# Patient Record
Sex: FEMALE | Race: WHITE | HISPANIC OR LATINO | Employment: UNEMPLOYED | URBAN - METROPOLITAN AREA
[De-identification: names, ages, dates, MRNs, and addresses within clinical notes are randomized per-mention and may not be internally consistent; named-entity substitution may affect disease eponyms.]

---

## 2017-01-10 ENCOUNTER — OFFICE VISIT (OUTPATIENT)
Dept: PSYCHIATRY | Facility: CLINIC | Age: 47
End: 2017-01-10
Payer: COMMERCIAL

## 2017-01-10 VITALS
HEIGHT: 62 IN | DIASTOLIC BLOOD PRESSURE: 91 MMHG | HEART RATE: 67 BPM | WEIGHT: 174 LBS | SYSTOLIC BLOOD PRESSURE: 143 MMHG | BODY MASS INDEX: 32.02 KG/M2

## 2017-01-10 DIAGNOSIS — F40.298 SPECIFIC PHOBIA: ICD-10-CM

## 2017-01-10 DIAGNOSIS — F42.9 OBSESSIVE-COMPULSIVE DISORDER, UNSPECIFIED TYPE: ICD-10-CM

## 2017-01-10 DIAGNOSIS — F33.2 MDD (MAJOR DEPRESSIVE DISORDER), RECURRENT SEVERE, WITHOUT PSYCHOSIS: Primary | ICD-10-CM

## 2017-01-10 DIAGNOSIS — F41.1 GENERALIZED ANXIETY DISORDER: ICD-10-CM

## 2017-01-10 DIAGNOSIS — F41.1 GAD (GENERALIZED ANXIETY DISORDER): ICD-10-CM

## 2017-01-10 DIAGNOSIS — G47.33 OSA (OBSTRUCTIVE SLEEP APNEA): ICD-10-CM

## 2017-01-10 DIAGNOSIS — F34.1 DYSTHYMIC DISORDER: ICD-10-CM

## 2017-01-10 PROCEDURE — 3080F DIAST BP >= 90 MM HG: CPT | Mod: S$GLB,,, | Performed by: PSYCHIATRY & NEUROLOGY

## 2017-01-10 PROCEDURE — 3077F SYST BP >= 140 MM HG: CPT | Mod: S$GLB,,, | Performed by: PSYCHIATRY & NEUROLOGY

## 2017-01-10 PROCEDURE — 99213 OFFICE O/P EST LOW 20 MIN: CPT | Mod: S$GLB,,, | Performed by: PSYCHIATRY & NEUROLOGY

## 2017-01-10 PROCEDURE — 90833 PSYTX W PT W E/M 30 MIN: CPT | Mod: S$GLB,,, | Performed by: PSYCHIATRY & NEUROLOGY

## 2017-01-10 PROCEDURE — 1159F MED LIST DOCD IN RCRD: CPT | Mod: S$GLB,,, | Performed by: PSYCHIATRY & NEUROLOGY

## 2017-01-10 PROCEDURE — 99999 PR PBB SHADOW E&M-EST. PATIENT-LVL III: CPT | Mod: PBBFAC,,, | Performed by: PSYCHIATRY & NEUROLOGY

## 2017-01-10 RX ORDER — CLONAZEPAM 0.5 MG/1
0.5 TABLET, ORALLY DISINTEGRATING ORAL 4 TIMES DAILY
Qty: 120 TABLET | Refills: 5 | Status: SHIPPED | OUTPATIENT
Start: 2017-01-10 | End: 2017-04-10 | Stop reason: SDUPTHER

## 2017-01-10 RX ORDER — VENLAFAXINE HYDROCHLORIDE 37.5 MG/1
37.5 CAPSULE, EXTENDED RELEASE ORAL DAILY
Qty: 30 CAPSULE | Refills: 0 | Status: SHIPPED | OUTPATIENT
Start: 2017-01-10 | End: 2017-04-10 | Stop reason: DRUGHIGH

## 2017-01-10 RX ORDER — VENLAFAXINE HYDROCHLORIDE 150 MG/1
150 CAPSULE, EXTENDED RELEASE ORAL DAILY
Qty: 30 CAPSULE | Refills: 3 | Status: SHIPPED | OUTPATIENT
Start: 2017-01-10 | End: 2017-07-06 | Stop reason: SDUPTHER
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
## 2017-01-10 NOTE — PATIENT INSTRUCTIONS
Increase Effexor XR to 112.5 mg daily by taking one 75 mg capsule and one 37.5 mg capsule in the AM.  Take this dose for 2 weeks, then increase dose to 150 mg once a day in AM.     Continue all other current medications as you have been taking.

## 2017-01-24 ENCOUNTER — DOCUMENTATION ONLY (OUTPATIENT)
Dept: NEUROLOGY | Facility: CLINIC | Age: 47
End: 2017-01-24

## 2017-01-24 NOTE — PROGRESS NOTES
Fax received from [employer]. Avonex pen PA is approved through 1/16/22 with reference number [id number].

## 2017-02-27 ENCOUNTER — HOSPITAL ENCOUNTER (EMERGENCY)
Facility: HOSPITAL | Age: 47
Discharge: HOME | End: 2017-02-27
Attending: EMERGENCY MEDICINE
Payer: COMMERCIAL

## 2017-02-27 VITALS
TEMPERATURE: 98 F | DIASTOLIC BLOOD PRESSURE: 78 MMHG | RESPIRATION RATE: 14 BRPM | WEIGHT: 173 LBS | SYSTOLIC BLOOD PRESSURE: 120 MMHG | HEART RATE: 67 BPM | HEIGHT: 62 IN | OXYGEN SATURATION: 98 % | BODY MASS INDEX: 31.83 KG/M2

## 2017-02-27 DIAGNOSIS — G35 MULTIPLE SCLEROSIS EXACERBATION: Primary | ICD-10-CM

## 2017-02-27 DIAGNOSIS — R53.83 FATIGUE, UNSPECIFIED TYPE: ICD-10-CM

## 2017-02-27 DIAGNOSIS — R07.9 CHEST PAIN, UNSPECIFIED TYPE: ICD-10-CM

## 2017-02-27 LAB
ALBUMIN SERPL BCP-MCNC: 4.2 G/DL
ALP SERPL-CCNC: 80 U/L
ALT SERPL W/O P-5'-P-CCNC: 55 U/L
ANION GAP SERPL CALC-SCNC: 13 MMOL/L
AST SERPL-CCNC: 28 U/L
B-HCG UR QL: NEGATIVE
BASOPHILS # BLD AUTO: 0.02 K/UL
BASOPHILS NFR BLD: 0.3 %
BILIRUB SERPL-MCNC: 0.3 MG/DL
BILIRUB UR QL STRIP: NEGATIVE
BUN SERPL-MCNC: 11 MG/DL
CALCIUM SERPL-MCNC: 10.1 MG/DL
CHLORIDE SERPL-SCNC: 104 MMOL/L
CLARITY UR: CLEAR
CO2 SERPL-SCNC: 23 MMOL/L
COLOR UR: YELLOW
CREAT SERPL-MCNC: 0.7 MG/DL
CTP QC/QA: YES
D DIMER PPP IA.FEU-MCNC: 0.31 MG/L FEU
DIFFERENTIAL METHOD: ABNORMAL
EOSINOPHIL # BLD AUTO: 0.1 K/UL
EOSINOPHIL NFR BLD: 1.6 %
ERYTHROCYTE [DISTWIDTH] IN BLOOD BY AUTOMATED COUNT: 11.9 %
EST. GFR  (AFRICAN AMERICAN): >60 ML/MIN/1.73 M^2
EST. GFR  (NON AFRICAN AMERICAN): >60 ML/MIN/1.73 M^2
FLUAV AG SPEC QL IA: NEGATIVE
FLUBV AG SPEC QL IA: NEGATIVE
GLUCOSE SERPL-MCNC: 88 MG/DL
GLUCOSE UR QL STRIP: NEGATIVE
HCT VFR BLD AUTO: 41.9 %
HGB BLD-MCNC: 13.9 G/DL
HGB UR QL STRIP: NEGATIVE
KETONES UR QL STRIP: NEGATIVE
LEUKOCYTE ESTERASE UR QL STRIP: ABNORMAL
LYMPHOCYTES # BLD AUTO: 4.6 K/UL
LYMPHOCYTES NFR BLD: 63.3 %
MCH RBC QN AUTO: 32 PG
MCHC RBC AUTO-ENTMCNC: 33.2 %
MCV RBC AUTO: 96 FL
MICROSCOPIC COMMENT: NORMAL
MONOCYTES # BLD AUTO: 0.3 K/UL
MONOCYTES NFR BLD: 4.6 %
NEUTROPHILS # BLD AUTO: 2.2 K/UL
NEUTROPHILS NFR BLD: 30.2 %
NITRITE UR QL STRIP: NEGATIVE
PH UR STRIP: 7 [PH] (ref 5–8)
PLATELET # BLD AUTO: 251 K/UL
PMV BLD AUTO: 9.9 FL
POTASSIUM SERPL-SCNC: 3.9 MMOL/L
PROT SERPL-MCNC: 8 G/DL
PROT UR QL STRIP: NEGATIVE
RBC # BLD AUTO: 4.35 M/UL
SODIUM SERPL-SCNC: 140 MMOL/L
SP GR UR STRIP: 1.01 (ref 1–1.03)
SPECIMEN SOURCE: NORMAL
TROPONIN I SERPL DL<=0.01 NG/ML-MCNC: 0.01 NG/ML
URN SPEC COLLECT METH UR: ABNORMAL
UROBILINOGEN UR STRIP-ACNC: NEGATIVE EU/DL
WBC # BLD AUTO: 7.33 K/UL
WBC #/AREA URNS HPF: 3 /HPF (ref 0–5)

## 2017-02-27 PROCEDURE — 93005 ELECTROCARDIOGRAM TRACING: CPT

## 2017-02-27 PROCEDURE — 81000 URINALYSIS NONAUTO W/SCOPE: CPT

## 2017-02-27 PROCEDURE — 85025 COMPLETE CBC W/AUTO DIFF WBC: CPT

## 2017-02-27 PROCEDURE — 99284 EMERGENCY DEPT VISIT MOD MDM: CPT | Mod: 25

## 2017-02-27 PROCEDURE — 63600175 PHARM REV CODE 636 W HCPCS: Performed by: EMERGENCY MEDICINE

## 2017-02-27 PROCEDURE — 84484 ASSAY OF TROPONIN QUANT: CPT

## 2017-02-27 PROCEDURE — 80053 COMPREHEN METABOLIC PANEL: CPT

## 2017-02-27 PROCEDURE — 85379 FIBRIN DEGRADATION QUANT: CPT

## 2017-02-27 PROCEDURE — 81025 URINE PREGNANCY TEST: CPT

## 2017-02-27 PROCEDURE — 87400 INFLUENZA A/B EACH AG IA: CPT

## 2017-02-27 RX ORDER — PREDNISONE 20 MG/1
60 TABLET ORAL
Status: COMPLETED | OUTPATIENT
Start: 2017-02-27 | End: 2017-02-27

## 2017-02-27 RX ORDER — PREDNISONE 20 MG/1
40 TABLET ORAL DAILY
Qty: 8 TABLET | Refills: 0 | Status: SHIPPED | OUTPATIENT
Start: 2017-02-27 | End: 2017-03-04

## 2017-02-27 RX ADMIN — PREDNISONE 60 MG: 20 TABLET ORAL at 10:02

## 2017-02-27 NOTE — ED AVS SNAPSHOT
[facility] [address]  [place] [address]               Jillian Osullivan   2017  8:16 PM   ED    Description:  Female : 1970   Department:  [facility]           Your Care was Coordinated By:     Provider Role From To    Nancy Bradshaw MD Attending Provider 17 --      Reason for Visit     Generalized Body Aches           Diagnoses this Visit        Comments    Multiple sclerosis exacerbation    -  Primary     Chest pain, unspecified type         Fatigue, unspecified type           ED Disposition     None           To Do List           Follow-up Information     Follow up with Erik Berrios MD.    Specialty:  Family Medicine    Why:  As needed    Contact information:    [address]  [address]  [place] [address] [phone]         These Medications        Disp Refills Start End    predniSONE (DELTASONE) 20 MG tablet 8 tablet 0 2017 3/4/2017    Take 2 tablets (40 mg total) by mouth once daily. - Oral    Pharmacy: [facility] [address] [place] [address] [place] [facility] AT [facility] Ph #: [phone]         [facility]     [facility] Care Line -  Assistance  Registered nurses in the [facility] provide clinical advisement, health education, appointment booking, and other advisory services.  Call for this free service at [phone].             Medications           Message regarding Medications     Verify the changes and/or additions to your medication regime listed below are the same as discussed with your clinician today.  If any of these changes or additions are incorrect, please notify your healthcare provider.        START taking these NEW medications        Refills    predniSONE (DELTASONE) 20 MG tablet 0    Sig: Take 2 tablets (40 mg total) by mouth once daily.    Class: Print    Route: Oral      These medications were administered today        Dose  Freq    predniSONE tablet 60 mg 60 mg ED 1 Time    Sig: Take 3 tablets (60 mg total) by mouth ED 1 Time.    Class: Normal    Route: Oral           Verify that the below list of medications is an accurate representation of the medications you are currently taking.  If none reported, the list may be blank. If incorrect, please contact your healthcare provider. Carry this list with you in case of emergency.           Current Medications     busPIRone (BUSPAR) 15 MG tablet Take 1 tablet (15 mg total) by mouth 2 (two) times daily.    CALCIUM CARBONATE/VITAMIN D3 (VITAMIN D-3 ORAL) Take 5,000 Units/day by mouth once daily.     CALCIUM-MAGNESIUM-ZINC ORAL Take by mouth once daily. Calcium-1,000 mg  Magnesium-500 mg  Zinc-25mg    clindamycin (CLEOCIN) 300 MG capsule Take 1 capsule by mouth 3 (three) times daily.    clindamycin phosphate 1% (CLINDAGEL) 1 % gel     clobetasol (CLOBEX) 0.05 % shampoo     clonazepam (KLONOPIN) 0.5 MG disintegrating tablet Take 1 tablet (0.5 mg total) by mouth 4 (four) times daily.    coenzyme Q10 (CO Q-10) 300 mg Cap Take 1 capsule by mouth once daily. 400mg    cyanocobalamin 1,000 mcg/mL injection Inject 1,000 mLs into the muscle every 30 days.     cyproheptadine (,PERIACTIN,) 2 mg/5 mL syrup Take by mouth 2 (two) times daily.    diazepam (VALIUM) 10 MG Tab Take 1 tablet (10 mg total) by mouth daily as needed (sleep).    E AC/LINOLEIC/GAMOL AC/MARISELA (BORAGE OIL-GLA-LINOLEIC ACID ORAL) Take 1 tablet by mouth once daily.    estradiol 0.05 mg/24 hr td ptsw (VIVELLE-DOT) 0.05 mg/24 hr 1 patch. Apply twice weekly    FLAXSEED OIL ORAL Take by mouth once daily. Omega 3 2800MG    interferon beta-1a (AVONEX) 30 mcg/0.5 mL PnKt Inject 30 mcg as directed once a week.    L. RHAMNOSUS GG/INULIN (CULTURELLE PROBIOTICS ORAL) Take by mouth once daily. Ultimate Jo-Ann 15 Billion Probiotic    levothyroxine (SYNTHROID) 100 MCG tablet Take 1 tablet by mouth once daily.    linaclotide 290 mcg Cap Take 290 mcg by  mouth once daily.    melatonin 5 mg Cap Take by mouth once daily.    metformin (GLUCOPHAGE-XR) 500 MG 24 hr tablet Take 1 tablet by mouth once daily.    MINERALS ORAL Take 1 tablet by mouth once daily. New Vision Essential Minerals    omega 3-dha-epa-fish oil 1,000 (120-180) mg Cap Take 1 capsule by mouth once daily.    ONETOUCH DELICA LANCETS 33 gauge Misc     ONETOUCH ULTRA TEST Strp     ONETOUCH ULTRA2 kit     predniSONE (DELTASONE) 20 MG tablet Take 2 tablets (40 mg total) by mouth once daily.    predniSONE tablet 60 mg Take 3 tablets (60 mg total) by mouth ED 1 Time.    progesterone (PROMETRIUM) 100 MG capsule Take 2 capsules by mouth once daily.    psyllium (METAMUCIL) powder Take 1 packet by mouth once daily.    ramelteon (ROZEREM) 8 mg tablet Take 1 tablet (8 mg total) by mouth nightly.    trazodone (DESYREL) 100 MG tablet Take 1 - 2 tablets oral at bedtime for sleep    triamcinolone acetonide 0.1% (KENALOG) 0.1 % cream Apply 1 application topically 2 (two) times daily.    TURMERIC (CURCUMIN MISC) Take by mouth once daily. Super Bio-Curcumin 400 mg    UNABLE TO FIND Take by mouth 2 (two) times daily. Multigenics without Iron    UNABLE TO FIND Take 100 g by mouth once daily. medication name: D-Ribose    venlafaxine (EFFEXOR-XR) 150 MG Cp24 Take 1 capsule (150 mg total) by mouth once daily.    venlafaxine (EFFEXOR-XR) 37.5 MG 24 hr capsule Take 1 capsule (37.5 mg total) by mouth once daily.    venlafaxine (EFFEXOR-XR) 75 MG 24 hr capsule Take 1 capsule (75 mg total) by mouth once daily.           Clinical Reference Information           Your Vitals Were     BP                   129/97           Allergies as of 2/27/2017        Reactions    Copaxone [Glatiramer (Copolymer 1)] Dermatitis    Vicodin [Hydrocodone-acetaminophen] Rash    Aspirin     Other reaction(s): Rash    Penicillins     Other reaction(s): Rash  Other reaction(s): Rash    Sulfamethoxazole-trimethoprim     Other reaction(s): Rash    Vancomycin  Nausea And Vomiting    Percocet [Oxycodone-acetaminophen] Rash      Immunizations Administered on Date of Encounter - 2/27/2017     None      ED Micro, Lab, POCT     Start Ordered       Status Ordering Provider    02/27/17 2111 02/27/17 2110  CBC auto differential  STAT      Final result     02/27/17 2111 02/27/17 2110  Comprehensive metabolic panel  STAT      Final result     02/27/17 2111 02/27/17 2110  Troponin I  STAT      Final result     02/27/17 2111 02/27/17 2110  D dimer, quantitative  STAT      Final result     02/27/17 2033 02/27/17 2032  Influenza antigen Nasopharyngeal Swab  STAT      Final result     02/27/17 1847 02/27/17 1846  Urinalysis  STAT      Final result     02/27/17 1846 02/27/17 1846  Urinalysis Microscopic  Once      Final result     02/27/17 0000 02/27/17 1945  POCT urine pregnancy     Comments:  This order was created through External Result Entry    Completed       ED Imaging Orders     Start Ordered       Status Ordering Provider    02/27/17 2033 02/27/17 2032  X-Ray Chest PA And Lateral  1 time imaging      Final result         Discharge Instructions       Take steroids as directed.  Rest.  Drink plenty of fluids.  See your doctor if you are not improving in 2-3 days.        Your Scheduled Appointments     Mar 10, 2017 10:40 AM CST   New Patient with Manny Navarro MD   [facility]    [address]  [facility] [address]   [phone]            Jun 26, 2017  9:40 AM CDT   Established Patient Visit with MD Reynlod Gunderson- Multiple Sclerosis ([address] )    [address]  [facility] [address]   [phone]               [facility] complies with applicable Federal civil rights laws and does not discriminate on the basis of race, color, national origin, age, disability, or sex.        Language Assistance Services     ATTENTION: Language assistance services are available, free of charge. Please call  [phone].      ATENCIÓN: Si habla español, tiene a matthews disposición servicios gratuitos de asistencia lingüística. Llame al [phone].     CHÚ Ý: N?u b?n nói Ti?ng Vi?t, có các d?ch v? h? tr? ngôn ng? mi?n phí dành cho b?n. G?i s? [phone].

## 2017-02-28 NOTE — ED NOTES
Patient identifiers for Rosarito T [place] checked and correct.  LOC: The patient is awake, alert and aware of environment with an appropriate affect, the patient is oriented x 3 and speaking appropriately.  APPEARANCE: Patient uncomfortable and in no acute distress, patient is clean and well groomed, patient's clothing are properly fastened.  SKIN: The skin is warm and dry, patient has normal skin turgor and moist mucus membrane..  MUSKULOSKELETAL: Patient moving all extremities well, no obvious swelling or deformities noted.  RESPIRATORY: Airway is open and patent, respirations are spontaneous, patient has a normal effort and rate.  CARDIAC: Patient has a normal rate and rhythm.

## 2017-02-28 NOTE — DISCHARGE INSTRUCTIONS
Take steroids as directed.  Rest.  Drink plenty of fluids.  See your doctor if you are not improving in 2-3 days.

## 2017-02-28 NOTE — ED PROVIDER NOTES
Encounter Date: 2/27/2017       History     Chief Complaint   Patient presents with    Generalized Body Aches     pt reports generalized body aches x 2 week with intermittent SOB.      Review of patient's allergies indicates:   Allergen Reactions    Copaxone [glatiramer (copolymer 1)] Dermatitis    Vicodin [hydrocodone-acetaminophen] Rash    Aspirin      Other reaction(s): Rash    Penicillins      Other reaction(s): Rash  Other reaction(s): Rash    Sulfamethoxazole-trimethoprim      Other reaction(s): Rash    Vancomycin Nausea And Vomiting    Percocet [oxycodone-acetaminophen] Rash     HPI Comments: 46F with MS presents with fatigue x 2 weeks and chest pain that started today.  She reports no energy x 2 weeks, much worse today.  While she was riding the exercise bike today she felt like she was going to fall off.  Later, around 1400, she developed a pressure type pain in the center of her chest.  Pain has been constant since onset with no associated symptoms and no modifying factors. Pain is currently 7/10.    The history is provided by the patient.     Past Medical History:   Diagnosis Date    Endometriosis, site unspecified     H/O total hysterectomy     Hidradenitis     History of laparoscopy     History of psychiatric care     Multiple sclerosis     Panic anxiety syndrome     Therapy      Past Surgical History:   Procedure Laterality Date    APPENDECTOMY      breast reduction      HYSTERECTOMY      WA EXPLORATORY OF ABDOMEN      2002    sweat gland removal  10/2013    rt groin     Family History   Problem Relation Age of Onset    Anxiety disorder Mother     Cancer Mother      cervix    Breast cancer Neg Hx     Colon cancer Neg Hx     Ovarian cancer Neg Hx      Social History   Substance Use Topics    Smoking status: Never Smoker    Smokeless tobacco: Never Used    Alcohol use No     Review of Systems   Constitutional: Positive for fatigue. Negative for diaphoresis.   Respiratory:  Positive for shortness of breath.    Cardiovascular: Positive for chest pain. Negative for palpitations.   Gastrointestinal: Negative for abdominal pain, nausea and vomiting.   Neurological: Negative for syncope.   All other systems reviewed and are negative.      Physical Exam   Initial Vitals   BP Pulse Resp Temp SpO2   02/27/17 1842 02/27/17 1842 02/27/17 1842 02/27/17 1842 02/27/17 1842   178/111 75 18 97.9 °F (36.6 °C) 99 %     Physical Exam    Nursing note and vitals reviewed.  Constitutional: She appears well-developed and well-nourished. No distress.   HENT:   Head: Normocephalic and atraumatic.   Mouth/Throat: Oropharynx is clear and moist.   Eyes: Conjunctivae are normal.   Neck: Normal range of motion.   Cardiovascular: Normal rate, regular rhythm, normal heart sounds and intact distal pulses.   No murmur heard.  Pulmonary/Chest: Breath sounds normal. She has no wheezes. She has no rhonchi. She has no rales.   Abdominal: Soft. Bowel sounds are normal. She exhibits no distension. There is no tenderness.   Musculoskeletal: Normal range of motion. She exhibits no edema or tenderness.   Neurological: She is alert and oriented to person, place, and time. She has normal strength.   Skin: Skin is warm and dry. No pallor.   Psychiatric: She has a normal mood and affect. Her behavior is normal.         ED Course   Procedures  Labs Reviewed   URINALYSIS - Abnormal; Notable for the following:        Result Value    Leukocytes, UA Trace (*)     All other components within normal limits   CBC W/ AUTO DIFFERENTIAL - Abnormal; Notable for the following:     MCH 32.0 (*)     Gran% 30.2 (*)     Lymph% 63.3 (*)     All other components within normal limits   COMPREHENSIVE METABOLIC PANEL - Abnormal; Notable for the following:     ALT 55 (*)     All other components within normal limits   URINALYSIS MICROSCOPIC   INFLUENZA A AND B ANTIGEN   TROPONIN I   D DIMER, QUANTITATIVE     EKG Readings: (Independently Interpreted)    Initial Reading: No STEMI. Rhythm: Normal Sinus Rhythm. Heart Rate: 76. Ectopy: No Ectopy. ST Segments: Normal ST Segments. T Waves: Normal. Clinical Impression: Normal Sinus Rhythm       X-Rays:   Independently Interpreted Readings:   Other Readings:  CXR - no acute abnormalities    Medical Decision Making:   Independently Interpreted Test(s):   I have ordered and independently interpreted X-rays - see prior notes.  I have ordered and independently interpreted EKG Reading(s) - see prior notes  Clinical Tests:   Lab Tests: Ordered and Reviewed  Radiological Study: Ordered and Reviewed  Medical Tests: Reviewed and Ordered  ED Management:  Fatigue, gen weakness, now with CP x 5 hours.  NO STEMI or other acute ischemic changes.  Normal troponin and CXR.  No anemia or dehydration.  No signs of infection.  I feel this is likely MS flare.  I will treat with short steroid course and have pt follow up with PCP.                   ED Course     Clinical Impression:   The primary encounter diagnosis was Multiple sclerosis exacerbation. Diagnoses of Chest pain, unspecified type and Fatigue, unspecified type were also pertinent to this visit.          Nancy Bradshaw MD  03/05/17 8946

## 2017-03-10 ENCOUNTER — OFFICE VISIT (OUTPATIENT)
Dept: SLEEP MEDICINE | Facility: CLINIC | Age: 47
End: 2017-03-10
Attending: PSYCHIATRY & NEUROLOGY
Payer: COMMERCIAL

## 2017-03-10 VITALS
SYSTOLIC BLOOD PRESSURE: 124 MMHG | BODY MASS INDEX: 32.46 KG/M2 | DIASTOLIC BLOOD PRESSURE: 87 MMHG | HEIGHT: 62 IN | WEIGHT: 176.38 LBS | HEART RATE: 71 BPM

## 2017-03-10 DIAGNOSIS — G25.81 RLS (RESTLESS LEGS SYNDROME): Primary | ICD-10-CM

## 2017-03-10 DIAGNOSIS — G47.9 SLEEP DISTURBANCES: ICD-10-CM

## 2017-03-10 DIAGNOSIS — F41.1 GENERALIZED ANXIETY DISORDER: ICD-10-CM

## 2017-03-10 DIAGNOSIS — G47.33 OSA (OBSTRUCTIVE SLEEP APNEA): ICD-10-CM

## 2017-03-10 PROCEDURE — 1160F RVW MEDS BY RX/DR IN RCRD: CPT | Mod: S$GLB,,, | Performed by: PSYCHIATRY & NEUROLOGY

## 2017-03-10 PROCEDURE — 3079F DIAST BP 80-89 MM HG: CPT | Mod: S$GLB,,, | Performed by: PSYCHIATRY & NEUROLOGY

## 2017-03-10 PROCEDURE — 99999 PR PBB SHADOW E&M-EST. PATIENT-LVL III: CPT | Mod: PBBFAC,,, | Performed by: PSYCHIATRY & NEUROLOGY

## 2017-03-10 PROCEDURE — 3074F SYST BP LT 130 MM HG: CPT | Mod: S$GLB,,, | Performed by: PSYCHIATRY & NEUROLOGY

## 2017-03-10 PROCEDURE — 99204 OFFICE O/P NEW MOD 45 MIN: CPT | Mod: S$GLB,,, | Performed by: PSYCHIATRY & NEUROLOGY

## 2017-03-10 RX ORDER — GABAPENTIN 300 MG/1
300 CAPSULE ORAL NIGHTLY
Qty: 30 CAPSULE | Refills: 0 | Status: SHIPPED | OUTPATIENT
Start: 2017-03-10 | End: 2017-04-10 | Stop reason: SDUPTHER

## 2017-03-10 RX ORDER — LIOTHYRONINE SODIUM 5 UG/1
TABLET ORAL
Refills: 5 | COMMUNITY
Start: 2017-02-02 | End: 2017-04-10

## 2017-03-10 RX ORDER — ESTRADIOL 0.04 MG/D
1 FILM, EXTENDED RELEASE TRANSDERMAL
Refills: 5 | COMMUNITY
Start: 2017-03-02 | End: 2022-04-06

## 2017-03-10 NOTE — LETTER
March 10, 2017      Mer Yates MD  [address]  [facility] [address]           [facility]  [address]  [facility] [address]  Phone: [phone]          Patient: Jillian sOullivan   MR Number: [id number]   YOB: 1970   Date of Visit: 3/10/2017       Dear Dr. Mer Yates:    Thank you for referring Jillian Osullivan to me for evaluation. Attached you will find relevant portions of my assessment and plan of care.    If you have questions, please do not hesitate to call me. I look forward to following Jillian Osullivan along with you.    Sincerely,    Manny Navarro MD    Enclosure  CC:  No Recipients    If you would like to receive this communication electronically, please contact [email or web address] or [phone] to request more information on [email or web address] Link access.    For providers and/or their staff who would like to refer a patient to [facility], please contact us through our one-stop-shop provider referral line, [facility], at [phone].    If you feel you have received this communication in error or would no longer like to receive these types of communications, please e-mail [email or web address]

## 2017-03-10 NOTE — PROGRESS NOTES
"This 46 y.o. female patient presents for the management of obstructive sleep apnea. Per RYLEY Crow "Using CPAP but unable to wear mask--using nasal canual. Not waking up feeling rested."    BASELINE PSG 7/18/16: +CHRIS, AHI 16.4, low sat 91%, wt 168 lbs  TITRATION 9/20/16: Titrated to effective pressure 7 cm, wt. 168 lbs    ESS = 13    She is using CPAP nightly, but not feeling that her sleep is any better.  Feeling exhausted.    She continues to have difficulties with anxiety and insomnia.  Takes Buspar, clonazepam (4 times a day), Effexor - managed by Dr. Montiel    MS symptoms: initially eyesight; ongoing issues with joints/legs    Often feels need to move her legs at night; Urge; Moving constantly; Using adjustable bed;  Mostly in the evenings and around bed time.    For sleep, she has tried Rozeram, Trazodone 100 mg (it worked and then stopped working)    She is currently taking melatonin 5 mg    SLEEP ROUTINE:   Time to bed: 9-9:30 pm   Sleep onset latency: a while   Disruptions or awakenings: 3-4   Wakeup time: 6 am   Perceived sleep quality: poor   Daytime naps: every few days      Past Medical History:   Diagnosis Date    Endometriosis, site unspecified     H/O total hysterectomy     Hidradenitis     History of laparoscopy     History of psychiatric care     Multiple sclerosis     Panic anxiety syndrome     Therapy        Past Surgical History:   Procedure Laterality Date    APPENDECTOMY      breast reduction      HYSTERECTOMY      WA EXPLORATORY OF ABDOMEN      2002    sweat gland removal  10/2013    rt groin       Family History   Problem Relation Age of Onset    Anxiety disorder Mother     Cancer Mother      cervix    Breast cancer Neg Hx     Colon cancer Neg Hx     Ovarian cancer Neg Hx        Social History   Substance Use Topics    Smoking status: Never Smoker    Smokeless tobacco: Never Used    Alcohol use No       ALLERGIES: Reviewed in EPIC    CURRENT MEDICATIONS: Reviewed in " "EPIC    REVIEW OF SYSTEMS:  Sleep related symptoms as per HPI;    Denies weight gain;    Denies sinus problems;    Denies dyspnea;    Positive palpitations;    Denies acid reflux;    Denies polyuria;    Denies headaches;    Positive mood disturbance;    Denies anemia;    Otherwise, a balance of systems reviewed is negative.    PHYSICAL EXAM:  /87 (BP Location: Right arm, Patient Position: Sitting, BP Method: Automatic)  Pulse 71  Ht 5' 2" (1.575 m)  Wt 80 kg (176 lb 5.9 oz)  BMI 32.26 kg/m2  GENERAL: Well groomed; Normally developed;  NEURO: Oriented to time, place and person.    Normal attention span and concentration.  Station normal. Gait normal.  PSYCH: Affect is full. Mood is normal.    I spent greater than 25 minutes during this 45 minute visit in counseling the patient regarding sleep study results, CHRIS etiology, ramifications, and treatment options. Sources of fatigue, anxiety and insomnia relationships.      ASSESSMENT:    1. Obstructive Sleep Apnea, moderate severity. CHRIS appears to be very well controlled on CPAP. She is using an effective setting, determined by titration sleep study. She is demonstrating good adherence, 5.7 hours/nightly over 30-day average. Some oral leaks, but probably not enough to limit effectiveness. She will trial a chin strap to see if oral drying improves.    2. Sleep disturbances/Insomnia - underlying  appears to be anxiety/depression. Recently to ER for elevated blood pressure/chest pain; She feels like anxiety is getting worse, despite medical management. Cause for anxiety unclear - also multi-factorial, being managed by Dr. Montiel. She has implicated MS or Avonex as possible root cause, since she did not have any anxiety at all prior to her MS diagnosis and treatment. She does lament than trying to change MS related medications in the past has been challenging.    3. Restless legs feelings at night / akithesia - in some cases PLMS can be potentiated by " Effexor; No evidence of PLMS on sleep study; RLS can occur just as a feeling (without acutal limb kicks) that can contribute to night time anxiety and insomnia.    4. Fatigue - multi-factorial; certainly MS, medication, and depression can be playing a role; CHRIS is treated and an effective pressure has been determined.         PLAN:    1. With great reluctanance (due to concerns of polypharmacy), I am recommending gabapentin 300 mg at bedtime to help settle RLS/akithesia and anxiety feelings. If no perceived benefit, she should stop right away. She understands. Safe use and potential side effects discussed.    2. Continue CPAP at 7 cm; trial with chin strap. The potential benefits were discussed. She received copies of her sleep studies.    Precautions: The patient was advised to abstain from driving should they feel sleepy or drowsy.    Follow up: one month. MD/NP

## 2017-03-10 NOTE — MR AVS SNAPSHOT
[facility]  [address]  [facility] [address]  Phone: [phone]                  Jillian Osullivan   3/10/2017 10:40 AM   Office Visit    Description:  Female : 1970   Provider:  Manny Navarro MD   Department:  [facility]           Reason for Visit     Sleep Apnea           Diagnoses this Visit        Comments    RLS (restless legs syndrome)    -  Primary     CHRIS (obstructive sleep apnea)         Sleep disturbances         Generalized anxiety disorder                To Do List           Future Appointments        Provider Department Dept Phone    2017 9:00 AM Manny Navarro MD [facility] [phone]    2017 9:40 AM Mer Yates MD [facility] Multiple Sclerosis [phone]      Goals (5 Years of Data)     None      Follow-Up and Disposition     Return in about 1 month (around 4/10/2017).    Follow-up and Disposition History       These Medications        Disp Refills Start End    gabapentin (NEURONTIN) 300 MG capsule 30 capsule 0 3/10/2017 3/10/2018    Take 1 capsule (300 mg total) by mouth every evening. - Oral    [facility] [address] [place] [address] AT [facility] [address] Ph #: [phone]         [facility] On Call     [facility] On Call Nurse Care Line -  Assistance  Registered nurses in the [facility] On Call Center provide clinical advisement, health education, appointment booking, and other advisory services.  Call for this free service at [phone].             Medications           Message regarding Medications     Verify the changes and/or additions to your medication regime listed below are the same as discussed with your clinician today.  If any of these changes or additions are incorrect, please notify your healthcare provider.        START taking these NEW medications        Refills    gabapentin (NEURONTIN) 300 MG capsule 0    Sig: Take 1 capsule (300 mg total) by  mouth every evening.    Class: Normal    Route: Oral      STOP taking these medications     clindamycin (CLEOCIN) 300 MG capsule Take 1 capsule by mouth 3 (three) times daily.    clindamycin phosphate 1% (CLINDAGEL) 1 % gel            Verify that the below list of medications is an accurate representation of the medications you are currently taking.  If none reported, the list may be blank. If incorrect, please contact your healthcare provider. Carry this list with you in case of emergency.           Current Medications     busPIRone (BUSPAR) 15 MG tablet Take 1 tablet (15 mg total) by mouth 2 (two) times daily.    CALCIUM CARBONATE/VITAMIN D3 (VITAMIN D-3 ORAL) Take 5,000 Units/day by mouth once daily.     CALCIUM-MAGNESIUM-ZINC ORAL Take by mouth once daily. Calcium-1,000 mg  Magnesium-500 mg  Zinc-25mg    clonazepam (KLONOPIN) 0.5 MG disintegrating tablet Take 1 tablet (0.5 mg total) by mouth 4 (four) times daily.    coenzyme Q10 (CO Q-10) 300 mg Cap Take 1 capsule by mouth once daily. 400mg    cyanocobalamin 1,000 mcg/mL injection Inject 1,000 mLs into the muscle every 30 days.     cyproheptadine (,PERIACTIN,) 2 mg/5 mL syrup Take by mouth 2 (two) times daily.    diazepam (VALIUM) 10 MG Tab Take 1 tablet (10 mg total) by mouth daily as needed (sleep).    estradiol (VIVELLE-DOT) 0.0375 mg/24 hr 1 patch twice a week.    estradiol 0.05 mg/24 hr td ptsw (VIVELLE-DOT) 0.05 mg/24 hr 1 patch. Apply twice weekly    FLAXSEED OIL ORAL Take by mouth once daily. Omega 3 2800MG    interferon beta-1a (AVONEX) 30 mcg/0.5 mL PnKt Inject 30 mcg as directed once a week.    L. RHAMNOSUS GG/INULIN (CULTURELLE PROBIOTICS ORAL) Take by mouth once daily. Ultimate Jo-Ann 15 Billion Probiotic    levothyroxine (SYNTHROID) 100 MCG tablet Take 1 tablet by mouth once daily.    liothyronine (CYTOMEL) 5 MCG Tab TK 1 T PO  QD    melatonin 5 mg Cap Take by mouth once daily.    metformin (GLUCOPHAGE-XR) 500 MG 24 hr tablet Take 1 tablet by mouth  "once daily.    MINERALS ORAL Take 1 tablet by mouth once daily. New Vision Essential Minerals    omega 3-dha-epa-fish oil 1,000 (120-180) mg Cap Take 1 capsule by mouth once daily.    psyllium (METAMUCIL) powder Take 1 packet by mouth once daily.    trazodone (DESYREL) 100 MG tablet Take 1 - 2 tablets oral at bedtime for sleep    TURMERIC (CURCUMIN MISC) Take by mouth once daily. Super Bio-Curcumin 400 mg    UNABLE TO FIND Take by mouth 2 (two) times daily. Multigenics without Iron    UNABLE TO FIND Take 100 g by mouth once daily. medication name: D-Ribose    venlafaxine (EFFEXOR-XR) 150 MG Cp24 Take 1 capsule (150 mg total) by mouth once daily.    clobetasol (CLOBEX) 0.05 % shampoo     E AC/LINOLEIC/GAMOL AC/MARISELA (BORAGE OIL-GLA-LINOLEIC ACID ORAL) Take 1 tablet by mouth once daily.    gabapentin (NEURONTIN) 300 MG capsule Take 1 capsule (300 mg total) by mouth every evening.    linaclotide 290 mcg Cap Take 290 mcg by mouth once daily.    ONETOUCH DELICA LANCETS 33 gauge Misc     ONETOUCH ULTRA TEST Strp     ONETOUCH ULTRA2 kit     progesterone (PROMETRIUM) 100 MG capsule Take 2 capsules by mouth once daily.    ramelteon (ROZEREM) 8 mg tablet Take 1 tablet (8 mg total) by mouth nightly.    triamcinolone acetonide 0.1% (KENALOG) 0.1 % cream Apply 1 application topically 2 (two) times daily.    venlafaxine (EFFEXOR-XR) 37.5 MG 24 hr capsule Take 1 capsule (37.5 mg total) by mouth once daily.    venlafaxine (EFFEXOR-XR) 75 MG 24 hr capsule Take 1 capsule (75 mg total) by mouth once daily.           Clinical Reference Information           Your Vitals Were     BP Pulse Height Weight BMI    124/87 (BP Location: Right arm, Patient Position: Sitting, BP Method: Automatic) 71 5' 2" (1.575 m) 80 kg (176 lb 5.9 oz) 32.26 kg/m2      Blood Pressure          Most Recent Value    BP  124/87      Allergies as of 3/10/2017     Copaxone [Glatiramer (Copolymer 1)]    Vicodin [Hydrocodone-acetaminophen]    Aspirin    Penicillins    " Sulfamethoxazole-trimethoprim    Vancomycin    Percocet [Oxycodone-acetaminophen]      Immunizations Administered on Date of Encounter - 3/10/2017     None      Language Assistance Services     ATTENTION: Language assistance services are available, free of charge. Please call [phone].      ATENCIÓN: Si habla tammie, tiene a matthews disposición servicios gratuitos de asistencia lingüística. Llame al [phone].     CHÚ Ý: N?u b?n nói Ti?ng Vi?t, có các d?ch v? h? tr? ngôn ng? mi?n phí dành cho b?n. G?i s? [phone].         [facility] complies with applicable Federal civil rights laws and does not discriminate on the basis of race, color, national origin, age, disability, or sex.

## 2017-04-10 ENCOUNTER — OFFICE VISIT (OUTPATIENT)
Dept: PSYCHIATRY | Facility: CLINIC | Age: 47
End: 2017-04-10
Payer: COMMERCIAL

## 2017-04-10 VITALS
BODY MASS INDEX: 32.14 KG/M2 | HEART RATE: 67 BPM | WEIGHT: 174.63 LBS | SYSTOLIC BLOOD PRESSURE: 129 MMHG | HEIGHT: 62 IN | DIASTOLIC BLOOD PRESSURE: 83 MMHG

## 2017-04-10 DIAGNOSIS — F40.298 SPECIFIC PHOBIA: ICD-10-CM

## 2017-04-10 DIAGNOSIS — F41.1 GAD (GENERALIZED ANXIETY DISORDER): ICD-10-CM

## 2017-04-10 DIAGNOSIS — F33.2 SEVERE EPISODE OF RECURRENT MAJOR DEPRESSIVE DISORDER, WITHOUT PSYCHOTIC FEATURES: ICD-10-CM

## 2017-04-10 DIAGNOSIS — F42.9 OBSESSIVE-COMPULSIVE DISORDER, UNSPECIFIED TYPE: ICD-10-CM

## 2017-04-10 DIAGNOSIS — F32.1 MDD (MAJOR DEPRESSIVE DISORDER), SINGLE EPISODE, MODERATE: Primary | ICD-10-CM

## 2017-04-10 DIAGNOSIS — F34.1 DYSTHYMIC DISORDER: ICD-10-CM

## 2017-04-10 DIAGNOSIS — G25.81 RLS (RESTLESS LEGS SYNDROME): ICD-10-CM

## 2017-04-10 DIAGNOSIS — F41.1 GENERALIZED ANXIETY DISORDER: ICD-10-CM

## 2017-04-10 PROCEDURE — 99999 PR PBB SHADOW E&M-EST. PATIENT-LVL III: CPT | Mod: PBBFAC,,, | Performed by: PSYCHIATRY & NEUROLOGY

## 2017-04-10 PROCEDURE — 99214 OFFICE O/P EST MOD 30 MIN: CPT | Mod: S$GLB,,, | Performed by: PSYCHIATRY & NEUROLOGY

## 2017-04-10 PROCEDURE — 1160F RVW MEDS BY RX/DR IN RCRD: CPT | Mod: S$GLB,,, | Performed by: PSYCHIATRY & NEUROLOGY

## 2017-04-10 PROCEDURE — 3074F SYST BP LT 130 MM HG: CPT | Mod: S$GLB,,, | Performed by: PSYCHIATRY & NEUROLOGY

## 2017-04-10 PROCEDURE — 3079F DIAST BP 80-89 MM HG: CPT | Mod: S$GLB,,, | Performed by: PSYCHIATRY & NEUROLOGY

## 2017-04-10 RX ORDER — METFORMIN HYDROCHLORIDE 750 MG/1
TABLET, EXTENDED RELEASE ORAL
Refills: 5 | COMMUNITY
Start: 2017-03-19 | End: 2019-01-06 | Stop reason: SDUPTHER

## 2017-04-10 RX ORDER — BUSPIRONE HYDROCHLORIDE 15 MG/1
15 TABLET ORAL 2 TIMES DAILY
Qty: 60 TABLET | Refills: 6 | Status: SHIPPED | OUTPATIENT
Start: 2017-04-10 | End: 2017-07-06 | Stop reason: SDUPTHER

## 2017-04-10 RX ORDER — VENLAFAXINE HYDROCHLORIDE 75 MG/1
225 CAPSULE, EXTENDED RELEASE ORAL DAILY
Qty: 90 CAPSULE | Refills: 3 | Status: SHIPPED | OUTPATIENT
Start: 2017-04-10 | End: 2017-07-06 | Stop reason: SDUPTHER

## 2017-04-10 RX ORDER — GABAPENTIN 300 MG/1
300 CAPSULE ORAL NIGHTLY
Qty: 30 CAPSULE | Refills: 0 | Status: SHIPPED | OUTPATIENT
Start: 2017-04-10 | End: 2017-05-12 | Stop reason: SDUPTHER

## 2017-04-10 RX ORDER — CLONAZEPAM 0.5 MG/1
0.5 TABLET, ORALLY DISINTEGRATING ORAL 4 TIMES DAILY
Qty: 120 TABLET | Refills: 5 | Status: SHIPPED | OUTPATIENT
Start: 2017-04-10 | End: 2017-07-06 | Stop reason: SDUPTHER

## 2017-04-10 RX ORDER — DIAZEPAM 10 MG/1
10 TABLET ORAL DAILY PRN
Qty: 30 TABLET | Refills: 2 | Status: SHIPPED | OUTPATIENT
Start: 2017-04-10 | End: 2017-07-06 | Stop reason: SDUPTHER

## 2017-04-10 NOTE — PATIENT INSTRUCTIONS
Increase Effexor XR to 225 mg daily (take 3 of the 75 mg capsules everyday).    Continue all other current medications with refills as noted.    Take the gabapentin 300 mg one tablet every night until you see Dr. Navarro next month.
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
## 2017-04-10 NOTE — MR AVS SNAPSHOT
[facility]  [address]  [facility] [address]  Phone: [phone]  Fax: [phone]                  Jillian Osullivan   4/10/2017 10:30 AM   Office Visit    Description:  Female : 1970   Provider:  Bennett Montiel MD   Department:  [facility]           Diagnoses this Visit        Comments    Severe episode of recurrent major depressive disorder, without psychotic features    -  Primary     RLS (restless legs syndrome)         MIRANDA (generalized anxiety disorder)                To Do List           Future Appointments        Provider Department Dept Phone    2017 9:00 AM Manny Navarro MD [facility] [phone]    2017 9:40 AM Mer Yates MD [facility] Multiple Sclerosis [phone]      Goals (5 Years of Data)     None      Follow-Up and Disposition     Return in 3 months (on 2017).       These Medications        Disp Refills Start End    venlafaxine (EFFEXOR-XR) 75 MG 24 hr capsule 90 capsule 3 4/10/2017     Take 3 capsules (225 mg total) by mouth once daily. - Oral    Pharmacy: [facility] [address] [place] [address] ANNA ROMAN [facility] Ph #: [phone]       gabapentin (NEURONTIN) 300 MG capsule 30 capsule 0 4/10/2017 4/10/2018    Take 1 capsule (300 mg total) by mouth every evening. - Oral    Pharmacy: [facility] [address] [place] [address] ANNA ROMAN AT [facility] Ph #: [phone]       busPIRone (BUSPAR) 15 MG tablet 60 tablet 6 4/10/2017     Take 1 tablet (15 mg total) by mouth 2 (two) times daily. - Oral    Pharmacy: [facility] ANNA HAMMONDS AT [facility] Ph #: [phone]       clonazepam (KLONOPIN) 0.5 MG disintegrating tablet 120 tablet 5 4/10/2017     Take 1 tablet (0.5 mg total) by mouth 4 (four) times daily. - Oral    Pharmacy: [facility] [address] [place] [address] ANNA ROMAN  [address] AT [facility] Ph #: [phone]       diazePAM (VALIUM) 10 MG Tab 30 tablet 2 4/10/2017     Take 1 tablet (10 mg total) by mouth daily as needed (sleep). - Oral    [facility] [id number]  ANNA ROMAN [address] AT [facility] Ph #: [phone]         [facility]     [facility] Nurse Care Line - 24/7 Assistance  Unless otherwise directed by your provider, please contact [facility], our nurse care line that is available for 24/7 assistance.     Registered nurses in the [facility] provide: appointment scheduling, clinical advisement, health education, and other advisory services.  Call: [phone] (toll free)               Medications           Message regarding Medications     Verify the changes and/or additions to your medication regime listed below are the same as discussed with your clinician today.  If any of these changes or additions are incorrect, please notify your healthcare provider.        START taking these NEW medications        Refills    venlafaxine (EFFEXOR-XR) 75 MG 24 hr capsule 3    Sig: Take 3 capsules (225 mg total) by mouth once daily.    Class: Normal    Route: Oral      CHANGE how you are taking these medications     Start Taking Instead of    diazePAM (VALIUM) 10 MG Tab diazepam (VALIUM) 10 MG Tab    Dosage:  Take 1 tablet (10 mg total) by mouth daily as needed (sleep). Dosage:  Take 1 tablet (10 mg total) by mouth daily as needed (sleep).    Reason for Change:  Reorder       STOP taking these medications     estradiol 0.05 mg/24 hr td ptsw (VIVELLE-DOT) 0.05 mg/24 hr 1 patch. Apply twice weekly    liothyronine (CYTOMEL) 5 MCG Tab TK 1 T PO  QD    ramelteon (ROZEREM) 8 mg tablet Take 1 tablet (8 mg total) by mouth nightly.           Verify that the below list of medications is an accurate representation of the medications you are currently taking.  If none reported, the list may be blank. If incorrect, please contact  your healthcare provider. Carry this list with you in case of emergency.           Current Medications     busPIRone (BUSPAR) 15 MG tablet Take 1 tablet (15 mg total) by mouth 2 (two) times daily.    CALCIUM CARBONATE/VITAMIN D3 (VITAMIN D-3 ORAL) Take 5,000 Units/day by mouth once daily.     CALCIUM-MAGNESIUM-ZINC ORAL Take by mouth once daily. Calcium-1,000 mg  Magnesium-500 mg  Zinc-25mg    clobetasol (CLOBEX) 0.05 % shampoo     clonazepam (KLONOPIN) 0.5 MG disintegrating tablet Take 1 tablet (0.5 mg total) by mouth 4 (four) times daily.    coenzyme Q10 (CO Q-10) 300 mg Cap Take 1 capsule by mouth once daily. 400mg    cyanocobalamin 1,000 mcg/mL injection Inject 1,000 mLs into the muscle every 30 days.     cyproheptadine (,PERIACTIN,) 2 mg/5 mL syrup Take by mouth 2 (two) times daily.    diazePAM (VALIUM) 10 MG Tab Take 1 tablet (10 mg total) by mouth daily as needed (sleep).    E AC/LINOLEIC/GAMOL AC/MARISELA (BORAGE OIL-GLA-LINOLEIC ACID ORAL) Take 1 tablet by mouth once daily.    estradiol (VIVELLE-DOT) 0.0375 mg/24 hr 1 patch twice a week.    FLAXSEED OIL ORAL Take by mouth once daily. Omega 3 2800MG    gabapentin (NEURONTIN) 300 MG capsule Take 1 capsule (300 mg total) by mouth every evening.    interferon beta-1a (AVONEX) 30 mcg/0.5 mL PnKt Inject 30 mcg as directed once a week.    L. RHAMNOSUS GG/INULIN (CULTURELLE PROBIOTICS ORAL) Take by mouth once daily. Ultimate Jo-Ann 15 Billion Probiotic    levothyroxine (SYNTHROID) 100 MCG tablet Take 1 tablet by mouth once daily.    linaclotide 290 mcg Cap Take 290 mcg by mouth once daily.    melatonin 5 mg Cap Take by mouth once daily.    metformin (GLUCOPHAGE-XR) 750 MG 24 hr tablet TK ONE T PO BID    MINERALS ORAL Take 1 tablet by mouth once daily. New Vision Essential Minerals    omega 3-dha-epa-fish oil 1,000 (120-180) mg Cap Take 1 capsule by mouth once daily.    ONETOUCH DELICA LANCETS 33 gauge Misc     ONETOUCH ULTRA TEST Strp     ONETOUCH ULTRA2 kit      "progesterone (PROMETRIUM) 100 MG capsule Take 2 capsules by mouth once daily.    psyllium (METAMUCIL) powder Take 1 packet by mouth once daily.    trazodone (DESYREL) 100 MG tablet Take 1 - 2 tablets oral at bedtime for sleep    triamcinolone acetonide 0.1% (KENALOG) 0.1 % cream Apply 1 application topically 2 (two) times daily.    TURMERIC (CURCUMIN MISC) Take by mouth once daily. Super Bio-Curcumin 400 mg    UNABLE TO FIND Take by mouth 2 (two) times daily. Multigenics without Iron    UNABLE TO FIND Take 100 g by mouth once daily. medication name: D-Ribose    venlafaxine (EFFEXOR-XR) 150 MG Cp24 Take 1 capsule (150 mg total) by mouth once daily.    venlafaxine (EFFEXOR-XR) 75 MG 24 hr capsule Take 3 capsules (225 mg total) by mouth once daily.           Clinical Reference Information           Your Vitals Were     BP Pulse Height Weight BMI    129/83 67 5' 2" (1.575 m) 79.2 kg (174 lb 9.6 oz) 31.93 kg/m2      Blood Pressure          Most Recent Value    BP  129/83      Allergies as of 4/10/2017     Copaxone [Glatiramer (Copolymer 1)]    Vicodin [Hydrocodone-acetaminophen]    Aspirin    Penicillins    Sulfamethoxazole-trimethoprim    Vancomycin    Percocet [Oxycodone-acetaminophen]      Immunizations Administered on Date of Encounter - 4/10/2017     None      Instructions    Increase Effexor XR to 225 mg daily (take 3 of the 75 mg capsules everyday).    Continue all other current medications with refills as noted.    Take the gabapentin 300 mg one tablet every night until you see Dr. Navarro next month.           Language Assistance Services     ATTENTION: Language assistance services are available, free of charge. Please call [phone].      ATENCIÓN: Si habla español, tiene a matthews disposición servicios gratuitos de asistencia lingüística. Llame al [phone].     RADHA Ý: N?u b?n nói Ti?ng Vi?t, có các d?ch v? h? tr? ngôn ng? mi?n phí dành cho b?n. G?i s? [phone].         [facility] complies " with applicable Federal civil rights laws and does not discriminate on the basis of race, color, national origin, age, disability, or sex.

## 2017-04-30 DIAGNOSIS — F41.1 GAD (GENERALIZED ANXIETY DISORDER): ICD-10-CM

## 2017-05-03 RX ORDER — CLONAZEPAM 0.5 MG/1
TABLET, ORALLY DISINTEGRATING ORAL
Qty: 120 TABLET | Refills: 0 | OUTPATIENT
Start: 2017-05-03

## 2017-05-12 ENCOUNTER — OFFICE VISIT (OUTPATIENT)
Dept: SLEEP MEDICINE | Facility: CLINIC | Age: 47
End: 2017-05-12
Payer: COMMERCIAL

## 2017-05-12 VITALS
SYSTOLIC BLOOD PRESSURE: 126 MMHG | HEART RATE: 68 BPM | DIASTOLIC BLOOD PRESSURE: 86 MMHG | BODY MASS INDEX: 31.65 KG/M2 | WEIGHT: 173.06 LBS

## 2017-05-12 DIAGNOSIS — G47.33 OSA (OBSTRUCTIVE SLEEP APNEA): Primary | ICD-10-CM

## 2017-05-12 DIAGNOSIS — G25.81 RLS (RESTLESS LEGS SYNDROME): ICD-10-CM

## 2017-05-12 PROCEDURE — 3074F SYST BP LT 130 MM HG: CPT | Mod: S$GLB,,, | Performed by: PSYCHIATRY & NEUROLOGY

## 2017-05-12 PROCEDURE — 99999 PR PBB SHADOW E&M-EST. PATIENT-LVL II: CPT | Mod: PBBFAC,,, | Performed by: PSYCHIATRY & NEUROLOGY

## 2017-05-12 PROCEDURE — 3079F DIAST BP 80-89 MM HG: CPT | Mod: S$GLB,,, | Performed by: PSYCHIATRY & NEUROLOGY

## 2017-05-12 PROCEDURE — 99214 OFFICE O/P EST MOD 30 MIN: CPT | Mod: S$GLB,,, | Performed by: PSYCHIATRY & NEUROLOGY

## 2017-05-12 PROCEDURE — 1160F RVW MEDS BY RX/DR IN RCRD: CPT | Mod: S$GLB,,, | Performed by: PSYCHIATRY & NEUROLOGY

## 2017-05-12 RX ORDER — GABAPENTIN 300 MG/1
300 CAPSULE ORAL NIGHTLY
Qty: 30 CAPSULE | Refills: 3 | Status: SHIPPED | OUTPATIENT
Start: 2017-05-12 | End: 2017-06-27 | Stop reason: SDUPTHER

## 2017-05-12 NOTE — PROGRESS NOTES
"This 46 y.o. female patient presents for the management of obstructive sleep apnea. Per RYLEY Crow "Using CPAP but unable to wear mask--using nasal canual. Not waking up feeling rested."    1. CHRIS   BASELINE PSG 7/18/16: +CHRIS, AHI 16.4, low sat 91%, wt 168 lbs  TITRATION 9/20/16: Titrated to effective pressure 7 cm, wt. 168 lbs    ESS = 13    She is using CPAP nightly, but not feeling that her sleep is any better.  Feeling exhausted.    CPAP interrogation Dream Station not auto capable: 1174 hours used; 6.3 hours/n ave; 28/30 days >4 hours    Using Dreamwear mask, complaining of slippage. She wants a more stable mask.    2. She continues to have difficulties with anxiety and insomnia.  Takes Buspar, clonazepam (4 times a day), Effexor - managed by Dr. Montiel    MS symptoms: initially eyesight; ongoing issues with joints/legs    Often feels need to move her legs at night; Urge; Moving constantly; Using adjustable bed;  Mostly in the evenings and around bed time.    For sleep, she has tried Rozeram, Trazodone 100 mg (it worked and then stopped working)    She is currently taking melatonin 5 mg    SLEEP ROUTINE:   Time to bed: 9-9:30 pm   Sleep onset latency: a while   Disruptions or awakenings: 3-4   Wakeup time: 6 am   Perceived sleep quality: poor   Daytime naps: every few days      Past Medical History:   Diagnosis Date    Endometriosis, site unspecified     H/O total hysterectomy     Hidradenitis     History of laparoscopy     History of psychiatric care     Multiple sclerosis     Panic anxiety syndrome     Therapy        Past Surgical History:   Procedure Laterality Date    APPENDECTOMY      breast reduction      HYSTERECTOMY      MS EXPLORATORY OF ABDOMEN      2002    sweat gland removal  10/2013    rt groin       Family History   Problem Relation Age of Onset    Anxiety disorder Mother     Cancer Mother      cervix    Breast cancer Neg Hx     Colon cancer Neg Hx     Ovarian cancer Neg Hx  "       Social History   Substance Use Topics    Smoking status: Never Smoker    Smokeless tobacco: Never Used    Alcohol use No       ALLERGIES: Reviewed in EPIC    CURRENT MEDICATIONS: Reviewed in EPIC    REVIEW OF SYSTEMS:  Sleep related symptoms as per HPI;    Denies weight gain;    Denies sinus problems;    Denies dyspnea;    Positive palpitations;    Positive mood disturbance;    Denies anemia;    Otherwise, a balance of systems reviewed is negative.    PHYSICAL EXAM:  /86 (BP Location: Left arm, Patient Position: Sitting, BP Method: Automatic)  Pulse 68  Wt 78.5 kg (173 lb 1 oz)  BMI 31.65 kg/m2  GENERAL: Well groomed; Normally developed;  NEURO: Oriented to time, place and person.    Normal attention span and concentration.  Station normal. Gait normal.  PSYCH: Affect is full. Mood is normal.    I spent greater than 25 minutes during this 45 minute visit in counseling the patient regarding sleep study results, CHRIS etiology, ramifications, and treatment options. Sources of fatigue, anxiety and insomnia relationships.      ASSESSMENT:    1. Obstructive Sleep Apnea, moderate severity. CHRIS appears to be very well controlled on CPAP. She is using an effective setting, determined by titration sleep study. She is demonstrating good adherence, 5.7 hours/nightly over 30-day average. Some oral leaks, but probably not enough to limit effectiveness. She will trial a chin strap to see if oral drying improves.    2. Sleep disturbances/Insomnia - underlying  appears to be anxiety/depression. Recently to ER for elevated blood pressure/chest pain; She feels like anxiety is getting worse, despite medical management. Cause for anxiety unclear - also multi-factorial, being managed by Dr. Montiel. She has implicated MS or Avonex as possible root cause, since she did not have any anxiety at all prior to her MS diagnosis and treatment. She does lament than trying to change MS related medications in the past has been  challenging.    3. Restless legs feelings at night / akithesia - in some cases PLMS can be potentiated by Effexor; No evidence of PLMS on sleep study; RLS can occur just as a feeling (without acutal limb kicks) that can contribute to night time anxiety and insomnia.    4. Fatigue - multi-factorial; certainly MS, medication, and depression can be playing a role; CHRIS is treated and an effective pressure has been determined.         PLAN:    1. Continue gabapentin 300 mg at bedtime to help settle RLS/akithesia and anxiety feelings. She has had some initial effect. Trial earlier dosing to help prevent morning grogginess and help ealier onset in evening for sleep.  2. Continue CPAP, emperic bump of pressure to 8 cm; trial with chin strap. The potential benefits were discussed. She received copies of her sleep studies.    Precautions: The patient was advised to abstain from driving should they feel sleepy or drowsy.    Follow up: one month. MD/NP

## 2017-06-27 ENCOUNTER — OFFICE VISIT (OUTPATIENT)
Dept: SLEEP MEDICINE | Facility: CLINIC | Age: 47
End: 2017-06-27
Payer: COMMERCIAL

## 2017-06-27 VITALS
SYSTOLIC BLOOD PRESSURE: 118 MMHG | HEART RATE: 78 BPM | DIASTOLIC BLOOD PRESSURE: 80 MMHG | HEIGHT: 62 IN | BODY MASS INDEX: 31.64 KG/M2 | WEIGHT: 171.94 LBS

## 2017-06-27 DIAGNOSIS — G47.00 INSOMNIA, UNSPECIFIED TYPE: ICD-10-CM

## 2017-06-27 DIAGNOSIS — G47.33 OSA (OBSTRUCTIVE SLEEP APNEA): ICD-10-CM

## 2017-06-27 DIAGNOSIS — G25.81 RLS (RESTLESS LEGS SYNDROME): Primary | ICD-10-CM

## 2017-06-27 PROCEDURE — 99214 OFFICE O/P EST MOD 30 MIN: CPT | Mod: S$GLB,,, | Performed by: PSYCHIATRY & NEUROLOGY

## 2017-06-27 PROCEDURE — 99999 PR PBB SHADOW E&M-EST. PATIENT-LVL II: CPT | Mod: PBBFAC,,, | Performed by: PSYCHIATRY & NEUROLOGY

## 2017-06-27 RX ORDER — GABAPENTIN 300 MG/1
300 CAPSULE ORAL NIGHTLY PRN
Qty: 60 CAPSULE | Refills: 3 | Status: SHIPPED | OUTPATIENT
Start: 2017-06-27 | End: 2017-09-27 | Stop reason: SDUPTHER

## 2017-06-27 NOTE — PROGRESS NOTES
"This 46 y.o. female patient presents for the management of obstructive sleep apnea. Per RYLEY Crow "Using CPAP but unable to wear mask--using nasal canual. Not waking up feeling rested."    ESS = 17    1. CHRIS   BASELINE PSG 7/18/16: +CHRIS, AHI 16.4, low sat 91%, wt 168 lbs  TITRATION 9/20/16: Titrated to effective pressure 7 cm, wt. 168 lbs    She is using CPAP nightly, but not feeling that her sleep is any better.  Feeling exhausted.    CPAP interrogation Dream Station not auto capable: 1428 hours used; 5.2 hours/n ave; 21/30 days >4 hours    2. She continues to have difficulties with anxiety and insomnia.  She states that stress, anxiety, and feeling nervous at bedtime keep her from sleeping.    Takes Buspar, clonazepam (4 times a day), Effexor - managed by Dr. Montiel  For sleep, she has tried Rozeram, Trazodone 100 mg (it worked and then stopped working)  She is currently taking melatonin 5 mg    MS symptoms: initially eyesight; ongoing issues with joints/legs    3. RLS  - Gabapentin 300 mg was initially effective in controlling RLS and helping her sleep better. It "worked" for 3 nights, but then lost its effectiveness. She has been using it intermittently, because she is concerned about habituation. She is also concerned about inability to lose weight.    Often feels need to move her legs at night; Urge; Moving constantly; Using adjustable bed;  Mostly in the evenings and around bed time.    BT: 9-9:30 pm  LO: 11 pm  OOB: 6-6:30 am    SLEEP ROUTINE:   Time to bed: 9-9:30 pm   Sleep onset latency: a while   Disruptions or awakenings: 3-4   Wakeup time: 6 am   Perceived sleep quality: poor   Daytime naps: every few days      Past Medical History:   Diagnosis Date    Endometriosis, site unspecified     H/O total hysterectomy     Hidradenitis     History of laparoscopy     History of psychiatric care     Multiple sclerosis     Panic anxiety syndrome     Therapy        Past Surgical History:   Procedure " "Laterality Date    APPENDECTOMY      breast reduction      HYSTERECTOMY      UT EXPLORATORY OF ABDOMEN      2002    sweat gland removal  10/2013    rt groin       Family History   Problem Relation Age of Onset    Anxiety disorder Mother     Cancer Mother      cervix    Breast cancer Neg Hx     Colon cancer Neg Hx     Ovarian cancer Neg Hx        Social History   Substance Use Topics    Smoking status: Never Smoker    Smokeless tobacco: Never Used    Alcohol use No       ALLERGIES: Reviewed in EPIC    CURRENT MEDICATIONS: Reviewed in EPIC    REVIEW OF SYSTEMS:  Sleep related symptoms as per HPI;    Denies weight gain, but difficulty losing;    Positive palpitations;    Positive mood disturbance;    Denies anemia;    Otherwise, a balance of systems reviewed is negative.    PHYSICAL EXAM:  /80 (BP Location: Right arm, Patient Position: Sitting)   Pulse 78   Ht 5' 2" (1.575 m)   Wt 78 kg (171 lb 15.3 oz)   BMI 31.45 kg/m²   GENERAL: Well groomed; Normally developed;  NEURO: Oriented to time, place and person.    Normal attention span and concentration.  Station normal. Gait normal.  PSYCH: Affect is full. Mood is normal.    I spent greater than 25 minutes during this 45 minute visit in counseling the patient regarding insomnia and sources of fatigue, anxiety and insomnia relationships.      ASSESSMENT:    1. Obstructive Sleep Apnea, moderate severity. CHRIS appears to be very well controlled on CPAP. She is using an effective setting, determined by titration sleep study. She is demonstrating adequate adherence, 5.2 hours/nightly over 30-day average.      2. Sleep disturbances/Insomnia - underlying  appears to be anxiety/depression. Recently to ER for elevated blood pressure/chest pain; She feels like anxiety is getting worse, despite medical management. Cause for anxiety unclear - also multi-factorial, being managed by Dr. Montiel. She has implicated MS or Avonex as possible root cause, since " she did not have any anxiety at all prior to her MS diagnosis and treatment. She does lament than trying to change MS related medications in the past has been challenging.    3. Restless legs feelings at night / akithesia - in some cases PLMS can be potentiated by Effexor; No evidence of PLMS on sleep study; RLS can occur just as a feeling (without acutal limb kicks) that can contribute to night time anxiety and insomnia. RLS appears to have responded to initial doses of gabapentin    4. Fatigue - multi-factorial; certainly MS, medication, and depression can be playing a role; CHRIS is treated and an effective pressure has been determined.         PLAN:    1. Trial increasing gabapentin to 600-900 mg at bedtime to help settle RLS/akithesia and anxiety feelings. She has had some initial effect.  2. Meet with Dr. Montiel regarding anxiety control, which is a contributing factor to difficulty sleeping.  3. Sleep hygiene addressed. Recommend stimulus control as part of CBT-I when frustrated from not sleeping in the bedroom. Patient is reluctant, because a lot of trouble to leave the bedroom.  4. Continue CPAP, at pressure to 8 cm; trial with chin strap. The potential benefits were discussed.      Precautions: The patient was advised to abstain from driving should they feel sleepy or drowsy.    Follow up: one month. MD/NP

## 2017-06-29 DIAGNOSIS — G35 MULTIPLE SCLEROSIS: ICD-10-CM

## 2017-07-06 ENCOUNTER — OFFICE VISIT (OUTPATIENT)
Dept: PSYCHIATRY | Facility: CLINIC | Age: 47
End: 2017-07-06
Payer: COMMERCIAL

## 2017-07-06 VITALS
DIASTOLIC BLOOD PRESSURE: 77 MMHG | HEIGHT: 62 IN | HEART RATE: 72 BPM | BODY MASS INDEX: 32.09 KG/M2 | SYSTOLIC BLOOD PRESSURE: 119 MMHG | WEIGHT: 174.38 LBS

## 2017-07-06 DIAGNOSIS — F32.1 MDD (MAJOR DEPRESSIVE DISORDER), SINGLE EPISODE, MODERATE: Primary | ICD-10-CM

## 2017-07-06 DIAGNOSIS — F40.298 SPECIFIC PHOBIA: ICD-10-CM

## 2017-07-06 DIAGNOSIS — F42.9 OBSESSIVE-COMPULSIVE DISORDER, UNSPECIFIED TYPE: ICD-10-CM

## 2017-07-06 DIAGNOSIS — F41.1 GENERALIZED ANXIETY DISORDER: ICD-10-CM

## 2017-07-06 DIAGNOSIS — F41.1 GAD (GENERALIZED ANXIETY DISORDER): ICD-10-CM

## 2017-07-06 DIAGNOSIS — F34.1 DYSTHYMIC DISORDER: ICD-10-CM

## 2017-07-06 DIAGNOSIS — F33.2 SEVERE EPISODE OF RECURRENT MAJOR DEPRESSIVE DISORDER, WITHOUT PSYCHOTIC FEATURES: ICD-10-CM

## 2017-07-06 PROCEDURE — 90833 PSYTX W PT W E/M 30 MIN: CPT | Mod: S$GLB,,, | Performed by: PSYCHIATRY & NEUROLOGY

## 2017-07-06 PROCEDURE — 99999 PR PBB SHADOW E&M-EST. PATIENT-LVL III: CPT | Mod: PBBFAC,,, | Performed by: PSYCHIATRY & NEUROLOGY

## 2017-07-06 PROCEDURE — 99213 OFFICE O/P EST LOW 20 MIN: CPT | Mod: S$GLB,,, | Performed by: PSYCHIATRY & NEUROLOGY

## 2017-07-06 RX ORDER — TRAZODONE HYDROCHLORIDE 100 MG/1
TABLET ORAL
Qty: 60 TABLET | Refills: 6 | Status: SHIPPED | OUTPATIENT
Start: 2017-07-06 | End: 2017-09-27

## 2017-07-06 RX ORDER — BUSPIRONE HYDROCHLORIDE 15 MG/1
15 TABLET ORAL 2 TIMES DAILY
Qty: 60 TABLET | Refills: 6 | Status: SHIPPED | OUTPATIENT
Start: 2017-07-06 | End: 2017-09-27 | Stop reason: SDUPTHER

## 2017-07-06 RX ORDER — VENLAFAXINE HYDROCHLORIDE 75 MG/1
225 CAPSULE, EXTENDED RELEASE ORAL DAILY
Qty: 90 CAPSULE | Refills: 3 | Status: SHIPPED | OUTPATIENT
Start: 2017-07-06 | End: 2017-09-27 | Stop reason: SDUPTHER

## 2017-07-06 RX ORDER — DIAZEPAM 10 MG/1
10 TABLET ORAL DAILY PRN
Qty: 30 TABLET | Refills: 5 | Status: SHIPPED | OUTPATIENT
Start: 2017-07-06 | End: 2017-09-27 | Stop reason: SDUPTHER

## 2017-07-06 RX ORDER — CLONAZEPAM 0.5 MG/1
0.5 TABLET, ORALLY DISINTEGRATING ORAL 4 TIMES DAILY
Qty: 120 TABLET | Refills: 5 | Status: SHIPPED | OUTPATIENT
Start: 2017-07-06 | End: 2017-09-27 | Stop reason: SDUPTHER

## 2017-07-06 RX ORDER — VENLAFAXINE HYDROCHLORIDE 150 MG/1
150 CAPSULE, EXTENDED RELEASE ORAL DAILY
Qty: 30 CAPSULE | Refills: 3 | Status: SHIPPED | OUTPATIENT
Start: 2017-07-06 | End: 2017-09-27 | Stop reason: SDUPTHER

## 2017-07-06 NOTE — PATIENT INSTRUCTIONS
Continue all current medications with refills as noted.   Return to clinic in 2 - 3 months for follow up appt.  Consider attending our BMU program -- info was given to you.
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
## 2017-07-10 ENCOUNTER — OFFICE VISIT (OUTPATIENT)
Dept: NEUROLOGY | Facility: CLINIC | Age: 47
End: 2017-07-10
Payer: COMMERCIAL

## 2017-07-10 ENCOUNTER — LAB VISIT (OUTPATIENT)
Dept: LAB | Facility: HOSPITAL | Age: 47
End: 2017-07-10
Attending: PSYCHIATRY & NEUROLOGY
Payer: COMMERCIAL

## 2017-07-10 VITALS
SYSTOLIC BLOOD PRESSURE: 133 MMHG | BODY MASS INDEX: 31.68 KG/M2 | HEIGHT: 62 IN | HEART RATE: 70 BPM | WEIGHT: 172.19 LBS | DIASTOLIC BLOOD PRESSURE: 95 MMHG

## 2017-07-10 DIAGNOSIS — G35 MS (MULTIPLE SCLEROSIS): Primary | ICD-10-CM

## 2017-07-10 DIAGNOSIS — Z71.89 COUNSELING REGARDING ADVANCED CARE PLANNING AND GOALS OF CARE: ICD-10-CM

## 2017-07-10 DIAGNOSIS — Z51.12 ENCOUNTER FOR ANTINEOPLASTIC CHEMOTHERAPY AND IMMUNOTHERAPY: ICD-10-CM

## 2017-07-10 DIAGNOSIS — Z51.11 ENCOUNTER FOR ANTINEOPLASTIC CHEMOTHERAPY AND IMMUNOTHERAPY: ICD-10-CM

## 2017-07-10 DIAGNOSIS — G35 MS (MULTIPLE SCLEROSIS): ICD-10-CM

## 2017-07-10 LAB
ALBUMIN SERPL BCP-MCNC: 3.9 G/DL
ALP SERPL-CCNC: 98 U/L
ALT SERPL W/O P-5'-P-CCNC: 79 U/L
AST SERPL-CCNC: 33 U/L
BASOPHILS # BLD AUTO: 0.02 K/UL
BASOPHILS NFR BLD: 0.3 %
BILIRUB DIRECT SERPL-MCNC: 0.2 MG/DL
BILIRUB SERPL-MCNC: 0.4 MG/DL
DIFFERENTIAL METHOD: ABNORMAL
EOSINOPHIL # BLD AUTO: 0.1 K/UL
EOSINOPHIL NFR BLD: 0.9 %
ERYTHROCYTE [DISTWIDTH] IN BLOOD BY AUTOMATED COUNT: 12.2 %
HCT VFR BLD AUTO: 39.7 %
HGB BLD-MCNC: 13 G/DL
LYMPHOCYTES # BLD AUTO: 3.9 K/UL
LYMPHOCYTES NFR BLD: 56.6 %
MCH RBC QN AUTO: 31.4 PG
MCHC RBC AUTO-ENTMCNC: 32.7 %
MCV RBC AUTO: 96 FL
MONOCYTES # BLD AUTO: 0.3 K/UL
MONOCYTES NFR BLD: 4.9 %
NEUTROPHILS # BLD AUTO: 2.6 K/UL
NEUTROPHILS NFR BLD: 37.2 %
PLATELET # BLD AUTO: 275 K/UL
PMV BLD AUTO: 9.9 FL
PROT SERPL-MCNC: 7.7 G/DL
RBC # BLD AUTO: 4.14 M/UL
WBC # BLD AUTO: 6.92 K/UL

## 2017-07-10 PROCEDURE — 99215 OFFICE O/P EST HI 40 MIN: CPT | Mod: S$GLB,,, | Performed by: PSYCHIATRY & NEUROLOGY

## 2017-07-10 PROCEDURE — 80076 HEPATIC FUNCTION PANEL: CPT

## 2017-07-10 PROCEDURE — 99999 PR PBB SHADOW E&M-EST. PATIENT-LVL III: CPT | Mod: PBBFAC,,, | Performed by: PSYCHIATRY & NEUROLOGY

## 2017-07-10 PROCEDURE — 85025 COMPLETE CBC W/AUTO DIFF WBC: CPT

## 2017-07-10 PROCEDURE — 36415 COLL VENOUS BLD VENIPUNCTURE: CPT

## 2017-07-10 NOTE — Clinical Note
TIFFANY, starting Aubagio;  Getting baseline labs today; kindly submit via [facility]; advise 1 week washout;  thanks :)

## 2017-07-10 NOTE — PROGRESS NOTES
Subjective:       Patient ID: Jillian Osullivan is a 46 y.o. female who presents today for a routine clinic visit for MS.    MS HPI:  · DMT: Avonex  · Side effects from DMT? YES- flu like symptoms, IS pain, fatigue, worsening depression.  · Taking vitamin D3 as recommended? No Dose:   · Overall she is feeling well  · Reports on Sat she felt like her legs had weights on them; this sx only lasted for 1 hour  · Pt has had MS since 2007;    · To be remembered is that she had a severe reaction to Copaxone (only took one injection); was hospitalized; her legs swelled up and she got blisters on them.   · She has history of hidradenitis supportiva;  Takes Clinda prn;      SOCIAL HISTORY  Social History   Substance Use Topics    Smoking status: Never Smoker    Smokeless tobacco: Never Used    Alcohol use No     Living arrangements - the patient lives with her .  Employment N/A    MS ROS:  · Fatigue: Yes - confounded by depression;  Takes CoQ10 300mg/day; never been on stimulant  · Sleep Disturbance: Yes - on CPAP  · Bladder Dysfunction: Yes -   · Bowel Dysfunction: No  · Spasticity: No  · Visual Symptoms: No  · Cognitive: Yes - some issues with short-term  · Mood Disorder: Yes - has anxiety and depression; sees Dr. Montiel;  · Gait Disturbance: No  · Falls: No  · Hand Dysfunction: Yes - numbness in bilateral hands;   · Pain: No  · Sexual Dysfunction: Not Assessed  · Skin Breakdown: No  · Tremors: No  · Dysphagia:  No  · Dysarthria:  No  · Heat sensitivity:  Yes -   · Any un-met adaptive needs? No  · Copay Assist?  Yes -         Objective:        25 foot timed walk: 4.9 seconds without assist;   Timed 25 Foot Walk: 12/28/2016   Did patient wear an AFO? No   Was assistive device used? No   Time for 25 Foot Walk (seconds) 4.8     Neurologic Exam      MENTAL STATUS:  Grossly intact     CRANIAL NERVE EXAM:  There is no internuclear ophthalmoplegia.  Extraocular   muscles are intact.  Pupils are equal, round, and  reactive to light.  No facial   asymmetry.       MOTOR EXAM:  Strength is 5/5 in all groups   in the lower extremities and upper extremities.     REFLEXES:  Symmetric and 1+ throughout in all 4 extremities.    SENSORY EXAM:  Grossly intact to vibration sense     COORDINATION:  Normal finger-to-nose exam.     GAIT:  slightly wide based        Labs:     Lab Results   Component Value Date    [id number] 64 06/27/2016    [id number] 67 11/30/2015    [id number] 52 06/09/2015       Diagnosis/Assessment/Plan:    1. Multiple Sclerosis  · Assessment: Pt is clinically stable but she is not tolerating Avonex  · Imaging: planned in early 2018   · Disease Modifying Therapies: switch to Aubagio; d/c Avonex--may be driving her depression; The patient was counseled about the risks associated with immune suppressive therapy, including a higher risk of serious infections and malignancy, as well as the importance of avoiding all live virus vaccines while on immune suppressive medication. Risks, rationale, and expectations of Aubagio discussed with patient.  Will check pre-screening labs;       2. MS Symptom Assessment / Management  · Fatigue: consider fatigue medication next visit;   · Mood Disorder: pt open to counseling; have asked Dr. Montiel for input      F/u with me in 6 months;     Over 50% of this 40 minute visit was spent in direct face to face counseling of the patient about her MS and rationale for switching to Aubagio.        MS (multiple sclerosis)  -     CBC auto differential; Future; Expected date: 07/10/2017  -     Hepatic function panel; Future

## 2017-07-11 ENCOUNTER — DOCUMENTATION ONLY (OUTPATIENT)
Dept: NEUROLOGY | Facility: CLINIC | Age: 47
End: 2017-07-11

## 2017-07-17 ENCOUNTER — DOCUMENTATION ONLY (OUTPATIENT)
Dept: NEUROLOGY | Facility: CLINIC | Age: 47
End: 2017-07-17

## 2017-07-17 ENCOUNTER — TELEPHONE (OUTPATIENT)
Dept: NEUROLOGY | Facility: CLINIC | Age: 47
End: 2017-07-17

## 2017-07-17 DIAGNOSIS — Z79.899 HIGH RISK MEDICATION USE: ICD-10-CM

## 2017-07-17 DIAGNOSIS — G35 MULTIPLE SCLEROSIS: Primary | ICD-10-CM

## 2017-07-17 NOTE — PROGRESS NOTES
Fax received from [employer]. Louann HEDRICK approved through 7/14/22 with case number [id number].

## 2017-07-18 NOTE — TELEPHONE ENCOUNTER
Call placed to pt to check in re: Aubagio. Pt states that she hasn't heard from anyone at [facility] but she received a shipment of drug yesterday. Instructed pt not to begin yet as she needs a 1 week washout from Avonex (last taken 7/12/17) and a TB gold test. Pt is scheduled for TB gold tomorrow morning at [place]. Pt aware.

## 2017-07-19 ENCOUNTER — LAB VISIT (OUTPATIENT)
Dept: LAB | Facility: HOSPITAL | Age: 47
End: 2017-07-19
Attending: PSYCHIATRY & NEUROLOGY
Payer: COMMERCIAL

## 2017-07-19 DIAGNOSIS — G35 MULTIPLE SCLEROSIS: ICD-10-CM

## 2017-07-19 DIAGNOSIS — Z79.899 HIGH RISK MEDICATION USE: ICD-10-CM

## 2017-07-19 PROCEDURE — 36415 COLL VENOUS BLD VENIPUNCTURE: CPT

## 2017-07-19 PROCEDURE — 86480 TB TEST CELL IMMUN MEASURE: CPT

## 2017-07-19 NOTE — TELEPHONE ENCOUNTER
Message received from , Marisel. She states they have left the pt two VMs (number confirmed correct). States she will give the pt another call.

## 2017-07-20 ENCOUNTER — DOCUMENTATION ONLY (OUTPATIENT)
Dept: NEUROLOGY | Facility: CLINIC | Age: 47
End: 2017-07-20

## 2017-07-21 LAB
MITOGEN NIL: >10 IU/ML
NIL: 0.03 IU/ML
TB ANTIGEN NIL: 0 IU/ML
TB ANTIGEN: 0.03 IU/ML
TB GOLD: NEGATIVE

## 2017-07-24 RX ORDER — TERIFLUNOMIDE 14 MG/1
14 TABLET, FILM COATED ORAL DAILY
COMMUNITY
End: 2018-03-20

## 2017-07-24 NOTE — TELEPHONE ENCOUNTER
Call received from pt. Informed her of TB gold results and ok to start Aubagio. Also discussed monthly safety labs needed for 6 months. Pt verbalizes understanding of all.    Lab appts scheduled and reminders postal mailed to pt.

## 2017-07-27 DIAGNOSIS — F41.1 GAD (GENERALIZED ANXIETY DISORDER): ICD-10-CM

## 2017-07-30 RX ORDER — CLONAZEPAM 0.5 MG/1
TABLET, ORALLY DISINTEGRATING ORAL
Qty: 120 TABLET | Refills: 0 | OUTPATIENT
Start: 2017-07-30

## 2017-08-04 ENCOUNTER — TELEPHONE (OUTPATIENT)
Dept: NEUROLOGY | Facility: CLINIC | Age: 47
End: 2017-08-04

## 2017-08-04 DIAGNOSIS — K30 STOMACH UPSET: Primary | ICD-10-CM

## 2017-08-04 DIAGNOSIS — G35 MULTIPLE SCLEROSIS: ICD-10-CM

## 2017-08-04 RX ORDER — SUCRALFATE 1 G/10ML
1 SUSPENSION ORAL 2 TIMES DAILY
Qty: 420 ML | Refills: 1 | Status: SHIPPED | OUTPATIENT
Start: 2017-08-04 | End: 2021-05-21

## 2017-08-04 NOTE — TELEPHONE ENCOUNTER
Call placed to pt. She states she has been having side effects from the Aubagio (began 7/25/17). Almost immediately she began having stomach pain and diarrhea. On Tuesday she began noticing little red bumps on her chest and a little on her arms--not itchy (compared them to little pimples without drake). She also has a history of hydradenitis and is having a flare up right now.

## 2017-08-04 NOTE — TELEPHONE ENCOUNTER
Call placed to pt. Informed her of Luisa's recs: carafate and watch/wait skin issue. Pt will contact PCP regarding this. Asked pt to call this office next week to update us. Verbalizes understanding of all.

## 2017-08-04 NOTE — TELEPHONE ENCOUNTER
We will try Carafate for stomach upset. Please advise her to monitor skin. Doesn't sound like typical drug rash. She can send us a picture if she would like. She should also follow up with her dermatologist.

## 2017-08-04 NOTE — TELEPHONE ENCOUNTER
----- Message from Alvaro Jones sent at 8/4/2017  9:29 AM CDT -----  Contact: Self [phone]  Patient is requesting a return call from Jaimie quach aubagio nurse regarding the new medication, pls call

## 2017-08-07 NOTE — TELEPHONE ENCOUNTER
----- Message from Madelin Márquez sent at 8/7/2017 10:05 AM CDT -----  Contact: self @ [phone]  Would like to speak with Carolyn concerning her medication that was sent in on Friday.  pls call.

## 2017-08-07 NOTE — TELEPHONE ENCOUNTER
Call placed to pt. She states that her  picked up the carafate from the pharmacy, and it cost $55 (copay amount). She states it is too expensive and would like to return the medication as it is unopened. Informed pt that she will have to discuss this with the pharmacy. Verbalizes understanding.    She also states that her stomach is feeling better since she began taking Zantac.

## 2017-08-15 ENCOUNTER — TELEPHONE (OUTPATIENT)
Dept: SLEEP MEDICINE | Facility: CLINIC | Age: 47
End: 2017-08-15

## 2017-08-15 NOTE — TELEPHONE ENCOUNTER
----- Message from Kaitlin Capps sent at 8/15/2017  8:14 AM CDT -----  Contact: pt  X_  1st Request  _  2nd Request  _  3rd Request    Who:ALBERTO BAUER [[id number]]    Why: Patient states she would like to speak with the staff in regards to her last appointment..... Please contact to further discuss and advise     What Number to Call Back: [phone]    When to Expect a call back: (Before the end of the day)   -- if call after 3:00 call back will be tomorrow.

## 2017-08-16 ENCOUNTER — OFFICE VISIT (OUTPATIENT)
Dept: SLEEP MEDICINE | Facility: CLINIC | Age: 47
End: 2017-08-16
Payer: COMMERCIAL

## 2017-08-16 VITALS
SYSTOLIC BLOOD PRESSURE: 139 MMHG | BODY MASS INDEX: 31.33 KG/M2 | WEIGHT: 171.31 LBS | HEART RATE: 82 BPM | DIASTOLIC BLOOD PRESSURE: 92 MMHG

## 2017-08-16 DIAGNOSIS — G47.33 OSA (OBSTRUCTIVE SLEEP APNEA): ICD-10-CM

## 2017-08-16 DIAGNOSIS — G25.81 RLS (RESTLESS LEGS SYNDROME): ICD-10-CM

## 2017-08-16 DIAGNOSIS — F51.01 PRIMARY INSOMNIA: Primary | ICD-10-CM

## 2017-08-16 DIAGNOSIS — F41.1 ANXIETY, GENERALIZED: ICD-10-CM

## 2017-08-16 PROCEDURE — 99214 OFFICE O/P EST MOD 30 MIN: CPT | Mod: S$GLB,,, | Performed by: PSYCHIATRY & NEUROLOGY

## 2017-08-16 PROCEDURE — 99999 PR PBB SHADOW E&M-EST. PATIENT-LVL III: CPT | Mod: PBBFAC,,, | Performed by: PSYCHIATRY & NEUROLOGY

## 2017-08-16 PROCEDURE — 3075F SYST BP GE 130 - 139MM HG: CPT | Mod: S$GLB,,, | Performed by: PSYCHIATRY & NEUROLOGY

## 2017-08-16 PROCEDURE — 3008F BODY MASS INDEX DOCD: CPT | Mod: S$GLB,,, | Performed by: PSYCHIATRY & NEUROLOGY

## 2017-08-16 PROCEDURE — 3080F DIAST BP >= 90 MM HG: CPT | Mod: S$GLB,,, | Performed by: PSYCHIATRY & NEUROLOGY

## 2017-08-16 RX ORDER — TERIFLUNOMIDE 14 MG/1
TABLET, FILM COATED ORAL
Refills: 11 | COMMUNITY
Start: 2017-07-14 | End: 2018-03-20

## 2017-08-16 RX ORDER — LEVOTHYROXINE SODIUM 75 UG/1
TABLET ORAL
Refills: 5 | COMMUNITY
Start: 2017-08-07 | End: 2019-01-06 | Stop reason: SDUPTHER

## 2017-08-16 NOTE — PROGRESS NOTES
"This 46 y.o. female patient presents for the management of obstructive sleep apnea. Per RYLEY Crow "Using CPAP but unable to wear mask--using nasal canual. Not waking up feeling rested."    ESS = 13    1. CHRIS   BASELINE PSG 7/18/16: +CHRIS, AHI 16.4, low sat 91%, wt 168 lbs  TITRATION 9/20/16: Titrated to effective pressure 7 cm, wt. 168 lbs    She is using CPAP nightly, but not feeling that her sleep is any better.  Feeling exhausted.    CPAP interrogation Dream Station not auto capable, set at 8 cm: 1749 hours used; 4.9 hours/n ave; 19/30 days >4 hours    2. She continues to have difficulties with anxiety and insomnia. This is her main complaint today.  Feeling more anxious than normal. Increasing gabapentin 600 mg didn't help    She states that stress, anxiety, and feeling nervous at bedtime keep her from sleeping.    Takes Buspar, clonazepam (4 times a day), Effexor - managed by Dr. Montiel  She is currently taking melatonin 5 mg; Taking "stress arrest".  She was trying prn Valium 10 mg, but it lost its effect.    She has tried Rozeram, Trazodone 100 mg (it worked and then stopped working)    MS symptoms: initially eyesight; ongoing issues with joints/legs    3. RLS  - Gabapentin 300 mg was initially effective in controlling RLS and helping her sleep better. It "worked" for 3 nights, but then lost its effectiveness. She has been using it intermittently, because she is concerned about habituation. She is also concerned about inability to lose weight.    Often feels need to move her legs at night; Urge; Moving constantly; Using adjustable bed;  Mostly in the evenings and around bed time.    BT: 9-9:30 pm  LO: 11 pm  OOB: 6-6:30 am    SLEEP ROUTINE:   Time to bed: 9-9:30 pm   Sleep onset latency: a while   Disruptions or awakenings: 3-4   Wakeup time: 6 am   Perceived sleep quality: poor   Daytime naps: every few days      Past Medical History:   Diagnosis Date    Endometriosis, site unspecified     H/O total " hysterectomy     Hidradenitis     History of laparoscopy     History of psychiatric care     Multiple sclerosis     Panic anxiety syndrome     Therapy        Past Surgical History:   Procedure Laterality Date    APPENDECTOMY      breast reduction      HYSTERECTOMY      IN EXPLORATORY OF ABDOMEN      2002    sweat gland removal  10/2013    rt groin       Family History   Problem Relation Age of Onset    Anxiety disorder Mother     Cancer Mother      cervix    Breast cancer Neg Hx     Colon cancer Neg Hx     Ovarian cancer Neg Hx        Social History   Substance Use Topics    Smoking status: Never Smoker    Smokeless tobacco: Never Used    Alcohol use No       ALLERGIES: Reviewed in EPIC    CURRENT MEDICATIONS: Reviewed in EPIC    REVIEW OF SYSTEMS:  Sleep related symptoms as per HPI;    Denies weight gain, but difficulty losing;    Positive palpitations;    Positive mood disturbance;    Denies anemia;    Otherwise, a balance of systems reviewed is negative.    PHYSICAL EXAM:  BP (!) 139/92 (BP Location: Right arm, Patient Position: Sitting)   Pulse 82   Wt 77.7 kg (171 lb 4.8 oz)   BMI 31.33 kg/m²   GENERAL: Well groomed; Normally developed;  NEURO: Oriented to time, place and person.    Normal attention span and concentration.  Station normal. Gait normal.  PSYCH: Affect is full. Mood is normal.    I spent greater than 25 minutes during this 45 minute visit in counseling the patient regarding insomnia and sources of fatigue, anxiety and insomnia relationships.      ASSESSMENT:    1. Obstructive Sleep Apnea, moderate severity. CHRIS appears to be very well controlled on CPAP. She is using an effective setting, determined by titration sleep study. She is demonstrating adequate adherence, >4 hours/nightly over 30-day average.      2. Sleep disturbances/Insomnia - underlying  appears to be anxiety/depression.  She feels like anxiety is getting worse, despite medical management. Cause for  anxiety unclear - also multi-factorial, being managed by Dr. Montiel. She has implicated MS or Avonex as possible root cause, since she did not have any anxiety at all prior to her MS diagnosis and treatment. She does lament than trying to change MS related medications in the past has been challenging. She has been spending excess time in bed 9+ hours, and only sleeping a fraction of that amount. Will plan to implement CBT-insomnia.    3. Restless legs feelings at night / akithesia - in some cases PLMS can be potentiated by Effexor; No evidence of PLMS on sleep study; RLS can occur just as a feeling (without acutal limb kicks) that can contribute to night time anxiety and insomnia. RLS appears to have responded to initial doses of gabapentin. She is so focused on the effects of her underlying anxiety, it is difficult to ascertain whether she is getting any benefit from this.    4. Fatigue - multi-factorial; certainly MS, medication, and depression can be playing a role; CHRIS is treated and an effective pressure has been determined.         PLAN:    Recommend a greater focus/effort on CBT-insomnia  Start with reducing number of allowable bedtime hours to 6.5-7 hours nightly. This means delaying bedtime to 11:30pm to MN. Out of bed at 6 am  Implement stimulus control - leave bedroom when anxious or not sleeping.    1. Continue gabapentin at 600 mg at bedtime to help settle RLS/akithesia and anxiety feelings. She has had some initial effect.  2. Meet with Dr. Montiel regarding anxiety control, which is a contributing factor to difficulty sleeping.  3. Continue CPAP, at pressure to 8 cm; trial with chin strap. The potential benefits were discussed.      Precautions: The patient was advised to abstain from driving should they feel sleepy or drowsy.    Follow up: 2-3 weeks. MD/NP

## 2017-08-24 ENCOUNTER — LAB VISIT (OUTPATIENT)
Dept: LAB | Facility: HOSPITAL | Age: 47
End: 2017-08-24
Attending: PSYCHIATRY & NEUROLOGY
Payer: COMMERCIAL

## 2017-08-24 DIAGNOSIS — Z79.899 HIGH RISK MEDICATION USE: ICD-10-CM

## 2017-08-24 DIAGNOSIS — G35 MULTIPLE SCLEROSIS: ICD-10-CM

## 2017-08-24 LAB
ALBUMIN SERPL BCP-MCNC: 4.2 G/DL
ALP SERPL-CCNC: 65 U/L
ALT SERPL W/O P-5'-P-CCNC: 56 U/L
AST SERPL-CCNC: 34 U/L
BASOPHILS # BLD AUTO: 0.04 K/UL
BASOPHILS NFR BLD: 0.7 %
BILIRUB DIRECT SERPL-MCNC: 0.2 MG/DL
BILIRUB SERPL-MCNC: 0.8 MG/DL
DIFFERENTIAL METHOD: ABNORMAL
EOSINOPHIL # BLD AUTO: 0.1 K/UL
EOSINOPHIL NFR BLD: 1.9 %
ERYTHROCYTE [DISTWIDTH] IN BLOOD BY AUTOMATED COUNT: 12.7 %
HCT VFR BLD AUTO: 40 %
HGB BLD-MCNC: 13.2 G/DL
LYMPHOCYTES # BLD AUTO: 2.8 K/UL
LYMPHOCYTES NFR BLD: 48.2 %
MCH RBC QN AUTO: 31.7 PG
MCHC RBC AUTO-ENTMCNC: 33 G/DL
MCV RBC AUTO: 96 FL
MONOCYTES # BLD AUTO: 0.3 K/UL
MONOCYTES NFR BLD: 5.6 %
NEUTROPHILS # BLD AUTO: 2.5 K/UL
NEUTROPHILS NFR BLD: 43.4 %
PLATELET # BLD AUTO: 286 K/UL
PMV BLD AUTO: 9.7 FL
PROT SERPL-MCNC: 8.1 G/DL
RBC # BLD AUTO: 4.17 M/UL
WBC # BLD AUTO: 5.73 K/UL

## 2017-08-24 PROCEDURE — 80076 HEPATIC FUNCTION PANEL: CPT

## 2017-08-24 PROCEDURE — 85025 COMPLETE CBC W/AUTO DIFF WBC: CPT

## 2017-08-24 PROCEDURE — 36415 COLL VENOUS BLD VENIPUNCTURE: CPT

## 2017-09-11 ENCOUNTER — TELEPHONE (OUTPATIENT)
Dept: NEUROLOGY | Facility: CLINIC | Age: 47
End: 2017-09-11

## 2017-09-11 NOTE — TELEPHONE ENCOUNTER
----- Message from Madelin Márquez sent at 9/11/2017  1:42 PM CDT -----  Contact: self @ [phone]  Pt is calling to schedule her f/u appt for January.  pls call with an appt.

## 2017-09-18 ENCOUNTER — OFFICE VISIT (OUTPATIENT)
Dept: SLEEP MEDICINE | Facility: CLINIC | Age: 47
End: 2017-09-18
Payer: COMMERCIAL

## 2017-09-18 VITALS
DIASTOLIC BLOOD PRESSURE: 89 MMHG | BODY MASS INDEX: 31.49 KG/M2 | HEART RATE: 73 BPM | SYSTOLIC BLOOD PRESSURE: 133 MMHG | WEIGHT: 172.19 LBS

## 2017-09-18 DIAGNOSIS — F51.01 PRIMARY INSOMNIA: Primary | ICD-10-CM

## 2017-09-18 DIAGNOSIS — F41.9 ANXIETY: ICD-10-CM

## 2017-09-18 DIAGNOSIS — G47.33 OSA (OBSTRUCTIVE SLEEP APNEA): ICD-10-CM

## 2017-09-18 PROCEDURE — 3008F BODY MASS INDEX DOCD: CPT | Mod: S$GLB,,, | Performed by: PSYCHIATRY & NEUROLOGY

## 2017-09-18 PROCEDURE — 3079F DIAST BP 80-89 MM HG: CPT | Mod: S$GLB,,, | Performed by: PSYCHIATRY & NEUROLOGY

## 2017-09-18 PROCEDURE — 99214 OFFICE O/P EST MOD 30 MIN: CPT | Mod: S$GLB,,, | Performed by: PSYCHIATRY & NEUROLOGY

## 2017-09-18 PROCEDURE — 3075F SYST BP GE 130 - 139MM HG: CPT | Mod: S$GLB,,, | Performed by: PSYCHIATRY & NEUROLOGY

## 2017-09-18 PROCEDURE — 99999 PR PBB SHADOW E&M-EST. PATIENT-LVL III: CPT | Mod: PBBFAC,,, | Performed by: PSYCHIATRY & NEUROLOGY

## 2017-09-18 NOTE — PROGRESS NOTES
"This 46 y.o. female patient presents for the management of obstructive sleep apnea. Per RYLEY Crow "Using CPAP but unable to wear mask--using nasal canual. Not waking up feeling rested."    ESS = 13    1. CHRIS   BASELINE PSG 7/18/16: +CHRIS, AHI 16.4, low sat 91%, wt 168 lbs  TITRATION 9/20/16: Titrated to effective pressure 7 cm, wt. 168 lbs    She is using CPAP nightly, but not feeling that her sleep is any better.  Feeling exhausted.    CPAP interrogation Dream Station not auto capable, set at 8 cm: 1812 hours used; 3.9 hours/n ave; 19/30 days >4 hours    2. She continues to have difficulties with anxiety and insomnia. This is her main complaint today.  Feeling more anxious than normal. Increasing gabapentin 600 mg didn't help.  She states that stress, anxiety, and feeling nervous at bedtime keep her from sleeping.    She reports dozing off on the couch around 9:30 pm. Wakes up, then goes to bed at 11 pm  Once in bed, she feels wide awake. Sleep onset ranges from 30 mins - 2 hours  WASO 3-4 awakenings; 30-90 mins of wake time  Awakens around 4 am, then very light and fragmented sleep, dozing in and out of sleep until wake time of 6 am.    Takes Buspar, clonazepam (4 times a day), Effexor - managed by Dr. Montiel  She is currently taking melatonin 5 mg; Taking "stress arrest".  She was trying prn Valium 10 mg, but it lost its effect.    She has tried Rozeram, Trazodone 100 mg (it worked and then stopped working)    MS symptoms: initially eyesight; ongoing issues with joints/legs    3. RLS  - Gabapentin 300 mg was initially effective in controlling RLS and helping her sleep better. It "worked" for 3 nights, but then lost its effectiveness. She has been using it intermittently, because she is concerned about habituation. She is also concerned about inability to lose weight.    Often feels need to move her legs at night; Urge; Moving constantly; Using adjustable bed;  Mostly in the evenings and around bed time.    SLEEP " ROUTINE:   Time to bed: 11:30 pm   Sleep onset latency: a while   Disruptions or awakenings: 3-4   Wakeup time: 6 am   Perceived sleep quality: poor   Daytime naps: every few days      Past Medical History:   Diagnosis Date    Endometriosis, site unspecified     H/O total hysterectomy     Hidradenitis     History of laparoscopy     History of psychiatric care     Multiple sclerosis     Panic anxiety syndrome     Therapy        Past Surgical History:   Procedure Laterality Date    APPENDECTOMY      breast reduction      HYSTERECTOMY      IN EXPLORATORY OF ABDOMEN      2002    sweat gland removal  10/2013    rt groin       Family History   Problem Relation Age of Onset    Anxiety disorder Mother     Cancer Mother      cervix    Breast cancer Neg Hx     Colon cancer Neg Hx     Ovarian cancer Neg Hx        Social History   Substance Use Topics    Smoking status: Never Smoker    Smokeless tobacco: Never Used    Alcohol use No       ALLERGIES: Reviewed in EPIC    CURRENT MEDICATIONS: Reviewed in EPIC    REVIEW OF SYSTEMS:  Sleep related symptoms as per HPI;    Denies weight gain, but difficulty losing;    Positive palpitations;    Positive mood disturbance;    Denies anemia;    Otherwise, a balance of systems reviewed is negative.    PHYSICAL EXAM:  /89 (BP Location: Right arm, Patient Position: Sitting)   Pulse 73   Wt 78.1 kg (172 lb 2.9 oz)   BMI 31.49 kg/m²   GENERAL: Well groomed; Normally developed;  NEURO: Oriented to time, place and person.    Normal attention span and concentration.  Station normal. Gait normal.  PSYCH: Affect is full. Mood is normal.    I spent greater than 25 minutes during this 45 minute visit in counseling the patient regarding insomnia and sources of fatigue, anxiety and insomnia relationships.      ASSESSMENT:    1. Obstructive Sleep Apnea, moderate severity. CHRIS appears to be very well controlled on CPAP. She is using an effective setting, determined by  titration sleep study. She is demonstrating adequate adherence, >4 hours/nightly over 30-day average.      2. Sleep disturbances/Insomnia - underlying  appears to be anxiety/depression.  She feels like anxiety is getting worse, despite medical management. Cause for anxiety unclear - also multi-factorial, being managed by Dr. Montiel. She has implicated MS or Avonex as possible root cause, since she did not have any anxiety at all prior to her MS diagnosis and treatment. She does lament than trying to change MS related medications in the past has been challenging. She has been spending excess time in bed 9+ hours, and only sleeping a fraction of that amount. She has attempted to implement CBT-insomnia, but having issues with dozing off prior to bedroom, and this leaves her feeling wide awake once she does enter the bedroom.    3. Restless legs feelings at night / akithesia - in some cases PLMS can be potentiated by Effexor; No evidence of PLMS on sleep study; RLS can occur just as a feeling (without acutal limb kicks) that can contribute to night time anxiety and insomnia. RLS appears to have responded to initial doses of gabapentin. She is so focused on the effects of her underlying anxiety, it is difficult to ascertain whether she is getting any benefit from this.    4. Fatigue - multi-factorial; certainly MS, medication, and depression can be playing a role; CHRIS is treated and an effective pressure has been determined.         PLAN:    Recommend a continued focus/effort on CBT-insomnia  Continue with reducing number of allowable bedtime hours to 6.5-7 hours nightly.     CBT schedule:  1. List-making or journaling about 2 hours before bedtime to help decompress thoughts from your mind.  2. Must clearly avoid any napping/dozing 2-3 hours before allowed bedtime. This may include discontinuing sedentary activities, like lounging on the sofa, in the time period before bedtime to help avoid accidental dozings. If  dozing does occur, you may need to delay bedtime until you feel drowsy or sleepy again.  3. Change bedtime to 10:00 pm. Out of bed at 5 am  4. Stimulus control. Leave bedroom if not sleeping for 30 minutes, or if feeling wide awake and no chance of sleep. Return to bedroom, only once drowsiness/sleepiness returns, and if it is during your allowable bedtime hours, that is 10pm to 5 am.    Other considerations:  1. Continue gabapentin at 600 mg at bedtime to help settle RLS/akithesia and anxiety feelings. She has had some initial effect.  2. Meet with Dr. Montiel regarding anxiety control, which is a contributing factor to difficulty sleeping.  3. Continue CPAP, at pressure to 8 cm; trial with chin strap. The potential benefits were discussed.      Precautions: The patient was advised to abstain from driving should they feel sleepy or drowsy.    Follow up: 2-3 weeks. MD/NP

## 2017-09-18 NOTE — PATIENT INSTRUCTIONS
1. List-making or journaling about 2 hours before bedtime to help decompress thoughts from your mind.  2. Must clearly avoid any napping/dozing 2-3 hours before allowed bedtime. This may include discontinuing sedentary activities, like lounging on the sofa, in the time period before bedtime to help avoid accidental dozings. If dozing does occur, you may need to delay bedtime until you feel drowsy or sleepy again.  3. Change bedtime to 10:00 pm. Out of bed at 5 am  4. Stimulus control. Leave bedroom if not sleeping for 30 minutes, or if feeling wide awake and no chance of sleep. Return to bedroom, only once drowsiness/sleepiness returns, and if it is during your allowable bedtime hours, that is 10pm to 5 am.

## 2017-09-27 ENCOUNTER — OFFICE VISIT (OUTPATIENT)
Dept: PSYCHIATRY | Facility: CLINIC | Age: 47
End: 2017-09-27
Payer: COMMERCIAL

## 2017-09-27 VITALS
DIASTOLIC BLOOD PRESSURE: 77 MMHG | HEART RATE: 70 BPM | HEIGHT: 62 IN | SYSTOLIC BLOOD PRESSURE: 129 MMHG | BODY MASS INDEX: 31.77 KG/M2 | WEIGHT: 172.63 LBS

## 2017-09-27 DIAGNOSIS — F41.1 GAD (GENERALIZED ANXIETY DISORDER): ICD-10-CM

## 2017-09-27 DIAGNOSIS — G47.33 OSA (OBSTRUCTIVE SLEEP APNEA): ICD-10-CM

## 2017-09-27 DIAGNOSIS — F32.1 MDD (MAJOR DEPRESSIVE DISORDER), SINGLE EPISODE, MODERATE: Primary | ICD-10-CM

## 2017-09-27 DIAGNOSIS — F40.10 SOCIAL PHOBIA: ICD-10-CM

## 2017-09-27 DIAGNOSIS — F42.9 OBSESSIVE-COMPULSIVE DISORDER, UNSPECIFIED TYPE: ICD-10-CM

## 2017-09-27 DIAGNOSIS — F40.298 SPECIFIC PHOBIA: ICD-10-CM

## 2017-09-27 DIAGNOSIS — F33.2 SEVERE EPISODE OF RECURRENT MAJOR DEPRESSIVE DISORDER, WITHOUT PSYCHOTIC FEATURES: ICD-10-CM

## 2017-09-27 DIAGNOSIS — F41.1 GENERALIZED ANXIETY DISORDER: ICD-10-CM

## 2017-09-27 DIAGNOSIS — G25.81 RLS (RESTLESS LEGS SYNDROME): ICD-10-CM

## 2017-09-27 PROCEDURE — 90836 PSYTX W PT W E/M 45 MIN: CPT | Mod: S$GLB,,, | Performed by: PSYCHIATRY & NEUROLOGY

## 2017-09-27 PROCEDURE — 99213 OFFICE O/P EST LOW 20 MIN: CPT | Mod: S$GLB,,, | Performed by: PSYCHIATRY & NEUROLOGY

## 2017-09-27 PROCEDURE — 3008F BODY MASS INDEX DOCD: CPT | Mod: S$GLB,,, | Performed by: PSYCHIATRY & NEUROLOGY

## 2017-09-27 PROCEDURE — 3074F SYST BP LT 130 MM HG: CPT | Mod: S$GLB,,, | Performed by: PSYCHIATRY & NEUROLOGY

## 2017-09-27 PROCEDURE — 3078F DIAST BP <80 MM HG: CPT | Mod: S$GLB,,, | Performed by: PSYCHIATRY & NEUROLOGY

## 2017-09-27 PROCEDURE — 99999 PR PBB SHADOW E&M-EST. PATIENT-LVL III: CPT | Mod: PBBFAC,,, | Performed by: PSYCHIATRY & NEUROLOGY

## 2017-09-27 RX ORDER — BUSPIRONE HYDROCHLORIDE 15 MG/1
15 TABLET ORAL 2 TIMES DAILY
Qty: 60 TABLET | Refills: 6 | Status: SHIPPED | OUTPATIENT
Start: 2017-09-27 | End: 2017-12-14 | Stop reason: SDUPTHER

## 2017-09-27 RX ORDER — VENLAFAXINE HYDROCHLORIDE 150 MG/1
150 CAPSULE, EXTENDED RELEASE ORAL DAILY
Qty: 30 CAPSULE | Refills: 3 | Status: SHIPPED | OUTPATIENT
Start: 2017-09-27 | End: 2017-12-14 | Stop reason: SDUPTHER

## 2017-09-27 RX ORDER — VENLAFAXINE HYDROCHLORIDE 75 MG/1
225 CAPSULE, EXTENDED RELEASE ORAL DAILY
Qty: 90 CAPSULE | Refills: 3 | Status: SHIPPED | OUTPATIENT
Start: 2017-09-27 | End: 2017-12-14 | Stop reason: SDUPTHER

## 2017-09-27 RX ORDER — DIAZEPAM 10 MG/1
10 TABLET ORAL DAILY PRN
Qty: 30 TABLET | Refills: 5 | Status: SHIPPED | OUTPATIENT
Start: 2017-09-27 | End: 2017-12-14 | Stop reason: SDUPTHER

## 2017-09-27 RX ORDER — CLONAZEPAM 0.5 MG/1
0.5 TABLET, ORALLY DISINTEGRATING ORAL 4 TIMES DAILY
Qty: 120 TABLET | Refills: 5 | Status: SHIPPED | OUTPATIENT
Start: 2017-09-27 | End: 2017-12-14 | Stop reason: SDUPTHER

## 2017-09-27 RX ORDER — GABAPENTIN 300 MG/1
CAPSULE ORAL
Qty: 90 CAPSULE | Refills: 3 | Status: SHIPPED | OUTPATIENT
Start: 2017-09-27 | End: 2017-11-28 | Stop reason: SDUPTHER

## 2017-09-27 RX ORDER — CLINDAMYCIN PHOSPHATE 10 MG/G
GEL TOPICAL
Refills: 1 | COMMUNITY
Start: 2017-08-28
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
## 2017-09-28 ENCOUNTER — LAB VISIT (OUTPATIENT)
Dept: LAB | Facility: HOSPITAL | Age: 47
End: 2017-09-28
Attending: PSYCHIATRY & NEUROLOGY
Payer: COMMERCIAL

## 2017-09-28 DIAGNOSIS — Z79.899 HIGH RISK MEDICATION USE: ICD-10-CM

## 2017-09-28 DIAGNOSIS — G35 MULTIPLE SCLEROSIS: ICD-10-CM

## 2017-09-28 LAB
ALBUMIN SERPL BCP-MCNC: 4 G/DL
ALP SERPL-CCNC: 72 U/L
ALT SERPL W/O P-5'-P-CCNC: 47 U/L
AST SERPL-CCNC: 28 U/L
BASOPHILS # BLD AUTO: 0.03 K/UL
BASOPHILS NFR BLD: 0.5 %
BILIRUB DIRECT SERPL-MCNC: 0.2 MG/DL
BILIRUB SERPL-MCNC: 0.6 MG/DL
DIFFERENTIAL METHOD: ABNORMAL
EOSINOPHIL # BLD AUTO: 0.2 K/UL
EOSINOPHIL NFR BLD: 2.6 %
ERYTHROCYTE [DISTWIDTH] IN BLOOD BY AUTOMATED COUNT: 12.3 %
HCT VFR BLD AUTO: 41.1 %
HGB BLD-MCNC: 13.7 G/DL
LYMPHOCYTES # BLD AUTO: 3.1 K/UL
LYMPHOCYTES NFR BLD: 50 %
MCH RBC QN AUTO: 32.1 PG
MCHC RBC AUTO-ENTMCNC: 33.3 G/DL
MCV RBC AUTO: 96 FL
MONOCYTES # BLD AUTO: 0.3 K/UL
MONOCYTES NFR BLD: 5.5 %
NEUTROPHILS # BLD AUTO: 2.6 K/UL
NEUTROPHILS NFR BLD: 41.2 %
PLATELET # BLD AUTO: 269 K/UL
PMV BLD AUTO: 10 FL
PROT SERPL-MCNC: 7.7 G/DL
RBC # BLD AUTO: 4.27 M/UL
WBC # BLD AUTO: 6.18 K/UL

## 2017-09-28 PROCEDURE — 80076 HEPATIC FUNCTION PANEL: CPT

## 2017-09-28 PROCEDURE — 85025 COMPLETE CBC W/AUTO DIFF WBC: CPT

## 2017-09-28 PROCEDURE — 36415 COLL VENOUS BLD VENIPUNCTURE: CPT

## 2017-10-26 ENCOUNTER — LAB VISIT (OUTPATIENT)
Dept: LAB | Facility: HOSPITAL | Age: 47
End: 2017-10-26
Attending: PSYCHIATRY & NEUROLOGY
Payer: COMMERCIAL

## 2017-10-26 DIAGNOSIS — F41.1 GAD (GENERALIZED ANXIETY DISORDER): ICD-10-CM

## 2017-10-26 DIAGNOSIS — Z79.899 HIGH RISK MEDICATION USE: ICD-10-CM

## 2017-10-26 DIAGNOSIS — G35 MULTIPLE SCLEROSIS: ICD-10-CM

## 2017-10-26 LAB
ALBUMIN SERPL BCP-MCNC: 4 G/DL
ALP SERPL-CCNC: 85 U/L
ALT SERPL W/O P-5'-P-CCNC: 62 U/L
AST SERPL-CCNC: 26 U/L
BASOPHILS # BLD AUTO: 0.04 K/UL
BASOPHILS NFR BLD: 0.7 %
BILIRUB DIRECT SERPL-MCNC: 0.3 MG/DL
BILIRUB SERPL-MCNC: 0.8 MG/DL
DIFFERENTIAL METHOD: ABNORMAL
EOSINOPHIL # BLD AUTO: 0.1 K/UL
EOSINOPHIL NFR BLD: 1.3 %
ERYTHROCYTE [DISTWIDTH] IN BLOOD BY AUTOMATED COUNT: 13.1 %
HCT VFR BLD AUTO: 40.9 %
HGB BLD-MCNC: 13.2 G/DL
LYMPHOCYTES # BLD AUTO: 3 K/UL
LYMPHOCYTES NFR BLD: 50 %
MCH RBC QN AUTO: 31.8 PG
MCHC RBC AUTO-ENTMCNC: 32.3 G/DL
MCV RBC AUTO: 99 FL
MONOCYTES # BLD AUTO: 0.4 K/UL
MONOCYTES NFR BLD: 7.1 %
NEUTROPHILS # BLD AUTO: 2.5 K/UL
NEUTROPHILS NFR BLD: 40.7 %
PLATELET # BLD AUTO: 283 K/UL
PMV BLD AUTO: 10.1 FL
PROT SERPL-MCNC: 7.9 G/DL
RBC # BLD AUTO: 4.15 M/UL
WBC # BLD AUTO: 6.02 K/UL

## 2017-10-26 PROCEDURE — 36415 COLL VENOUS BLD VENIPUNCTURE: CPT

## 2017-10-26 PROCEDURE — 80076 HEPATIC FUNCTION PANEL: CPT

## 2017-10-26 PROCEDURE — 85025 COMPLETE CBC W/AUTO DIFF WBC: CPT

## 2017-11-02 RX ORDER — CLONAZEPAM 0.5 MG/1
TABLET, ORALLY DISINTEGRATING ORAL
Qty: 120 TABLET | Refills: 0 | OUTPATIENT
Start: 2017-11-02

## 2017-11-05 ENCOUNTER — PATIENT MESSAGE (OUTPATIENT)
Dept: SLEEP MEDICINE | Facility: CLINIC | Age: 47
End: 2017-11-05

## 2017-11-28 ENCOUNTER — OFFICE VISIT (OUTPATIENT)
Dept: SLEEP MEDICINE | Facility: CLINIC | Age: 47
End: 2017-11-28
Payer: COMMERCIAL

## 2017-11-28 VITALS
BODY MASS INDEX: 31.41 KG/M2 | HEART RATE: 82 BPM | WEIGHT: 171.75 LBS | DIASTOLIC BLOOD PRESSURE: 84 MMHG | SYSTOLIC BLOOD PRESSURE: 128 MMHG

## 2017-11-28 DIAGNOSIS — F51.01 PRIMARY INSOMNIA: Primary | ICD-10-CM

## 2017-11-28 DIAGNOSIS — G47.33 OSA (OBSTRUCTIVE SLEEP APNEA): ICD-10-CM

## 2017-11-28 DIAGNOSIS — G25.81 RLS (RESTLESS LEGS SYNDROME): ICD-10-CM

## 2017-11-28 PROCEDURE — 99214 OFFICE O/P EST MOD 30 MIN: CPT | Mod: S$GLB,,, | Performed by: PSYCHIATRY & NEUROLOGY

## 2017-11-28 PROCEDURE — 99999 PR PBB SHADOW E&M-EST. PATIENT-LVL II: CPT | Mod: PBBFAC,,, | Performed by: PSYCHIATRY & NEUROLOGY

## 2017-11-28 RX ORDER — GABAPENTIN 300 MG/1
CAPSULE ORAL
Qty: 90 CAPSULE | Refills: 3 | Status: SHIPPED | OUTPATIENT
Start: 2017-11-28 | End: 2018-06-05 | Stop reason: SDUPTHER

## 2017-11-28 NOTE — PROGRESS NOTES
"This 46 y.o. female patient presents for the management of obstructive sleep apnea.    Headaches ongoing since recent flu. Took 3 weeks to recover    ESS = 14    1. CHRIS   BASELINE PSG 7/18/16: +CHRIS, AHI 16.4, low sat 91%, wt 168 lbs  TITRATION 9/20/16: Titrated to effective pressure 7 cm, wt. 168 lbs    She is using CPAP nightly, but not feeling that her sleep is any better.  Feeling exhausted.    CPAP interrogation Dream Station not auto capable, set at 8 cm: 2016 hours used; 2.7 hours/n ave; 6/30 days >4 hours (usage in last 3 weeks is down due to flu)    2. She continues to have difficulties with anxiety and insomnia. This is her main complaint today.    Sleep is worse and more fragmented. Increased worry and stress.    Feeling more anxious than normal, concerned about parents who were affected in [place].   She states that stress, anxiety, and feeling nervous at bedtime keep her from sleeping.    She reports dozing off on the couch during the day and in the evening while taking medication that make her groggy. Wakes up, then goes to bed at 10:30 pm - MN  Once in bed, sleep onset ranges from 30 mins - 1 hour  WASO 5-6 awakenings; 30-90 mins of wake time  Awakens around 4 am, then very light and fragmented sleep, dozing in and out of sleep until wake time of 6 am.    Takes Buspar, clonazepam (4 times a day for GI related issue), Valium 10 mg (for high anxiety), Effexor - managed by Dr. Montiel  She is currently taking melatonin 5 mg; Takes Prem    She has tried Rozeram, Trazodone 100 mg (it worked and then stopped working)    MS symptoms: initially eyesight; ongoing issues with joints/legs    3. RLS  - Gabapentin 300 mg was initially effective in controlling RLS and helping her sleep better. It "worked" for 3 nights, but then lost its effectiveness. She has been using it intermittently, because she is concerned about habituation. She is also concerned about inability to lose weight. Increasing gabapentin 600 mg " didn't help. She may have tried increasing to 900 mg, but does not recall the result.     Often feels need to move her legs at night; Urge; Moving constantly; Using adjustable bed;  Mostly in the evenings and around bed time.    SLEEP ROUTINE:   Time to bed: 11:30 pm   Sleep onset latency: a while   Disruptions or awakenings: 3-4   Wakeup time: 6 am   Perceived sleep quality: poor   Daytime naps: every few days      Past Medical History:   Diagnosis Date    Endometriosis, site unspecified     H/O total hysterectomy     Hidradenitis     History of laparoscopy     History of psychiatric care     Multiple sclerosis     Panic anxiety syndrome     Therapy        Past Surgical History:   Procedure Laterality Date    APPENDECTOMY      breast reduction      HYSTERECTOMY      OR EXPLORATORY OF ABDOMEN      2002    sweat gland removal  10/2013    rt groin       Family History   Problem Relation Age of Onset    Anxiety disorder Mother     Cancer Mother      cervix    Breast cancer Neg Hx     Colon cancer Neg Hx     Ovarian cancer Neg Hx        Social History   Substance Use Topics    Smoking status: Never Smoker    Smokeless tobacco: Never Used    Alcohol use No       ALLERGIES: Reviewed in EPIC    CURRENT MEDICATIONS: Reviewed in EPIC    REVIEW OF SYSTEMS:  Sleep related symptoms as per HPI;    Denies weight gain, but difficulty losing;    Positive palpitations;    Positive mood disturbance;    Denies anemia;    Otherwise, a balance of systems reviewed is negative.    PHYSICAL EXAM:  /84 (BP Location: Left arm, Patient Position: Sitting)   Pulse 82   Wt 77.9 kg (171 lb 11.8 oz)   BMI 31.41 kg/m²   GENERAL: Well groomed; Normally developed;  NEURO: Oriented to time, place and person.    Normal attention span and concentration.  Station normal. Gait normal.  PSYCH: Affect is full. Mood is normal.    I spent greater than 25 minutes during this 45 minute visit in counseling the patient regarding  insomnia and sources of fatigue, anxiety and insomnia relationships.      ASSESSMENT:    1. Obstructive Sleep Apnea, moderate severity. CHRIS appears to be very well controlled on CPAP. She is using an effective setting, determined by titration sleep study. She is demonstrating adequate adherence, >4 hours/nightly over 30-day average.      2. Sleep disturbances/Insomnia - underlying  appears to be anxiety/depression.  She feels like anxiety is getting worse, despite medical management. Cause for anxiety unclear - also multi-factorial, being managed by Dr. Montiel. She has implicated MS or Avonex as possible root cause, since she did not have any anxiety at all prior to her MS diagnosis and treatment. She does lament than trying to change MS related medications in the past has been challenging. She had been spending excess time in bed 9+ hours, and only sleeping a fraction of that amount, but she has managed to reduce time in bed to 8 hours. Unfortunately, she is doing a lot of unintentional dozing during the day. She has attempted to implement CBT-insomnia, but having issues with dozing off prior to bedroom, and this leaves her feeling wide awake once she does enter the bedroom. Also, keeping the logs makes her anxious.    3. Restless legs feelings at night / akithesia - in some cases PLMS can be potentiated by Effexor; No evidence of PLMS on sleep study; RLS can occur just as a feeling (without acutal limb kicks) that can contribute to night time anxiety and insomnia. RLS appears to have responded to initial doses of gabapentin. She is so focused on the effects of her underlying anxiety, it is difficult to ascertain whether she is getting any benefit from this.    4. Fatigue - multi-factorial; certainly MS, medication, and depression can be playing a role; CHRIS is treated and an effective pressure has been determined.         PLAN:    CBT schedule:  1. List-making or journaling about 2 hours before bedtime to  help decompress thoughts from your mind. She is doing this, which helps her keep tracks of things  2. Must clearly avoid any napping/dozing 2-3 hours before allowed bedtime. This may include discontinuing sedentary activities, like lounging on the sofa, in the time period before bedtime to help avoid accidental dozings. If dozing does occur, you may need to delay bedtime until you feel drowsy or sleepy again.  3. Change bedtime to 10:30 pm. Out of bed at 6 am    She is holding off on sleep logs now, because she feels that keeping track of this makes her too anxious    Other considerations:  1. Continue gabapentin at 600 mg at bedtime to help settle RLS/akithesia and anxiety feelings. She has had some initial effect. May take up to 3 nightly (900 mg);  2. Meet with Dr. Montiel regarding anxiety control, which is a contributing factor to difficulty sleeping. Clonazepam at 4 intervals spaced during the day, may be contributing to daytime somnolence and short unintentional naps, which are interfering with night time sleep. Ideally, as far as sleep continuity, she would be better off dosing more of the clonazepam pre-bedtime. However, this change to regimen would need to keep her other health issues in consideration. Patient reports she takes the clonazepam for GI motility issues, so she will clarify this at her next GI check up.  3. Continue CPAP, at pressure to 8 cm; trial with chin strap. The potential benefits were discussed.      Precautions: The patient was advised to abstain from driving should they feel sleepy or drowsy.    Follow up: 6-8 weeks. (After her GI visit in [place]) MD/NP

## 2017-11-30 ENCOUNTER — LAB VISIT (OUTPATIENT)
Dept: LAB | Facility: HOSPITAL | Age: 47
End: 2017-11-30
Attending: PSYCHIATRY & NEUROLOGY
Payer: COMMERCIAL

## 2017-11-30 DIAGNOSIS — G35 MULTIPLE SCLEROSIS: ICD-10-CM

## 2017-11-30 DIAGNOSIS — Z79.899 HIGH RISK MEDICATION USE: ICD-10-CM

## 2017-11-30 LAB
ALBUMIN SERPL BCP-MCNC: 3.8 G/DL
ALP SERPL-CCNC: 59 U/L
ALT SERPL W/O P-5'-P-CCNC: 37 U/L
AST SERPL-CCNC: 22 U/L
BASOPHILS # BLD AUTO: 0.02 K/UL
BASOPHILS NFR BLD: 0.4 %
BILIRUB DIRECT SERPL-MCNC: 0.3 MG/DL
BILIRUB SERPL-MCNC: 0.9 MG/DL
DIFFERENTIAL METHOD: ABNORMAL
EOSINOPHIL # BLD AUTO: 0.1 K/UL
EOSINOPHIL NFR BLD: 1.7 %
ERYTHROCYTE [DISTWIDTH] IN BLOOD BY AUTOMATED COUNT: 13 %
HCT VFR BLD AUTO: 37.4 %
HGB BLD-MCNC: 12 G/DL
LYMPHOCYTES # BLD AUTO: 2.4 K/UL
LYMPHOCYTES NFR BLD: 50.4 %
MCH RBC QN AUTO: 31.2 PG
MCHC RBC AUTO-ENTMCNC: 32.1 G/DL
MCV RBC AUTO: 97 FL
MONOCYTES # BLD AUTO: 0.3 K/UL
MONOCYTES NFR BLD: 7.1 %
NEUTROPHILS # BLD AUTO: 1.9 K/UL
NEUTROPHILS NFR BLD: 40.4 %
PLATELET # BLD AUTO: 241 K/UL
PMV BLD AUTO: 10.3 FL
PROT SERPL-MCNC: 7.3 G/DL
RBC # BLD AUTO: 3.85 M/UL
WBC # BLD AUTO: 4.68 K/UL

## 2017-11-30 PROCEDURE — 36415 COLL VENOUS BLD VENIPUNCTURE: CPT

## 2017-11-30 PROCEDURE — 80076 HEPATIC FUNCTION PANEL: CPT

## 2017-11-30 PROCEDURE — 85025 COMPLETE CBC W/AUTO DIFF WBC: CPT

## 2017-12-14 ENCOUNTER — OFFICE VISIT (OUTPATIENT)
Dept: PSYCHIATRY | Facility: CLINIC | Age: 47
End: 2017-12-14
Payer: COMMERCIAL

## 2017-12-14 VITALS
WEIGHT: 175.38 LBS | HEART RATE: 83 BPM | DIASTOLIC BLOOD PRESSURE: 92 MMHG | BODY MASS INDEX: 32.08 KG/M2 | SYSTOLIC BLOOD PRESSURE: 137 MMHG

## 2017-12-14 DIAGNOSIS — G25.81 RLS (RESTLESS LEGS SYNDROME): ICD-10-CM

## 2017-12-14 DIAGNOSIS — F40.298 SPECIFIC PHOBIA: ICD-10-CM

## 2017-12-14 DIAGNOSIS — F42.9 OBSESSIVE-COMPULSIVE DISORDER, UNSPECIFIED TYPE: ICD-10-CM

## 2017-12-14 DIAGNOSIS — F41.1 GAD (GENERALIZED ANXIETY DISORDER): ICD-10-CM

## 2017-12-14 DIAGNOSIS — F41.1 GENERALIZED ANXIETY DISORDER: ICD-10-CM

## 2017-12-14 DIAGNOSIS — F40.10 SOCIAL PHOBIA: ICD-10-CM

## 2017-12-14 DIAGNOSIS — F33.2 SEVERE EPISODE OF RECURRENT MAJOR DEPRESSIVE DISORDER, WITHOUT PSYCHOTIC FEATURES: ICD-10-CM

## 2017-12-14 DIAGNOSIS — F32.1 MDD (MAJOR DEPRESSIVE DISORDER), SINGLE EPISODE, MODERATE: Primary | ICD-10-CM

## 2017-12-14 PROCEDURE — 99213 OFFICE O/P EST LOW 20 MIN: CPT | Mod: S$GLB,,, | Performed by: PSYCHIATRY & NEUROLOGY

## 2017-12-14 PROCEDURE — 90836 PSYTX W PT W E/M 45 MIN: CPT | Mod: S$GLB,,, | Performed by: PSYCHIATRY & NEUROLOGY

## 2017-12-14 PROCEDURE — 99999 PR PBB SHADOW E&M-EST. PATIENT-LVL II: CPT | Mod: PBBFAC,,, | Performed by: PSYCHIATRY & NEUROLOGY

## 2017-12-14 RX ORDER — CLONAZEPAM 0.5 MG/1
0.5 TABLET, ORALLY DISINTEGRATING ORAL 4 TIMES DAILY
Qty: 120 TABLET | Refills: 5 | Status: SHIPPED | OUTPATIENT
Start: 2017-12-14 | End: 2018-03-15 | Stop reason: SDUPTHER

## 2017-12-14 RX ORDER — VENLAFAXINE HYDROCHLORIDE 150 MG/1
150 CAPSULE, EXTENDED RELEASE ORAL DAILY
Qty: 30 CAPSULE | Refills: 3 | Status: SHIPPED | OUTPATIENT
Start: 2017-12-14 | End: 2018-03-20

## 2017-12-14 RX ORDER — DIAZEPAM 10 MG/1
10 TABLET ORAL DAILY PRN
Qty: 30 TABLET | Refills: 5 | Status: SHIPPED | OUTPATIENT
Start: 2017-12-14 | End: 2018-03-15 | Stop reason: SDUPTHER

## 2017-12-14 RX ORDER — BUSPIRONE HYDROCHLORIDE 15 MG/1
15 TABLET ORAL 2 TIMES DAILY
Qty: 60 TABLET | Refills: 6 | Status: SHIPPED | OUTPATIENT
Start: 2017-12-14 | End: 2018-03-15 | Stop reason: SDUPTHER

## 2017-12-14 RX ORDER — VENLAFAXINE HYDROCHLORIDE 75 MG/1
225 CAPSULE, EXTENDED RELEASE ORAL DAILY
Qty: 90 CAPSULE | Refills: 3 | Status: SHIPPED | OUTPATIENT
Start: 2017-12-14 | End: 2018-06-18 | Stop reason: SDUPTHER
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
## 2017-12-17 ENCOUNTER — PATIENT MESSAGE (OUTPATIENT)
Dept: PSYCHIATRY | Facility: CLINIC | Age: 47
End: 2017-12-17

## 2017-12-26 ENCOUNTER — PATIENT MESSAGE (OUTPATIENT)
Dept: NEUROLOGY | Facility: CLINIC | Age: 47
End: 2017-12-26

## 2017-12-28 ENCOUNTER — LAB VISIT (OUTPATIENT)
Dept: LAB | Facility: HOSPITAL | Age: 47
End: 2017-12-28
Attending: PSYCHIATRY & NEUROLOGY
Payer: COMMERCIAL

## 2017-12-28 DIAGNOSIS — G35 MULTIPLE SCLEROSIS: ICD-10-CM

## 2017-12-28 DIAGNOSIS — Z79.899 HIGH RISK MEDICATION USE: ICD-10-CM

## 2017-12-28 LAB
ALBUMIN SERPL BCP-MCNC: 4 G/DL
ALP SERPL-CCNC: 65 U/L
ALT SERPL W/O P-5'-P-CCNC: 36 U/L
AST SERPL-CCNC: 25 U/L
BASOPHILS # BLD AUTO: 0.04 K/UL
BASOPHILS NFR BLD: 0.5 %
BILIRUB DIRECT SERPL-MCNC: 0.2 MG/DL
BILIRUB SERPL-MCNC: 0.4 MG/DL
DIFFERENTIAL METHOD: ABNORMAL
EOSINOPHIL # BLD AUTO: 0.1 K/UL
EOSINOPHIL NFR BLD: 1.6 %
ERYTHROCYTE [DISTWIDTH] IN BLOOD BY AUTOMATED COUNT: 13 %
HCT VFR BLD AUTO: 40.9 %
HGB BLD-MCNC: 13.2 G/DL
LYMPHOCYTES # BLD AUTO: 3.1 K/UL
LYMPHOCYTES NFR BLD: 39.9 %
MCH RBC QN AUTO: 31.9 PG
MCHC RBC AUTO-ENTMCNC: 32.3 G/DL
MCV RBC AUTO: 99 FL
MONOCYTES # BLD AUTO: 0.4 K/UL
MONOCYTES NFR BLD: 5.6 %
NEUTROPHILS # BLD AUTO: 4 K/UL
NEUTROPHILS NFR BLD: 52.1 %
PLATELET # BLD AUTO: 263 K/UL
PMV BLD AUTO: 9.9 FL
PROT SERPL-MCNC: 7.8 G/DL
RBC # BLD AUTO: 4.14 M/UL
WBC # BLD AUTO: 7.67 K/UL

## 2017-12-28 PROCEDURE — 80076 HEPATIC FUNCTION PANEL: CPT

## 2017-12-28 PROCEDURE — 36415 COLL VENOUS BLD VENIPUNCTURE: CPT

## 2017-12-28 PROCEDURE — 85025 COMPLETE CBC W/AUTO DIFF WBC: CPT

## 2018-01-05 ENCOUNTER — OFFICE VISIT (OUTPATIENT)
Dept: OPHTHALMOLOGY | Facility: CLINIC | Age: 48
End: 2018-01-05
Payer: COMMERCIAL

## 2018-01-05 ENCOUNTER — OFFICE VISIT (OUTPATIENT)
Dept: NEUROLOGY | Facility: CLINIC | Age: 48
End: 2018-01-05
Payer: COMMERCIAL

## 2018-01-05 ENCOUNTER — CLINICAL SUPPORT (OUTPATIENT)
Dept: OPHTHALMOLOGY | Facility: CLINIC | Age: 48
End: 2018-01-05
Payer: COMMERCIAL

## 2018-01-05 VITALS
HEIGHT: 62 IN | DIASTOLIC BLOOD PRESSURE: 87 MMHG | HEART RATE: 78 BPM | BODY MASS INDEX: 34.23 KG/M2 | WEIGHT: 186 LBS | SYSTOLIC BLOOD PRESSURE: 127 MMHG

## 2018-01-05 DIAGNOSIS — G35 MULTIPLE SCLEROSIS: Primary | ICD-10-CM

## 2018-01-05 DIAGNOSIS — Z79.899 ENCOUNTER FOR LONG-TERM (CURRENT) USE OF HIGH-RISK MEDICATION: ICD-10-CM

## 2018-01-05 DIAGNOSIS — R53.83 FATIGUE, UNSPECIFIED TYPE: ICD-10-CM

## 2018-01-05 DIAGNOSIS — H54.7 DECREASED VISUAL ACUITY: ICD-10-CM

## 2018-01-05 DIAGNOSIS — Z71.89 COUNSELING REGARDING GOALS OF CARE: ICD-10-CM

## 2018-01-05 DIAGNOSIS — H53.451 ALTITUDINAL SCOTOMA OF RIGHT EYE: ICD-10-CM

## 2018-01-05 DIAGNOSIS — H47.291 PARTIAL OPTIC ATROPHY OF RIGHT EYE: ICD-10-CM

## 2018-01-05 PROCEDURE — 99214 OFFICE O/P EST MOD 30 MIN: CPT | Mod: ,,, | Performed by: PHYSICIAN ASSISTANT

## 2018-01-05 PROCEDURE — 99999 PR PBB SHADOW E&M-EST. PATIENT-LVL IV: CPT | Mod: PBBFAC,,, | Performed by: OPHTHALMOLOGY

## 2018-01-05 PROCEDURE — 92004 COMPRE OPH EXAM NEW PT 1/>: CPT | Mod: S$GLB,,, | Performed by: OPHTHALMOLOGY

## 2018-01-05 PROCEDURE — 99999 PR PBB SHADOW E&M-EST. PATIENT-LVL IV: CPT | Mod: PBBFAC,,, | Performed by: PHYSICIAN ASSISTANT

## 2018-01-05 PROCEDURE — 92083 EXTENDED VISUAL FIELD XM: CPT | Mod: S$GLB,,, | Performed by: OPHTHALMOLOGY

## 2018-01-05 PROCEDURE — 3008F BODY MASS INDEX DOCD: CPT | Mod: ,,, | Performed by: PHYSICIAN ASSISTANT

## 2018-01-05 NOTE — PROGRESS NOTES
HPI     Concerns About Ocular Health    Additional comments: Multiple sclerosis           Comments   Referred by   Patient here for evaluation of poss Optic Neuritis OD.  Dx w/MS since 2007.  Patient notice OD vision a little blurry, but nothing else.   notice [facility] with her OD [facility]  Wear eyeglasses mainly to read and watch television.  No pain.  Review HVF    I have personally interviewed the patient, reviewed the history and   examined the patient and agree with the technician's exam.    No pain. No recent changes in vision. She has a history of optic neuritis   in her right eye.       Last edited by Brad Paz MD on 1/5/2018 11:57 AM. (History)            Assessment /Plan     For exam results, see Encounter Report.    Multiple sclerosis    Altitudinal scotoma of right eye  -     Bettencourt Visual Field - OU - Extended - Both Eyes    Partial optic atrophy of right eye      Ms. Osullivan has optic atrophy and a relative superior altitudinal visual field defect in her right eye consistent with a previous documented episode of optic neuritis. The lack of pain and the fact that she has not noted a recent change in the vision in her right eye militate against this being a new episode of retrobulbar optic neuritis. I would not recommend intravenous corticosteroid therapy related to her optic nerve situation. I will repeat her exam and visual field testing in one year.

## 2018-01-05 NOTE — LETTER
[facility] - Ophthalmology  [address]  [place] [address]  Phone: [phone]  Fax: [phone]   January 5, 2018    Emily Crow PA-C  [address] [facility] [address]    Patient: Jillian Osullivan   MR Number: [id number]   YOB: 1970   Date of Visit: 1/5/2018       Dear Dr. Crow:    Thank you for referring Jillian Osullivan to me for evaluation. Here is my assessment and plan of care:    Assessment:   /Plan     For exam results, see Encounter Report.    Multiple sclerosis    Altitudinal scotoma of right eye  -     Bettencourt Visual Field - OU - Extended - Both Eyes    Partial optic atrophy of right eye      Ms. Osullivan has optic atrophy and a relative superior altitudinal visual field defect in her right eye consistent with a previous documented episode of optic neuritis. The lack of pain and the fact that she has not noted a recent change in the vision in her right eye militate against this being a new episode of retrobulbar optic neuritis. I would not recommend intravenous corticosteroid therapy related to her optic nerve situation. I will repeat her exam and visual field testing in one year.          Plan:       For exam results, see Encounter Report.    Multiple sclerosis    Altitudinal scotoma of right eye  -     Bettencourt Visual Field - OU - Extended - Both Eyes    Partial optic atrophy of right eye      Ms. Osullivan has optic atrophy and a relative superior altitudinal visual field defect in her right eye consistent with a previous documented episode of optic neuritis. The lack of pain and the fact that she has not noted a recent change in the vision in her right eye militate against this being a new episode of retrobulbar optic neuritis. I would not recommend intravenous corticosteroid therapy related to her optic nerve situation. I will repeat her exam and visual field testing in one year.            Below you will find my full exam findings. If you have  questions, please do not hesitate to call me. I look forward to following Ms. Jillian Osullivan along with you.    Sincerely,          Brad Paz MD       CC  Mer Yates MD             Base Eye Exam     Visual Acuity (Snellen - Linear)       Right Left    Dist cc 20/80 20/30    Dist ph cc 20/50 +1 20/25    Correction:  Glasses          Tonometry (Applanation, 12:01 PM)       Right Left    Pressure 20 20          Pupils       Dark Light Shape React APD    Right 5 3 Round Brisk +1    Left 5 3 Round Brisk           Visual Fields     See HVF report.          Extraocular Movement       Right Left     Full, Ortho Full, Ortho          Neuro/Psych     Oriented x3:  Yes    Mood/Affect:  Normal          Dilation     Both eyes:  1% Mydriacyl, 2.5% Phenylephrine @ 12:02 PM            Slit Lamp and Fundus Exam     External Exam       Right Left    External Normal Normal          Slit Lamp Exam       Right Left    Lids/Lashes Normal Normal    Conjunctiva/Sclera White and quiet White and quiet    Cornea Clear Clear    Anterior Chamber Deep and quiet Deep and quiet    Iris Round and reactive Round and reactive    Lens Clear Clear    Vitreous Normal Normal          Fundus Exam       Right Left    Disc 2+ Pallor Normal    C/D Ratio 0.2 0.2    Macula Normal Normal    Vessels Normal Normal    Periphery Normal Normal

## 2018-01-05 NOTE — PROGRESS NOTES
HVF done OU    Fix&coop- fair OU  Rel- fair OU    Pt. Kept looking all over screen at the beginning of the test Ou, then she would keep eye fixated on target light.     AM

## 2018-01-05 NOTE — PROGRESS NOTES
Subjective:       Patient ID: Jillian Osullivan is a 47 y.o. female who presents today for a fit-in clinic visit for MS with her mother.    MS HPI:  · DMT: Aubagio(started 7/25/2017)  · Side effects from DMT? Yes - initially with some GI upset and diarrhea-this has resolved.  · Taking vitamin D3 as recommended? Yes    Since December she has had a new tingling in feet and feeling of heaviness in early December(4 months post), hair thinning  Also at the same time has been more light headed and dizzy, no falls, and worsening fatigue  She states she does not really like Aubagio and would consider returning to interferon    SOCIAL HISTORY  Social History   Substance Use Topics    Smoking status: Never Smoker    Smokeless tobacco: Never Used    Alcohol use No     Living arrangements - the patient lives with their family.  Employment     MS ROS:  As above     History of past optic neuritis OD-last visual acuity OD was 20/40 in December of 2016  Objective:        1. 25 foot timed walk:today-5.3s(wide based)  Timed 25 Foot Walk: 12/28/2016   Did patient wear an AFO? No   Was assistive device used? No   Time for 25 Foot Walk (seconds) 4.8       Neurologic Exam     Mental Status   Oriented to person, place, and time.   Follows 3 step commands.   Speech: speech is normal   Level of consciousness: alert  Normal comprehension.     Cranial Nerves     CN II   Visual acuity: decreased (20/160 OD with colenbrander chart,  20/20 OS with Snellen hand held chart at 6 ft)    CN III, IV, VI   Extraocular motions are normal.   Right pupil: Shape: regular. Consensual response: APD.   Left pupil: Shape: regular. Consensual response: intact.     CN V   Facial sensation intact.     CN VII   Facial expression full, symmetric.     CN VIII   Hearing: intact (finger rub)    CN IX, X   Palate: symmetric    CN XI   CN XI normal.     CN XII   Tongue deviation: none    Motor Exam   Muscle bulk: normal  Overall muscle tone: normal    Strength    Right deltoid: 5/5  Left deltoid: 5/5  Right triceps: 5/5  Left triceps: 5/5  Right wrist extension: 5/5  Left wrist extension: 5/5  Right interossei: 5/5  Left interossei: 5/5  Right iliopsoas: 5/5  Left iliopsoas: 5/5  Right hamstrin/5  Left hamstrin/5  Right anterior tibial: 5/5  Left anterior tibial: 5/5  Right peroneal: 5/5  Left peroneal: 5/5    Sensory Exam   Right arm light touch: patient states diminished on R as compared to L.  Left arm light touch: normal  Right leg light touch: normal  Left leg light touch: patient states diminished on L as compared to R.  Vibration normal.     Gait, Coordination, and Reflexes     Gait  Gait: wide-based    Coordination   Finger to nose coordination: normal  Tandem walking coordination: abnormal (slowed, but no loss of balance)    Tremor   Resting tremor: absent  Action tremor: absent    Reflexes   Right brachioradialis: 1+  Left brachioradialis: 1+  Right biceps: 1+  Left biceps: 1+  Right triceps: 1+  Left triceps: 1+  Right patellar: 1+  Left patellar: 1+  Right achilles: 1+  Left achilles: 1+  Right plantar: normal  Left plantar: normal  Right ankle clonus: absent  Left ankle clonus: absent  Right pendular knee jerk: absent  Left pendular knee jerk: absentslowed Heel/toe walk, no loss of balance  Normal RSM         Imaging:     Results for orders placed during the hospital encounter of 16   MRI Brain W WO Contrast    Impression No significant change from prior.  Continued few scattered small punctate sized foci of T2/flair signal abnormality supratentorial parenchyma remains concerning for mild degree of prior demyelinating plaque burden.    No evidence for new signal abnormality or enhancement to suggest interval active demyelination.    Previous question punctate focus in the right cerebellum no longer seen likely previous artifactual.       Electronically signed by: ASHLEY WONG DO  Date:     16  Time:    12:38      Results for orders placed  during the hospital encounter of 03/08/16   MRI Cervical Spine W WO Cont    Impression  No significant change from prior.  Unremarkable MRI of the cervical spine specifically without evidence for cord signal normality to suggest edema or abnormal intrathecal enhancement.    No significant central canal or neural foraminal stenosis.          Electronically signed by: ASHLEY WONG DO  Date:     03/08/16  Time:    12:42      Results for orders placed during the hospital encounter of 08/20/12   MRI Thoracic Spine W WO Contrast    Impression *  No detrimental change or acute findings in this patient with multiple sclerosis.  ______________________________________     Electronically signed by resident: Martín Dhillon MD  Date:     08/20/12  Time:    17:14       ______________________________________     Electronically signed by: SELENA WHITE MD  Date:     08/21/12  Time:    08:45          Labs:     Lab Results   Component Value Date    KAYLQEYM10RI 64 06/27/2016    QONHSGXB22PO 67 11/30/2015    [id number] 52 06/09/2015     No results found for: JCVINDEX, JCVANTIBODY  No results found for: QX0NKLNR, ABSOLUTECD3, WE2KXXEZ, ABSOLUTECD8, ED8RGGRF, ABSOLUTECD4, LABCD48  Lab Results   Component Value Date    WBC 7.67 12/28/2017    RBC 4.14 12/28/2017    HGB 13.2 12/28/2017    HCT 40.9 12/28/2017    MCV 99 (H) 12/28/2017    MCH 31.9 (H) 12/28/2017    MCHC 32.3 12/28/2017    RDW 13.0 12/28/2017     12/28/2017    MPV 9.9 12/28/2017    GRAN 4.0 12/28/2017    GRAN 52.1 12/28/2017    LYMPH 3.1 12/28/2017    LYMPH 39.9 12/28/2017    MONO 0.4 12/28/2017    MONO 5.6 12/28/2017    EOS 0.1 12/28/2017    BASO 0.04 12/28/2017    EOSINOPHIL 1.6 12/28/2017    BASOPHIL 0.5 12/28/2017     Sodium   Date Value Ref Range Status   02/27/2017 140 136 - 145 mmol/L Final     Potassium   Date Value Ref Range Status   02/27/2017 3.9 3.5 - 5.1 mmol/L Final     Chloride   Date Value Ref Range Status   02/27/2017 104 95 - 110 mmol/L Final     CO2    Date Value Ref Range Status   02/27/2017 23 23 - 29 mmol/L Final     Glucose   Date Value Ref Range Status   02/27/2017 88 70 - 110 mg/dL Final     BUN, Bld   Date Value Ref Range Status   02/27/2017 11 6 - 20 mg/dL Final     Creatinine   Date Value Ref Range Status   02/27/2017 0.7 0.5 - 1.4 mg/dL Final     Calcium   Date Value Ref Range Status   02/27/2017 10.1 8.7 - 10.5 mg/dL Final     Total Protein   Date Value Ref Range Status   12/28/2017 7.8 6.0 - 8.4 g/dL Final     Albumin   Date Value Ref Range Status   12/28/2017 4.0 3.5 - 5.2 g/dL Final     Total Bilirubin   Date Value Ref Range Status   12/28/2017 0.4 0.1 - 1.0 mg/dL Final     Comment:     For infants and newborns, interpretation of results should be based  on gestational age, weight and in agreement with clinical  observations.  Premature Infant recommended reference ranges:  Up to 24 hours.............<8.0 mg/dL  Up to 48 hours............<12.0 mg/dL  3-5 days..................<15.0 mg/dL  6-29 days.................<15.0 mg/dL       Alkaline Phosphatase   Date Value Ref Range Status   12/28/2017 65 55 - 135 U/L Final     AST   Date Value Ref Range Status   12/28/2017 25 10 - 40 U/L Final     ALT   Date Value Ref Range Status   12/28/2017 36 10 - 44 U/L Final     Anion Gap   Date Value Ref Range Status   02/27/2017 13 8 - 16 mmol/L Final     eGFR if    Date Value Ref Range Status   02/27/2017 >60 >60 mL/min/1.73 m^2 Final     eGFR if non    Date Value Ref Range Status   02/27/2017 >60 >60 mL/min/1.73 m^2 Final     Comment:     Calculation used to obtain the estimated glomerular filtration  rate (eGFR) is the CKD-EPI equation. Since race is unknown   in our information system, the eGFR values for   -American and Non--American patients are given   for each creatinine result.         Diagnosis/Assessment/Plan:    1. Multiple Sclerosis  · Assessment: Patient seen for urgent care/fit in visit today. I detect a  significant change in her visual acuity OD along with APD. I am concerned she may have Optic neuritis, and I have set up appt to see Dr. Paz for urgent visit. May consider MRI; however, can treat clinically for now as patient is extremely hesitant to do MRI due to claustrophobia/anxiety and past bad experience. If she has optic neuritis will plan to treat with oral smoothie high dose steroids(solumedrol 1g/d X 3 days)-discussed with patient today.   · Disease Modifying Therapies: Will continue Aubagio for now while we focus on vision issue, but may consider holding for 2 -4 weeks to evaluate if LE tingling improves. Overall, she is not happy on Aubagio and I suspect we will need to change at some point.     Over 50% of this 30 minute visit was spent in direct face to face counseling of the patient about MS, DMT considerations, and MS symptom management.   Will keep follow up with Dr. Yates next week for now, may consider changing and having her come back in 2 weeks.  Patient agreed to POC today.    Attending, Dr. Yates, was available during today's encounter. Any change to plan along with cosign to appear in the EMR.     Emily Crow PA-C  [facility]        Multiple sclerosis  -     Ambulatory referral to Ophthalmology    Decreased visual acuity  -     Ambulatory referral to Ophthalmology

## 2018-01-10 ENCOUNTER — HOSPITAL ENCOUNTER (OUTPATIENT)
Dept: RADIOLOGY | Facility: HOSPITAL | Age: 48
Discharge: HOME | End: 2018-01-10
Attending: PHYSICIAN ASSISTANT
Payer: COMMERCIAL

## 2018-01-10 VITALS
SYSTOLIC BLOOD PRESSURE: 134 MMHG | OXYGEN SATURATION: 100 % | DIASTOLIC BLOOD PRESSURE: 86 MMHG | RESPIRATION RATE: 20 BRPM | WEIGHT: 172 LBS | BODY MASS INDEX: 31.65 KG/M2 | HEIGHT: 62 IN | HEART RATE: 82 BPM

## 2018-01-10 DIAGNOSIS — G35 MULTIPLE SCLEROSIS: ICD-10-CM

## 2018-01-10 PROCEDURE — A9585 GADOBUTROL INJECTION: HCPCS | Performed by: PHYSICIAN ASSISTANT

## 2018-01-10 PROCEDURE — 63600175 PHARM REV CODE 636 W HCPCS: Performed by: STUDENT IN AN ORGANIZED HEALTH CARE EDUCATION/TRAINING PROGRAM

## 2018-01-10 PROCEDURE — 25500020 PHARM REV CODE 255: Performed by: PHYSICIAN ASSISTANT

## 2018-01-10 PROCEDURE — 70553 MRI BRAIN STEM W/O & W/DYE: CPT | Mod: TC

## 2018-01-10 PROCEDURE — 70553 MRI BRAIN STEM W/O & W/DYE: CPT | Mod: 26,,, | Performed by: RADIOLOGY

## 2018-01-10 RX ORDER — GADOBUTROL 604.72 MG/ML
9 INJECTION INTRAVENOUS
Status: COMPLETED | OUTPATIENT
Start: 2018-01-10 | End: 2018-01-10

## 2018-01-10 RX ORDER — MIDAZOLAM HYDROCHLORIDE 1 MG/ML
2 INJECTION INTRAMUSCULAR; INTRAVENOUS ONCE
Status: COMPLETED | OUTPATIENT
Start: 2018-01-10 | End: 2018-01-10

## 2018-01-10 RX ADMIN — MIDAZOLAM HYDROCHLORIDE 2 MG: 1 INJECTION, SOLUTION INTRAMUSCULAR; INTRAVENOUS at 02:01

## 2018-01-10 RX ADMIN — GADOBUTROL 9 ML: 604.72 INJECTION INTRAVENOUS at 02:01

## 2018-01-10 NOTE — PROGRESS NOTES
Ambulatory to dressing room and mother in [place] to take pt home / pt AAO w/ NAD advised to eat / increase fluids / rest and no driving today / call PCP for follow up

## 2018-01-10 NOTE — PROGRESS NOTES
Pt in waiting room and waiting for MRI to become available / family with pt / nurse spoke with pt about medication with hopes of reducing her anxiety / pt receptive and AAO w/ NAD

## 2018-01-10 NOTE — PROGRESS NOTES
Pt doing well / talking with staff and reports feeling well / waiting on recovery time to D/C / mother in [place] and to take pt home / advised to have her eat / increase fluids / rest and no driving

## 2018-01-11 ENCOUNTER — TELEPHONE (OUTPATIENT)
Dept: NEUROLOGY | Facility: CLINIC | Age: 48
End: 2018-01-11

## 2018-01-12 ENCOUNTER — LAB VISIT (OUTPATIENT)
Dept: LAB | Facility: HOSPITAL | Age: 48
End: 2018-01-12
Attending: PSYCHIATRY & NEUROLOGY
Payer: COMMERCIAL

## 2018-01-12 ENCOUNTER — OFFICE VISIT (OUTPATIENT)
Dept: NEUROLOGY | Facility: CLINIC | Age: 48
End: 2018-01-12
Payer: COMMERCIAL

## 2018-01-12 VITALS
SYSTOLIC BLOOD PRESSURE: 127 MMHG | BODY MASS INDEX: 31.65 KG/M2 | DIASTOLIC BLOOD PRESSURE: 93 MMHG | WEIGHT: 172 LBS | HEART RATE: 69 BPM | HEIGHT: 62 IN

## 2018-01-12 DIAGNOSIS — G35 EXACERBATION OF MULTIPLE SCLEROSIS: ICD-10-CM

## 2018-01-12 DIAGNOSIS — Z79.899 ENCOUNTER FOR LONG-TERM (CURRENT) USE OF HIGH-RISK MEDICATION: ICD-10-CM

## 2018-01-12 DIAGNOSIS — G35 MS (MULTIPLE SCLEROSIS): ICD-10-CM

## 2018-01-12 DIAGNOSIS — G35 MS (MULTIPLE SCLEROSIS): Primary | ICD-10-CM

## 2018-01-12 DIAGNOSIS — R26.9 GAIT DISTURBANCE: ICD-10-CM

## 2018-01-12 DIAGNOSIS — Z29.89 PROPHYLACTIC IMMUNOTHERAPY: ICD-10-CM

## 2018-01-12 DIAGNOSIS — Z71.89 COUNSELING REGARDING GOALS OF CARE: ICD-10-CM

## 2018-01-12 LAB
ALBUMIN SERPL BCP-MCNC: 4.2 G/DL
ALP SERPL-CCNC: 92 U/L
ALT SERPL W/O P-5'-P-CCNC: 81 U/L
ANION GAP SERPL CALC-SCNC: 9 MMOL/L
AST SERPL-CCNC: 30 U/L
BASOPHILS # BLD AUTO: 0.04 K/UL
BASOPHILS NFR BLD: 0.6 %
BILIRUB SERPL-MCNC: 0.7 MG/DL
BUN SERPL-MCNC: 15 MG/DL
CALCIUM SERPL-MCNC: 10.7 MG/DL
CHLORIDE SERPL-SCNC: 102 MMOL/L
CO2 SERPL-SCNC: 29 MMOL/L
CREAT SERPL-MCNC: 0.7 MG/DL
DIFFERENTIAL METHOD: ABNORMAL
EOSINOPHIL # BLD AUTO: 0.1 K/UL
EOSINOPHIL NFR BLD: 2.2 %
ERYTHROCYTE [DISTWIDTH] IN BLOOD BY AUTOMATED COUNT: 12.5 %
EST. GFR  (AFRICAN AMERICAN): >60 ML/MIN/1.73 M^2
EST. GFR  (NON AFRICAN AMERICAN): >60 ML/MIN/1.73 M^2
GLUCOSE SERPL-MCNC: 79 MG/DL
HCT VFR BLD AUTO: 41.7 %
HGB BLD-MCNC: 13.6 G/DL
IMM GRANULOCYTES # BLD AUTO: 0.01 K/UL
IMM GRANULOCYTES NFR BLD AUTO: 0.2 %
LYMPHOCYTES # BLD AUTO: 3.6 K/UL
LYMPHOCYTES NFR BLD: 55.4 %
MCH RBC QN AUTO: 31.9 PG
MCHC RBC AUTO-ENTMCNC: 32.6 G/DL
MCV RBC AUTO: 98 FL
MONOCYTES # BLD AUTO: 0.5 K/UL
MONOCYTES NFR BLD: 7.8 %
NEUTROPHILS # BLD AUTO: 2.2 K/UL
NEUTROPHILS NFR BLD: 33.8 %
NRBC BLD-RTO: 0 /100 WBC
PLATELET # BLD AUTO: 312 K/UL
PMV BLD AUTO: 10.8 FL
POTASSIUM SERPL-SCNC: 4.3 MMOL/L
PROT SERPL-MCNC: 8.4 G/DL
RBC # BLD AUTO: 4.27 M/UL
SODIUM SERPL-SCNC: 140 MMOL/L
VIT B12 SERPL-MCNC: 1639 PG/ML
WBC # BLD AUTO: 6.5 K/UL

## 2018-01-12 PROCEDURE — 99215 OFFICE O/P EST HI 40 MIN: CPT | Mod: S$GLB,,, | Performed by: PSYCHIATRY & NEUROLOGY

## 2018-01-12 PROCEDURE — 36415 COLL VENOUS BLD VENIPUNCTURE: CPT

## 2018-01-12 PROCEDURE — 85025 COMPLETE CBC W/AUTO DIFF WBC: CPT

## 2018-01-12 PROCEDURE — 99999 PR PBB SHADOW E&M-EST. PATIENT-LVL III: CPT | Mod: PBBFAC,,, | Performed by: PSYCHIATRY & NEUROLOGY

## 2018-01-12 PROCEDURE — 80053 COMPREHEN METABOLIC PANEL: CPT

## 2018-01-12 PROCEDURE — 82607 VITAMIN B-12: CPT

## 2018-01-12 RX ORDER — METHYLPREDNISOLONE SODIUM SUCCINATE 1 G/16ML
INJECTION INTRAMUSCULAR; INTRAVENOUS
Qty: 3000 MG | Refills: 0 | Status: SHIPPED | OUTPATIENT
Start: 2018-01-12 | End: 2018-01-25

## 2018-01-12 NOTE — PROGRESS NOTES
"Subjective:       Patient ID: Jillian Osullivan is a 47 y.o. female who presents today for a fit-in clinic visit for MS.      MS HPI:  · DMT: Aubagio  · Side effects from DMT? Yes - "does not feel right"  · Taking vitamin D3 as recommended? Yes -    · Pt reports tingling in her legs is getting worse; now constant instead of intermittent;    · No visual pain;   · Saw Dr. Paz who did not see signs   · MRI done urgently last week was stable    SOCIAL HISTORY  Social History   Substance Use Topics    Smoking status: Never Smoker    Smokeless tobacco: Never Used    Alcohol use No     Living arrangements - the patient lives alone.  Employment        Objective:    25 foot timed walk: 5.2 second without assist;   Neurologic Exam   CN: VA 20/30 on right uncorrected  MOTOR--5/5 all groups  SENSORY--slight decrease vib distal LE    Imaging:     Results for orders placed during the hospital encounter of 01/10/18   MRI Brain W WO Contrast    Impression No significant change from prior.    Allowing for differences in technique no new lesion with continued scattered T2 flair hyperintensities and supratentorial parenchyma remain concerning for mild degree of prior demyelinating plaque. No evidence for new lesion or enhancing lesion to suggest interval or active demyelination.    Clinical correlation and followup advised.       Electronically signed by: ASHLEY WONG DO  Date:     01/10/18  Time:    15:01      Results for orders placed during the hospital encounter of 03/08/16   MRI Cervical Spine W WO Cont    Impression  No significant change from prior.  Unremarkable MRI of the cervical spine specifically without evidence for cord signal normality to suggest edema or abnormal intrathecal enhancement.    No significant central canal or neural foraminal stenosis.          Electronically signed by: ASHLEY WONG DO  Date:     03/08/16  Time:    12:42      Results for orders placed during the hospital encounter of 08/20/12 "   MRI Thoracic Spine W WO Contrast    Impression *  No detrimental change or acute findings in this patient with multiple sclerosis.  ______________________________________     Electronically signed by resident: Martín Dhillon MD  Date:     08/20/12  Time:    17:14       ______________________________________     Electronically signed by: SELENA WHITE MD  Date:     08/21/12  Time:    08:45          Labs:     Lab Results   Component Value Date    AXAQETDP52EP 64 06/27/2016    CQBIXPCN29SB 67 11/30/2015    GPRGDSYL52HI 52 06/09/2015       Lab Results   Component Value Date    WBC 7.67 12/28/2017    RBC 4.14 12/28/2017    HGB 13.2 12/28/2017    HCT 40.9 12/28/2017    MCV 99 (H) 12/28/2017    MCH 31.9 (H) 12/28/2017    MCHC 32.3 12/28/2017    RDW 13.0 12/28/2017     12/28/2017    MPV 9.9 12/28/2017    GRAN 4.0 12/28/2017    GRAN 52.1 12/28/2017    LYMPH 3.1 12/28/2017    LYMPH 39.9 12/28/2017    MONO 0.4 12/28/2017    MONO 5.6 12/28/2017    EOS 0.1 12/28/2017    BASO 0.04 12/28/2017    EOSINOPHIL 1.6 12/28/2017    BASOPHIL 0.5 12/28/2017     Sodium   Date Value Ref Range Status   02/27/2017 140 136 - 145 mmol/L Final     Potassium   Date Value Ref Range Status   02/27/2017 3.9 3.5 - 5.1 mmol/L Final     Chloride   Date Value Ref Range Status   02/27/2017 104 95 - 110 mmol/L Final     CO2   Date Value Ref Range Status   02/27/2017 23 23 - 29 mmol/L Final     Glucose   Date Value Ref Range Status   02/27/2017 88 70 - 110 mg/dL Final     BUN, Bld   Date Value Ref Range Status   02/27/2017 11 6 - 20 mg/dL Final     Creatinine   Date Value Ref Range Status   02/27/2017 0.7 0.5 - 1.4 mg/dL Final     Calcium   Date Value Ref Range Status   02/27/2017 10.1 8.7 - 10.5 mg/dL Final     Total Protein   Date Value Ref Range Status   12/28/2017 7.8 6.0 - 8.4 g/dL Final     Albumin   Date Value Ref Range Status   12/28/2017 4.0 3.5 - 5.2 g/dL Final     Total Bilirubin   Date Value Ref Range Status   12/28/2017 0.4 0.1 - 1.0  mg/dL Final     Comment:     For infants and newborns, interpretation of results should be based  on gestational age, weight and in agreement with clinical  observations.  Premature Infant recommended reference ranges:  Up to 24 hours.............<8.0 mg/dL  Up to 48 hours............<12.0 mg/dL  3-5 days..................<15.0 mg/dL  6-29 days.................<15.0 mg/dL       Alkaline Phosphatase   Date Value Ref Range Status   12/28/2017 65 55 - 135 U/L Final     AST   Date Value Ref Range Status   12/28/2017 25 10 - 40 U/L Final     ALT   Date Value Ref Range Status   12/28/2017 36 10 - 44 U/L Final     Anion Gap   Date Value Ref Range Status   02/27/2017 13 8 - 16 mmol/L Final     eGFR if    Date Value Ref Range Status   02/27/2017 >60 >60 mL/min/1.73 m^2 Final     eGFR if non    Date Value Ref Range Status   02/27/2017 >60 >60 mL/min/1.73 m^2 Final     Comment:     Calculation used to obtain the estimated glomerular filtration  rate (eGFR) is the CKD-EPI equation. Since race is unknown   in our information system, the eGFR values for   -American and Non--American patients are given   for each creatinine result.         Diagnosis/Assessment/Plan:    1. Multiple Sclerosis  · Assessment: DDX is MS exacerbation (would be spinal) for side effects from Aubagio;will treat for relapse; return to clinic in 2 week; if she improved quickly, likely MS exacerbation; if not, may be side effects from Aubagio; will probably need to d/c Aubagio one way or another; f/u 2 weeks  · Imaging: spinal imaging deferred by patients  · Disease Modifying Therapies: as above; check labs today    Over 50% of this 40 minute visit was spent in direct face to face counseling of the patient about MS, DMT considerations, and MS symptom management.     MS (multiple sclerosis)  -     CBC auto differential; Future; Expected date: 01/12/2018  -     Comprehensive metabolic panel; Future  -      Urinalysis, Reflex to Urine Culture Urine, Clean Catch; Future  -     Vitamin B12; Future; Expected date: 01/12/2018  -     methylPREDNISolone sodium succinate (SOLU-MEDROL) 1,000 mg injection; Take 1,000 mg of methylprednisolone po via smoothie daily x 3 days for MS exacerbation  Dispense: 3000 mg; Refill: 0    Exacerbation of multiple sclerosis  -     methylPREDNISolone sodium succinate (SOLU-MEDROL) 1,000 mg injection; Take 1,000 mg of methylprednisolone po via smoothie daily x 3 days for MS exacerbation  Dispense: 3000 mg; Refill: 0

## 2018-01-25 ENCOUNTER — OFFICE VISIT (OUTPATIENT)
Dept: NEUROLOGY | Facility: CLINIC | Age: 48
End: 2018-01-25
Payer: COMMERCIAL

## 2018-01-25 ENCOUNTER — TELEPHONE (OUTPATIENT)
Dept: NEUROLOGY | Facility: CLINIC | Age: 48
End: 2018-01-25

## 2018-01-25 ENCOUNTER — LAB VISIT (OUTPATIENT)
Dept: LAB | Facility: HOSPITAL | Age: 48
End: 2018-01-25
Attending: PSYCHIATRY & NEUROLOGY
Payer: COMMERCIAL

## 2018-01-25 VITALS — SYSTOLIC BLOOD PRESSURE: 140 MMHG | DIASTOLIC BLOOD PRESSURE: 90 MMHG | HEART RATE: 79 BPM

## 2018-01-25 DIAGNOSIS — Z79.899 HIGH RISK MEDICATION USE: ICD-10-CM

## 2018-01-25 DIAGNOSIS — G35 MULTIPLE SCLEROSIS: ICD-10-CM

## 2018-01-25 DIAGNOSIS — Z71.89 COUNSELING REGARDING GOALS OF CARE: ICD-10-CM

## 2018-01-25 DIAGNOSIS — G35 MULTIPLE SCLEROSIS: Primary | ICD-10-CM

## 2018-01-25 DIAGNOSIS — Z79.899 ENCOUNTER FOR LONG-TERM (CURRENT) USE OF HIGH-RISK MEDICATION: ICD-10-CM

## 2018-01-25 DIAGNOSIS — M79.2 NEUROPATHIC PAIN: ICD-10-CM

## 2018-01-25 DIAGNOSIS — F41.1 GAD (GENERALIZED ANXIETY DISORDER): ICD-10-CM

## 2018-01-25 LAB
ALBUMIN SERPL BCP-MCNC: 3.7 G/DL
ALP SERPL-CCNC: 74 U/L
ALT SERPL W/O P-5'-P-CCNC: 96 U/L
AST SERPL-CCNC: 43 U/L
BASOPHILS # BLD AUTO: 0.01 K/UL
BASOPHILS NFR BLD: 0.2 %
BILIRUB DIRECT SERPL-MCNC: 0.2 MG/DL
BILIRUB SERPL-MCNC: 0.6 MG/DL
DIFFERENTIAL METHOD: ABNORMAL
EOSINOPHIL # BLD AUTO: 0.1 K/UL
EOSINOPHIL NFR BLD: 1.6 %
ERYTHROCYTE [DISTWIDTH] IN BLOOD BY AUTOMATED COUNT: 13 %
HCT VFR BLD AUTO: 39 %
HGB BLD-MCNC: 12.5 G/DL
LYMPHOCYTES # BLD AUTO: 2.6 K/UL
LYMPHOCYTES NFR BLD: 46.9 %
MCH RBC QN AUTO: 31.6 PG
MCHC RBC AUTO-ENTMCNC: 32.1 G/DL
MCV RBC AUTO: 99 FL
MONOCYTES # BLD AUTO: 0.4 K/UL
MONOCYTES NFR BLD: 7.3 %
NEUTROPHILS # BLD AUTO: 2.5 K/UL
NEUTROPHILS NFR BLD: 44 %
PLATELET # BLD AUTO: 272 K/UL
PMV BLD AUTO: 9.8 FL
PROT SERPL-MCNC: 7.2 G/DL
RBC # BLD AUTO: 3.96 M/UL
WBC # BLD AUTO: 5.59 K/UL

## 2018-01-25 PROCEDURE — 99214 OFFICE O/P EST MOD 30 MIN: CPT | Mod: ,,, | Performed by: PHYSICIAN ASSISTANT

## 2018-01-25 PROCEDURE — 99999 PR PBB SHADOW E&M-EST. PATIENT-LVL IV: CPT | Mod: PBBFAC,,, | Performed by: PHYSICIAN ASSISTANT

## 2018-01-25 PROCEDURE — 36415 COLL VENOUS BLD VENIPUNCTURE: CPT

## 2018-01-25 PROCEDURE — 80076 HEPATIC FUNCTION PANEL: CPT

## 2018-01-25 PROCEDURE — 3008F BODY MASS INDEX DOCD: CPT | Mod: ,,, | Performed by: PHYSICIAN ASSISTANT

## 2018-01-25 PROCEDURE — 85025 COMPLETE CBC W/AUTO DIFF WBC: CPT

## 2018-01-25 NOTE — Clinical Note
We decided to hold Aubagio and see if her symptoms improve any. If they do, you and I talked about Copaxone but she had significant reaction. I think either returning to interferon(but hesitant with mood) or Ocrevus--I imagine with her anxiety she will be concerned about side effects of Ocrevus. Not sure we have ever checked NATHALIE on her--what are your thoughts?

## 2018-01-25 NOTE — PROGRESS NOTES
"Subjective:       Patient ID: Jillian Osullivan is a 47 y.o. female who presents today alone for a fit-in clinic visit for MS.      MS HPI:  · DMT: Aubagio  · Side effects from DMT? Yes - generally doesn't feel well   · Taking vitamin D3 as recommended? Yes   · Patient presents for two week follow up after high dose steroids. She received 3 days of oral smoothie steroids(Sat/Sun/Mon a weeks ago for possible relapse) She felt an improvement in her sensory symptoms initially, but feels has though they have returned as of yesterday  · Patient states she had a hard time last night--any felt as if she was getting sick. But this morning she felt improved but felt as if her previous sensory symptoms of "liquid running down both legs" is returning.    SOCIAL HISTORY  Social History   Substance Use Topics    Smoking status: Never Smoker    Smokeless tobacco: Never Used    Alcohol use No     Living arrangements - the patient lives with their spouse.  Employment unemployed    MS ROS:  As above        Objective:        1. 25 foot timed walk: 4.9s today; was 5.2 and 5.3s previous visits  Timed 25 Foot Walk: 12/28/2016   Did patient wear an AFO? No   Was assistive device used? No   Time for 25 Foot Walk (seconds) 4.8       Neurologic Exam          Imaging:     Results for orders placed during the hospital encounter of 01/10/18   MRI Brain W WO Contrast    Impression No significant change from prior.    Allowing for differences in technique no new lesion with continued scattered T2 flair hyperintensities and supratentorial parenchyma remain concerning for mild degree of prior demyelinating plaque. No evidence for new lesion or enhancing lesion to suggest interval or active demyelination.    Clinical correlation and followup advised.       Electronically signed by: ASHLEY WONG DO  Date:     01/10/18  Time:    15:01      Results for orders placed during the hospital encounter of 03/08/16   MRI Cervical Spine W WO Cont    " Impression  No significant change from prior.  Unremarkable MRI of the cervical spine specifically without evidence for cord signal normality to suggest edema or abnormal intrathecal enhancement.    No significant central canal or neural foraminal stenosis.          Electronically signed by: ASHLEY WONG DO  Date:     03/08/16  Time:    12:42      Results for orders placed during the hospital encounter of 08/20/12   MRI Thoracic Spine W WO Contrast    Impression *  No detrimental change or acute findings in this patient with multiple sclerosis.  ______________________________________     Electronically signed by resident: Martín Dhillon MD  Date:     08/20/12  Time:    17:14       ______________________________________     Electronically signed by: SELENA WHITE MD  Date:     08/21/12  Time:    08:45          Labs:     Lab Results   Component Value Date    [id number] 64 06/27/2016    [id number] 67 11/30/2015    HDJDTAYQ26OR 52 06/09/2015     No results found for: JCVINDEX, JCVANTIBODY  No results found for: ZT4FYCFM, ABSOLUTECD3, JX4DLPOB, ABSOLUTECD8, MU5NRWTD, ABSOLUTECD4, LABCD48  Lab Results   Component Value Date    WBC 5.59 01/25/2018    RBC 3.96 (L) 01/25/2018    HGB 12.5 01/25/2018    HCT 39.0 01/25/2018    MCV 99 (H) 01/25/2018    MCH 31.6 (H) 01/25/2018    MCHC 32.1 01/25/2018    RDW 13.0 01/25/2018     01/25/2018    MPV 9.8 01/25/2018    GRAN 2.5 01/25/2018    GRAN 44.0 01/25/2018    LYMPH 2.6 01/25/2018    LYMPH 46.9 01/25/2018    MONO 0.4 01/25/2018    MONO 7.3 01/25/2018    EOS 0.1 01/25/2018    BASO 0.01 01/25/2018    EOSINOPHIL 1.6 01/25/2018    BASOPHIL 0.2 01/25/2018     Sodium   Date Value Ref Range Status   01/12/2018 140 136 - 145 mmol/L Final     Potassium   Date Value Ref Range Status   01/12/2018 4.3 3.5 - 5.1 mmol/L Final     Chloride   Date Value Ref Range Status   01/12/2018 102 95 - 110 mmol/L Final     CO2   Date Value Ref Range Status   01/12/2018 29 23 - 29 mmol/L Final      Glucose   Date Value Ref Range Status   01/12/2018 79 70 - 110 mg/dL Final     BUN, Bld   Date Value Ref Range Status   01/12/2018 15 6 - 20 mg/dL Final     Creatinine   Date Value Ref Range Status   01/12/2018 0.7 0.5 - 1.4 mg/dL Final     Calcium   Date Value Ref Range Status   01/12/2018 10.7 (H) 8.7 - 10.5 mg/dL Final     Total Protein   Date Value Ref Range Status   01/25/2018 7.2 6.0 - 8.4 g/dL Final     Albumin   Date Value Ref Range Status   01/25/2018 3.7 3.5 - 5.2 g/dL Final     Total Bilirubin   Date Value Ref Range Status   01/25/2018 0.6 0.1 - 1.0 mg/dL Final     Comment:     For infants and newborns, interpretation of results should be based  on gestational age, weight and in agreement with clinical  observations.  Premature Infant recommended reference ranges:  Up to 24 hours.............<8.0 mg/dL  Up to 48 hours............<12.0 mg/dL  3-5 days..................<15.0 mg/dL  6-29 days.................<15.0 mg/dL       Alkaline Phosphatase   Date Value Ref Range Status   01/25/2018 74 55 - 135 U/L Final     AST   Date Value Ref Range Status   01/25/2018 43 (H) 10 - 40 U/L Final     ALT   Date Value Ref Range Status   01/25/2018 96 (H) 10 - 44 U/L Final     Anion Gap   Date Value Ref Range Status   01/12/2018 9 8 - 16 mmol/L Final     eGFR if    Date Value Ref Range Status   01/12/2018 >60.0 >60 mL/min/1.73 m^2 Final     eGFR if non    Date Value Ref Range Status   01/12/2018 >60.0 >60 mL/min/1.73 m^2 Final     Comment:     Calculation used to obtain the estimated glomerular filtration  rate (eGFR) is the CKD-EPI equation.          Diagnosis/Assessment/Plan:    1. Multiple Sclerosis  · Assessment: Patient presents for follow up after high dose steroids a week ago for possible relapse. Her timed walk has returned to her normal; however, she states her sensory symptoms did improve immediately after steroids but seem to be returning at this point. It still remains  unclear as to whether she has had a true relapse(Brain MRI stable, but did not order C-spine MRI as she is very claustrophobic) or if her symptoms are related to Aubagio(neuropathy?). In the end, we decided to hold Aubagio for the next two weeks, repeat labs(LFT's slightly elevated of late) then and have her return to clinic for reassessment. We will mostly likely change DMT at that time.  · Imaging:Brain MRI recently stable, no spine imaging done due to patients extreme claustrophobia  · Disease Modifying Therapies: In consideration for new DMT, if it appears that her symptoms improve after holding Aubagio we would ideally want to stay with less immunosuppression over more immunosuppression; however, she has significant IR to Copaxone previously and I'm a little hesitant to return to interferons as it appeared to be driving her depression. We will discuss alternative when she returns to clinic shortly.       Over 50% of this 30 minute visit was spent in direct face to face counseling of the patient about MS, DMT considerations, and MS symptom management.   Follow-up in about 2 weeks (around 2/8/2018).  Patient agreed to POC today.    Attending, Dr. Yates, was available during today's encounter. Any change to plan along with cosign to appear in the EMR.     Emily Crow PA-C  [facility]        Multiple sclerosis  -     Hepatic function panel; Future    Counseling regarding goals of care    Encounter for long-term (current) use of high-risk medication    Neuropathic pain

## 2018-01-27 RX ORDER — CLONAZEPAM 0.5 MG/1
TABLET, ORALLY DISINTEGRATING ORAL
Qty: 120 TABLET | Refills: 0 | OUTPATIENT
Start: 2018-01-27

## 2018-02-06 ENCOUNTER — LAB VISIT (OUTPATIENT)
Dept: LAB | Facility: OTHER | Age: 48
End: 2018-02-06
Payer: COMMERCIAL

## 2018-02-06 ENCOUNTER — OFFICE VISIT (OUTPATIENT)
Dept: SLEEP MEDICINE | Facility: CLINIC | Age: 48
End: 2018-02-06
Payer: COMMERCIAL

## 2018-02-06 VITALS
BODY MASS INDEX: 34.78 KG/M2 | SYSTOLIC BLOOD PRESSURE: 130 MMHG | HEIGHT: 62 IN | DIASTOLIC BLOOD PRESSURE: 86 MMHG | WEIGHT: 189 LBS

## 2018-02-06 DIAGNOSIS — G47.33 OSA (OBSTRUCTIVE SLEEP APNEA): Primary | ICD-10-CM

## 2018-02-06 DIAGNOSIS — F51.01 PRIMARY INSOMNIA: ICD-10-CM

## 2018-02-06 DIAGNOSIS — G35 MULTIPLE SCLEROSIS: ICD-10-CM

## 2018-02-06 LAB
ALBUMIN SERPL BCP-MCNC: 4.2 G/DL
ALP SERPL-CCNC: 78 U/L
ALT SERPL W/O P-5'-P-CCNC: 74 U/L
AST SERPL-CCNC: 34 U/L
BILIRUB DIRECT SERPL-MCNC: 0.2 MG/DL
BILIRUB SERPL-MCNC: 0.5 MG/DL
PROT SERPL-MCNC: 7.7 G/DL

## 2018-02-06 PROCEDURE — 99999 PR PBB SHADOW E&M-EST. PATIENT-LVL III: CPT | Mod: PBBFAC,,, | Performed by: PSYCHIATRY & NEUROLOGY

## 2018-02-06 PROCEDURE — 80076 HEPATIC FUNCTION PANEL: CPT

## 2018-02-06 PROCEDURE — 3008F BODY MASS INDEX DOCD: CPT | Mod: S$GLB,,, | Performed by: PSYCHIATRY & NEUROLOGY

## 2018-02-06 PROCEDURE — 99214 OFFICE O/P EST MOD 30 MIN: CPT | Mod: S$GLB,,, | Performed by: PSYCHIATRY & NEUROLOGY

## 2018-02-06 PROCEDURE — 36415 COLL VENOUS BLD VENIPUNCTURE: CPT

## 2018-02-06 RX ORDER — CHOLECALCIFEROL (VITAMIN D3) 125 MCG
5000 CAPSULE ORAL DAILY
COMMUNITY

## 2018-02-06 RX ORDER — AA/PROT/LYSINE/METHIO/VIT C/B6 50-12.5 MG
300 TABLET ORAL
COMMUNITY
End: 2018-03-20

## 2018-02-06 NOTE — PROGRESS NOTES
"This 47 y.o. female patient returns for the management of obstructive sleep apnea and insomnia.    MS symptoms: initially eyesight; ongoing issues with joints/legs    ESS = 17    1. CHRIS   BASELINE PSG 7/18/16: +CHRIS, AHI 16.4, low sat 91%, wt 168 lbs  TITRATION 9/20/16: Titrated to effective pressure 7 cm, wt. 168 lbs    She is attempting CPAP nightly, but sometimes difficulty due to her other ailments. Last night she managed to sleep with it for 7+ hours.  Still Feeling exhausted. No snoring    She is using a Resmed nasal pillows mask.  CPAP interrogation Dream Station not auto capable, set at 8 cm: 2166 hours used; 2.7 hours/n ave; 9/30 days >4 hours (usage in last 3 weeks is down due to colds)    2. She continues to have difficulties with anxiety and insomnia. This is her main complaint today.  Sleep is worse and more fragmented. Increased worry and stress. Recent MS flare  She states that stress, anxiety, and feeling nervous at bedtime keep her from sleeping.  She is hoping that this will improve with changing her MS medication.    Bedtime 10:30 pm - MN  Once in bed, sleep onset ranges from 30 mins - 1 hour  WASO 5-6 awakenings; 30-90 mins of wake time  Awakens around 4 am, then very light and fragmented sleep, dozing in and out of sleep until wake time of 6 am.    Takes Buspar, clonazepam (4 times a day for GI related issue), Valium 10 mg (for high anxiety), Effexor - managed by Dr. Montiel  She is currently taking melatonin 5 mg; Takes Prem  She has tried Rozeram, Trazodone 100 mg (it worked and then stopped working)    3. RLS  - Gabapentin 300 mg was initially effective in controlling RLS and helping her sleep better. It "worked" for 3 nights, but then lost its effectiveness. She has been using it intermittently, because she is concerned about habituation. She is also concerned about inability to lose weight. Increasing gabapentin 900 mg may be helping somewhat.    Often feels need to move her legs at night; " "Urge; Moving constantly; Using adjustable bed;  Mostly in the evenings and around bed time.    SLEEP ROUTINE:   Time to bed: 11:30 pm   Sleep onset latency: a while   Disruptions or awakenings: 3-4   Wakeup time: 6 am   Perceived sleep quality: poor   Daytime naps: every few days      Past Medical History:   Diagnosis Date    Endometriosis, site unspecified     H/O total hysterectomy     Hidradenitis     History of laparoscopy     History of psychiatric care     Multiple sclerosis     Panic anxiety syndrome     Therapy        Past Surgical History:   Procedure Laterality Date    APPENDECTOMY      breast reduction      HYSTERECTOMY      CO EXPLORATORY OF ABDOMEN      2002    sweat gland removal  10/2013    rt groin       Family History   Problem Relation Age of Onset    Anxiety disorder Mother     Cancer Mother      cervix    Breast cancer Neg Hx     Colon cancer Neg Hx     Ovarian cancer Neg Hx        Social History   Substance Use Topics    Smoking status: Never Smoker    Smokeless tobacco: Never Used    Alcohol use No       ALLERGIES: Reviewed in EPIC    CURRENT MEDICATIONS: Reviewed in EPIC    REVIEW OF SYSTEMS:  Sleep related symptoms as per HPI;    Denies weight gain, but difficulty losing;    Positive palpitations;    Positive mood disturbance;    Denies anemia;    Otherwise, a balance of systems reviewed is negative.    PHYSICAL EXAM:  /86   Ht 5' 2" (1.575 m)   Wt 85.7 kg (189 lb)   BMI 34.57 kg/m²   GENERAL: Well groomed; Normally developed;  NEURO: Oriented to time, place and person.    Normal attention span and concentration.  Station normal. Gait normal.  PSYCH: Affect is full. Mood is normal.    I spent greater than 15 minutes during this 30 minute visit in counseling the patient regarding insomnia and sources of fatigue, anxiety and insomnia relationships.      ASSESSMENT:    1. Obstructive Sleep Apnea, moderate severity. CHRIS appears to be moderately well controlled on " CPAP. She is using an effective setting, determined by titration sleep study. Recent adherence is down due to upper respiratory infection. On last psychiatry visit, Dr. Montiel expressed concern that she was using a nasal canula only. I confirmed today that she is using an approved nasal pillows CPAP mask, which forms appropriate seal and provides PAP therapy when used. She demonstrated knowledge on proper use.    2. Sleep disturbances/Insomnia - underlying  appears to be anxiety/depression.  She feels like anxiety is getting worse, despite medical management. Cause for anxiety unclear - also multi-factorial, being managed by Dr. Montiel. She has implicated MS or Avonex as possible root cause, since she did not have any anxiety at all prior to her MS diagnosis and treatment. She does hope that changing MS related medications may result in improvement. She had been spending excess time in bed 9+ hours, and only sleeping a fraction of that amount, but she has managed to reduce time in bed to 8 hours. Unfortunately, she is doing a lot of unintentional dozing during the day, while taking clonazepam for GI issues. She has attempted to implement CBT-insomnia, but having issues with dozing off prior to bedroom, and this leaves her feeling wide awake once she does enter the bedroom. Also, keeping the logs makes her anxious.    3. Restless legs feelings at night / akithesia - in some cases PLMS can be potentiated by Effexor; No evidence of PLMS on sleep study; RLS can occur just as a feeling (without acutal limb kicks) that can contribute to night time anxiety and insomnia. RLS appears to have responded to higher doses of gabapentin. She is so focused on the effects of her underlying anxiety, it is difficult to ascertain whether she is getting any benefit from this.    4. Fatigue - multi-factorial; certainly MS, medication, and depression can be playing a role; CHRIS is treated and an effective pressure has been  determined.         PLAN:    CBT schedule:  1. List-making or journaling about 2 hours before bedtime to help decompress thoughts from your mind. She is doing this, which helps her keep tracks of things  2. Must clearly avoid any napping/dozing 2-3 hours before allowed bedtime. This may include discontinuing sedentary activities, like lounging on the sofa, in the time period before bedtime to help avoid accidental dozings. If dozing does occur, you may need to delay bedtime until you feel drowsy or sleepy again.  3. Bedtime to 11 pm- MN. Out of bed at 6-7 am    She is holding off on sleep logs now, because she feels that keeping track of this makes her too anxious    Other considerations:  1. Continue gabapentin at 900 mg at bedtime to help settle RLS/akithesia and anxiety feelings. She has had some initial effect. May take up to 3 nightly (900 mg);  2. Meet with Dr. Montiel regarding anxiety control, which is a contributing factor to difficulty sleeping.    3. Continue CPAP, at pressure to 8 cm; trial with chin strap. The potential benefits were discussed.   She is using her nasal mask properly with good seal.    Precautions: The patient was advised to abstain from driving should they feel sleepy or drowsy.    Follow up: 6-8 weeks. (After initiating new MS medication) MD/NP

## 2018-02-07 ENCOUNTER — OFFICE VISIT (OUTPATIENT)
Dept: NEUROLOGY | Facility: CLINIC | Age: 48
End: 2018-02-07
Payer: COMMERCIAL

## 2018-02-07 VITALS
DIASTOLIC BLOOD PRESSURE: 95 MMHG | WEIGHT: 176 LBS | HEIGHT: 62 IN | SYSTOLIC BLOOD PRESSURE: 121 MMHG | HEART RATE: 71 BPM | BODY MASS INDEX: 32.39 KG/M2

## 2018-02-07 DIAGNOSIS — Z71.89 COUNSELING REGARDING GOALS OF CARE: ICD-10-CM

## 2018-02-07 DIAGNOSIS — G35 MULTIPLE SCLEROSIS: Primary | ICD-10-CM

## 2018-02-07 PROCEDURE — 3080F DIAST BP >= 90 MM HG: CPT | Mod: S$GLB,,, | Performed by: PHYSICIAN ASSISTANT

## 2018-02-07 PROCEDURE — 99214 OFFICE O/P EST MOD 30 MIN: CPT | Mod: S$GLB,,, | Performed by: PHYSICIAN ASSISTANT

## 2018-02-07 PROCEDURE — 3074F SYST BP LT 130 MM HG: CPT | Mod: S$GLB,,, | Performed by: PHYSICIAN ASSISTANT

## 2018-02-07 PROCEDURE — 99999 PR PBB SHADOW E&M-EST. PATIENT-LVL IV: CPT | Mod: PBBFAC,,, | Performed by: PHYSICIAN ASSISTANT

## 2018-02-07 NOTE — PROGRESS NOTES
Subjective:       Patient ID: Jillian Osullivan is a 47 y.o. female who presents today for a routine clinic visit for MS.      MS HPI:  · DMT: Aubagio on Hold  · Side effects from DMT? Yes  · Taking vitamin D3 as recommended? Yes   · Patient presents for two week follow up--She states that her symptoms have been slowly improving. However, she is still having some symptoms     SOCIAL HISTORY  Social History   Substance Use Topics    Smoking status: Never Smoker    Smokeless tobacco: Never Used    Alcohol use No     Living arrangements - the patient lives with their spouse.  Employment unemployed    MS ROS:  As above        Objective:        1. 25 foot timed walk:6.2s today; 4.9s , 5.2 and 5.3s previous visits  Timed 25 Foot Walk: 12/28/2016   Did patient wear an AFO? No   Was assistive device used? No   Time for 25 Foot Walk (seconds) 4.8       Neurologic Exam          Imaging:     Results for orders placed during the hospital encounter of 01/10/18   MRI Brain W WO Contrast    Impression No significant change from prior.    Allowing for differences in technique no new lesion with continued scattered T2 flair hyperintensities and supratentorial parenchyma remain concerning for mild degree of prior demyelinating plaque. No evidence for new lesion or enhancing lesion to suggest interval or active demyelination.    Clinical correlation and followup advised.       Electronically signed by: ASHLEY WONG DO  Date:     01/10/18  Time:    15:01      Results for orders placed during the hospital encounter of 03/08/16   MRI Cervical Spine W WO Cont    Impression  No significant change from prior.  Unremarkable MRI of the cervical spine specifically without evidence for cord signal normality to suggest edema or abnormal intrathecal enhancement.    No significant central canal or neural foraminal stenosis.          Electronically signed by: ASHLEY WONG DO  Date:     03/08/16  Time:    12:42      Results for orders placed  during the hospital encounter of 08/20/12   MRI Thoracic Spine W WO Contrast    Impression *  No detrimental change or acute findings in this patient with multiple sclerosis.  ______________________________________     Electronically signed by resident: Martín Dhillon MD  Date:     08/20/12  Time:    17:14       ______________________________________     Electronically signed by: SELENA WHITE MD  Date:     08/21/12  Time:    08:45          Labs:     Lab Results   Component Value Date    QEVEQBFF81KN 64 06/27/2016    [id number] 67 11/30/2015    [id number] 52 06/09/2015     No results found for: JCVINDEX, JCVANTIBODY  No results found for: KT1DPOOF, ABSOLUTECD3, PE9OSEBH, ABSOLUTECD8, MZ4ALTQC, ABSOLUTECD4, LABCD48  Lab Results   Component Value Date    WBC 5.59 01/25/2018    RBC 3.96 (L) 01/25/2018    HGB 12.5 01/25/2018    HCT 39.0 01/25/2018    MCV 99 (H) 01/25/2018    MCH 31.6 (H) 01/25/2018    MCHC 32.1 01/25/2018    RDW 13.0 01/25/2018     01/25/2018    MPV 9.8 01/25/2018    GRAN 2.5 01/25/2018    GRAN 44.0 01/25/2018    LYMPH 2.6 01/25/2018    LYMPH 46.9 01/25/2018    MONO 0.4 01/25/2018    MONO 7.3 01/25/2018    EOS 0.1 01/25/2018    BASO 0.01 01/25/2018    EOSINOPHIL 1.6 01/25/2018    BASOPHIL 0.2 01/25/2018     Sodium   Date Value Ref Range Status   01/12/2018 140 136 - 145 mmol/L Final     Potassium   Date Value Ref Range Status   01/12/2018 4.3 3.5 - 5.1 mmol/L Final     Chloride   Date Value Ref Range Status   01/12/2018 102 95 - 110 mmol/L Final     CO2   Date Value Ref Range Status   01/12/2018 29 23 - 29 mmol/L Final     Glucose   Date Value Ref Range Status   01/12/2018 79 70 - 110 mg/dL Final     BUN, Bld   Date Value Ref Range Status   01/12/2018 15 6 - 20 mg/dL Final     Creatinine   Date Value Ref Range Status   01/12/2018 0.7 0.5 - 1.4 mg/dL Final     Calcium   Date Value Ref Range Status   01/12/2018 10.7 (H) 8.7 - 10.5 mg/dL Final     Total Protein   Date Value Ref Range Status    02/06/2018 7.7 6.0 - 8.4 g/dL Final     Albumin   Date Value Ref Range Status   02/06/2018 4.2 3.5 - 5.2 g/dL Final     Total Bilirubin   Date Value Ref Range Status   02/06/2018 0.5 0.1 - 1.0 mg/dL Final     Comment:     For infants and newborns, interpretation of results should be based  on gestational age, weight and in agreement with clinical  observations.  Premature Infant recommended reference ranges:  Up to 24 hours.............<8.0 mg/dL  Up to 48 hours............<12.0 mg/dL  3-5 days..................<15.0 mg/dL  6-29 days.................<15.0 mg/dL       Alkaline Phosphatase   Date Value Ref Range Status   02/06/2018 78 55 - 135 U/L Final     AST   Date Value Ref Range Status   02/06/2018 34 10 - 40 U/L Final     ALT   Date Value Ref Range Status   02/06/2018 74 (H) 10 - 44 U/L Final     Anion Gap   Date Value Ref Range Status   01/12/2018 9 8 - 16 mmol/L Final     eGFR if    Date Value Ref Range Status   01/12/2018 >60.0 >60 mL/min/1.73 m^2 Final     eGFR if non    Date Value Ref Range Status   01/12/2018 >60.0 >60 mL/min/1.73 m^2 Final     Comment:     Calculation used to obtain the estimated glomerular filtration  rate (eGFR) is the CKD-EPI equation.          Diagnosis/Assessment/Plan:    1. Multiple Sclerosis  · Assessment: Patient presents for quick follow up visit after holding Aubagio for 2 weeks. Her LE tingling is resolving and I do attriubute this to Aubagio discontinuation. However, her timed walk is slower. When considering a change in DMT it is important to understand if her MS is worsening or not. MRI is really imperative.   · Imaging: Brain MRI was stable; however, spine imaging has not yet been done  · Disease Modifying Therapies: Aubagio has been discontinued, and we will need to change DMT. If her MS in fact is not worsening we can consider returning to interferon with close monitoring of her anxiety/depression. If she has developed new spine lesions  we would want to advance therapy-we discussed Ocrevus today in clinic. Written material was provided to patient. She would like to consider MRI's and read about Ocrevus. She will contact us in a week to discuss MRI further--she will no doubt require IV versed--we can separate MRI's to be done on different days. Can consider a Valium prior to MRI as well.       Over 50% of this 30 minute visit was spent in direct face to face counseling of the patient about MS, DMT considerations, and MS symptom management.     Follow-up if symptoms worsen or fail to improve, for follow up via portal/phone.  Patient agreed to POC today.    Attending, Dr. Yates, was available during today's encounter and participated in the medical decision making. Any change to plan along with cosign to appear in the EMR.     JANIYA QureshiC  [facility]      Multiple sclerosis    Counseling regarding goals of care

## 2018-02-26 ENCOUNTER — OFFICE VISIT (OUTPATIENT)
Dept: OPTOMETRY | Facility: CLINIC | Age: 48
End: 2018-02-26
Payer: COMMERCIAL

## 2018-02-26 DIAGNOSIS — H47.291 PARTIAL OPTIC ATROPHY OF RIGHT EYE: Primary | ICD-10-CM

## 2018-02-26 DIAGNOSIS — G35 MULTIPLE SCLEROSIS: ICD-10-CM

## 2018-02-26 PROCEDURE — 92015 DETERMINE REFRACTIVE STATE: CPT | Mod: S$GLB,,, | Performed by: OPTOMETRIST

## 2018-02-26 PROCEDURE — 99999 PR PBB SHADOW E&M-EST. PATIENT-LVL II: CPT | Mod: PBBFAC,,, | Performed by: OPTOMETRIST

## 2018-02-26 PROCEDURE — 92012 INTRM OPH EXAM EST PATIENT: CPT | Mod: S$GLB,,, | Performed by: OPTOMETRIST

## 2018-02-26 NOTE — PROGRESS NOTES
HPI     DLS: 1/5/2018 with Dr. Gavin   Pt states va is mostly blurry in the right eye at a distance with and   without glasses. 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 (History)        ROS     Positive for: Eyes (MS w ON Hx OD)    Negative for: Constitutional, Gastrointestinal, Neurological, Skin,   Genitourinary, Musculoskeletal, HENT, Endocrine, Cardiovascular,   Respiratory, Psychiatric, Allergic/Imm, Heme/Lymph    Last edited by Serafin Ardon, OD on 2/26/2018  2:51 PM. (History)        Assessment /Plan     For exam results, see Encounter Report.    Partial optic atrophy of right eye    Multiple sclerosis      1. 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 Inc presby--wrote new spex Rx.  Discussed w pt even w best Rx [facility] OD will not be as good as OS    PLAN:    rtc as sched w Dr gavin, and 1 yr to me

## 2018-02-26 NOTE — LETTER
February 27, 2018      Emily Crow PA-C  [address] [facility] [address]           [place] [facility]  [address] [facility]  [place] [address]  Phone: [phone]  Fax: [phone]          Patient: Jillian Osullivan   MR Number: [id number]   YOB: 1970   Date of Visit: 2/26/2018       Dear Emily Crow:    Thank you for referring Jillian Osullivan to me for evaluation. Attached you will find relevant portions of my assessment and plan of care.    If you have questions, please do not hesitate to call me. I look forward to following Jillian Osullivan along with you.    Sincerely,    Serafin Ardon, OD    Enclosure  CC:  No Recipients    If you would like to receive this communication electronically, please contact [email or web address] or [phone] to request more information on [employer] access.    For providers and/or their staff who would like to refer a patient to [facility], please contact us through our one-stop-shop provider referral line, [facility], at [phone].    If you feel you have received this communication in error or would no longer like to receive these types of communications, please e-mail [email or web address]

## 2018-02-28 ENCOUNTER — PATIENT MESSAGE (OUTPATIENT)
Dept: NEUROLOGY | Facility: CLINIC | Age: 48
End: 2018-02-28

## 2018-02-28 DIAGNOSIS — G35 MULTIPLE SCLEROSIS: Primary | ICD-10-CM

## 2018-03-15 ENCOUNTER — OFFICE VISIT (OUTPATIENT)
Dept: PSYCHIATRY | Facility: CLINIC | Age: 48
End: 2018-03-15
Payer: COMMERCIAL

## 2018-03-15 VITALS
HEIGHT: 62 IN | BODY MASS INDEX: 32.88 KG/M2 | HEART RATE: 70 BPM | SYSTOLIC BLOOD PRESSURE: 136 MMHG | DIASTOLIC BLOOD PRESSURE: 85 MMHG | WEIGHT: 178.69 LBS

## 2018-03-15 DIAGNOSIS — F41.1 GENERALIZED ANXIETY DISORDER: ICD-10-CM

## 2018-03-15 DIAGNOSIS — F40.10 SOCIAL PHOBIA: ICD-10-CM

## 2018-03-15 DIAGNOSIS — G47.33 OSA (OBSTRUCTIVE SLEEP APNEA): ICD-10-CM

## 2018-03-15 DIAGNOSIS — F42.9 OBSESSIVE-COMPULSIVE DISORDER, UNSPECIFIED TYPE: ICD-10-CM

## 2018-03-15 DIAGNOSIS — F41.1 GAD (GENERALIZED ANXIETY DISORDER): ICD-10-CM

## 2018-03-15 DIAGNOSIS — F32.1 MDD (MAJOR DEPRESSIVE DISORDER), SINGLE EPISODE, MODERATE: Primary | ICD-10-CM

## 2018-03-15 DIAGNOSIS — G25.81 RLS (RESTLESS LEGS SYNDROME): ICD-10-CM

## 2018-03-15 DIAGNOSIS — F40.298 SPECIFIC PHOBIA: ICD-10-CM

## 2018-03-15 PROCEDURE — 3075F SYST BP GE 130 - 139MM HG: CPT | Mod: CPTII,S$GLB,, | Performed by: PSYCHIATRY & NEUROLOGY

## 2018-03-15 PROCEDURE — 99999 PR PBB SHADOW E&M-EST. PATIENT-LVL III: CPT | Mod: PBBFAC,,, | Performed by: PSYCHIATRY & NEUROLOGY

## 2018-03-15 PROCEDURE — 3079F DIAST BP 80-89 MM HG: CPT | Mod: CPTII,S$GLB,, | Performed by: PSYCHIATRY & NEUROLOGY

## 2018-03-15 PROCEDURE — 90833 PSYTX W PT W E/M 30 MIN: CPT | Mod: S$GLB,,, | Performed by: PSYCHIATRY & NEUROLOGY

## 2018-03-15 PROCEDURE — 99213 OFFICE O/P EST LOW 20 MIN: CPT | Mod: S$GLB,,, | Performed by: PSYCHIATRY & NEUROLOGY

## 2018-03-15 RX ORDER — DIAZEPAM 10 MG/1
10 TABLET ORAL 2 TIMES DAILY PRN
Qty: 60 TABLET | Refills: 5 | Status: SHIPPED | OUTPATIENT
Start: 2018-03-15 | End: 2018-06-18 | Stop reason: SDUPTHER

## 2018-03-15 RX ORDER — BUSPIRONE HYDROCHLORIDE 15 MG/1
22.5 TABLET ORAL 2 TIMES DAILY
Qty: 90 TABLET | Refills: 6 | Status: SHIPPED | OUTPATIENT
Start: 2018-03-15 | End: 2018-06-18 | Stop reason: SDUPTHER

## 2018-03-15 RX ORDER — CLONAZEPAM 0.5 MG/1
0.5 TABLET, ORALLY DISINTEGRATING ORAL 4 TIMES DAILY
Qty: 120 TABLET | Refills: 5 | Status: SHIPPED | OUTPATIENT
Start: 2018-03-15 | End: 2018-06-18 | Stop reason: SDUPTHER
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
## 2018-03-19 NOTE — NURSING
Attempted to call pt regarding scheduled MRI with IV Sedation for Wed 3/21, voice message left however, no instructions given on message.

## 2018-03-20 ENCOUNTER — ANESTHESIA EVENT (OUTPATIENT)
Dept: ENDOSCOPY | Facility: HOSPITAL | Age: 48
End: 2018-03-20
Payer: COMMERCIAL

## 2018-03-20 NOTE — NURSING
Spoke with pt regarding scheduled MRI Wednesday, pt is scheduled with anesthesia, verbalized understanding to remain NPO after midnight, states has attempted unsuccessfully MRI with IV sedation, instructed to hold all sedating medications in the morning, states that her primary physician instructed her that she could take her Klonopin in the morning.

## 2018-03-20 NOTE — PRE-PROCEDURE INSTRUCTIONS
Preop instructions: NPO after midnight or 8 hours prior to procedure time, shower instructions, directions, leave all valuables at home, medication instructions for PM prior & am of procedure explained. Patient stated an understanding.  Patient denies any side effects or issues with anesthesia or sedation.  Patient does have significant anxiety, versed alone will not work for the MRI. States she is already anxious and gets even more anxious walking to MRI.

## 2018-03-21 ENCOUNTER — HOSPITAL ENCOUNTER (OUTPATIENT)
Dept: RADIOLOGY | Facility: HOSPITAL | Age: 48
Discharge: HOME | End: 2018-03-21
Attending: PHYSICIAN ASSISTANT
Payer: COMMERCIAL

## 2018-03-21 ENCOUNTER — HOSPITAL ENCOUNTER (OUTPATIENT)
Facility: HOSPITAL | Age: 48
Discharge: HOME | End: 2018-03-21
Attending: PSYCHIATRY & NEUROLOGY | Admitting: PSYCHIATRY & NEUROLOGY
Payer: COMMERCIAL

## 2018-03-21 ENCOUNTER — ANESTHESIA (OUTPATIENT)
Dept: ENDOSCOPY | Facility: HOSPITAL | Age: 48
End: 2018-03-21
Payer: COMMERCIAL

## 2018-03-21 VITALS
DIASTOLIC BLOOD PRESSURE: 83 MMHG | SYSTOLIC BLOOD PRESSURE: 128 MMHG | RESPIRATION RATE: 18 BRPM | HEART RATE: 65 BPM | OXYGEN SATURATION: 99 % | TEMPERATURE: 98 F

## 2018-03-21 DIAGNOSIS — G35 MULTIPLE SCLEROSIS: ICD-10-CM

## 2018-03-21 PROCEDURE — 72156 MRI NECK SPINE W/O & W/DYE: CPT | Mod: TC

## 2018-03-21 PROCEDURE — 71000044 HC DOSC ROUTINE RECOVERY FIRST HOUR

## 2018-03-21 PROCEDURE — 37000008 HC ANESTHESIA 1ST 15 MINUTES

## 2018-03-21 PROCEDURE — 25500020 PHARM REV CODE 255: Performed by: PHYSICIAN ASSISTANT

## 2018-03-21 PROCEDURE — 72157 MRI CHEST SPINE W/O & W/DYE: CPT | Mod: 26,,, | Performed by: RADIOLOGY

## 2018-03-21 PROCEDURE — 63600175 PHARM REV CODE 636 W HCPCS: Performed by: STUDENT IN AN ORGANIZED HEALTH CARE EDUCATION/TRAINING PROGRAM

## 2018-03-21 PROCEDURE — 37000009 HC ANESTHESIA EA ADD 15 MINS

## 2018-03-21 PROCEDURE — 72157 MRI CHEST SPINE W/O & W/DYE: CPT | Mod: TC

## 2018-03-21 PROCEDURE — A9585 GADOBUTROL INJECTION: HCPCS | Performed by: PHYSICIAN ASSISTANT

## 2018-03-21 PROCEDURE — D9220A PRA ANESTHESIA: Mod: ,,, | Performed by: ANESTHESIOLOGY

## 2018-03-21 PROCEDURE — 72156 MRI NECK SPINE W/O & W/DYE: CPT | Mod: 26,,, | Performed by: RADIOLOGY

## 2018-03-21 RX ORDER — GLUCAGON 1 MG
1 KIT INJECTION
Status: DISCONTINUED | OUTPATIENT
Start: 2018-03-21 | End: 2018-03-21 | Stop reason: HOSPADM

## 2018-03-21 RX ORDER — MIDAZOLAM HYDROCHLORIDE 1 MG/ML
INJECTION, SOLUTION INTRAMUSCULAR; INTRAVENOUS
Status: DISCONTINUED | OUTPATIENT
Start: 2018-03-21 | End: 2018-03-21

## 2018-03-21 RX ORDER — SODIUM CHLORIDE 0.9 % (FLUSH) 0.9 %
5 SYRINGE (ML) INJECTION
Status: DISCONTINUED | OUTPATIENT
Start: 2018-03-21 | End: 2018-03-21 | Stop reason: HOSPADM

## 2018-03-21 RX ORDER — PROPOFOL 10 MG/ML
VIAL (ML) INTRAVENOUS CONTINUOUS PRN
Status: DISCONTINUED | OUTPATIENT
Start: 2018-03-21 | End: 2018-03-21

## 2018-03-21 RX ORDER — FENTANYL CITRATE 50 UG/ML
25 INJECTION, SOLUTION INTRAMUSCULAR; INTRAVENOUS EVERY 5 MIN PRN
Status: DISCONTINUED | OUTPATIENT
Start: 2018-03-21 | End: 2018-03-21 | Stop reason: HOSPADM

## 2018-03-21 RX ORDER — OXYCODONE HYDROCHLORIDE 5 MG/1
5 TABLET ORAL
Status: DISCONTINUED | OUTPATIENT
Start: 2018-03-21 | End: 2018-03-21

## 2018-03-21 RX ORDER — PROPOFOL 10 MG/ML
VIAL (ML) INTRAVENOUS
Status: DISCONTINUED | OUTPATIENT
Start: 2018-03-21 | End: 2018-03-21

## 2018-03-21 RX ORDER — IBUPROFEN 200 MG
16 TABLET ORAL
Status: DISCONTINUED | OUTPATIENT
Start: 2018-03-21 | End: 2018-03-21 | Stop reason: HOSPADM

## 2018-03-21 RX ORDER — IBUPROFEN 200 MG
24 TABLET ORAL
Status: DISCONTINUED | OUTPATIENT
Start: 2018-03-21 | End: 2018-03-21 | Stop reason: HOSPADM

## 2018-03-21 RX ORDER — GADOBUTROL 604.72 MG/ML
8 INJECTION INTRAVENOUS
Status: COMPLETED | OUTPATIENT
Start: 2018-03-21 | End: 2018-03-21

## 2018-03-21 RX ADMIN — PROPOFOL 175 MCG/KG/MIN: 10 INJECTION, EMULSION INTRAVENOUS at 12:03

## 2018-03-21 RX ADMIN — MIDAZOLAM HYDROCHLORIDE 2 MG: 1 INJECTION, SOLUTION INTRAMUSCULAR; INTRAVENOUS at 12:03

## 2018-03-21 RX ADMIN — PROPOFOL 30 MG: 10 INJECTION, EMULSION INTRAVENOUS at 12:03

## 2018-03-21 RX ADMIN — PROPOFOL 100 MG: 10 INJECTION, EMULSION INTRAVENOUS at 12:03

## 2018-03-21 RX ADMIN — GADOBUTROL 8 ML: 604.72 INJECTION INTRAVENOUS at 01:03

## 2018-03-21 NOTE — PROGRESS NOTES
C and T spine normal-no demyelinating lesions noted. Mild degenerative changes seen(similar to prior)

## 2018-03-21 NOTE — ANESTHESIA POSTPROCEDURE EVALUATION
Anesthesia Post Evaluation    Patient: Jillian Osullivan    Procedure(s) Performed: Procedure(s) (LRB):  IMAGING-(MRI) (N/A)    Final Anesthesia Type: general  Patient location during evaluation: PACU  Patient participation: Yes- Able to Participate  Level of consciousness: awake and alert and oriented  Post-procedure vital signs: reviewed and stable  Pain management: adequate  Airway patency: patent  PONV status at discharge: No PONV  Anesthetic complications: no      Cardiovascular status: stable  Respiratory status: unassisted  Hydration status: euvolemic  Follow-up not needed.        Visit Vitals  /83   Pulse 65   Temp 36.5 °C (97.7 °F) (Temporal)   Resp 18   SpO2 99%   Breastfeeding? No       Pain/Bubba Score: Pain Assessment Performed: Yes (3/21/2018  2:20 PM)  Presence of Pain: denies (3/21/2018  2:20 PM)  Bubba Score: 10 (3/21/2018  2:20 PM)  Modified Bubba Score: 20 (3/21/2018  2:20 PM)

## 2018-03-21 NOTE — DISCHARGE INSTRUCTIONS
Magnetic Resonance Imaging (MRI)     You will be asked to hold very still during the scan.     Magnetic resonance imaging (MRI) is a test that lets your doctor see detailed pictures of the inside of your body. MRI combines the use of strong magnets and radio waves to form an MRI image.  How do I get ready for an MRI?  · Follow any directions you are given for not eating or drinking before the test.  · Ask your provider if you should stop taking any medicine before the test.  · Follow your normal daily routine unless your provider tells you otherwise.  · You'll be asked to remove your watch, jewelry, hearing aids, credit cards, pens, pocket knives, eyeglasses, and other metal objects.  · You may be asked to remove your makeup. Makeup may contain some metal.  · Most MRI tests take 30 to 60 minutes. Depending on the type of MRI you are having, the test may take longer. Give yourself extra time to check in.     MRI uses strong magnets. Metal is affected by magnets and can distort the image. The magnet used in MRI can cause metal objects in your body to move. If you have a metal implant, you may not be able to have an MRI unless the implant is certified as MRI safe. People with these implants should not have an MRI:  · Ear (cochlear) implants  · Certain clips used for brain aneurysms  · Certain metal coils put in blood vessels  · Most defibrillators  · Most pacemakers  Be sure to tell the radiologist or technologist if you:  · Have had any previous surgeries  · Have a pacemaker, surgical clips, metal plate or pins, an artificial joint, staples or screws, ear (cochlear) implants, or other implants  · Wear a medicated adhesive patch  · Have metal splinters in your body  · Have implanted nerve stimulators or drug-infusion ports  · Have tattoos or body piercings. Some tattoo inks contain metal.  · Work with metal  · Have braces. You must remove any dental work.  · Have a bullet or other metal in your body  Also tell the  radiologist or technologist if you:  · Are pregnant or think you may be  · Are afraid of small, enclosed spaces (claustrophobic)  · Are allergic to X-ray dye (contrast medium), iodine, shellfish, or any medicines  · Have other allergies  · Are breastfeeding  · Have a history of cancer  · Have any serious health problems. This includes kidney disease or a liver transplant. You may not be able to have the contrast material used for MRI.   What happens during an MRI?  · You may be asked to wear a hospital gown.  · You may be given earplugs to wear if you need them.  · You may be injected with a special dye (contrast) that improves the MRI image.   · Youll lie down on a platform that slides into the magnet.  What happens after an MRI?  · You can get back to normal activities right away. If you were given contrast, it will pass naturally through your body within a day. You may be told to drink more water or other fluids during this time.   · Your doctor will discuss the test results with you during a follow-up appointment or over the phone.  · Your next appointment is: __________________  Date Last Reviewed: 6/2/2015  © 9362-0835 [employer]. [address] [place] [address], [place] [address]. All rights reserved. This information is not intended as a substitute for professional medical care. Always follow your healthcare professional's instructions.

## 2018-03-21 NOTE — TRANSFER OF CARE
Anesthesia Transfer of Care Note    Patient: Jillian Osullivan    Procedure(s) Performed: Procedure(s) (LRB):  IMAGING-(MRI) (N/A)    Patient location: PACU    Anesthesia Type: general and MAC    Transport from OR: Transported from OR on room air with adequate spontaneous ventilation    Post pain: adequate analgesia    Post assessment: no apparent anesthetic complications    Post vital signs: stable    Level of consciousness: sedated    Nausea/Vomiting: no nausea/vomiting    Complications: none    Transfer of care protocol was followed      Last vitals:   Visit Vitals  /79 (BP Location: Left arm, Patient Position: Lying)   Pulse 89   Temp 36.5 °C (97.7 °F) (Temporal)   Resp 16   SpO2 95%   Breastfeeding? No

## 2018-03-21 NOTE — PLAN OF CARE
Discharge instructions patient and mother.  Verbalized understanding.  Pain tolerable. Tolerating PO fluids.

## 2018-03-22 ENCOUNTER — PATIENT MESSAGE (OUTPATIENT)
Dept: NEUROLOGY | Facility: CLINIC | Age: 48
End: 2018-03-22

## 2018-03-27 ENCOUNTER — OFFICE VISIT (OUTPATIENT)
Dept: SLEEP MEDICINE | Facility: CLINIC | Age: 48
End: 2018-03-27
Payer: COMMERCIAL

## 2018-03-27 VITALS
HEART RATE: 89 BPM | WEIGHT: 178.81 LBS | SYSTOLIC BLOOD PRESSURE: 140 MMHG | BODY MASS INDEX: 32.91 KG/M2 | DIASTOLIC BLOOD PRESSURE: 90 MMHG | HEIGHT: 62 IN

## 2018-03-27 DIAGNOSIS — F51.01 PRIMARY INSOMNIA: ICD-10-CM

## 2018-03-27 DIAGNOSIS — G47.33 OSA (OBSTRUCTIVE SLEEP APNEA): Primary | ICD-10-CM

## 2018-03-27 PROCEDURE — 3080F DIAST BP >= 90 MM HG: CPT | Mod: CPTII,S$GLB,, | Performed by: PSYCHIATRY & NEUROLOGY

## 2018-03-27 PROCEDURE — 3077F SYST BP >= 140 MM HG: CPT | Mod: CPTII,S$GLB,, | Performed by: PSYCHIATRY & NEUROLOGY

## 2018-03-27 PROCEDURE — 99999 PR PBB SHADOW E&M-EST. PATIENT-LVL V: CPT | Mod: PBBFAC,,, | Performed by: PSYCHIATRY & NEUROLOGY

## 2018-03-27 PROCEDURE — 99214 OFFICE O/P EST MOD 30 MIN: CPT | Mod: S$GLB,,, | Performed by: PSYCHIATRY & NEUROLOGY

## 2018-03-27 RX ORDER — GUAIFENESIN AND PHENYLEPHRINE HCL 400; 10 MG/1; MG/1
TABLET ORAL DAILY
COMMUNITY

## 2018-03-27 RX ORDER — TERIFLUNOMIDE 14 MG/1
TABLET, FILM COATED ORAL
COMMUNITY
End: 2018-03-28

## 2018-03-27 RX ORDER — TERIFLUNOMIDE 14 MG/1
14 TABLET, FILM COATED ORAL
COMMUNITY
End: 2018-03-28

## 2018-03-27 RX ORDER — METFORMIN HYDROCHLORIDE 500 MG/1
TABLET, EXTENDED RELEASE ORAL
Refills: 5 | COMMUNITY
Start: 2018-01-11 | End: 2019-01-06 | Stop reason: SDUPTHER

## 2018-03-27 RX ORDER — METFORMIN HYDROCHLORIDE 500 MG/1
TABLET ORAL
COMMUNITY
Start: 2018-01-11 | End: 2019-01-06 | Stop reason: SDUPTHER

## 2018-03-27 NOTE — PROGRESS NOTES
"This 47 y.o. female patient returns for the management of obstructive sleep apnea and insomnia.    MS symptoms: initially eyesight; ongoing issues with joints/legs    ESS = 17    1. CHRIS   BASELINE PSG 7/18/16: +CHRIS, AHI 16.4, low sat 91%, wt 168 lbs  TITRATION 9/20/16: Titrated to effective pressure 7 cm, wt. 168 lbs    She is attempting CPAP nightly, but sometimes difficulty due to her other ailments. Last night she managed to sleep with it for 7+ hours.  Still Feeling exhausted. No snoring    She is using a Resmed nasal pillows mask.  CPAP interrogation Dream Station not auto capable, set at 8 cm: 2438 hours used; 4.0 hours/n ave; 12/30 days >4 hours      2. She continues to have difficulties with anxiety and insomnia. This is her main complaint today.  She feels that this is somewhat better since being off of her injectible MS meds.  Also Buspar was increased by psych.  Sleep is worse and more fragmented. Increased worry and stress.    Some dozing and napping around 2 pm in the afternoon.  Bedtime 10:30 pm - MN  Once in bed, sleep onset ranges from 30 mins - 1 hour  WASO 5-6 awakenings; 30-90 mins of wake time  Awakens around 4 am, then very light and fragmented sleep, dozing in and out of sleep until wake time of 5:45 -6 am.    Takes Buspar, clonazepam (4 times a day for GI related issue), Valium 10 mg (for high anxiety), Effexor - managed by Dr. Montiel  She is currently taking melatonin 5 mg; Takes Prem  She has tried Rozeram, Trazodone 100 mg (it worked and then stopped working)    3. RLS  - Gabapentin 300 mg was initially effective in controlling RLS and helping her sleep better. It "worked" for 3 nights, but then lost its effectiveness. She has been using it intermittently, because she is concerned about habituation. She is also concerned about inability to lose weight. Increasing gabapentin 900 mg may be helping somewhat, though she complains of morning sedation. Her  calls her a "zombie"    RLS " feelings: Often feels need to move her legs at night; Urge; Moving constantly; Using adjustable bed;  Mostly in the evenings and around bed time.    SLEEP ROUTINE:   Time to bed: 11:30 pm   Sleep onset latency: a while   Disruptions or awakenings: 3-4   Wakeup time: 6 am   Perceived sleep quality: poor   Daytime naps: every few days      Past Medical History:   Diagnosis Date    Endometriosis, site unspecified     H/O total hysterectomy     Hidradenitis     History of laparoscopy     History of psychiatric care     Multiple sclerosis     Panic anxiety syndrome     Therapy        Past Surgical History:   Procedure Laterality Date    APPENDECTOMY      breast reduction      HYSTERECTOMY      MN EXPLORATORY OF ABDOMEN      2002    sweat gland removal  10/2013    rt groin       Family History   Problem Relation Age of Onset    Anxiety disorder Mother     Cancer Mother      cervix    Cataracts Mother     Breast cancer Neg Hx     Colon cancer Neg Hx     Ovarian cancer Neg Hx     Amblyopia Neg Hx     Blindness Neg Hx     Glaucoma Neg Hx     Macular degeneration Neg Hx     Retinal detachment Neg Hx     Strabismus Neg Hx        Social History   Substance Use Topics    Smoking status: Never Smoker    Smokeless tobacco: Never Used    Alcohol use No       ALLERGIES: Reviewed in EPIC    CURRENT MEDICATIONS: Reviewed in EPIC    REVIEW OF SYSTEMS:  Sleep related symptoms as per HPI;    Denies weight gain, but difficulty losing;    Positive palpitations;    Positive mood disturbance;    Denies anemia;    Otherwise, a balance of systems reviewed is negative.    PHYSICAL EXAM:  There were no vitals taken for this visit.  GENERAL: Well groomed; Normally developed;  NEURO: Oriented to time, place and person.    Normal attention span and concentration.  Station normal. Gait normal.  PSYCH: Affect is full. Mood is normal.    I spent greater than 15 minutes during this 30 minute visit in counseling the patient  regarding insomnia and sources of fatigue, anxiety and insomnia relationships.      ASSESSMENT:    1. Obstructive Sleep Apnea, moderate severity. CHRIS appears to be moderately well controlled on CPAP. She is using an effective setting, determined by titration sleep study. Recent adherence is down due to upper respiratory infection. On last psychiatry visit, Dr. Montiel expressed concern that she was using a nasal canula only. I confirmed today that she is using an approved nasal pillows CPAP mask, which forms appropriate seal and provides PAP therapy when used. She demonstrated knowledge on proper use.    2. Sleep disturbances/Insomnia - underlying  appears to be anxiety/depression.  She feels like anxiety is getting worse, despite medical management. Cause for anxiety unclear - also multi-factorial, being managed by Dr. Montiel. She has implicated MS or Avonex as possible root cause, since she did not have any anxiety at all prior to her MS diagnosis and treatment. She does hope that changing MS related medications may result in improvement. She had been spending excess time in bed 9+ hours, and only sleeping a fraction of that amount, but she has managed to reduce time in bed to 8 hours. Unfortunately, she is doing a lot of unintentional dozing during the day, while taking clonazepam for GI issues. She has attempted to implement CBT-insomnia, but having issues with dozing off prior to bedroom, and this leaves her feeling wide awake once she does enter the bedroom. Also, keeping the logs makes her anxious.    3. Restless legs feelings at night / akithesia - in some cases PLMS can be potentiated by Effexor; No evidence of PLMS on sleep study; RLS can occur just as a feeling (without acutal limb kicks) that can contribute to night time anxiety and insomnia. RLS appears to have responded to higher doses of gabapentin. She is so focused on the effects of her underlying anxiety, it is difficult to ascertain whether  she is getting any benefit from this.    4. Fatigue - multi-factorial; certainly MS, medication, and depression can be playing a role; CHRIS is treated and an effective pressure has been determined.         PLAN:    CBT schedule:  1. List-making or journaling about 2 hours before bedtime to help decompress thoughts from your mind. She is doing this, which helps her keep tracks of things  2. Must clearly avoid any napping/dozing 2-3 hours before allowed bedtime. This may include discontinuing sedentary activities, like lounging on the sofa, in the time period before bedtime to help avoid accidental dozings. If dozing does occur, you may need to delay bedtime until you feel drowsy or sleepy again.  3. Bedtime to 11 pm- MN. Out of bed at 6-7 am    She is holding off on sleep logs now, because she feels that keeping track of this makes her too anxious    Other considerations:  1. Reduce gabapentin at 600 mg at bedtime to help settle RLS/akithesia and anxiety feelings.  Reduction due to morning sedation  2. Meet with Dr. Montiel regarding anxiety control, which is a contributing factor to difficulty sleeping.    3. Continue CPAP, at pressure to 8 cm; trial with chin strap. The potential benefits were discussed.   She is using her nasal mask properly with good seal.    Precautions: The patient was advised to abstain from driving should they feel sleepy or drowsy.    Follow up: 2-3 months. (After initiating new MS medication) MD/NP

## 2018-03-28 ENCOUNTER — DOCUMENTATION ONLY (OUTPATIENT)
Dept: NEUROLOGY | Facility: CLINIC | Age: 48
End: 2018-03-28

## 2018-03-28 DIAGNOSIS — G35 MULTIPLE SCLEROSIS: Primary | ICD-10-CM

## 2018-03-29 ENCOUNTER — TELEPHONE (OUTPATIENT)
Dept: PHARMACY | Facility: CLINIC | Age: 48
End: 2018-03-29

## 2018-03-29 ENCOUNTER — PATIENT MESSAGE (OUTPATIENT)
Dept: NEUROLOGY | Facility: CLINIC | Age: 48
End: 2018-03-29

## 2018-03-29 NOTE — TELEPHONE ENCOUNTER
Received Rx for Avonex maintenance pens and starter pack of prefilled syringes for titration. Patient seems uncomfortable with syringe based titration. Will coordinate with prescriber's office to offer counseling on medication initiation.

## 2018-03-30 ENCOUNTER — PATIENT MESSAGE (OUTPATIENT)
Dept: NEUROLOGY | Facility: CLINIC | Age: 48
End: 2018-03-30

## 2018-04-09 ENCOUNTER — PATIENT MESSAGE (OUTPATIENT)
Dept: NEUROLOGY | Facility: CLINIC | Age: 48
End: 2018-04-09

## 2018-04-09 NOTE — ANESTHESIA POSTPROCEDURE EVALUATION
Anesthesia Post Evaluation    Patient: Jillian Osullivan  Anesthesia Discharge Summary    Admit Date: 3/21/2018    Discharge Date and Time: 3/21/2018  2:28 PM    Attending Physician:  No att. providers found    Discharge Provider:  mandy benitez MD    Active Problems:   Patient Active Problem List   Diagnosis    Multiple sclerosis    Hidradenitis    Generalized anxiety disorder    Specific phobia    Obsessive-compulsive disorder    Counseling regarding goals of care    Vitamin D insufficiency    Encounter for long-term (current) use of high-risk medication    Endometriosis    CHRIS (obstructive sleep apnea)    Social phobia    MDD (major depressive disorder), single episode, moderate    RLS (restless legs syndrome)    Altitudinal scotoma of right eye    Partial optic atrophy of right eye        Discharged Condition: good    Reason for Admission: MRI    Hospital Course: Patient tolerate procedure and anesthesia well. Test performed without complication.    Consults: none    Significant Diagnostic Studies: None    Treatments/Procedures: Procedure(s) (LRB): anesthesia for exam    Disposition: Home or self care    Patient Instructions:   Discharge Medication List as of 3/21/2018  1:57 PM      CONTINUE these medications which have NOT CHANGED    Details   busPIRone (BUSPAR) 15 MG tablet Take 1.5 tablets (22.5 mg total) by mouth 2 (two) times daily., Starting Thu 3/15/2018, Print      CALCIUM-MAGNESIUM-ZINC ORAL Take by mouth once daily. Calcium-1,000 mg  Magnesium-500 mg  Zinc-25mg, Until Discontinued, Historical Med      ROCKY SEED/ALA/LINOLEIC/OLEIC (ROCKY SEED OIL-OMEGA 3-6-9 ORAL) Take by mouth., Historical Med      cholecalciferol, vitamin D3, 5,000 unit capsule Take 5,000 Units by mouth once daily. , Historical Med      clindamycin phosphate 1% (CLINDAGEL) 1 % gel MARLENA TO THE AFFECTED AREA ON AREAS OF BODY BID, Historical Med      clonazePAM (KLONOPIN) 0.5 MG disintegrating tablet Take 1 tablet (0.5 mg  total) by mouth 4 (four) times daily., Starting Thu 3/15/2018, Print      coenzyme Q10 (CO Q-10) 300 mg Cap Take 1 capsule by mouth every evening. 400mg, Historical Med      cyproheptadine (,PERIACTIN,) 2 mg/5 mL syrup Take by mouth 2 (two) times daily., Until Discontinued, Historical Med      diazePAM (VALIUM) 10 MG Tab Take 1 tablet (10 mg total) by mouth 2 (two) times daily as needed (sleep)., Starting Thu 3/15/2018, Print      estradiol (VIVELLE-DOT) 0.0375 mg/24 hr 1 patch twice a week., Starting 3/2/2017, Until Discontinued, Historical Med      FLAXSEED OIL ORAL Take by mouth once daily. Omega 3 2800MG, Until Discontinued, Historical Med      gabapentin (NEURONTIN) 300 MG capsule Take oral 2 - 3 capsules nightly for restless legs syndrome, Normal      L. RHAMNOSUS GG/INULIN (CULTURELLE PROBIOTICS ORAL) Take by mouth every evening. Ultimate Jo-Ann 15 Billion Probiotic, Historical Med      levothyroxine (SYNTHROID) 75 MCG tablet TK 1 T PO QD, Historical Med      melatonin 5 mg Cap Take by mouth every evening. , Historical Med      metformin (GLUCOPHAGE-XR) 750 MG 24 hr tablet TK ONE T PO BID, Historical Med      MINERALS ORAL Take 1 tablet by mouth once daily. New Vision Essential Minerals, Until Discontinued, Historical Med      omega 3-dha-epa-fish oil 1,000 (120-180) mg Cap Take 1 capsule by mouth once daily., Until Discontinued, Historical Med      ONETOUCH DELICA LANCETS 33 gauge Misc Starting 8/13/2015, Until Discontinued, Historical Med      ONETOUCH ULTRA TEST Strp Starting 8/13/2015, Until Discontinued, Historical Med      ONETOUCH ULTRA2 kit Historical Med      psyllium (METAMUCIL) powder Take 1 packet by mouth once daily., Until Discontinued, Historical Med      sucralfate (CARAFATE) 100 mg/mL suspension Take 10 mLs (1 g total) by mouth 2 (two) times daily., Starting Fri 8/4/2017, Normal      triamcinolone acetonide 0.1% (KENALOG) 0.1 % cream Apply 1 application topically 2 (two) times daily., Starting  "10/10/2016, Until Discontinued, Historical Med      TURMERIC (CURCUMIN MISC) Take by mouth once daily. Super Bio-Curcumin 400 mg, Until Discontinued, Historical Med      !! UNABLE TO FIND Take by mouth 2 (two) times daily. Multigenics without Iron, Until Discontinued, Historical Med      !! UNABLE TO FIND Take 100 g by mouth once daily. medication name: D-Ribose, Until Discontinued, Historical Med      venlafaxine (EFFEXOR-XR) 75 MG 24 hr capsule Take 3 capsules (225 mg total) by mouth once daily., Starting u 12/14/2017, Normal       !! - Potential duplicate medications found. Please discuss with provider.            Discharge Procedure Orders (must include Diet, Follow-up, Activity)  No discharge procedures on file.     Discharge instructions - Please return to clinic (contact pediatrician etc..) if:  1) Persistent cough.  2) Respiratory difficulty (including: noisy breathing, nasal flaring, "barky" cough or wheezing).  3) Persistent pain not responsive to prescribed medications (if any).  4) Change in current mental status (age appropriate).  5) Repeating or recurrent episodes of vomiting.  6) Inability to tolerate oral fluids.      Procedure(s) Performed: Procedure(s) (LRB):  IMAGING-(MRI) (N/A)    Final Anesthesia Type: general  Patient location during evaluation: PACU  Patient participation: Yes- Able to Participate  Level of consciousness: awake and alert  Post-procedure vital signs: reviewed and stable  Pain management: adequate  Airway patency: patent  PONV status at discharge: No PONV  Anesthetic complications: no      Cardiovascular status: blood pressure returned to baseline  Respiratory status: unassisted  Hydration status: euvolemic  Follow-up not needed.        Visit Vitals  /83   Pulse 65   Temp 36.5 °C (97.7 °F) (Temporal)   Resp 18   SpO2 99%   Breastfeeding? No       Pain/Bubba Score: No Data Recorded      "

## 2018-04-09 NOTE — TELEPHONE ENCOUNTER
Insurance states a prior auth is already on file for maintenance Avonex Pen, however a PA is still needed for starter dose of syringes. Submitted prior authorization request for AVONEX to insurance on 04/09 12:54pm. SCOTT

## 2018-04-10 NOTE — TELEPHONE ENCOUNTER
DOCUMENTATION ONLY:  Prior authorization for Avonex syringe approved from 01/16/2017 to 01/16/2022    Case Id: [id number]    Co-pay: $80    Patient Assistance IS NOT required.  SCOTT

## 2018-04-10 NOTE — DISCHARGE SUMMARY
"DISCHARGE SUMMARY  Hospital Medicine    Team: Networked reference to record PCT     Patient Name: Jillian Osullivan  YOB: 1970    Admit Date: 3/21/2018    Discharge Date: 04/10/2018    Discharge Attending Physician: No att. providers found     Diagnoses:  Active Hospital Problems    Diagnosis  POA    Multiple sclerosis [G35]  Yes      Resolved Hospital Problems    Diagnosis Date Resolved POA   No resolved problems to display.       HOSPITAL COURSE:    Initial Presentation:     Jillian Osullivan is a 47 y.o. female w/ a significant PMHx of anxiety, depression, CHRIS and MS. She had a previous "bad experience" in an MRI and wants to be deeply sedated.     Course of Principle Problem for Admission:    Patient admitted for MRI. Tolerated procedure well. Discharged in stable condition.    Pertinent/Significant Diagnostic Studies:  *MRI    Disposition:  Home       Future Scheduled Appointments:  Future Appointments  Date Time Provider Department Center   6/8/2018 10:40 AM Manny Navarro MD [facility] SLEEP [facility]   6/18/2018 11:30 AM Bennett Montiel MD [facility] PSYCH [facility]       Last CBC/BMP/HgbA1c (if applicable):  No results found for this or any previous visit (from the past 336 hour(s)).  No results found for this or any previous visit (from the past 336 hour(s)).  No results found for: HGBA1C    Discharge Medication List:     Medication List      ASK your doctor about these medications    busPIRone 15 MG tablet  Commonly known as:  BUSPAR  Take 1.5 tablets (22.5 mg total) by mouth 2 (two) times daily.     CALCIUM-MAGNESIUM-ZINC ORAL     ROCKY SEED OIL-OMEGA 3-6-9 ORAL     cholecalciferol (vitamin D3) 5,000 unit capsule     clindamycin phosphate 1% 1 % gel  Commonly known as:  CLINDAGEL     clonazePAM 0.5 MG disintegrating tablet  Commonly known as:  KLONOPIN  Take 1 tablet (0.5 mg total) by mouth 4 (four) times daily.     CO Q-10 300 mg Cap  Generic drug:  coenzyme Q10     CULTURELLE PROBIOTICS " ORAL     CURCUMIN MISC     cyproheptadine 2 mg/5 mL syrup  Commonly known as:  (PERIACTIN)     diazePAM 10 MG Tab  Commonly known as:  VALIUM  Take 1 tablet (10 mg total) by mouth 2 (two) times daily as needed (sleep).     estradiol 0.0375 mg/24 hr  Commonly known as:  VIVELLE-DOT     FLAXSEED OIL ORAL     gabapentin 300 MG capsule  Commonly known as:  NEURONTIN  Take oral 2 - 3 capsules nightly for restless legs syndrome     levothyroxine 75 MCG tablet  Commonly known as:  SYNTHROID     melatonin 5 mg Cap     metFORMIN 750 MG 24 hr tablet  Commonly known as:  GLUCOPHAGE-XR     MINERALS ORAL     omega 3-dha-epa-fish oil 1,000 mg (120 mg-180 mg) Cap     ONETOUCH DELICA LANCETS 33 gauge Misc  Generic drug:  lancets     ONETOUCH ULTRA TEST Strp  Generic drug:  blood sugar diagnostic     ONETOUCH ULTRA2 kit  Generic drug:  blood-glucose meter     psyllium powder  Commonly known as:  METAMUCIL     sucralfate 100 mg/mL suspension  Commonly known as:  CARAFATE  Take 10 mLs (1 g total) by mouth 2 (two) times daily.     triamcinolone acetonide 0.1% 0.1 % cream  Commonly known as:  KENALOG     * UNABLE TO FIND     * UNABLE TO FIND     venlafaxine 75 MG 24 hr capsule  Commonly known as:  EFFEXOR-XR  Take 3 capsules (225 mg total) by mouth once daily.        * This list has 2 medication(s) that are the same as other medications prescribed for you. Read the directions carefully, and ask your doctor or other care provider to review them with you.                Patient Instructions:  No discharge procedures on file.    Signing Physician:  Leah Jacobs MD

## 2018-04-11 ENCOUNTER — TELEPHONE (OUTPATIENT)
Dept: NEUROLOGY | Facility: CLINIC | Age: 48
End: 2018-04-11

## 2018-04-11 DIAGNOSIS — G35 MULTIPLE SCLEROSIS: Primary | ICD-10-CM

## 2018-04-11 NOTE — TELEPHONE ENCOUNTER
Call returned to [facility]. Order clarification given for starter pack and maintenance doses.

## 2018-04-11 NOTE — TELEPHONE ENCOUNTER
----- Message from Abigail Keith sent at 4/11/2018  1:47 PM CDT -----  Contact:  from [facility]  Needs to get a presciption clarification    interferon beta-1a, albumin, (AVONEX) 30 mcg injection  Avonex is the medication      can be reached at [phone]    Thanks

## 2018-04-19 DIAGNOSIS — F41.1 GAD (GENERALIZED ANXIETY DISORDER): ICD-10-CM

## 2018-04-24 RX ORDER — CLONAZEPAM 0.5 MG/1
TABLET, ORALLY DISINTEGRATING ORAL
Qty: 120 TABLET | Refills: 0 | OUTPATIENT
Start: 2018-04-24

## 2018-06-05 DIAGNOSIS — G25.81 RLS (RESTLESS LEGS SYNDROME): ICD-10-CM

## 2018-06-05 RX ORDER — GABAPENTIN 300 MG/1
CAPSULE ORAL
Qty: 90 CAPSULE | Refills: 3 | Status: SHIPPED | OUTPATIENT
Start: 2018-06-05 | End: 2018-11-05 | Stop reason: DRUGHIGH

## 2018-06-05 NOTE — TELEPHONE ENCOUNTER
----- Message from Emani Rob sent at 6/5/2018 10:04 AM CDT -----  Contact: ALBERTO BAUER [[id number]]  Can the clinic reply in [facility]: Yes      Please refill the medication(s) listed below. The patient can be reached at this phone number [phone] once it is called into the pharmacy.      Medication #1gabapentin (NEURONTIN) 300 MG capsule      Preferred Pharmacy: [facility] [id number] [place] [address] [facility] AT [facility]AN[address]

## 2018-06-18 ENCOUNTER — OFFICE VISIT (OUTPATIENT)
Dept: PSYCHIATRY | Facility: CLINIC | Age: 48
End: 2018-06-18
Payer: COMMERCIAL

## 2018-06-18 VITALS
WEIGHT: 182 LBS | HEART RATE: 61 BPM | SYSTOLIC BLOOD PRESSURE: 146 MMHG | BODY MASS INDEX: 33.29 KG/M2 | DIASTOLIC BLOOD PRESSURE: 96 MMHG

## 2018-06-18 DIAGNOSIS — F40.10 SOCIAL PHOBIA: ICD-10-CM

## 2018-06-18 DIAGNOSIS — F42.9 OBSESSIVE-COMPULSIVE DISORDER, UNSPECIFIED TYPE: ICD-10-CM

## 2018-06-18 DIAGNOSIS — G47.33 OSA (OBSTRUCTIVE SLEEP APNEA): ICD-10-CM

## 2018-06-18 DIAGNOSIS — F40.298 SPECIFIC PHOBIA: ICD-10-CM

## 2018-06-18 DIAGNOSIS — F33.2 SEVERE EPISODE OF RECURRENT MAJOR DEPRESSIVE DISORDER, WITHOUT PSYCHOTIC FEATURES: ICD-10-CM

## 2018-06-18 DIAGNOSIS — F41.1 GAD (GENERALIZED ANXIETY DISORDER): ICD-10-CM

## 2018-06-18 DIAGNOSIS — F41.1 GENERALIZED ANXIETY DISORDER: ICD-10-CM

## 2018-06-18 DIAGNOSIS — G25.81 RLS (RESTLESS LEGS SYNDROME): ICD-10-CM

## 2018-06-18 DIAGNOSIS — F32.1 MDD (MAJOR DEPRESSIVE DISORDER), SINGLE EPISODE, MODERATE: Primary | ICD-10-CM

## 2018-06-18 PROCEDURE — 3080F DIAST BP >= 90 MM HG: CPT | Mod: CPTII,S$GLB,, | Performed by: PSYCHIATRY & NEUROLOGY

## 2018-06-18 PROCEDURE — 3008F BODY MASS INDEX DOCD: CPT | Mod: CPTII,S$GLB,, | Performed by: PSYCHIATRY & NEUROLOGY

## 2018-06-18 PROCEDURE — 3077F SYST BP >= 140 MM HG: CPT | Mod: CPTII,S$GLB,, | Performed by: PSYCHIATRY & NEUROLOGY

## 2018-06-18 PROCEDURE — 99213 OFFICE O/P EST LOW 20 MIN: CPT | Mod: S$GLB,,, | Performed by: PSYCHIATRY & NEUROLOGY

## 2018-06-18 PROCEDURE — 90833 PSYTX W PT W E/M 30 MIN: CPT | Mod: S$GLB,,, | Performed by: PSYCHIATRY & NEUROLOGY

## 2018-06-18 PROCEDURE — 99999 PR PBB SHADOW E&M-EST. PATIENT-LVL III: CPT | Mod: PBBFAC,,, | Performed by: PSYCHIATRY & NEUROLOGY

## 2018-06-18 RX ORDER — VENLAFAXINE HYDROCHLORIDE 75 MG/1
225 CAPSULE, EXTENDED RELEASE ORAL DAILY
Qty: 90 CAPSULE | Refills: 6 | Status: SHIPPED | OUTPATIENT
Start: 2018-06-18 | End: 2018-11-05

## 2018-06-18 RX ORDER — CLONAZEPAM 0.5 MG/1
0.5 TABLET, ORALLY DISINTEGRATING ORAL 4 TIMES DAILY
Qty: 120 TABLET | Refills: 5 | Status: SHIPPED | OUTPATIENT
Start: 2018-06-18 | End: 2019-01-15

## 2018-06-18 RX ORDER — DIAZEPAM 10 MG/1
10 TABLET ORAL 2 TIMES DAILY PRN
Qty: 60 TABLET | Refills: 5 | Status: SHIPPED | OUTPATIENT
Start: 2018-06-18 | End: 2018-08-16 | Stop reason: ALTCHOICE

## 2018-06-18 RX ORDER — QUETIAPINE FUMARATE 25 MG/1
TABLET, FILM COATED ORAL
Qty: 60 TABLET | Refills: 3 | Status: SHIPPED | OUTPATIENT
Start: 2018-06-18 | End: 2018-08-16 | Stop reason: SINTOL

## 2018-06-18 RX ORDER — BUSPIRONE HYDROCHLORIDE 15 MG/1
22.5 TABLET ORAL 2 TIMES DAILY
Qty: 90 TABLET | Refills: 6 | Status: SHIPPED | OUTPATIENT
Start: 2018-06-18 | End: 2018-11-05 | Stop reason: SDUPTHER
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
## 2018-07-02 ENCOUNTER — PATIENT MESSAGE (OUTPATIENT)
Dept: PSYCHIATRY | Facility: CLINIC | Age: 48
End: 2018-07-02

## 2018-08-02 ENCOUNTER — TELEPHONE (OUTPATIENT)
Dept: SLEEP MEDICINE | Facility: CLINIC | Age: 48
End: 2018-08-02

## 2018-08-16 ENCOUNTER — OFFICE VISIT (OUTPATIENT)
Dept: PSYCHIATRY | Facility: CLINIC | Age: 48
End: 2018-08-16
Payer: COMMERCIAL

## 2018-08-16 VITALS
BODY MASS INDEX: 33.95 KG/M2 | SYSTOLIC BLOOD PRESSURE: 134 MMHG | DIASTOLIC BLOOD PRESSURE: 92 MMHG | WEIGHT: 184.5 LBS | HEART RATE: 74 BPM | HEIGHT: 62 IN

## 2018-08-16 DIAGNOSIS — F41.1 GENERALIZED ANXIETY DISORDER: ICD-10-CM

## 2018-08-16 DIAGNOSIS — F32.1 MDD (MAJOR DEPRESSIVE DISORDER), SINGLE EPISODE, MODERATE: Primary | ICD-10-CM

## 2018-08-16 DIAGNOSIS — G47.33 OSA (OBSTRUCTIVE SLEEP APNEA): ICD-10-CM

## 2018-08-16 DIAGNOSIS — F40.01 PANIC DISORDER WITH AGORAPHOBIA: ICD-10-CM

## 2018-08-16 DIAGNOSIS — G25.81 RLS (RESTLESS LEGS SYNDROME): ICD-10-CM

## 2018-08-16 DIAGNOSIS — F40.298 SPECIFIC PHOBIA: ICD-10-CM

## 2018-08-16 DIAGNOSIS — G35 MS (MULTIPLE SCLEROSIS): ICD-10-CM

## 2018-08-16 DIAGNOSIS — F42.9 OBSESSIVE-COMPULSIVE DISORDER, UNSPECIFIED TYPE: ICD-10-CM

## 2018-08-16 PROBLEM — E03.9 ACQUIRED HYPOTHYROIDISM: Status: ACTIVE | Noted: 2018-01-29

## 2018-08-16 PROCEDURE — 3080F DIAST BP >= 90 MM HG: CPT | Mod: CPTII,S$GLB,, | Performed by: PSYCHIATRY & NEUROLOGY

## 2018-08-16 PROCEDURE — 99999 PR PBB SHADOW E&M-EST. PATIENT-LVL III: CPT | Mod: PBBFAC,,, | Performed by: PSYCHIATRY & NEUROLOGY

## 2018-08-16 PROCEDURE — 3075F SYST BP GE 130 - 139MM HG: CPT | Mod: CPTII,S$GLB,, | Performed by: PSYCHIATRY & NEUROLOGY

## 2018-08-16 PROCEDURE — 3008F BODY MASS INDEX DOCD: CPT | Mod: CPTII,S$GLB,, | Performed by: PSYCHIATRY & NEUROLOGY

## 2018-08-16 PROCEDURE — 90833 PSYTX W PT W E/M 30 MIN: CPT | Mod: S$GLB,,, | Performed by: PSYCHIATRY & NEUROLOGY

## 2018-08-16 PROCEDURE — 99213 OFFICE O/P EST LOW 20 MIN: CPT | Mod: S$GLB,,, | Performed by: PSYCHIATRY & NEUROLOGY

## 2018-08-16 RX ORDER — LORAZEPAM 2 MG/1
TABLET ORAL
Qty: 90 TABLET | Refills: 3 | Status: SHIPPED | OUTPATIENT
Start: 2018-08-16 | End: 2018-11-05 | Stop reason: SINTOL
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
## 2018-08-17 DIAGNOSIS — G35 MULTIPLE SCLEROSIS: ICD-10-CM

## 2018-08-23 RX ORDER — INTERFERON BETA-1A 30MCG/.5ML
KIT INTRAMUSCULAR
Qty: 1 KIT | Refills: 0 | OUTPATIENT
Start: 2018-08-23

## 2018-08-24 ENCOUNTER — TELEPHONE (OUTPATIENT)
Dept: SLEEP MEDICINE | Facility: CLINIC | Age: 48
End: 2018-08-24

## 2018-08-27 ENCOUNTER — OFFICE VISIT (OUTPATIENT)
Dept: SLEEP MEDICINE | Facility: CLINIC | Age: 48
End: 2018-08-27
Payer: COMMERCIAL

## 2018-08-27 VITALS
WEIGHT: 184.75 LBS | HEART RATE: 87 BPM | HEIGHT: 62 IN | DIASTOLIC BLOOD PRESSURE: 80 MMHG | BODY MASS INDEX: 34 KG/M2 | SYSTOLIC BLOOD PRESSURE: 140 MMHG

## 2018-08-27 DIAGNOSIS — G47.33 OSA (OBSTRUCTIVE SLEEP APNEA): Primary | ICD-10-CM

## 2018-08-27 DIAGNOSIS — G25.81 RLS (RESTLESS LEGS SYNDROME): ICD-10-CM

## 2018-08-27 DIAGNOSIS — G35 MULTIPLE SCLEROSIS: ICD-10-CM

## 2018-08-27 DIAGNOSIS — F51.01 PRIMARY INSOMNIA: ICD-10-CM

## 2018-08-27 PROCEDURE — 3079F DIAST BP 80-89 MM HG: CPT | Mod: CPTII,S$GLB,, | Performed by: PSYCHIATRY & NEUROLOGY

## 2018-08-27 PROCEDURE — 3008F BODY MASS INDEX DOCD: CPT | Mod: CPTII,S$GLB,, | Performed by: PSYCHIATRY & NEUROLOGY

## 2018-08-27 PROCEDURE — 99214 OFFICE O/P EST MOD 30 MIN: CPT | Mod: S$GLB,,, | Performed by: PSYCHIATRY & NEUROLOGY

## 2018-08-27 PROCEDURE — 3077F SYST BP >= 140 MM HG: CPT | Mod: CPTII,S$GLB,, | Performed by: PSYCHIATRY & NEUROLOGY

## 2018-08-27 PROCEDURE — 99999 PR PBB SHADOW E&M-EST. PATIENT-LVL III: CPT | Mod: PBBFAC,,, | Performed by: PSYCHIATRY & NEUROLOGY

## 2018-08-27 RX ORDER — GABAPENTIN 600 MG/1
600 TABLET ORAL
COMMUNITY
End: 2018-11-05 | Stop reason: DRUGHIGH

## 2018-08-27 RX ORDER — LEVOTHYROXINE SODIUM 75 UG/1
TABLET ORAL
COMMUNITY
Start: 2018-01-26 | End: 2019-01-06 | Stop reason: SDUPTHER

## 2018-08-27 RX ORDER — BUSPIRONE HYDROCHLORIDE 15 MG/1
15 TABLET ORAL
COMMUNITY
Start: 2018-01-26 | End: 2018-11-05

## 2018-08-27 RX ORDER — CYPROHEPTADINE HYDROCHLORIDE 2 MG/5ML
SOLUTION ORAL
COMMUNITY
Start: 2018-01-11 | End: 2019-01-15

## 2018-08-27 RX ORDER — METFORMIN HYDROCHLORIDE 750 MG/1
TABLET, EXTENDED RELEASE ORAL
COMMUNITY
Start: 2018-07-10 | End: 2022-04-06

## 2018-08-27 RX ORDER — MELATONIN 5 MG
CAPSULE ORAL
COMMUNITY
End: 2019-01-06

## 2018-08-27 RX ORDER — LEVOTHYROXINE SODIUM 75 UG/1
88 TABLET ORAL
COMMUNITY
Start: 2018-08-06 | End: 2021-05-21

## 2018-08-27 RX ORDER — CLINDAMYCIN PHOSPHATE 10 MG/G
GEL TOPICAL
COMMUNITY
End: 2019-01-06 | Stop reason: SDUPTHER

## 2018-08-27 RX ORDER — VENLAFAXINE HYDROCHLORIDE 150 MG/1
CAPSULE, EXTENDED RELEASE ORAL
Refills: 3 | COMMUNITY
Start: 2018-05-26 | End: 2018-11-05

## 2018-08-27 RX ORDER — ESTRADIOL 0.04 MG/D
FILM, EXTENDED RELEASE TRANSDERMAL
COMMUNITY
Start: 2018-01-06 | End: 2019-01-06 | Stop reason: SDUPTHER

## 2018-08-27 RX ORDER — DIMETHYL FUMARATE 120-240 MG
KIT ORAL
COMMUNITY
Start: 2018-08-21 | End: 2020-03-20

## 2018-08-27 NOTE — PROGRESS NOTES
This 47 y.o. female patient returns for the management of obstructive sleep apnea and insomnia.    MS symptoms: initially eyesight; ongoing issues with joints/legs.  Did not tolerate recent re-trial of Avonex due to anxiety feelings.    She is going to try a new medication in the coming week.    EPWORTH SLEEPINESS SCALE 8/27/2018   Sitting and reading 2   Watching TV 3   Sitting, inactive in a public place (e.g. a theatre or a meeting) 2   As a passenger in a car for an hour without a break 3   Lying down to rest in the afternoon when circumstances permit 3   Sitting and talking to someone 1   Sitting quietly after a lunch without alcohol 2   In a car, while stopped for a few minutes in traffic 2   Total score 18     1. CHRIS   BASELINE PSG 7/18/16: +CHRIS, AHI 16.4, low sat 91%, wt 168 lbs  TITRATION 9/20/16: Titrated to effective pressure 7 cm, wt. 168 lbs    She is attempting CPAP nightly, but sometimes difficulty due to her other ailments. Last night she managed to sleep with it for 7+ hours.  Still Feeling exhausted. No snoring    She is using a Resmed nasal pillows mask.  CPAP interrogation Dream Station not auto capable, set at 8 cm: 2874 hours used; 3.4 hours/n ave; 14/30 days >4 hours       DME = [employer]    2. She continues to have difficulties with anxiety and insomnia. This is her main complaint today.  She feels that this is somewhat better since being off of her injectible MS meds.  Also Buspar was increased by psych.  Sleep is worse and more fragmented. Increased worry and stress.    Some dozing and napping around 2 pm in the afternoon.  Bedtime 10:30 pm - MN  Once in bed, sleep onset ranges from 30 mins - 1 hour  WASO 5-6 awakenings; 30-90 mins of wake time  Awakens around 4 am, then very light and fragmented sleep, dozing in and out of sleep until wake time of 5:45 -6 am.    Takes Buspar, clonazepam (4 times a day for GI related issue) - stopped by Dr. Montiel (start on ativan), Valium 10 mg (for high  "anxiety), Effexor - managed by Dr. Montiel  She is currently taking melatonin 5 mg; Takes Gabapentin  She has tried Rozeram, Trazodone 100 mg (it worked and then stopped working)    3. RLS  - Gabapentin 300 mg was initially effective in controlling RLS and helping her sleep better. It "worked" for 3 nights, but then lost its effectiveness. She has been using it intermittently, because she is concerned about habituation. She is also concerned about inability to lose weight. Increasing gabapentin 900 mg may be helping somewhat, though she complains of morning sedation. Her  calls her a "zombie". Recently only taking 300 mg.    RLS feelings: Often feels need to move her legs at night; Urge; Moving constantly; Using adjustable bed;  Mostly in the evenings and around bed time.    SLEEP ROUTINE:   Time to bed: 11:30 pm   Sleep onset latency: a while   Disruptions or awakenings: 3-4   Wakeup time: 6 am   Perceived sleep quality: poor   Daytime naps: every few days      Past Medical History:   Diagnosis Date    Endometriosis, site unspecified     H/O total hysterectomy     Hidradenitis     History of laparoscopy     History of psychiatric care     Multiple sclerosis     Panic anxiety syndrome     Therapy        Past Surgical History:   Procedure Laterality Date    APPENDECTOMY      breast reduction      HYSTERECTOMY      CT EXPLORATORY OF ABDOMEN      2002    sweat gland removal  10/2013    rt groin       Family History   Problem Relation Age of Onset    Anxiety disorder Mother     Cancer Mother         cervix    Cataracts Mother     Breast cancer Neg Hx     Colon cancer Neg Hx     Ovarian cancer Neg Hx     Amblyopia Neg Hx     Blindness Neg Hx     Glaucoma Neg Hx     Macular degeneration Neg Hx     Retinal detachment Neg Hx     Strabismus Neg Hx        Social History     Tobacco Use    Smoking status: Never Smoker    Smokeless tobacco: Never Used   Substance Use Topics    Alcohol use: No " "    Alcohol/week: 0.0 oz    Drug use: No       ALLERGIES: Reviewed in EPIC    CURRENT MEDICATIONS: Reviewed in EPIC    REVIEW OF SYSTEMS:  Sleep related symptoms as per HPI;    Denies weight gain, but difficulty losing;    Positive palpitations;    Positive mood disturbance;    Denies anemia;    Otherwise, a balance of systems reviewed is negative.    PHYSICAL EXAM:  BP (!) 140/80   Pulse 87   Ht 5' 2" (1.575 m)   Wt 83.8 kg (184 lb 11.9 oz)   BMI 33.79 kg/m²   GENERAL: Well groomed; Normally developed;  NEURO: Oriented to time, place and person.    Normal attention span and concentration.  Station normal. Gait normal.  PSYCH: Affect is full. Mood is normal.    I spent greater than 15 minutes during this 30 minute visit in counseling the patient regarding insomnia and sources of fatigue, anxiety and insomnia relationships.      ASSESSMENT:    1. Obstructive Sleep Apnea, moderate severity. CHRIS appears to be moderately well controlled on CPAP. She is using an effective setting, determined by titration sleep study. Recent adherence is down due to ongoing health issues. She is using an approved nasal pillows CPAP mask, which forms appropriate seal and provides PAP therapy when used. She demonstrated knowledge on proper use.    2. Sleep disturbances/Insomnia - underlying  appears to be anxiety/depression.  She feels like anxiety is getting worse, despite medical management. Cause for anxiety unclear - also multi-factorial, being managed by Dr. Montiel. She has implicated MS or Avonex as possible root cause, since she did not have any anxiety at all prior to her MS diagnosis and treatment. She does hope that changing MS related medications may result in improvement. She had been spending excess time in bed 9+ hours, and only sleeping a fraction of that amount, but she has managed to reduce time in bed to 8 hours. Unfortunately, she is doing a lot of unintentional dozing during the day. She has attempted to " implement CBT-insomnia, but having issues with dozing off prior to bedroom, and this leaves her feeling wide awake once she does enter the bedroom. Also, keeping the logs makes her anxious.    3. Restless legs feelings at night / akithesia - in some cases PLMS can be potentiated by Effexor; No evidence of PLMS on sleep study; RLS can occur just as a feeling (without acutal limb kicks) that can contribute to night time anxiety and insomnia. RLS appears to have responded to higher doses of gabapentin. She is so focused on the effects of her underlying anxiety, it is difficult to ascertain whether she is getting any benefit from this.    4. Fatigue - multi-factorial; certainly MS, medication, and depression can be playing a role; CHRIS is treated and an effective pressure has been determined.         PLAN:    CBT schedule:  1. List-making or journaling about 2 hours before bedtime to help decompress thoughts from your mind. She is doing this, which helps her keep tracks of things  2. Must clearly avoid any napping/dozing 2-3 hours before allowed bedtime. This may include discontinuing sedentary activities, like lounging on the sofa, in the time period before bedtime to help avoid accidental dozings. If dozing does occur, you may need to delay bedtime until you feel drowsy or sleepy again.  3. Bedtime to 11 pm- MN. Out of bed at 6-7 am    She is holding off on sleep logs now, because she feels that keeping track of this makes her too anxious    Other considerations:  1. Continue gabapentin at 600 mg at bedtime to help settle RLS/akithesia and anxiety feelings.  Reduction due to morning sedation  2. Meet with Dr. Montiel regarding anxiety control, which is a contributing factor to difficulty sleeping.    3. Continue CPAP, at pressure to 8 cm; trial with chin strap. The potential benefits were discussed.   She is using her nasal mask properly with good seal.    Precautions: The patient was advised to abstain from driving  should they feel sleepy or drowsy.    Follow up: 3 months. (After initiating new MS medication) MD/NP

## 2018-08-29 ENCOUNTER — PATIENT MESSAGE (OUTPATIENT)
Dept: PSYCHIATRY | Facility: CLINIC | Age: 48
End: 2018-08-29

## 2018-08-29 RX ORDER — DIAZEPAM 10 MG/1
TABLET ORAL
Qty: 30 TABLET | Refills: 0 | OUTPATIENT
Start: 2018-08-29

## 2018-08-30 RX ORDER — INTERFERON BETA-1A 30MCG/.5ML
KIT INTRAMUSCULAR
Qty: 1 KIT | Refills: 0 | OUTPATIENT
Start: 2018-08-30

## 2018-09-29 DIAGNOSIS — F41.1 GAD (GENERALIZED ANXIETY DISORDER): ICD-10-CM

## 2018-09-29 RX ORDER — CLONAZEPAM 0.5 MG/1
TABLET, ORALLY DISINTEGRATING ORAL
Qty: 120 TABLET | Refills: 0 | OUTPATIENT
Start: 2018-09-29

## 2018-10-01 ENCOUNTER — PATIENT MESSAGE (OUTPATIENT)
Dept: PSYCHIATRY | Facility: CLINIC | Age: 48
End: 2018-10-01

## 2018-10-02 ENCOUNTER — PATIENT MESSAGE (OUTPATIENT)
Dept: PSYCHIATRY | Facility: CLINIC | Age: 48
End: 2018-10-02

## 2018-11-01 ENCOUNTER — OFFICE VISIT (OUTPATIENT)
Dept: URGENT CARE | Facility: CLINIC | Age: 48
End: 2018-11-01
Payer: COMMERCIAL

## 2018-11-01 VITALS
DIASTOLIC BLOOD PRESSURE: 100 MMHG | HEIGHT: 62 IN | RESPIRATION RATE: 20 BRPM | SYSTOLIC BLOOD PRESSURE: 170 MMHG | TEMPERATURE: 98 F | BODY MASS INDEX: 32.2 KG/M2 | OXYGEN SATURATION: 98 % | WEIGHT: 175 LBS | HEART RATE: 64 BPM

## 2018-11-01 DIAGNOSIS — S50.362A INSECT BITE OF LEFT ELBOW, INITIAL ENCOUNTER: ICD-10-CM

## 2018-11-01 DIAGNOSIS — L03.114 CELLULITIS OF LEFT ELBOW: ICD-10-CM
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
## 2018-11-01 DIAGNOSIS — R52 BODY ACHES: Primary | ICD-10-CM

## 2018-11-01 LAB
CTP QC/QA: YES
FLUAV AG NPH QL: NEGATIVE
FLUBV AG NPH QL: NEGATIVE

## 2018-11-01 PROCEDURE — 3077F SYST BP >= 140 MM HG: CPT | Mod: CPTII,S$GLB,, | Performed by: FAMILY MEDICINE

## 2018-11-01 PROCEDURE — 87804 INFLUENZA ASSAY W/OPTIC: CPT | Mod: QW,S$GLB,, | Performed by: FAMILY MEDICINE

## 2018-11-01 PROCEDURE — 99214 OFFICE O/P EST MOD 30 MIN: CPT | Mod: S$GLB,,, | Performed by: FAMILY MEDICINE

## 2018-11-01 PROCEDURE — 3008F BODY MASS INDEX DOCD: CPT | Mod: CPTII,S$GLB,, | Performed by: FAMILY MEDICINE

## 2018-11-01 PROCEDURE — 3080F DIAST BP >= 90 MM HG: CPT | Mod: CPTII,S$GLB,, | Performed by: FAMILY MEDICINE

## 2018-11-01 RX ORDER — CLINDAMYCIN HYDROCHLORIDE 150 MG/1
150 CAPSULE ORAL EVERY 8 HOURS
Qty: 30 CAPSULE | Refills: 0 | Status: SHIPPED | OUTPATIENT
Start: 2018-11-01 | End: 2018-11-11

## 2018-11-01 NOTE — PATIENT INSTRUCTIONS
Discharge Instructions for Cellulitis  You have been diagnosed with cellulitis. This is an infection in the deepest layer of the skin. In some cases, the infection also affects the muscle. Cellulitis is caused by bacteria. The bacteria can enter the body through broken skin. This can happen with a cut, scratch, animal bite, or an insect bite that has been scratched. You may have been treated in the hospital with antibiotics and fluids. You will likely be given a prescription for antibiotics to take at home. This sheet will help you take care of yourself at home.  Home care  When you are home:  · Take the prescribed antibiotic medicine you are given as directed until it is gone. Take it even if you feel better. It treats the infection and stops it from returning. Not taking all the medicine can make future infections hard to treat.  · Keep the infected area clean.  · When possible, raise the infected area above the level of your heart. This helps keep swelling down.  · Talk with your healthcare provider if you are in pain. Ask what kind of over-the-counter medicine you can take for pain.  · Apply clean bandages as advised.  · Take your temperature once a day for a week.  · Wash your hands often to prevent spreading the infection.  In the future, wash your hands before and after you touch cuts, scratches, or bandages. This will help prevent infection.   When to call your healthcare provider  Call your healthcare provider immediately if you have any of the following:  · Difficulty or pain when moving the joints above or below the infected area  · Discharge or pus draining from the area  · Fever of 100.4°F (38°C) or higher, or as directed by your healthcare provider  · Pain that gets worse in or around the infected   · Redness that gets worse in or around the infected area, particularly if the area of redness expands to a wider area  · Shaking chills  · Swelling of the infected area  · Vomiting   Date Last Reviewed:  8/1/2016  © 3095-2443 [employer]. [address] [place] [address], [place] [address]. All rights reserved. This information is not intended as a substitute for professional medical care. Always follow your healthcare professional's instructions.

## 2018-11-01 NOTE — PROGRESS NOTES
"Subjective:       Patient ID: Jillian Osullivan is a 47 y.o. female.    Vitals:  height is 5' 2" (1.575 m) and weight is 79.4 kg (175 lb). Her temperature is 98.2 °F (36.8 °C). Her blood pressure is 170/100 (abnormal) and her pulse is 64. Her respiration is 20 and oxygen saturation is 98%.     Chief Complaint: Cough    46 y/o female with c/o feeling run down, some cough and congestion, also c/o left elbow with  A lesion, does not recall any injury or tauma, does not recall any bite        Cough   This is a new problem. The current episode started in the past 7 days. The problem has been unchanged. The problem occurs constantly. The cough is non-productive. Associated symptoms include ear pain and nasal congestion. Nothing aggravates the symptoms. She has tried nothing for the symptoms.     Review of Systems   Constitution: Positive for malaise/fatigue.   HENT: Positive for congestion and ear pain.    Respiratory: Positive for cough.        Objective:      Physical Exam   Constitutional: She is oriented to person, place, and time. She appears well-developed and well-nourished. She is cooperative.  Non-toxic appearance. She does not appear ill. No distress.   HENT:   Head: Normocephalic and atraumatic.   Right Ear: Hearing, tympanic membrane, external ear and ear canal normal.   Left Ear: Hearing, tympanic membrane, external ear and ear canal normal.   Nose: Nose normal. No mucosal edema, rhinorrhea or nasal deformity. No epistaxis. Right sinus exhibits no maxillary sinus tenderness and no frontal sinus tenderness. Left sinus exhibits no maxillary sinus tenderness and no frontal sinus tenderness.   Mouth/Throat: Uvula is midline, oropharynx is clear and moist and mucous membranes are normal. No trismus in the jaw. Normal dentition. No uvula swelling. No posterior oropharyngeal erythema.   Eyes: Conjunctivae and lids are normal. Right eye exhibits no discharge. Left eye exhibits no discharge. No scleral icterus. "   Sclera clear bilat   Neck: Trachea normal, normal range of motion, full passive range of motion without pain and phonation normal. Neck supple. No JVD present.   Cardiovascular: Normal rate, regular rhythm, normal heart sounds, intact distal pulses and normal pulses. Exam reveals no gallop and no friction rub.   No murmur heard.  Pulmonary/Chest: Effort normal and breath sounds normal. No stridor.   Abdominal: Soft. Normal appearance and bowel sounds are normal. She exhibits no distension, no pulsatile midline mass and no mass. There is no tenderness.   Musculoskeletal: Normal range of motion. She exhibits no edema or deformity.   Left elbow with slight erythema, small bliter with surrounding warmth and erythema, no drainage  No streaking, minimal tenderness   Lymphadenopathy:     She has no cervical adenopathy.   Neurological: She is alert and oriented to person, place, and time. She exhibits normal muscle tone. Coordination normal.   Skin: Skin is warm, dry and intact. She is not diaphoretic. No pallor.   Psychiatric: She has a normal mood and affect. Her speech is normal and behavior is normal. Judgment and thought content normal. Cognition and memory are normal.   Nursing note and vitals reviewed.      Assessment:       No diagnosis found.    Plan:         There are no diagnoses linked to this encounter.

## 2018-11-05 ENCOUNTER — OFFICE VISIT (OUTPATIENT)
Dept: PSYCHIATRY | Facility: CLINIC | Age: 48
End: 2018-11-05
Payer: COMMERCIAL

## 2018-11-05 VITALS
DIASTOLIC BLOOD PRESSURE: 86 MMHG | WEIGHT: 177.81 LBS | BODY MASS INDEX: 32.72 KG/M2 | HEIGHT: 62 IN | SYSTOLIC BLOOD PRESSURE: 145 MMHG | HEART RATE: 72 BPM

## 2018-11-05 DIAGNOSIS — F42.9 OBSESSIVE-COMPULSIVE DISORDER, UNSPECIFIED TYPE: ICD-10-CM

## 2018-11-05 DIAGNOSIS — F41.1 GENERALIZED ANXIETY DISORDER: ICD-10-CM

## 2018-11-05 DIAGNOSIS — F41.1 GAD (GENERALIZED ANXIETY DISORDER): ICD-10-CM

## 2018-11-05 DIAGNOSIS — F32.1 MDD (MAJOR DEPRESSIVE DISORDER), SINGLE EPISODE, MODERATE: Primary | ICD-10-CM

## 2018-11-05 DIAGNOSIS — G47.33 OSA (OBSTRUCTIVE SLEEP APNEA): ICD-10-CM

## 2018-11-05 DIAGNOSIS — F33.2 SEVERE EPISODE OF RECURRENT MAJOR DEPRESSIVE DISORDER, WITHOUT PSYCHOTIC FEATURES: ICD-10-CM

## 2018-11-05 DIAGNOSIS — K58.8 OTHER IRRITABLE BOWEL SYNDROME: ICD-10-CM

## 2018-11-05 DIAGNOSIS — F40.01 PANIC DISORDER WITH AGORAPHOBIA: ICD-10-CM

## 2018-11-05 DIAGNOSIS — G25.81 RLS (RESTLESS LEGS SYNDROME): ICD-10-CM

## 2018-11-05 PROCEDURE — 90833 PSYTX W PT W E/M 30 MIN: CPT | Mod: S$GLB,,, | Performed by: PSYCHIATRY & NEUROLOGY

## 2018-11-05 PROCEDURE — 99999 PR PBB SHADOW E&M-EST. PATIENT-LVL III: CPT | Mod: PBBFAC,,, | Performed by: PSYCHIATRY & NEUROLOGY

## 2018-11-05 PROCEDURE — 99213 OFFICE O/P EST LOW 20 MIN: CPT | Mod: S$GLB,,, | Performed by: PSYCHIATRY & NEUROLOGY

## 2018-11-05 PROCEDURE — 3079F DIAST BP 80-89 MM HG: CPT | Mod: CPTII,S$GLB,, | Performed by: PSYCHIATRY & NEUROLOGY

## 2018-11-05 PROCEDURE — 3008F BODY MASS INDEX DOCD: CPT | Mod: CPTII,S$GLB,, | Performed by: PSYCHIATRY & NEUROLOGY

## 2018-11-05 PROCEDURE — 3077F SYST BP >= 140 MM HG: CPT | Mod: CPTII,S$GLB,, | Performed by: PSYCHIATRY & NEUROLOGY

## 2018-11-05 RX ORDER — BUSPIRONE HYDROCHLORIDE 15 MG/1
22.5 TABLET ORAL 2 TIMES DAILY
Qty: 90 TABLET | Refills: 6 | Status: SHIPPED | OUTPATIENT
Start: 2018-11-05 | End: 2019-04-12 | Stop reason: SDUPTHER

## 2018-11-05 RX ORDER — VENLAFAXINE HYDROCHLORIDE 75 MG/1
225 CAPSULE, EXTENDED RELEASE ORAL DAILY
Qty: 90 CAPSULE | Refills: 6 | Status: SHIPPED | OUTPATIENT
Start: 2018-11-05 | End: 2019-04-12 | Stop reason: SDUPTHER

## 2018-11-05 RX ORDER — DIAZEPAM 10 MG/1
10 TABLET ORAL 3 TIMES DAILY PRN
Qty: 90 TABLET | Refills: 5 | Status: SHIPPED | OUTPATIENT
Start: 2018-11-05 | End: 2019-06-25

## 2018-11-05 RX ORDER — GABAPENTIN 300 MG/1
CAPSULE ORAL
Qty: 90 CAPSULE | Refills: 6 | Status: SHIPPED | OUTPATIENT
Start: 2018-11-05 | End: 2019-06-25

## 2018-11-05 RX ORDER — CLONAZEPAM 0.5 MG/1
0.5 TABLET ORAL EVERY 6 HOURS
Qty: 120 TABLET | Refills: 5 | Status: SHIPPED | OUTPATIENT
Start: 2018-11-05 | End: 2019-04-12 | Stop reason: SDUPTHER
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
## 2018-12-28 ENCOUNTER — OFFICE VISIT (OUTPATIENT)
Dept: PSYCHIATRY | Facility: CLINIC | Age: 48
End: 2018-12-28
Payer: COMMERCIAL

## 2018-12-28 VITALS
BODY MASS INDEX: 32.35 KG/M2 | SYSTOLIC BLOOD PRESSURE: 129 MMHG | WEIGHT: 175.81 LBS | HEART RATE: 74 BPM | HEIGHT: 62 IN | DIASTOLIC BLOOD PRESSURE: 83 MMHG

## 2018-12-28 DIAGNOSIS — G25.81 RLS (RESTLESS LEGS SYNDROME): ICD-10-CM

## 2018-12-28 DIAGNOSIS — F41.1 GENERALIZED ANXIETY DISORDER: ICD-10-CM

## 2018-12-28 DIAGNOSIS — F40.298 SPECIFIC PHOBIA: ICD-10-CM

## 2018-12-28 DIAGNOSIS — F32.1 MDD (MAJOR DEPRESSIVE DISORDER), SINGLE EPISODE, MODERATE: Primary | ICD-10-CM

## 2018-12-28 DIAGNOSIS — G47.33 OSA (OBSTRUCTIVE SLEEP APNEA): ICD-10-CM

## 2018-12-28 DIAGNOSIS — F40.01 PANIC DISORDER WITH AGORAPHOBIA: ICD-10-CM

## 2018-12-28 DIAGNOSIS — F42.9 OBSESSIVE-COMPULSIVE DISORDER, UNSPECIFIED TYPE: ICD-10-CM

## 2018-12-28 PROCEDURE — 99999 PR PBB SHADOW E&M-EST. PATIENT-LVL III: CPT | Mod: PBBFAC,,, | Performed by: PSYCHIATRY & NEUROLOGY

## 2018-12-28 PROCEDURE — 3074F SYST BP LT 130 MM HG: CPT | Mod: CPTII,S$GLB,, | Performed by: PSYCHIATRY & NEUROLOGY

## 2018-12-28 PROCEDURE — 3079F DIAST BP 80-89 MM HG: CPT | Mod: CPTII,S$GLB,, | Performed by: PSYCHIATRY & NEUROLOGY

## 2018-12-28 PROCEDURE — 3008F BODY MASS INDEX DOCD: CPT | Mod: CPTII,S$GLB,, | Performed by: PSYCHIATRY & NEUROLOGY

## 2018-12-28 PROCEDURE — 3008F PR BODY MASS INDEX (BMI) DOCUMENTED: ICD-10-PCS | Mod: CPTII,S$GLB,, | Performed by: PSYCHIATRY & NEUROLOGY

## 2018-12-28 PROCEDURE — 90833 PSYTX W PT W E/M 30 MIN: CPT | Mod: S$GLB,,, | Performed by: PSYCHIATRY & NEUROLOGY

## 2018-12-28 PROCEDURE — 3074F PR MOST RECENT SYSTOLIC BLOOD PRESSURE < 130 MM HG: ICD-10-PCS | Mod: CPTII,S$GLB,, | Performed by: PSYCHIATRY & NEUROLOGY

## 2018-12-28 PROCEDURE — 99999 PR PBB SHADOW E&M-EST. PATIENT-LVL III: ICD-10-PCS | Mod: PBBFAC,,, | Performed by: PSYCHIATRY & NEUROLOGY

## 2018-12-28 PROCEDURE — 99213 PR OFFICE/OUTPT VISIT, EST, LEVL III, 20-29 MIN: ICD-10-PCS | Mod: S$GLB,,, | Performed by: PSYCHIATRY & NEUROLOGY

## 2018-12-28 PROCEDURE — 90833 PR PSYCHOTHERAPY W/PATIENT W/E&M, 30 MIN (ADD ON): ICD-10-PCS | Mod: S$GLB,,, | Performed by: PSYCHIATRY & NEUROLOGY

## 2018-12-28 PROCEDURE — 3079F PR MOST RECENT DIASTOLIC BLOOD PRESSURE 80-89 MM HG: ICD-10-PCS | Mod: CPTII,S$GLB,, | Performed by: PSYCHIATRY & NEUROLOGY

## 2018-12-28 PROCEDURE — 99213 OFFICE O/P EST LOW 20 MIN: CPT | Mod: S$GLB,,, | Performed by: PSYCHIATRY & NEUROLOGY
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
## 2018-12-28 NOTE — PATIENT INSTRUCTIONS
Continue all current medications as you have been taking.    Recommended therapists: Dr. Iliana Mauro, Dr. Becca Epstein, Dr. Naila Venegas,  Dr. Geraldo Michaels, or [facility].    Return to clinic on Thursday 02/07/2019 for follow up appt.

## 2019-01-15 ENCOUNTER — OFFICE VISIT (OUTPATIENT)
Dept: SLEEP MEDICINE | Facility: CLINIC | Age: 49
End: 2019-01-15
Payer: COMMERCIAL

## 2019-01-15 VITALS
SYSTOLIC BLOOD PRESSURE: 130 MMHG | DIASTOLIC BLOOD PRESSURE: 84 MMHG | HEIGHT: 62 IN | BODY MASS INDEX: 32.54 KG/M2 | WEIGHT: 176.81 LBS | HEART RATE: 74 BPM

## 2019-01-15 DIAGNOSIS — G47.33 OBSTRUCTIVE SLEEP APNEA: Primary | ICD-10-CM

## 2019-01-15 PROCEDURE — 3075F PR MOST RECENT SYSTOLIC BLOOD PRESS GE 130-139MM HG: ICD-10-PCS | Mod: CPTII,S$GLB,, | Performed by: NURSE PRACTITIONER

## 2019-01-15 PROCEDURE — 99213 PR OFFICE/OUTPT VISIT, EST, LEVL III, 20-29 MIN: ICD-10-PCS | Mod: S$GLB,,, | Performed by: NURSE PRACTITIONER

## 2019-01-15 PROCEDURE — 3079F DIAST BP 80-89 MM HG: CPT | Mod: CPTII,S$GLB,, | Performed by: NURSE PRACTITIONER

## 2019-01-15 PROCEDURE — 99999 PR PBB SHADOW E&M-EST. PATIENT-LVL V: CPT | Mod: PBBFAC,,, | Performed by: NURSE PRACTITIONER

## 2019-01-15 PROCEDURE — 3008F PR BODY MASS INDEX (BMI) DOCUMENTED: ICD-10-PCS | Mod: CPTII,S$GLB,, | Performed by: NURSE PRACTITIONER

## 2019-01-15 PROCEDURE — 99999 PR PBB SHADOW E&M-EST. PATIENT-LVL V: ICD-10-PCS | Mod: PBBFAC,,, | Performed by: NURSE PRACTITIONER

## 2019-01-15 PROCEDURE — 3075F SYST BP GE 130 - 139MM HG: CPT | Mod: CPTII,S$GLB,, | Performed by: NURSE PRACTITIONER

## 2019-01-15 PROCEDURE — 3079F PR MOST RECENT DIASTOLIC BLOOD PRESSURE 80-89 MM HG: ICD-10-PCS | Mod: CPTII,S$GLB,, | Performed by: NURSE PRACTITIONER

## 2019-01-15 PROCEDURE — 3008F BODY MASS INDEX DOCD: CPT | Mod: CPTII,S$GLB,, | Performed by: NURSE PRACTITIONER

## 2019-01-15 PROCEDURE — 99213 OFFICE O/P EST LOW 20 MIN: CPT | Mod: S$GLB,,, | Performed by: NURSE PRACTITIONER

## 2019-01-15 NOTE — PROGRESS NOTES
"This 48 y.o. female patient returns for the management of obstructive sleep apnea and insomnia. Last seen by MD 8/2018, this is my initial visit with her.    She was never called by dept to reschedule her 3-mos f/u appt. Continues to apply CPAP mask nightly but typically removes mask during sleep. No chin strap. + anxiety, sees Dr. Montiel. avg use 3h/n .using golifeforwomen mask, often has strap grace. Sees MD at [facility] for MS. Continued fatigue, not sleeping well. Gabapentin 300mg 2-3 qhs helps RLS symptoms/feelings.       ESS= 13      HISTORY  1. CHRIS   She is attempting CPAP nightly, but sometimes difficulty due to her other ailments. Last night she managed to sleep with it for 7+ hours.  Still Feeling exhausted. No snoring    She is using a Resmed nasal pillows mask.  CPAP interrogation Dream Station not auto capable, set at 8 cm: 2874 hours used; 3.4 hours/n ave; 14/30 days >4 hours       DME = [employer]    2. She continues to have difficulties with anxiety and insomnia. This is her main complaint today.  She feels that this is somewhat better since being off of her injectible MS meds.  Also Buspar was increased by psych.  Sleep is worse and more fragmented. Increased worry and stress.    Some dozing and napping around 2 pm in the afternoon.  Bedtime 10:30 pm - MN  Once in bed, sleep onset ranges from 30 mins - 1 hour  WASO 5-6 awakenings; 30-90 mins of wake time  Awakens around 4 am, then very light and fragmented sleep, dozing in and out of sleep until wake time of 5:45 -6 am.    Takes Buspar, clonazepam (4 times a day for GI related issue) - stopped by Dr. Montiel (start on ativan), Valium 10 mg (for high anxiety), Effexor - managed by Dr. Montiel  She is currently taking melatonin 5 mg; Takes Gabapentin  She has tried Rozeram, Trazodone 100 mg (it worked and then stopped working)    3. RLS  - Gabapentin 300 mg was initially effective in controlling RLS and helping her sleep better. It "worked" for 3 nights, but " "then lost its effectiveness. She has been using it intermittently, because she is concerned about habituation. She is also concerned about inability to lose weight. Increasing gabapentin 900 mg may be helping somewhat, though she complains of morning sedation. Her  calls her a "zombie". Recently only taking 300 mg.    RLS feelings: Often feels need to move her legs at night; Urge; Moving constantly; Using adjustable bed;  Mostly in the evenings and around bed time.    SLEEP ROUTINE:   Time to bed: 11:30 pm   Sleep onset latency: a while   Disruptions or awakenings: 3-4   Wakeup time: 6 am   Perceived sleep quality: poor   Daytime naps: every few days        REVIEW OF SYSTEMS:  Sleep related symptoms as per HPI; 8# gain. Otherwise a balance review of 10-systems is negative.       PHYSICAL EXAM:  /84   Pulse 74   Ht 5' 2" (1.575 m)   Wt 80.2 kg (176 lb 12.9 oz)   BMI 32.34 kg/m²   GENERAL: Well groomed; Normally developed; obese      ASSESSMENT:    1. Obstructive Sleep Apnea, moderate severity. CHRIS appears to be moderately well controlled on CPAP. She is using an effective setting, determined by titration sleep study 2016 with similar wft. Recent adherence is down due to ongoing health issues. She is using an approved nasal pillows CPAP mask, which forms appropriate seal and provides PAP therapy when used. Mask removal and mask interface affecting use    2. Sleep disturbances/Insomnia - underlying  appears to be anxiety/depression.  She feels like anxiety is getting worse, despite medical management. Cause for anxiety unclear - also multi-factorial, being managed by Dr. Montiel. She has implicated MS or Avonex as possible root cause, since she did not have any anxiety at all prior to her MS diagnosis and treatment.     3. Restless legs feelings at night / akithesia - in some cases PLMS can be potentiated by Effexor; No evidence of PLMS on sleep study; RLS can occur just as a feeling (without " acutal limb kicks) that can contribute to night time anxiety and insomnia. RLS appears to have responded to higher doses of gabapentin. She is so focused on the effects of her underlying anxiety, it is difficult to ascertain whether she is getting any benefit from this.1/15/19: Improved with gabapentin    4. Fatigue - multi-factorial; certainly MS, medication, and depression can be playing a role; as well as suboptimal mask on time    PLAN:  IMPROVE mask on time, continue CPAP 8cm. change from [facility] to [facility] for supplies/consider more comfortable nasal mask and larger strap pads prn. rtc 2-mos adherence    Continue gabapentin at 600 mg qhs (Tiana)

## 2019-01-29 ENCOUNTER — TELEPHONE (OUTPATIENT)
Dept: OPTOMETRY | Facility: CLINIC | Age: 49
End: 2019-01-29

## 2019-02-04 ENCOUNTER — TELEPHONE (OUTPATIENT)
Dept: OPTOMETRY | Facility: CLINIC | Age: 49
End: 2019-02-04

## 2019-02-04 NOTE — TELEPHONE ENCOUNTER
----- Message from Cassie Christine sent at 2/4/2019  9:44 AM CST -----  Contact: Jillian LaughlinIsha Osullivan have been waiting for a call from Dr. Ardon staff and no one has contacted her. She called on 1/28/2019 She can be reached [phone].

## 2019-02-07 ENCOUNTER — OFFICE VISIT (OUTPATIENT)
Dept: PSYCHIATRY | Facility: CLINIC | Age: 49
End: 2019-02-07
Payer: COMMERCIAL

## 2019-02-07 VITALS
BODY MASS INDEX: 32.09 KG/M2 | SYSTOLIC BLOOD PRESSURE: 119 MMHG | HEIGHT: 62 IN | WEIGHT: 174.38 LBS | DIASTOLIC BLOOD PRESSURE: 78 MMHG | HEART RATE: 85 BPM

## 2019-02-07 DIAGNOSIS — F32.1 MDD (MAJOR DEPRESSIVE DISORDER), SINGLE EPISODE, MODERATE: Primary | ICD-10-CM

## 2019-02-07 DIAGNOSIS — F42.9 OBSESSIVE-COMPULSIVE DISORDER, UNSPECIFIED TYPE: ICD-10-CM

## 2019-02-07 DIAGNOSIS — F41.1 GENERALIZED ANXIETY DISORDER: ICD-10-CM

## 2019-02-07 DIAGNOSIS — F40.298 SPECIFIC PHOBIA: ICD-10-CM

## 2019-02-07 DIAGNOSIS — F40.01 PANIC DISORDER WITH AGORAPHOBIA: ICD-10-CM

## 2019-02-07 DIAGNOSIS — G25.81 RLS (RESTLESS LEGS SYNDROME): ICD-10-CM

## 2019-02-07 PROCEDURE — 3074F SYST BP LT 130 MM HG: CPT | Mod: CPTII,S$GLB,, | Performed by: PSYCHIATRY & NEUROLOGY

## 2019-02-07 PROCEDURE — 99999 PR PBB SHADOW E&M-EST. PATIENT-LVL II: ICD-10-PCS | Mod: PBBFAC,,, | Performed by: PSYCHIATRY & NEUROLOGY

## 2019-02-07 PROCEDURE — 3078F PR MOST RECENT DIASTOLIC BLOOD PRESSURE < 80 MM HG: ICD-10-PCS | Mod: CPTII,S$GLB,, | Performed by: PSYCHIATRY & NEUROLOGY

## 2019-02-07 PROCEDURE — 3074F PR MOST RECENT SYSTOLIC BLOOD PRESSURE < 130 MM HG: ICD-10-PCS | Mod: CPTII,S$GLB,, | Performed by: PSYCHIATRY & NEUROLOGY

## 2019-02-07 PROCEDURE — 3078F DIAST BP <80 MM HG: CPT | Mod: CPTII,S$GLB,, | Performed by: PSYCHIATRY & NEUROLOGY

## 2019-02-07 PROCEDURE — 99213 PR OFFICE/OUTPT VISIT, EST, LEVL III, 20-29 MIN: ICD-10-PCS | Mod: S$GLB,,, | Performed by: PSYCHIATRY & NEUROLOGY

## 2019-02-07 PROCEDURE — 99999 PR PBB SHADOW E&M-EST. PATIENT-LVL II: CPT | Mod: PBBFAC,,, | Performed by: PSYCHIATRY & NEUROLOGY

## 2019-02-07 PROCEDURE — 90836 PSYTX W PT W E/M 45 MIN: CPT | Mod: S$GLB,,, | Performed by: PSYCHIATRY & NEUROLOGY

## 2019-02-07 PROCEDURE — 3008F PR BODY MASS INDEX (BMI) DOCUMENTED: ICD-10-PCS | Mod: CPTII,S$GLB,, | Performed by: PSYCHIATRY & NEUROLOGY

## 2019-02-07 PROCEDURE — 90836 PR PSYCHOTHERAPY W/PATIENT W/E&M, 45 MIN (ADD ON): ICD-10-PCS | Mod: S$GLB,,, | Performed by: PSYCHIATRY & NEUROLOGY

## 2019-02-07 PROCEDURE — 99213 OFFICE O/P EST LOW 20 MIN: CPT | Mod: S$GLB,,, | Performed by: PSYCHIATRY & NEUROLOGY

## 2019-02-07 PROCEDURE — 3008F BODY MASS INDEX DOCD: CPT | Mod: CPTII,S$GLB,, | Performed by: PSYCHIATRY & NEUROLOGY
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
## 2019-02-21 ENCOUNTER — OFFICE VISIT (OUTPATIENT)
Dept: OPTOMETRY | Facility: CLINIC | Age: 49
End: 2019-02-21
Payer: COMMERCIAL

## 2019-02-21 DIAGNOSIS — H52.13 MYOPIA OF BOTH EYES WITH ASTIGMATISM AND PRESBYOPIA: ICD-10-CM

## 2019-02-21 DIAGNOSIS — H52.203 MYOPIA OF BOTH EYES WITH ASTIGMATISM AND PRESBYOPIA: ICD-10-CM

## 2019-02-21 DIAGNOSIS — R26.9 ABNORMALITY OF GAIT: ICD-10-CM

## 2019-02-21 DIAGNOSIS — G35 MULTIPLE SCLEROSIS: Primary | ICD-10-CM

## 2019-02-21 DIAGNOSIS — F32.89 ATYPICAL DEPRESSIVE DISORDER: ICD-10-CM

## 2019-02-21 DIAGNOSIS — G35 MULTIPLE SCLEROSIS: ICD-10-CM

## 2019-02-21 DIAGNOSIS — H52.4 MYOPIA OF BOTH EYES WITH ASTIGMATISM AND PRESBYOPIA: ICD-10-CM

## 2019-02-21 DIAGNOSIS — H47.291 PARTIAL OPTIC ATROPHY OF RIGHT EYE: Primary | ICD-10-CM

## 2019-02-21 PROCEDURE — 99999 PR PBB SHADOW E&M-EST. PATIENT-LVL I: CPT | Mod: PBBFAC,,, | Performed by: OPTOMETRIST

## 2019-02-21 PROCEDURE — 92015 PR REFRACTION: ICD-10-PCS | Mod: S$GLB,,, | Performed by: OPTOMETRIST

## 2019-02-21 PROCEDURE — 92012 PR EYE EXAM, EST PATIENT,INTERMED: ICD-10-PCS | Mod: S$GLB,,, | Performed by: OPTOMETRIST

## 2019-02-21 PROCEDURE — 99999 PR PBB SHADOW E&M-EST. PATIENT-LVL I: ICD-10-PCS | Mod: PBBFAC,,, | Performed by: OPTOMETRIST

## 2019-02-21 PROCEDURE — 92012 INTRM OPH EXAM EST PATIENT: CPT | Mod: S$GLB,,, | Performed by: OPTOMETRIST

## 2019-02-21 PROCEDURE — 92015 DETERMINE REFRACTIVE STATE: CPT | Mod: S$GLB,,, | Performed by: OPTOMETRIST

## 2019-02-21 NOTE — PROGRESS NOTES
HPI     MS w Optic neuritis Hx OD.  Pt had full exam in [place] last   month--presents today wishing new spex Rx    Last edited by Serafin Ardon, OD on 2/21/2019  3:43 PM. (History)        ROS     Positive for: Eyes (MS w ON Hx OD)    Negative for: Constitutional, Gastrointestinal, Neurological, Skin,   Genitourinary, Musculoskeletal, HENT, Endocrine, Cardiovascular,   Respiratory, Psychiatric, Allergic/Imm, Heme/Lymph    Last edited by Serafin Ardon, OD on 2/21/2019  3:43 PM. (History)        Assessment /Plan     For exam results, see Encounter Report.    Partial optic atrophy of right eye    Multiple sclerosis    Myopia of both eyes with astigmatism and presbyopia      1. Hx reduced VA OD 2 to optic neuritis from MS--see our old notes, and notes from full exam in [place] 2 weeks ago which are in Epic.  Pt presents today wishing new spex Rx.  Discussed w pt due to ON Hx OD VA will NOT be as good as OS, even with new spex.  But new spex will improve VA OD.  Discussed because new spex will be stronger that it will take some time to adjust to them.  Advised staying in [place]    PLAN:    1. Wrote new spex Rx  2. rtc as sched w [facility]

## 2019-02-28 NOTE — PROGRESS NOTES
"[facility]  Physical Therapy Initial Evaluation    Name: Jillian Osullivan  Clinic Number: [id number]    Therapy Diagnosis:   Encounter Diagnoses   Name Primary?    Bilateral leg weakness     Impairment of balance     Decreased mobility and endurance      Physician: Dedrick, Provider    Physician Orders: PT Eval and Treat   Medical Diagnosis from Referral: multiple sclerosis, abnormality of gait, atypical depressive disorder  Evaluation Date: 3/1/2019  Authorization Period Expiration: 3/1/2019 to 12/31/2019  Plan of Care Expiration: 4/26/2019  Visit # / Visits authorized: 1/ 20  Time In: 1430  Time Out: 1515  Total Billable Time: 45 minutes    Precautions: Standard, Fall and impaired vision, emotional deficits      Subjective     History of Present Illness: Jillian is a 48 y.o. female that presents to [facility] secondary to longstanding history of relapsing-remitting form of multiple sclerosis( pt diagnosed in 2007). Pt most recently saw MD( Timmy Marquez) in [place] who referred her to PT for various reasons.  Pt has had multiple falls in the past year, has complained of legs " giving out" and is also experiencing problems with balance and her gait.  Additionally, pt has reported some HAs( though pt denied any today) and poor vision in R eye.  Pt has seen opthalmology and neuro-ophthalmology for R optic neuritis. Also, pt has been experiencing some problems with her memory.  Pt is currently receiving Ocrevus infusion for her MS symptoms.    Chief complaints:  1. Weakness to B LEs  2. Imbalance  3.     Fatigue and decreased endurance  4. Dizziness  5. Sleepiness the past 3 days    Falls in the past year: > 5 falls  Prior Therapy: only had therapy following hysterectomy  Nutrition:  Normal  Social History/Support systems: lives with  in [place]  Place of Residence (Steps/Adaptations/Levels): 1 story home, 0 steps  Previous functional status " "includes: pt started using SC last week due to imbalance  Current functional status:  Pt now uses cane more routinely( though she did not bring with her today)  Exercise routine prior to onset : pt goes to [facility] for yoga, left a recent class due to dizziness  DME owned: [employer]  Work/Job description includes:  Pt on disability; formerly worked in property management    Pt stated goals: " To feel better."  Family present/states: family not present    Pain:   Current 0/10, worst 10/10, best 0/10   Location:  Coccyx  Description: Aching and Throbbing  Aggravating Factors: prolonged pressure during sleep  Easing Factors: mobility    Past Medical History and Allergies  Jillian Osullivan  has a past medical history of Endometriosis, site unspecified, H/O total hysterectomy, Hidradenitis, History of laparoscopy, History of psychiatric care, Multiple sclerosis, Panic anxiety syndrome, and Therapy.    Jillian Osullivan  has a past surgical history that includes breast reduction; sweat gland removal (10/2013); pr exploratory of abdomen; Hysterectomy; and Appendectomy.    Jillian has a current medication list which includes the following prescription(s): buspirone, calcium/magnesium/zinc, vee seed/ala/linoleic/oleic, cholecalciferol (vitamin d3), clindamycin phosphate 1%, clonazepam, coenzyme q10, diazepam, estradiol, flaxseed oil, gabapentin, INV sodium chloride 0.9 % SolP with INV ocrelizumab 30 mg/mL Inj, l. rhamnosus gg/inulin, lactobac no.41/bifidobact no.7, levothyroxine, magnesium oxide-mg aa chelate, melatonin, metformin, minerals, omega 3-dha-epa-fish oil, onetouch delica lancets, onetouch ultra test, onetouch ultra2, psyllium, sucralfate, tecfidera, triamcinolone acetonide 0.1%, turmeric root extract, UNABLE TO FIND, UNABLE TO FIND, venlafaxine, and whey protein isolate.    Review of patient's allergies indicates:   Allergen Reactions    Glatiramer (copolymer 1) Dermatitis    Vicodin [hydrocodone-acetaminophen] " Rash    Lorazepam Other (See Comments)     Increased anxiety, agitation    Aspirin      Other reaction(s): Rash    Aspirin     Penicillins      Other reaction(s): Rash  Other reaction(s): Rash    Sulfa (sulfonamide antibiotics)     Sulfamethoxazole-trimethoprim      Other reaction(s): Rash    Teriflunomide     Vancomycin Nausea And Vomiting    Oxycodone-acetaminophen Rash        Imaging, MRI studies: (3/21/2018):  Cervical and thoracic spinal cord appear within normal limits.  No demyelinating lesions identified.  No abnormal enhancement.    (1/10/2018): MRI Brain:Allowing for differences in technique no new lesion with continued scattered T2 flair hyperintensities and supratentorial parenchyma remain concerning for mild degree of prior demyelinating plaque. No evidence for new lesion or enhancing lesion to suggest interval or active demyelination.  No significant change from prior( 3/8/2016).    Objective   - Follows commands: 100% of time   - Speech: no deficits      Mental status: alert, oriented to person, place, and time  Appearance: Casually dressed and Well groomed  Behavior:  calm, cooperative and adequate rapport can be established  Attention Span and Concentration:  Decreased; pt responses to some questions are delayed    Dominant hand:  right     Posture Alignment in sitting:   Head: WFL   Scapulae:  WFL  Trunk: rounded shoulders  Pelvis: WFL   Legs: mildly abducted    Posture Alignment in standing:   Head: WFL   Scapulae: WFL   Trunk: rounded shoulders  Pelvis: WFL   Legs: ER     Sensation: Light Touch: Intact occasional tingling from mid leg to foot         Tone: 1-  Slight increase in muscle tone, manifested by a catch and release or by minimal resistance at the end of the range of motino when the affected part(s) is moved in flexion or extension  Limbs/muscles affected: B LEs    Visual/Auditory: R eye worsening ~ pt has glasses but not wearing to clinic( has new glasses on order with updated  prescription)  Tracking:Impaired: difficulty following target and decreased speed  Saccades: Impaired: very delayed movement  Acuity: NT  R/L discrimination: Intact  Visual field: Intact    Coordination:   - fine motor: NT  - UE coordination: Impaired  LANEY  - LE coordination:  Impaired  LANEY    ROM:   UPPER EXTREMITY--AROM/PROM  (R) UE: limited as follows: 140 degrees shoulder flex in sitting  (L) UE: limited as follows: 140 degrees shoulder flex in sitting           RANGE OF MOTION--LOWER EXTREMITIES  (R) LE Hip: limited hip IR   Knee: WFL   Ankle: WFL    (L) LE: Hip: limited hip IR   Knee: WFL   Ankle: WFL    Strength: manual muscle test grades below   Upper Extremity Strength  (R) UE  (L) UE    Shoulder Flexion: 4/5 Shoulder Flexion: 4/5   Shoulder Abduction: 4/5 Shoulder Abduction: 4/5   Shoulder Extension: 4-/5 Shoulder Extension:   4-/5   Elbow Flexion: 4+/5 Elbow Flexion: 4+/5   Elbow Extension: 3+/5 Elbow Extension: 3+/5   Wrist Flexion: 4/5 Wrist Flexion: 4+/5   Wrist Extension: 4+/5 Wrist Extension: 4+/5   : 3/5 : 4/5     Lower Extremity Strength  Right LE  Left LE    Hip Flexion: 3-/5 Hip Flexion: 3/5   Hip Extension:  4/5 Hip Extension: 4-/5   Hip Abduction: 4/5 Hip Abduction: 4/5   Hip Adduction: 4+/5 Hip Adduction 4+/5   Knee Extension: 3/5 Knee Extension: 3+/5   Knee Flexion: 3+/5 Knee Flexion: 4/5   Ankle Dorsiflexion: 3/5 Ankle Dorsiflexion: 4-/5   Ankle Plantarflexion: 4/5 Ankle Plantarflexion: 4/5     Abdominal Strength: 3/5    Flexibility:  mildly limited to LEs due to tone and some discomfort during PROM     Evaluation   Single Limb Stance R LE Unable to perform, fear  (<10 sec = HIGH FALL RISK)   Single Limb Stance L LE Unable to perform, fear  (<10 sec = HIGH FALL RISK)   30 second Chair Rise 9 completed with arms     Postural control:  MCTSIB:  1. Eyes Open/feet together/Firm: 30 seconds, P with min sway  2. Eyes Closed/feet together/Firm: 29 seconds, F with min sway  3. Eyes Open/feet  together/Foam: 3 seconds, F with increased sway and anxiety  4. Eyes Closed/feet together/Foam: Not attempted due to poor performance with condition # 3    Gait Assessment:   - AD used: none  - Assistance: independent  - Distance: 150 feet from lobby to gym and vice-versa    GAIT DEVIATIONS:  Jillian displays the following deviations with ambulation: ER of LEs, mild lateral instability decreased gait speed    Impairments contributing to deviations: impaired motor control, LE weakness    Endurance Deficit: mild     Evaluation   Timed Up and Go 12 sec   Self Selected Walking Speed 0.86 m/sec (6m/7s)   Fast Walking Speed 1.0 m/sec (6m/6s)       Functional Mobility (Bed mobility, transfers)  Bed mobility: Mod I  Supine to sit: Mod I  Sit to supine: Mod I  Transfers to bed: I to Mod I  Transfers to toilet: I to Mod I  Sit to stand:  I to Mod I  Stand pivot:  I to Mod I  Car transfers: I to Mod I  Wheelchair mobility: N/A    ADL's:  Feeding: I  Grooming: I  Hygiene: I  UB Dressing: I  LB Dressing: Min A~  may assist pt with shoes  Toileting: I  Bathing: pt reports S from  for safety reasons        CMS Impairment/Limitation/Restriction for FOTO Multiple Sclerosis Survey    Therapist reviewed FOTO scores for Jillian Osullivan on 3/1/2019.   FOTO documents entered into [employer] - see Media section.    Limitation Score: 66%  Category: Mobility         TREATMENT     Home Exercises and Patient Education Provided    Education provided: PT educated pt regarding evaluation findings, POC and scheduling process.      Written Home Exercises Provided: to be provided in future session.      Assessment   This is a 48 y.o. female referred to outpatient physical therapy and presents with a medical diagnosis of multiple sclerosis, abnormality of gait and atypical depressive disorder.  Patient presents with decreased B LE strength, impaired balance, decreased fluidity of gait pattern, decreased gait speed and report of several  recent falls this year. Pt's LE strength appears sufficient to prevent falls in most cases, so PT a bit unsure why pt is having so many instances of falls.  This could be due to several of pt's impairments related to MS( such as vision impairment, decreased coordination, sensory disturbance to LE, etc.)  Pt also could be experiencing these as a result of decreased reaction time.  Pt's objective tests and measures reveal decreased LE strength which is fairly symmetrical( though R LE a bit weaker in some muscle groups). Pt's SSWS is 0.86 m/sec which places her in the 20-39 % impairment range compared to independent community ambulators.  Pt is also limited with 30 second chair rise( pt only able to complete 9 trials here; 15 trials is normative for women in 60-64 age range~ pt is only 48). Pt also scored poorly on MCTSIB condition # 3( standing on foam/eyes open), mostly due to anxiety.  PT was not able to even test pt in condition # 4( standing on foam/eyes closed). Finally, pt was unable to perform single limb stance with either limb, mostly due to fear. PT feels pt's history of anxiety and other emotional deficits could be limiting as pt moves forward with OP treatment. PT is warranted to improve pt's strength, balance, endurance to activity and to decrease incidence of falls.    Pt prognosis is Fair.   Pt will benefit from skilled outpatient Physical Therapy to address the deficits stated above and in the chart below, provide pt/family education, and to maximize pt's level of independence.     Plan of care discussed with patient: Yes  Pt's spiritual, cultural and educational needs considered and patient is agreeable to the plan of care and goals as stated below:     Anticipated Barriers for therapy: emotional deficits, anxiety regarding balance training    PT/PTA met face to face to discuss pt's treatment plan and established goals. Pt will be seen by physical therapy every 6th visit and minimally once per month.        History  Co-morbidities and personal factors that may impact the plan of care Examination  Body Structures and Functions, activity limitations and participation restrictions that may impact the plan of care    Clinical Presentation   Co-morbidities:   anxiety, coping style/mechanism and depression, MIRANDA, OCD, panic disorder        Personal Factors:   coping style  character  attitudes Body Regions:   lower extremities  upper extremities  trunk    Body Systems:    gross symmetry  ROM  strength  gross coordinated movement  balance  gait  transfers  transitions  motor control  motor learning            Participation Restrictions:   Anxiety, trust issues     Activity limitations:   Learning and applying knowledge  solving problems    General Tasks and Commands  undertaking multiple tasks    Communication  no deficits    Mobility  lifting and carrying objects  walking    Self care  washing oneself (bathing, drying, washing hands)  dressing    Domestic Life  shopping  cooking  doing house work (cleaning house, washing dishes, laundry)  assisting others    Interactions/Relationships  complex interpersonal interactions  relating with strangers    Life Areas  employment    Community and Social Life  community life  recreation and leisure         evolving clinical presentation with changing clinical characteristics   ( due to evolving nature of disease)                      moderate   high  high Decision Making/ Complexity Score:  moderate         Pt's spiritual, cultural and educational needs considered and pt agreeable to plan of care and goals as stated below:     GOALS:   Short term goals: 4 weeks, pt agrees to goals set.  1. Pt to be issued HEP and report compliance  2. Pt to improve 30 second chair rise to 10-11 trials to demonstrate improvement with this transition  3. Pt to improve SSWS to 1.0m/sec for improved community ambulation with decreased fall risk  4. Pt to improve MCTSIB score on condition # 3 to 6  seconds for improved balance on unstable surfaces  5. Pt to perform SLS on either limb x 2-3 seconds for improved balance and decreased fall risk    Long term goals: 8 weeks, pt agrees to goals set  6. Pt (I) with HEP  7. Pt to improve 30 second chair rise to 12-13 trials to demonstrate improvement with this transition  8. Pt to improve SSWS to 1.2 m/sec for improved community ambulation with decreased fall risk  9. Pt to improve MCTSIB score on condition # 3 to 9 seconds for improved balance on unstable surfaces  10. Pt to perform SLS on either limb x 4-5 seconds for improved balance and decreased fall risk      Plan   Outpatient physical therapy 2 times weekly for 8 weeks( until 4/262019) to include: Pt Education, HEP, therapeutic exercises, neuromuscular re-education, therapeutic activities, manual therapy, joint mobilizations, aquatic therapy and modalities PRN to achieve established goals. Pt may be seen by PTA as part of the rehabilitation team.       Abdias Solares, PT  03/01/2019

## 2019-03-01 ENCOUNTER — CLINICAL SUPPORT (OUTPATIENT)
Dept: REHABILITATION | Facility: HOSPITAL | Age: 49
End: 2019-03-01
Payer: COMMERCIAL

## 2019-03-01 DIAGNOSIS — R26.89 IMPAIRMENT OF BALANCE: ICD-10-CM

## 2019-03-01 DIAGNOSIS — Z74.09 DECREASED MOBILITY AND ENDURANCE: ICD-10-CM

## 2019-03-01 DIAGNOSIS — R29.898 BILATERAL LEG WEAKNESS: ICD-10-CM

## 2019-03-01 PROCEDURE — 97162 PT EVAL MOD COMPLEX 30 MIN: CPT | Mod: PO

## 2019-03-04 ENCOUNTER — DOCUMENTATION ONLY (OUTPATIENT)
Dept: REHABILITATION | Facility: HOSPITAL | Age: 49
End: 2019-03-04

## 2019-03-04 NOTE — PROGRESS NOTES
PT/PTA met face to face to discuss pt's treatment plan and progress towards established goals.  Continue with current PT POC with focus on balance, strengthening and general endurance.  Patient will be seen by physical therapist at least every sixth treatment or 30 days, whichever occurs first.    Mary Kate Merino, PTA  03/04/2019

## 2019-03-08 ENCOUNTER — DOCUMENTATION ONLY (OUTPATIENT)
Dept: REHABILITATION | Facility: HOSPITAL | Age: 49
End: 2019-03-08

## 2019-03-08 NOTE — PROGRESS NOTES
Face to face meeting completed with Abdias Solares PT regarding current status and progress of   Jillian Osullivan .  Strength and blaance  Omar Jade, PTA

## 2019-03-10 ENCOUNTER — DOCUMENTATION ONLY (OUTPATIENT)
Dept: REHABILITATION | Facility: HOSPITAL | Age: 49
End: 2019-03-10

## 2019-03-11 NOTE — PROGRESS NOTES
PT/PTA met face to face to discuss pt's treatment plan and progress towards established goals. Continue with current PT POC. Pt will be seen by physical therapist at least every 6th treatment day or every 30 days, whichever occurs first.      Martín Ojeda, PTA  03/10/2019

## 2019-03-12 ENCOUNTER — CLINICAL SUPPORT (OUTPATIENT)
Dept: REHABILITATION | Facility: HOSPITAL | Age: 49
End: 2019-03-12
Payer: COMMERCIAL

## 2019-03-12 DIAGNOSIS — Z74.09 DECREASED MOBILITY AND ENDURANCE: ICD-10-CM

## 2019-03-12 DIAGNOSIS — R29.898 BILATERAL LEG WEAKNESS: ICD-10-CM

## 2019-03-12 DIAGNOSIS — R26.89 IMPAIRMENT OF BALANCE: ICD-10-CM

## 2019-03-12 PROCEDURE — 97110 THERAPEUTIC EXERCISES: CPT | Mod: PO

## 2019-03-12 NOTE — PROGRESS NOTES
"  Physical Therapy Daily Treatment Note     Name: Jillian Osullivan  Clinic Number: [id number]    Therapy Diagnosis:   Encounter Diagnoses   Name Primary?    Bilateral leg weakness     Impairment of balance     Decreased mobility and endurance      Physician: Dedrick, Provider    Visit Date: 3/12/2019  Physician Orders: PT Eval and Treat   Medical Diagnosis from Referral: multiple sclerosis, abnormality of gait, atypical depressive disorder  Evaluation Date: 3/1/2019  Authorization Period Expiration: 3/1/2019 to 12/31/2019  Plan of Care Expiration: 4/26/2019  Visit # / Visits authorized: 2/ 20    Time In: 0815  Time Out: 0900  Total Billable Time: 45 minutes    Precautions: Standard, Fall and impaired vision, emotional deficits    Subjective     Pt reports: " I'm tired, I'm just exhausted, I don't know why."   She will be given an HEP today.   Response to previous treatment: sore from eval,"evevrywhere"  Functional change: ongoing    Pain: denies      Objective     Fransisca received therapeutic exercises to develop strength, endurance, ROM, flexibility and posture for 45 minutes including:  X 8 min on Nu Step recumbent stepper.  B UE/B LE on level 3.0  2 x 30 sec of B LE seated HS stretches    Seated therex including:    2 x 10 reps of B LE ankle pumps   2 x 10 reps of B LE hip adduction   2 x 10 reps of B LE LAQ   2 x 10 reps of B LE hip flexion         Fransisca participated in neuromuscular re-education activities to improve: Balance, Coordination, Kinesthetic, Sense, Proprioception and Posture for 0 minutes. The following activities were included:        Fransisca participated in gait training to improve functional mobility and safety for 0  minutes, including:          Home Exercises Provided and Patient Education Provided     Education provided: Sitting therex including: HS stretches, ankle pumps, hip adduction squeeze, marching and LAQ      Written Home Exercises Provided: yes.  Exercises were reviewed and Fransisca was " able to demonstrate them prior to the end of the session.  Fransisca demonstrated good  understanding of the education provided.     See EMR under Patient Instructions for exercises provided 3/12/2019.    Assessment     Fransisca tolerated her first tx session following initial evaluation fairly well.  Pt began cardiovascular endurance on the stepper and was educated on sitting HEP.  Pt demonstrated understanding of sitting HEP.  Pt requires increased amount of time to perform all activities.    Fransisca is progressing well towards her goals.   Pt prognosis is Fair.     Pt will continue to benefit from skilled outpatient physical therapy to address the deficits listed in the problem list box on initial evaluation, provide pt/family education and to maximize pt's level of independence in the home and community environment.     Pt's spiritual, cultural and educational needs considered and pt agreeable to plan of care and goals.     Anticipated barriers to physical therapy: emotional deficits, anxiety regarding balance training    Goals: Pt's spiritual, cultural and educational needs considered and pt agreeable to plan of care and goals as stated below:      GOALS:   Short term goals: 4 weeks, pt agrees to goals set.  1. Pt to be issued HEP and report compliance  2. Pt to improve 30 second chair rise to 10-11 trials to demonstrate improvement with this transition  3. Pt to improve SSWS to 1.0m/sec for improved community ambulation with decreased fall risk  4. Pt to improve MCTSIB score on condition # 3 to 6 seconds for improved balance on unstable surfaces  5. Pt to perform SLS on either limb x 2-3 seconds for improved balance and decreased fall risk     Long term goals: 8 weeks, pt agrees to goals set  6. Pt (I) with HEP  7. Pt to improve 30 second chair rise to 12-13 trials to demonstrate improvement with this transition  8. Pt to improve SSWS to 1.2 m/sec for improved community ambulation with decreased fall risk  9. Pt to  improve MCTSIB score on condition # 3 to 9 seconds for improved balance on unstable surfaces  Pt to perform SLS on either limb x 4-5 seconds for improved balance and decreased fall risk    Plan     Outpatient physical therapy 2 times weekly for 8 weeks( until 4/262019) to include: Pt Education, HEP, therapeutic exercises, neuromuscular re-education, therapeutic activities, manual therapy, joint mobilizations, aquatic therapy and modalities PRN to achieve established goals. Pt may be seen by PTA as part of the rehabilitation team.     Mary Kate Merino, PTA

## 2019-03-12 NOTE — PATIENT INSTRUCTIONS
HIP: Hamstrings - Short Sitting        Rest leg on raised surface. Keep knee straight. Lift chest. Hold _15-30__ seconds.  __3_ reps per set, ___ sets per day, ___ days per week    Copyright © [employer]. All rights reserved.   KNEE: Extension, Long Arc Quads - Sitting        Raise leg until knee is straight.  __10_ reps per set, _2__ sets per day    Copyright © [employer]. All rights reserved.   Hip (Front)        Begin sitting tall, both feet flat on floor. Inhale, then exhale while lifting knee as high as is comfortable, keeping upper body straight and still. Slowly return to starting position.  Repeat ___10_ times each leg. Do __2__ sets per session.   Copyright © [employer]. All rights reserved.     ANKLE: Pump - Sitting        With involved leg straight, bend foot toward floor, then toward ceiling.  Repeat 20___ times. Do _1__ times per day.    Copyright © [employer]. All rights reserved.   Adduction: Hip - Knees Together (Sitting)        Sit with towel roll between knees. Push knees together. Hold for __3_ seconds. Rest for ___ seconds.  Repeat _20__ times.     Copyright © [employer]. All rights reserved.

## 2019-03-14 ENCOUNTER — CLINICAL SUPPORT (OUTPATIENT)
Dept: REHABILITATION | Facility: HOSPITAL | Age: 49
End: 2019-03-14
Payer: COMMERCIAL

## 2019-03-14 DIAGNOSIS — Z74.09 DECREASED MOBILITY AND ENDURANCE: ICD-10-CM

## 2019-03-14 DIAGNOSIS — R26.89 IMPAIRMENT OF BALANCE: ICD-10-CM

## 2019-03-14 DIAGNOSIS — R29.898 BILATERAL LEG WEAKNESS: ICD-10-CM

## 2019-03-14 PROCEDURE — 97110 THERAPEUTIC EXERCISES: CPT | Mod: PO

## 2019-03-14 NOTE — PROGRESS NOTES
"  Physical Therapy Daily Treatment Note     Name: Jillian Osullivan  Clinic Number: [id number]    Therapy Diagnosis:   Encounter Diagnoses   Name Primary?    Bilateral leg weakness     Impairment of balance     Decreased mobility and endurance      Physician: Dedrick, Provider    Visit Date: 3/14/2019  Physician Orders: PT Eval and Treat   Medical Diagnosis from Referral: multiple sclerosis, abnormality of gait, atypical depressive disorder  Evaluation Date: 3/1/2019  Authorization Period Expiration: 3/1/2019 to 12/31/2019  Plan of Care Expiration: 4/26/2019  Visit # / Visits authorized: 3/ 20    Time In: 1300  Time Out: 1345  Total Billable Time: 45 minutes    Precautions: Standard, Fall and impaired vision, emotional deficits    Subjective   Pt ambulates with SC mod I from lobby to gym( ~ 150 feet) and vice-versa. Pt demonstrates ER of feet, slow gait pattern with mildly flexed knees.      Pt reports: " I slept longer today. I'm really tired."   She states she did not receive HEP from previous session. Pt will receive written HEP today.   Response to previous treatment: states she was tired after previous session  Functional change: ongoing    Pain: 7/10 to core and legs( pain to legs varies thoughout the day).      Objective     Fransisca received therapeutic exercises to develop strength, endurance, ROM, flexibility and posture for 45 minutes including:    X 8 min on SCI-FIT recumbent stepper using  B UE/B LE @ level 1.0 for improved UE/LE ROM, strength and CV endurance.  Pt needs cues for improved motor output at various points during exercise( to keep screen lit).        Seated :    1 x 10 reps of B LE heel raises   1 x 10 reps of B LE toe raises   1 x 10 reps of B LE hip adduction( ball squeezes)   1 x 10 reps of B LE LAQ   1 x 10 reps of B LE hip flexion   1 x 10 reps B LE hip abduction with peach band resistance    Supine:   3 x 30" B hamstring stretches    1 x 30" B hip adductor stretches    Supine<> sit " mod I for increased time.        Home Exercises Provided and Patient Education Provided     Education provided: Sitting therex including: B LE heel and toe raises, hip abduction against peach theraband, hip adduction squeezes with ball, LAQ, hip flex.  PT also educated pt regarding proper placement of SC in her hand when performing sit<> stand trials.    Written Home Exercises Provided: yes. PT provided pt with written copy of the above today( 3/14/19).  PT also provided pt with piece of peach theraband.  Exercises were reviewed and Fransisca was able to demonstrate them prior to the end of the session.  Fransisca demonstrated good  understanding of the education provided.     See EMR under Media for exercises provided 3/14/19.    Assessment     Fransisca tolerated her second therapy session fairly. Pt needed cues for improved motor output when performing on SCI-FIT recumbent stepper. Pt also appeared considerably anxious with attempts to gently stretch B hamstrings and hip adductors. Pt is clearly limited by her anxiety and she is aware of this. As mentioned at time of evaluation, pt also appears to have trust issues, though she seems comfortable with this PT. Pt continues to demonstrate slow movements and requires increased amount of time to perform all activities. Pt remains appropriate for OPPT to address her current deficits.  Continue with current POC.    Fransisca is progressing well towards her goals.   Pt prognosis is Fair.     Pt will continue to benefit from skilled outpatient physical therapy to address the deficits listed in the problem list box on initial evaluation, provide pt/family education and to maximize pt's level of independence in the home and community environment.     Pt's spiritual, cultural and educational needs considered and pt agreeable to plan of care and goals.     Anticipated barriers to physical therapy: emotional deficits, anxiety regarding balance training    Goals: Pt's spiritual, cultural and  educational needs considered and pt agreeable to plan of care and goals as stated below:      GOALS:   Short term goals: 4 weeks, pt agrees to goals set.  1. Pt to be issued HEP and report compliance~ in progress  2. Pt to improve 30 second chair rise to 10-11 trials to demonstrate improvement with this transition~progressing  3. Pt to improve SSWS to 1.0m/sec for improved community ambulation with decreased fall risk~progressing  4. Pt to improve MCTSIB score on condition # 3 to 6 seconds for improved balance on unstable surfaces~progressing  5. Pt to perform SLS on either limb x 2-3 seconds for improved balance and decreased fall risk~progressing     Long term goals: 8 weeks, pt agrees to goals set  6. Pt (I) with HEP~progressing  7. Pt to improve 30 second chair rise to 12-13 trials to demonstrate improvement with this transition~progressing  8. Pt to improve SSWS to 1.2 m/sec for improved community ambulation with decreased fall risk~progressing  9. Pt to improve MCTSIB score on condition # 3 to 9 seconds for improved balance on unstable surfaces~progressing   10. Pt to perform SLS on either limb x 4-5 seconds for improved balance and decreased fall risk~ ~progressing  Plan     Outpatient physical therapy 2 times weekly for 8 weeks( until 4/262019) to include: Pt Education, HEP, therapeutic exercises, neuromuscular re-education, therapeutic activities, manual therapy, joint mobilizations, aquatic therapy and modalities PRN to achieve established goals. Pt may be seen by PTA as part of the rehabilitation team.     Abdias Solares, PT

## 2019-03-19 ENCOUNTER — CLINICAL SUPPORT (OUTPATIENT)
Dept: REHABILITATION | Facility: HOSPITAL | Age: 49
End: 2019-03-19
Attending: HOSPITALIST
Payer: COMMERCIAL

## 2019-03-19 DIAGNOSIS — R26.89 IMPAIRMENT OF BALANCE: ICD-10-CM

## 2019-03-19 DIAGNOSIS — R29.898 BILATERAL LEG WEAKNESS: ICD-10-CM

## 2019-03-19 DIAGNOSIS — Z74.09 DECREASED MOBILITY AND ENDURANCE: ICD-10-CM

## 2019-03-19 PROCEDURE — 97110 THERAPEUTIC EXERCISES: CPT | Mod: PO

## 2019-03-19 PROCEDURE — 97112 NEUROMUSCULAR REEDUCATION: CPT | Mod: PO

## 2019-03-19 NOTE — PROGRESS NOTES
"  Physical Therapy Daily Treatment Note     Name: Jillian Osullivan  Clinic Number: [id number]    Therapy Diagnosis:   Encounter Diagnoses   Name Primary?    Bilateral leg weakness     Impairment of balance     Decreased mobility and endurance      Physician: Dedrick, Provider    Visit Date: 3/19/2019  Physician Orders: PT Eval and Treat   Medical Diagnosis from Referral: multiple sclerosis, abnormality of gait, atypical depressive disorder  Evaluation Date: 3/1/2019  Authorization Period Expiration: 3/1/2019 to 12/31/2019  Plan of Care Expiration: 4/26/2019  Visit # / Visits authorized: 4/ 20    Time In: 0906      Time Out: 0951  Total Billable Time: 45 minutes    Precautions: Standard, Fall and impaired vision, emotional deficits    Subjective       Pt reports: " There was a traffic jam on the way here."  Pt reports R thigh pain due to hidradenitis.  She states she has been compliant with her HEP but did not perform this morning before her session.  Response to previous treatment: states she can't remember how she felt after her previous session.  Functional change: ongoing    Pain: 7/10 to R inner thigh      Objective   Pt arrived to session 6 minutes late, but PT able to accommodate.    Pt ambulates with SC mod I from lobby to gym( ~ 150 feet) and vice-versa. Pt demonstrates ER of feet, slow gait pattern with improved knee extension during stance phase.      Fransisca received therapeutic exercises to develop strength, endurance, ROM, flexibility and posture for 10 minutes including:    X 9 min on Nu-step recumbent stepper using  B UE/B LE @ level 5 for improved UE/LE ROM, strength and CV endurance.  Pt needs cues for improved motor output at various points during exercise.      2 x 30" B LE standing gastroc stretch with use of wooden incline and rail for B UE support        Patient participated in neuromuscular re-education activities to improve: Balance, Coordination, Kinesthetic, Sense, Proprioception and " "Posture for 35 minutes. The following activities were included:         Standing balance in parallel bars:    On Foam pad:  2 x 30" static stand, no UE support, CGA( increased trembling noted)    On Foam fitter:  2 x 30" static stand, no UE support, CGA/min A trial 1 and CGA trial 2( increased trembling noted)     X 4 laps tandem ambulation with UE support, CGA/SBA and cues for proper technique    X 10 B LE step-ups on 4 inch block, SBA  X 10 B LE R side step-ups on 4 inch block, SBA  X 10 B LE L side step-ups on 4 inch block, SBA    X 2 laps backward ambulation in parallel bars with UE support, SBA( cues for proper movement of hands along bar and increased step length).    Pt requires 2 short seated rest breaks while performing above activities.      Home Exercises Provided and Patient Education Provided     Education provided: Sitting therex including: B LE heel and toe raises, hip abduction against peach theraband, hip adduction squeezes with ball, LAQ, hip flex.  PT also educated pt regarding proper placement of SC in her hand when performing sit<> stand trials.    Written Home Exercises Provided: yes. PT provided pt with written copy of the above today( 3/14/19).  PT also provided pt with piece of peach theraband.  Exercises were reviewed and Fransisca was able to demonstrate them prior to the end of the session.  Fransisca demonstrated good  understanding of the education provided.     See EMR under Media for exercises provided 3/14/19.    Assessment     Fransisca tolerated her therapy session well for the most part. Pt continues to need cues for improved motor output when performing on recumbent stepper. Pt also appeared considerably anxious with attempts to balance on foam pad and 4 point foam fitter( without use of UE support). However, pt did seem to come around as the session progressed, seemingly trusting PT's choice of therapeutic challenges. PT encouraged by pt's willingness to perform tandem and backward ambulation " activities as well. Pt continues to demonstrate slow movements and requires increased amount of time to perform all activities. Pt remains appropriate for OPPT to address her current deficits.  Continue with current POC.    Fransisca is progressing well towards her goals.   Pt prognosis is Fair.     Pt will continue to benefit from skilled outpatient physical therapy to address the deficits listed in the problem list box on initial evaluation, provide pt/family education and to maximize pt's level of independence in the home and community environment.     Pt's spiritual, cultural and educational needs considered and pt agreeable to plan of care and goals.     Anticipated barriers to physical therapy: emotional deficits, anxiety regarding balance training    Goals: Pt's spiritual, cultural and educational needs considered and pt agreeable to plan of care and goals as stated below:      GOALS:   Short term goals: 4 weeks, pt agrees to goals set.  1. Pt to be issued HEP and report compliance~ in progress  2. Pt to improve 30 second chair rise to 10-11 trials to demonstrate improvement with this transition~progressing  3. Pt to improve SSWS to 1.0m/sec for improved community ambulation with decreased fall risk~progressing  4. Pt to improve MCTSIB score on condition # 3 to 6 seconds for improved balance on unstable surfaces~progressing  5. Pt to perform SLS on either limb x 2-3 seconds for improved balance and decreased fall risk~progressing     Long term goals: 8 weeks, pt agrees to goals set  6. Pt (I) with HEP~progressing  7. Pt to improve 30 second chair rise to 12-13 trials to demonstrate improvement with this transition~progressing  8. Pt to improve SSWS to 1.2 m/sec for improved community ambulation with decreased fall risk~progressing  9. Pt to improve MCTSIB score on condition # 3 to 9 seconds for improved balance on unstable surfaces~progressing   10. Pt to perform SLS on either limb x 4-5 seconds for improved  balance and decreased fall risk~ ~progressing  Plan     Outpatient physical therapy 2 times weekly for 8 weeks( until 4/26/2019) to include: Pt Education, HEP, therapeutic exercises, neuromuscular re-education, therapeutic activities, manual therapy, joint mobilizations, aquatic therapy and modalities PRN to achieve established goals. Pt may be seen by PTA as part of the rehabilitation team.     Abdias Solares, PT

## 2019-03-21 ENCOUNTER — CLINICAL SUPPORT (OUTPATIENT)
Dept: REHABILITATION | Facility: HOSPITAL | Age: 49
End: 2019-03-21
Payer: COMMERCIAL

## 2019-03-21 DIAGNOSIS — Z74.09 DECREASED MOBILITY AND ENDURANCE: ICD-10-CM

## 2019-03-21 DIAGNOSIS — R29.898 BILATERAL LEG WEAKNESS: ICD-10-CM

## 2019-03-21 DIAGNOSIS — R26.89 IMPAIRMENT OF BALANCE: ICD-10-CM

## 2019-03-21 PROCEDURE — 97110 THERAPEUTIC EXERCISES: CPT | Mod: PO

## 2019-03-21 PROCEDURE — 97112 NEUROMUSCULAR REEDUCATION: CPT | Mod: PO

## 2019-03-21 NOTE — PROGRESS NOTES
"  Physical Therapy Daily Treatment Note     Name: Jillian Osullivan  Clinic Number: [id number]    Therapy Diagnosis:   Encounter Diagnoses   Name Primary?    Bilateral leg weakness     Impairment of balance     Decreased mobility and endurance      Physician: Dedrick, Provider    Visit Date: 3/21/2019  Physician Orders: PT Eval and Treat   Medical Diagnosis from Referral: multiple sclerosis, abnormality of gait, atypical depressive disorder  Evaluation Date: 3/1/2019  Authorization Period Expiration: 3/1/2019 to 12/31/2019  Plan of Care Expiration: 4/26/2019  Visit # / Visits authorized: 5/ 20    Time In: 1345  Time Out: 1430  Total Billable Time: 45 minutes    Precautions: Standard, Fall and impaired vision, emotional deficits    Subjective       Pt reports: " so-so"  Pt reports R thigh pain due to hidradenitis.  Pt reports her lesions were bleeding yesterday.  She states she was not compliant with her HEP yesterday due to her R thigh pain.  Response to previous treatment: pt reports fatigue after last therapy session  Functional change: ongoing    Pain: 8/10 to R inner thigh      Objective       Pt ambulates with SC mod I from lobby to gym( ~ 150 feet) and vice-versa. Pt demonstrates ER of feet, slow gait pattern with improved knee extension during stance phase.      Fransisca received therapeutic exercises to develop strength, endurance, ROM, flexibility and posture for 10 minutes including:    X 9 min on Nu-step recumbent stepper using  B UE/B LE @ level 5 for improved UE/LE ROM, strength and CV endurance.  Pt needs fewer cues for improved motor output compared to previous session( though R thigh pain limits).    2 x 30" B LE standing gastroc stretch with use of wooden incline and rail for B UE support        Patient participated in neuromuscular re-education activities to improve: Balance, Coordination, Kinesthetic, Sense, Proprioception and Posture for 35 minutes. The following activities were included: " "        Standing balance in parallel bars:    On Foam pad:  2 x 30" static stand, no UE support, CGA( decreased trembling noted vs previous session)  1 x 30" B LE alternate tandem stance with one UE support, min/CGA( increased trembling noted)    On Foam fitter:  2 x 30" static stand, no UE support, CGA/min A (decreased trembling from previous session)     X 4 laps tandem ambulation with UE support, SBA and cues for proper technique    1 x 10 alternate LE step-overs on foam fitter, 1 UE support, CGA provided for security    X 2 laps backward ambulation in parallel bars with 1 UE support, SBA( cues for maintaining only 1 UE on bar).    Pt requires 1 short seated rest break while performing above activities.      Home Exercises Provided and Patient Education Provided     Education provided: Sitting therex including: B LE heel and toe raises, hip abduction against peach theraband, hip adduction squeezes with ball, LAQ, hip flex.  PT also educated pt regarding proper placement of SC in her hand when performing sit<> stand trials.    Written Home Exercises Provided: yes. PT provided pt with written copy of the above today( 3/14/19).  PT also provided pt with piece of peach theraband. No updates to HEP on this date( 3/21/19)  Exercises were reviewed and Fransisca was able to demonstrate them prior to the end of the session.  Fransisca demonstrated good  understanding of the education provided.     See EMR under Media for exercises provided 3/14/19.    Assessment     Fransisca tolerated her therapy session well for the most part. She is currently limited by R thigh pain due to lesions secondary to hidradenitis. Despite this, she is able to work through a 45 minute session.  Pt less anxious today with attempts to balance on foam pad and 4 point foam fitter( without use of UE support). Pt also required only 1 seated rest break to complete standing balance activities in parallel bars. PT is slowly gaining pt's trust and it appears pt feels " comfortable with this PT. However, pt continues to demonstrate slow movements and requires increased amount of time to perform all activities. Pt remains appropriate for OPPT to address her current deficits.  Continue with current POC.    Fransisca is progressing well towards her goals.   Pt prognosis is Fair.     Pt will continue to benefit from skilled outpatient physical therapy to address the deficits listed in the problem list box on initial evaluation, provide pt/family education and to maximize pt's level of independence in the home and community environment.     Pt's spiritual, cultural and educational needs considered and pt agreeable to plan of care and goals.     Anticipated barriers to physical therapy: emotional deficits, anxiety regarding balance training    Goals: Pt's spiritual, cultural and educational needs considered and pt agreeable to plan of care and goals as stated below:      GOALS:   Short term goals: 4 weeks, pt agrees to goals set.  1. Pt to be issued HEP and report compliance~ in progress  2. Pt to improve 30 second chair rise to 10-11 trials to demonstrate improvement with this transition~progressing  3. Pt to improve SSWS to 1.0m/sec for improved community ambulation with decreased fall risk~progressing  4. Pt to improve MCTSIB score on condition # 3 to 6 seconds for improved balance on unstable surfaces~progressing  5. Pt to perform SLS on either limb x 2-3 seconds for improved balance and decreased fall risk~progressing     Long term goals: 8 weeks, pt agrees to goals set  6. Pt (I) with HEP~progressing  7. Pt to improve 30 second chair rise to 12-13 trials to demonstrate improvement with this transition~progressing  8. Pt to improve SSWS to 1.2 m/sec for improved community ambulation with decreased fall risk~progressing  9. Pt to improve MCTSIB score on condition # 3 to 9 seconds for improved balance on unstable surfaces~progressing   10. Pt to perform SLS on either limb x 4-5 seconds for  improved balance and decreased fall risk~ ~progressing  Plan     Outpatient physical therapy 2 times weekly for 8 weeks( until 4/26/2019) to include: Pt Education, HEP, therapeutic exercises, neuromuscular re-education, therapeutic activities, manual therapy, joint mobilizations, aquatic therapy and modalities PRN to achieve established goals. Pt may be seen by PTA as part of the rehabilitation team.     Abdias Solares, PT

## 2019-03-22 ENCOUNTER — OFFICE VISIT (OUTPATIENT)
Dept: SLEEP MEDICINE | Facility: CLINIC | Age: 49
End: 2019-03-22
Payer: COMMERCIAL

## 2019-03-22 VITALS
HEART RATE: 60 BPM | WEIGHT: 176.19 LBS | HEIGHT: 62 IN | BODY MASS INDEX: 32.42 KG/M2 | SYSTOLIC BLOOD PRESSURE: 139 MMHG | DIASTOLIC BLOOD PRESSURE: 95 MMHG

## 2019-03-22 DIAGNOSIS — G47.10 HYPERSOMNIA WITH SLEEP APNEA: Primary | ICD-10-CM

## 2019-03-22 DIAGNOSIS — G35 MS (MULTIPLE SCLEROSIS): ICD-10-CM

## 2019-03-22 DIAGNOSIS — G47.30 HYPERSOMNIA WITH SLEEP APNEA: Primary | ICD-10-CM

## 2019-03-22 PROCEDURE — 3075F SYST BP GE 130 - 139MM HG: CPT | Mod: CPTII,S$GLB,, | Performed by: NURSE PRACTITIONER

## 2019-03-22 PROCEDURE — 99214 PR OFFICE/OUTPT VISIT, EST, LEVL IV, 30-39 MIN: ICD-10-PCS | Mod: S$GLB,,, | Performed by: NURSE PRACTITIONER

## 2019-03-22 PROCEDURE — 3008F BODY MASS INDEX DOCD: CPT | Mod: CPTII,S$GLB,, | Performed by: NURSE PRACTITIONER

## 2019-03-22 PROCEDURE — 99999 PR PBB SHADOW E&M-EST. PATIENT-LVL IV: ICD-10-PCS | Mod: PBBFAC,,, | Performed by: NURSE PRACTITIONER

## 2019-03-22 PROCEDURE — 3080F PR MOST RECENT DIASTOLIC BLOOD PRESSURE >= 90 MM HG: ICD-10-PCS | Mod: CPTII,S$GLB,, | Performed by: NURSE PRACTITIONER

## 2019-03-22 PROCEDURE — 3080F DIAST BP >= 90 MM HG: CPT | Mod: CPTII,S$GLB,, | Performed by: NURSE PRACTITIONER

## 2019-03-22 PROCEDURE — 99999 PR PBB SHADOW E&M-EST. PATIENT-LVL IV: CPT | Mod: PBBFAC,,, | Performed by: NURSE PRACTITIONER

## 2019-03-22 PROCEDURE — 99214 OFFICE O/P EST MOD 30 MIN: CPT | Mod: S$GLB,,, | Performed by: NURSE PRACTITIONER

## 2019-03-22 PROCEDURE — 3075F PR MOST RECENT SYSTOLIC BLOOD PRESS GE 130-139MM HG: ICD-10-PCS | Mod: CPTII,S$GLB,, | Performed by: NURSE PRACTITIONER

## 2019-03-22 PROCEDURE — 3008F PR BODY MASS INDEX (BMI) DOCUMENTED: ICD-10-PCS | Mod: CPTII,S$GLB,, | Performed by: NURSE PRACTITIONER

## 2019-03-22 RX ORDER — MODAFINIL 200 MG/1
200 TABLET ORAL DAILY
Qty: 30 TABLET | Refills: 5 | Status: SHIPPED | OUTPATIENT
Start: 2019-03-22 | End: 2019-04-12 | Stop reason: SINTOL

## 2019-03-22 NOTE — PROGRESS NOTES
This 48 y.o. female patient returns for the management of obstructive sleep apnea and insomnia. Last seen 2-mos ago   Continues to apply CPAP mask nightly but typically removes mask during sleep. Keeping it on 3-4hr. Soooo sleepy/tired. Has NOT gotten new mask. Went to DME after last visit but they could n ot help her at the time and no appt scheduled. No chin strap. + anxiety, sees Dr. Montiel.Using golifeforwomen mask, often has strap marks despite pads. Sees MD at [facility] for MS. Continued fatigue, not sleeping well. Gabapentin 300mg 2-3 qhs helps RLS symptoms/feelings.     ESS=17    HISTORY  1. CHRIS   She is attempting CPAP nightly, but sometimes difficulty due to her other ailments. Last night she managed to sleep with it for 7+ hours.  Still Feeling exhausted. No snoring    She is using a Resmed nasal pillows mask.  CPAP interrogation Dream Station not auto capable, set at 8 cm: 2874 hours used; 3.4 hours/n ave; 14/30 days >4 hours       DME = [employer]    2. She continues to have difficulties with anxiety and insomnia. This is her main complaint today.  She feels that this is somewhat better since being off of her injectible MS meds.  Also Buspar was increased by psych.  Sleep is worse and more fragmented. Increased worry and stress.    Some dozing and napping around 2 pm in the afternoon.  Bedtime 10:30 pm - MN  Once in bed, sleep onset ranges from 30 mins - 1 hour  WASO 5-6 awakenings; 30-90 mins of wake time  Awakens around 4 am, then very light and fragmented sleep, dozing in and out of sleep until wake time of 5:45 -6 am.    Takes Buspar, clonazepam (4 times a day for GI related issue) - stopped by Dr. Montiel (start on ativan), Valium 10 mg (for high anxiety), Effexor - managed by Dr. Montiel  She is currently taking melatonin 5 mg; Takes Gabapentin  She has tried Rozeram, Trazodone 100 mg (it worked and then stopped working)    3. RLS  - Gabapentin 300 mg was initially effective in controlling RLS and helping  "her sleep better. It "worked" for 3 nights, but then lost its effectiveness. She has been using it intermittently, because she is concerned about habituation. She is also concerned about inability to lose weight. Increasing gabapentin 900 mg may be helping somewhat, though she complains of morning sedation. Her  calls her a "zombie". Recently only taking 300 mg.    RLS feelings: Often feels need to move her legs at night; Urge; Moving constantly; Using adjustable bed;  Mostly in the evenings and around bed time.    SLEEP ROUTINE:   Time to bed: 11:30 pm   Sleep onset latency: a while   Disruptions or awakenings: 3-4   Wakeup time: 6 am   Perceived sleep quality: poor   Daytime naps: every few days        REVIEW OF SYSTEMS:  Sleep related symptoms as per HPI; gait impairment using cane. Otherwise a balance review of 10-systems is negative.       PHYSICAL EXAM:  BP (!) 139/95   Pulse 60   Ht 5' 2" (1.575 m)   Wt 79.9 kg (176 lb 3.2 oz)   BMI 32.23 kg/m²   GENERAL: Well groomed; Normally developed; obese      ASSESSMENT:    1. Obstructive Sleep Apnea, moderate severity. CHRIS appears to be moderately well controlled on CPAP. She is using an effective setting, determined by titration sleep study 2016 with similar wft. Recent adherence is down due to ongoing health issues. She is using an approved nasal pillows CPAP mask, which forms appropriate seal and provides PAP therapy when used. Mask removal and mask interface continuing to affect use. Not gotten new mask yet  2. Sleep disturbances/Insomnia - underlying  appears to be anxiety/depression.  She feels like anxiety is getting worse, despite medical management. Cause for anxiety unclear - also multi-factorial, being managed by Dr. Montiel. She has implicated MS or Avonex as possible root cause, since she did not have any anxiety at all prior to her MS diagnosis and treatment.     3. Restless legs feelings at night / akithesia - in some cases PLMS can be " potentiated by Effexor; No evidence of PLMS on sleep study; RLS can occur just as a feeling (without acutal limb kicks) that can contribute to night time anxiety and insomnia. RLS appears to have responded to higher doses of gabapentin. She is so focused on the effects of her underlying anxiety, it is difficult to ascertain whether she is getting any benefit from this.1/15/19 and 3/22/19: Improved with gabapentin    4. Fatigue/hypersomnia with sleep apnea- multi-factorial; certainly MS, medication, and depression can be playing a role; as well as suboptimal mask on time    PLAN:  IMPROVE mask on time, continue CPAP 8cm. NEW mask style (nasal) [facility] DME. TRIAL nuvigil 150mg qd prn, discussed purpose and s/e. rtc 2-mos adherence    Continue gabapentin at 600 mg qhs (Tiana)

## 2019-03-25 ENCOUNTER — PATIENT MESSAGE (OUTPATIENT)
Dept: SLEEP MEDICINE | Facility: CLINIC | Age: 49
End: 2019-03-25

## 2019-03-26 ENCOUNTER — TELEPHONE (OUTPATIENT)
Dept: SLEEP MEDICINE | Facility: CLINIC | Age: 49
End: 2019-03-26

## 2019-03-26 ENCOUNTER — PATIENT MESSAGE (OUTPATIENT)
Dept: SLEEP MEDICINE | Facility: CLINIC | Age: 49
End: 2019-03-26

## 2019-03-26 ENCOUNTER — CLINICAL SUPPORT (OUTPATIENT)
Dept: REHABILITATION | Facility: HOSPITAL | Age: 49
End: 2019-03-26
Payer: COMMERCIAL

## 2019-03-26 DIAGNOSIS — R29.898 BILATERAL LEG WEAKNESS: ICD-10-CM

## 2019-03-26 DIAGNOSIS — R26.89 IMPAIRMENT OF BALANCE: ICD-10-CM

## 2019-03-26 DIAGNOSIS — Z74.09 DECREASED MOBILITY AND ENDURANCE: ICD-10-CM

## 2019-03-26 PROCEDURE — 97110 THERAPEUTIC EXERCISES: CPT | Mod: PO

## 2019-03-26 PROCEDURE — 97112 NEUROMUSCULAR REEDUCATION: CPT | Mod: PO

## 2019-03-26 NOTE — TELEPHONE ENCOUNTER
Approvedtoday  Request Reference Number: [id number]. MODAFINIL TAB 200MG is approved through 09/26/2019. For further questions, call [phone].

## 2019-03-26 NOTE — PROGRESS NOTES
"  Physical Therapy Daily Treatment Note     Name: Jillian Osullivan  Clinic Number: [id number]    Therapy Diagnosis:   Encounter Diagnoses   Name Primary?    Bilateral leg weakness     Impairment of balance     Decreased mobility and endurance      Physician: Referring, Unknown    Visit Date: 3/26/2019  Physician Orders: PT Eval and Treat   Medical Diagnosis from Referral: multiple sclerosis, abnormality of gait, atypical depressive disorder  Evaluation Date: 3/1/2019  Authorization Period Expiration: 3/1/2019 to 12/31/2019  Plan of Care Expiration: 4/26/2019  Visit # / Visits authorized: 6/ 20    Time In: 0900  Time Out: 0945  Total Billable Time: 45 minutes    Precautions: Standard, Fall and impaired vision, emotional deficits    Subjective       Pt reports: she has been having pain in her legs.  Hidradenitis is improving due to topical medication being applied.  Pt also reports that her MS medication( Ocrevus) is making her more tired.  Pt also states she is not sleeping well.   She states she was compliant with her HEP.  Response to previous treatment: pt reports fatigue after last therapy session  Functional change: ongoing    Pain: 8/10 to B LEs    Objective       Pt ambulates with SC mod I from lobby to gym( ~ 150 feet) and vice-versa. Pt demonstrates ER of feet, slow gait pattern with improved knee extension during stance phase.  Pt also ambulates with wide RYAN.      Fransisca received therapeutic exercises to develop strength, endurance, ROM, flexibility and posture for 10 minutes including:    X 9 min on Nu-step recumbent stepper using  B UE/B LE @ level 5 for improved UE/LE ROM, strength and CV endurance.      2 x 30" B LE standing gastroc stretch with use of wooden incline and rail for B UE support        Patient participated in neuromuscular re-education activities to improve: Balance, Coordination, Kinesthetic, Sense, Proprioception and Posture for 35 minutes. The following activities were included: " "        Standing balance in parallel bars:    On Foam pad:  2 x 30" static stand, no UE support, CGA( increased trembling noted vs previous session)~ occasional touchdown on bar  1 x 30" B LE alternate tandem stance with one UE support, min/CGA( increased trembling noted)    On Foam fitter:  2 x 30" static stand, no UE support, CGA, EC, (min trembling)  1 x 10 alternate LE step-overs on foam fitter, no support, CGA/min A provided for security and balance( cues for not touching bar)     X 4 laps tandem ambulation with 2 UE support progressing to 1 UE support, CGA/SBA and cues for proper technique    Firm Ground:  1 x 10 B SLS alternate toe tapping against medium cones, CGA, no UE support    Pt ascends and descends 12 six inch steps, non-reciprocal pattern with B rails for support, mod I    Pt requires 1 short seated rest break while performing above activities.      Home Exercises Provided and Patient Education Provided     Education provided: Sitting therex including: B LE heel and toe raises, hip abduction against peach theraband, hip adduction squeezes with ball, LAQ, hip flex.  PT also educated pt regarding proper placement of SC in her hand when performing sit<> stand trials.    Written Home Exercises Provided: yes. PT provided pt with written copy of the above today( 3/14/19).  PT also provided pt with piece of peach theraband. No updates to HEP on this date( 3/26/19)  Exercises were reviewed and Fransisca was able to demonstrate them prior to the end of the session.  Fransisca demonstrated good  understanding of the education provided.     See EMR under Media for exercises provided 3/14/19.    Assessment     Fransisca tolerated her therapy session well for the most part. She is limited by B LE pain, decreased balance and anxiety. Despite this, she is able to work through a 45 minute session.  Pt remains anxious with attempts to balance on foam pad and 4 point foam fitter( without use of UE support). Pt  required only 1 " seated rest break to complete standing balance activities in parallel bars. PT is slowly gaining pt's trust and it appears pt feels comfortable with this PT. However, pt continues to demonstrate slow movements and requires increased amount of time to perform all activities. Pt also requires considerable encouragement to try new tasks. Pt remains appropriate for OPPT to address her current deficits.  Continue with current POC.    Fransisca is progressing well towards her goals.   Pt prognosis is Fair.     Pt will continue to benefit from skilled outpatient physical therapy to address the deficits listed in the problem list box on initial evaluation, provide pt/family education and to maximize pt's level of independence in the home and community environment.     Pt's spiritual, cultural and educational needs considered and pt agreeable to plan of care and goals.     Anticipated barriers to physical therapy: emotional deficits, anxiety regarding balance training    Goals: Pt's spiritual, cultural and educational needs considered and pt agreeable to plan of care and goals as stated below:      GOALS:   Short term goals: 4 weeks, pt agrees to goals set.  1. Pt to be issued HEP and report compliance~ in progress  2. Pt to improve 30 second chair rise to 10-11 trials to demonstrate improvement with this transition~progressing  3. Pt to improve SSWS to 1.0m/sec for improved community ambulation with decreased fall risk~progressing  4. Pt to improve MCTSIB score on condition # 3 to 6 seconds for improved balance on unstable surfaces~progressing  5. Pt to perform SLS on either limb x 2-3 seconds for improved balance and decreased fall risk~progressing     Long term goals: 8 weeks, pt agrees to goals set  6. Pt (I) with HEP~progressing  7. Pt to improve 30 second chair rise to 12-13 trials to demonstrate improvement with this transition~progressing  8. Pt to improve SSWS to 1.2 m/sec for improved community ambulation with decreased  fall risk~progressing  9. Pt to improve MCTSIB score on condition # 3 to 9 seconds for improved balance on unstable surfaces~progressing   10. Pt to perform SLS on either limb x 4-5 seconds for improved balance and decreased fall risk~ ~progressing  Plan     Outpatient physical therapy 2 times weekly for 8 weeks( until 4/26/2019) to include: Pt Education, HEP, therapeutic exercises, neuromuscular re-education, therapeutic activities, manual therapy, joint mobilizations, aquatic therapy and modalities PRN to achieve established goals. Pt may be seen by PTA as part of the rehabilitation team.     Abdias Solares, PT

## 2019-03-28 ENCOUNTER — CLINICAL SUPPORT (OUTPATIENT)
Dept: REHABILITATION | Facility: HOSPITAL | Age: 49
End: 2019-03-28
Attending: FAMILY MEDICINE
Payer: COMMERCIAL

## 2019-03-28 DIAGNOSIS — Z74.09 DECREASED MOBILITY AND ENDURANCE: ICD-10-CM

## 2019-03-28 DIAGNOSIS — R26.89 IMPAIRMENT OF BALANCE: ICD-10-CM

## 2019-03-28 DIAGNOSIS — R29.898 BILATERAL LEG WEAKNESS: ICD-10-CM

## 2019-03-28 PROCEDURE — 97112 NEUROMUSCULAR REEDUCATION: CPT | Mod: PO

## 2019-03-28 PROCEDURE — 97110 THERAPEUTIC EXERCISES: CPT | Mod: PO

## 2019-03-28 NOTE — PROGRESS NOTES
"  Physical Therapy Daily Treatment Note     Name: Jillian Osullivan  Clinic Number: [id number]    Therapy Diagnosis:   Encounter Diagnoses   Name Primary?    Bilateral leg weakness     Impairment of balance     Decreased mobility and endurance      Physician: Dedrick, Provider    Visit Date: 3/28/2019  Physician Orders: PT Eval and Treat   Medical Diagnosis from Referral: multiple sclerosis, abnormality of gait, atypical depressive disorder  Evaluation Date: 3/1/2019  Authorization Period Expiration: 3/1/2019 to 12/31/2019  Plan of Care Expiration: 4/26/2019  Visit # / Visits authorized: 7/ 20    Time In: 0900  Time Out: 0945  Total Billable Time: 45 minutes    Precautions: Standard, Fall and impaired vision, emotional deficits    Subjective       Pt reports: she has a HA. Pt reports she is still not sleeping well.  Pt reports she needs to get her CPAP adjusted today.   She states she was compliant with her HEP.  Response to previous treatment: no adverse effects  Functional change: ongoing    Pain: 8/10 to head    Objective       Pt ambulates with SC mod I from lobby to gym( ~ 150 feet) and vice-versa. Pt demonstrates ER of feet, slow gait pattern with improved knee extension during stance phase.  Pt also ambulates with wide RYAN. Pt demonstrating increased gait speed and fluidity on this date.      Fransisca received therapeutic exercises to develop strength, endurance, ROM, flexibility and posture for 10 minutes including:    X 9 min on SCI-FIT recumbent stepper using  B UE/B LE @ level 1.0 for improved UE/LE ROM, strength and CV endurance.      2 x 30" B LE standing gastroc stretch with use of wooden incline and rail for B UE support        Patient participated in neuromuscular re-education activities to improve: Balance, Coordination, Kinesthetic, Sense, Proprioception and Posture for 35 minutes. The following activities were included:         Standing balance in parallel bars:    On Foam pad:  2 x 30" static " "stand, no UE support, CGA( slight trembling)  2 x 30" B LE alternate tandem stance with no UE support, min A( pt struggles more with R foot behind)    On Foam fitter:  2 x 30" static stand, no UE support, CGA, EC, (min trembling)  1 x 10 B alternate SLS with toe tapping against medium cone, no UE support, min A     X 4 laps tandem ambulation with 1 UE support, CGA   X 2 laps forward marching with 3 " hold, B UE support, SBA/S  X 2 laps forward marching with 3 " hold, 1 UE support, CGA      Pt ascends and descends 16 six inch steps, non-reciprocal pattern with B rails for support, mod I( increased speed today)    Pt requires no seated rest breaks while performing above activities.      Home Exercises Provided and Patient Education Provided     Education provided: Sitting therex including: B LE heel and toe raises, hip abduction against peach theraband, hip adduction squeezes with ball, LAQ, hip flex.  PT also educated pt regarding proper placement of SC in her hand when performing sit<> stand trials.    Written Home Exercises Provided: yes. PT provided pt with written copy of the above today( 3/14/19).  PT also provided pt with piece of peach theraband. No updates to HEP on this date( 3/26/19)  Exercises were reviewed and Fransisca was able to demonstrate them prior to the end of the session.  Fransisca demonstrated good  understanding of the education provided.     See EMR under Media for exercises provided 3/14/19.    Assessment     Fransisca tolerated her therapy session very well today. She is mildly limited by HA today but this did not deter her from completing her full session.  Pt demonstrated improved gait speed as she entered and exited clinic with improved LE fluidity. Pt is also progressing with performance of balance activities in parallel bars.  She is demonstrating improved ability to perform tandem stance activities and is less anxious when balancing on unstable surfaces with EC condition.  Pt is also doing well with " SLS( performed on unstable surface of foam fitter today). Pt also did not require a seated rest period during performance of standing activities. PT continues to gain pt's trust and it appears pt feels very comfortable with this PT. While pt still takes increased time to perform most tasks, her movement speed and quality are increasing. Pt remains appropriate for OPPT to address her current deficits.  Continue with current POC.    Fransisca is progressing well towards her goals.   Pt prognosis is Fair.     Pt will continue to benefit from skilled outpatient physical therapy to address the deficits listed in the problem list box on initial evaluation, provide pt/family education and to maximize pt's level of independence in the home and community environment.     Pt's spiritual, cultural and educational needs considered and pt agreeable to plan of care and goals.     Anticipated barriers to physical therapy: emotional deficits, anxiety regarding balance training    Goals: Pt's spiritual, cultural and educational needs considered and pt agreeable to plan of care and goals as stated below:      GOALS:   Short term goals: 4 weeks, pt agrees to goals set.  1. Pt to be issued HEP and report compliance~ in progress  2. Pt to improve 30 second chair rise to 10-11 trials to demonstrate improvement with this transition~progressing  3. Pt to improve SSWS to 1.0m/sec for improved community ambulation with decreased fall risk~progressing  4. Pt to improve MCTSIB score on condition # 3 to 6 seconds for improved balance on unstable surfaces~progressing  5. Pt to perform SLS on either limb x 2-3 seconds for improved balance and decreased fall risk~progressing     Long term goals: 8 weeks, pt agrees to goals set  6. Pt (I) with HEP~progressing  7. Pt to improve 30 second chair rise to 12-13 trials to demonstrate improvement with this transition~progressing  8. Pt to improve SSWS to 1.2 m/sec for improved community ambulation with  decreased fall risk~progressing  9. Pt to improve MCTSIB score on condition # 3 to 9 seconds for improved balance on unstable surfaces~progressing   10. Pt to perform SLS on either limb x 4-5 seconds for improved balance and decreased fall risk~ ~progressing  Plan     Outpatient physical therapy 2 times weekly for 8 weeks( until 4/26/2019) to include: Pt Education, HEP, therapeutic exercises, neuromuscular re-education, therapeutic activities, manual therapy, joint mobilizations, aquatic therapy and modalities PRN to achieve established goals. Pt may be seen by PTA as part of the rehabilitation team.     Abdias Solares, PT

## 2019-04-02 ENCOUNTER — CLINICAL SUPPORT (OUTPATIENT)
Dept: REHABILITATION | Facility: HOSPITAL | Age: 49
End: 2019-04-02
Attending: FAMILY MEDICINE
Payer: COMMERCIAL

## 2019-04-02 DIAGNOSIS — Z74.09 DECREASED MOBILITY AND ENDURANCE: ICD-10-CM

## 2019-04-02 DIAGNOSIS — R29.898 BILATERAL LEG WEAKNESS: ICD-10-CM

## 2019-04-02 DIAGNOSIS — R26.89 IMPAIRMENT OF BALANCE: ICD-10-CM

## 2019-04-02 PROCEDURE — 97112 NEUROMUSCULAR REEDUCATION: CPT | Mod: PO

## 2019-04-02 PROCEDURE — 97110 THERAPEUTIC EXERCISES: CPT | Mod: PO

## 2019-04-02 NOTE — PROGRESS NOTES
"  Physical Therapy Daily Treatment Note     Name: Jillian Osullivan  Clinic Number: [id number]    Therapy Diagnosis:   Encounter Diagnoses   Name Primary?    Bilateral leg weakness     Impairment of balance     Decreased mobility and endurance      Physician: Chayito Tse    Visit Date: 4/2/2019  Physician Orders: PT Eval and Treat   Medical Diagnosis from Referral: multiple sclerosis, abnormality of gait, atypical depressive disorder  Evaluation Date: 3/1/2019  Authorization Period Expiration: 3/1/2019 to 12/31/2019  Plan of Care Expiration: 4/26/2019  Visit # / Visits authorized: 8/ 20    Time In: 0900  Time Out: 0945  Total Billable Time: 45 minutes    Precautions: Standard, Fall and impaired vision, emotional deficits    Subjective       Pt reports: her hidradenitis is coming back and is hurting in the area of her R inner thigh. " I'm not having a good day."   She states she was compliant with her HEP.  Response to previous treatment: no adverse effects  Functional change: ongoing    Pain: 7.5/10 to R inner thigh    Objective       Pt ambulates with SC mod I from lobby to gym( ~ 150 feet) and vice-versa. Pt demonstrates ER of feet, slow gait pattern with improved knee extension during stance phase.  Pt also ambulates with wide RYAN.       Fransisca received therapeutic exercises to develop strength, endurance, ROM, flexibility and posture for 10 minutes including:    X 9 min on SCI-FIT recumbent stepper using  B UE/B LE @ level 1.0 for improved UE/LE ROM, strength and CV endurance.      2 x 30" B LE standing gastroc stretch with use of wooden incline and rail for B UE support        Patient participated in neuromuscular re-education activities to improve: Balance, Coordination, Kinesthetic, Sense, Proprioception and Posture for 35 minutes. The following activities were included:         Standing balance at ballet bar:    On Foam pad:  2 x 30" static stand, no UE support, CGA( slight trembling)  1 x 30" B " "LE alternate tandem stance with 1 finger support, min A( moderate trembling)    On Foam fitter:  1 x 30" static stand, no UE support, EO, CGA  1 x 30" static stand, no UE support, EC, CGA/min A (min trembling)  1 x 10 B LE alternate step ups , no UE support, CGA  1 x 10 B LE alternate step overs, no UE support, min A  2 x 30" B LE alternate rolling blue ball forward and back with 1 finger support, CGA    Standing balance in parallel bars:    Firm ground:  1 x 30" B LE alternate tandem standing, no UE support, EO, CGA/min A( min trembling)  1 x 15" B LE alternate SLS with 2 finger support, CGA( min trembling and pt uses contralateral leg for balance)    Pt ascends and descends 20 six inch steps, reciprocal/non-reciprocal pattern( generally reciprocal to ascend and non-reciprocal to descend) with B rails for support, mod I    Pt requires no seated rest breaks while performing above activities.      Home Exercises Provided and Patient Education Provided     Education provided: Sitting therex including: B LE heel and toe raises, hip abduction against peach theraband, hip adduction squeezes with ball, LAQ, hip flex.  PT also educated pt regarding proper placement of SC in her hand when performing sit<> stand trials.    Written Home Exercises Provided: yes. PT provided pt with written copy of the above today( 3/14/19).  PT also provided pt with piece of peach theraband. No updates to HEP on this date( 4/2/19)  Exercises were reviewed and Fransisca was able to demonstrate them prior to the end of the session.  Fransisca demonstrated good  understanding of the education provided.     See EMR under Media for exercises provided 3/14/19.    Assessment     Fransisca tolerated her therapy session fairly well today. She is limited by hidradenitis to R inner thigh and accompanying pain. Pt also seems to be generally having a more difficult day in terms of her MS.  Pt is not moving as smoothly or as quickly as she did the previous session.  Pt " was able to progress some with balance activities, as these were performed mostly near ballet bar( instead of parallel bars).  Pt remains challenged by her anxiety, though this is somewhat offset by pt's comfort level with this therapist. Pt is willing to try new balance challenges, though she generally needs some encouragement. Tandem standing on foam a bit more difficult compared to previous session. However, pt did not require any seated rest breaks during  performance of all balance tasks and increased her number of stairs negotiated to 20.  Pt remains appropriate for OPPT to address her current deficits.  Continue with current POC.    Fransisca is progressing well towards her goals.   Pt prognosis is Fair.     Pt will continue to benefit from skilled outpatient physical therapy to address the deficits listed in the problem list box on initial evaluation, provide pt/family education and to maximize pt's level of independence in the home and community environment.     Pt's spiritual, cultural and educational needs considered and pt agreeable to plan of care and goals.     Anticipated barriers to physical therapy: emotional deficits, anxiety regarding balance training    Goals: Pt's spiritual, cultural and educational needs considered and pt agreeable to plan of care and goals as stated below:      GOALS:   Short term goals: 4 weeks, pt agrees to goals set.  1. Pt to be issued HEP and report compliance~ in progress  2. Pt to improve 30 second chair rise to 10-11 trials to demonstrate improvement with this transition~progressing  3. Pt to improve SSWS to 1.0m/sec for improved community ambulation with decreased fall risk~progressing  4. Pt to improve MCTSIB score on condition # 3 to 6 seconds for improved balance on unstable surfaces~progressing  5. Pt to perform SLS on either limb x 2-3 seconds for improved balance and decreased fall risk~progressing     Long term goals: 8 weeks, pt agrees to goals set  6. Pt (I) with  HEP~progressing  7. Pt to improve 30 second chair rise to 12-13 trials to demonstrate improvement with this transition~progressing  8. Pt to improve SSWS to 1.2 m/sec for improved community ambulation with decreased fall risk~progressing  9. Pt to improve MCTSIB score on condition # 3 to 9 seconds for improved balance on unstable surfaces~progressing   10. Pt to perform SLS on either limb x 4-5 seconds for improved balance and decreased fall risk~ ~progressing  Plan     Outpatient physical therapy 2 times weekly for 8 weeks( until 4/26/2019) to include: Pt Education, HEP, therapeutic exercises, neuromuscular re-education, therapeutic activities, manual therapy, joint mobilizations, aquatic therapy and modalities PRN to achieve established goals. Pt may be seen by PTA as part of the rehabilitation team.     Abdias Solares, PT

## 2019-04-04 ENCOUNTER — CLINICAL SUPPORT (OUTPATIENT)
Dept: REHABILITATION | Facility: HOSPITAL | Age: 49
End: 2019-04-04
Attending: FAMILY MEDICINE
Payer: COMMERCIAL

## 2019-04-04 DIAGNOSIS — Z74.09 DECREASED MOBILITY AND ENDURANCE: ICD-10-CM

## 2019-04-04 DIAGNOSIS — R26.89 IMPAIRMENT OF BALANCE: ICD-10-CM

## 2019-04-04 DIAGNOSIS — R29.898 BILATERAL LEG WEAKNESS: ICD-10-CM

## 2019-04-04 PROCEDURE — 97110 THERAPEUTIC EXERCISES: CPT | Mod: PO

## 2019-04-04 PROCEDURE — 97112 NEUROMUSCULAR REEDUCATION: CPT | Mod: PO

## 2019-04-04 NOTE — PROGRESS NOTES
"  Physical Therapy Daily Treatment Note     Name: Jillian Osullivan  Clinic Number: [id number]    Therapy Diagnosis:   Encounter Diagnoses   Name Primary?    Bilateral leg weakness     Impairment of balance     Decreased mobility and endurance      Physician: Dedrick, Provider    Visit Date: 4/4/2019  Physician Orders: PT Eval and Treat   Medical Diagnosis from Referral: multiple sclerosis, abnormality of gait, atypical depressive disorder  Evaluation Date: 3/1/2019  Authorization Period Expiration: 3/1/2019 to 12/31/2019  Plan of Care Expiration: 4/26/2019  Visit # / Visits authorized: 9/ 20    Time In: 0900  Time Out: 0945  Total Billable Time: 45 minutes    Precautions: Standard, Fall and impaired vision, emotional deficits    Subjective       Pt reports: she feels tired today.  Pt also still reports pain due to her hidradenitis at her R inner thigh.  Pt states she treats these lesions with topical medicine.   She states she was compliant with her HEP.  Response to previous treatment: no adverse effects  Functional change: ongoing    Pain: 8/10 to R inner thigh    Objective       Pt ambulates with SC mod I from lobby to gym( ~ 150 feet) and vice-versa. Pt demonstrates ER of feet, slow gait pattern with improved knee extension during stance phase.  Pt also ambulates with wide RYAN.       Fransisca received therapeutic exercises to develop strength, endurance, ROM, flexibility and posture for 9 minutes including:    X 8 min on SCI-FIT recumbent stepper using  B UE/B LE @ level 1.0 for improved UE/LE ROM, strength and CV endurance.      2 x 30" B LE standing gastroc stretch with use of wooden incline and rail for B UE support        Patient participated in neuromuscular re-education activities to improve: Balance, Coordination, Kinesthetic, Sense, Proprioception and Posture for 36 minutes. The following activities were included:         Postural control:  MCTSIB:  1. Eyes Open/feet together/Firm: 30 seconds, P with " "no significant sway  2. Eyes Closed/feet together/Firm: 30 seconds, P with min sway  3. Eyes Open/feet together/Foam: 30 seconds, P with min sway  4. Eyes Closed/feet together/Foam: 20 seconds, F, min sway and mild anxiety noted      Standing balance in parallel bars:    On more stable rocker board:  2 x 30" working board in M/L direction, 2 finger support, CGA  2 x 30" holding board in horizontal plane, no UE support, min A    2 x 30" working board in A/P direction, 2 finger support, CGA  2 x 30" holding board in horizontal plane, no UE support, CGA    X 4 laps stepping over obstacles ( 5 yoga blocks) with light UE support on bars, S    Pt ascends and descends 12 six inch steps with B rails, mod I and use of non-reciprocal pattern.    30 second Chair Rise 10 completed with arms     PT also tested pt's SSWS with use of SC = 0.75 m/sec (6m/8sec)        Home Exercises Provided and Patient Education Provided     Education provided: Sitting therex including: B LE heel and toe raises, hip abduction against peach theraband, hip adduction squeezes with ball, LAQ, hip flex.  PT also educated pt regarding proper placement of SC in her hand when performing sit<> stand trials.    Written Home Exercises Provided: yes. PT provided pt with written copy of the above today( 3/14/19).  PT also provided pt with piece of peach theraband. No updates to HEP on this date( 4/4/19).  Exercises were reviewed and Fransisca was able to demonstrate them prior to the end of the session.  Fransisca demonstrated good  understanding of the education provided.     See EMR under Media for exercises provided 3/14/19.    Assessment     Fransisca tolerated her therapy session fairly today. Pt appeared to be more limited by fatigue and lethargy and is even more bothered by lesions to her R inner thigh due to hidradenitis.  Pt's last 2 visits seemed to be more difficult for her to complete in terms of her MS and the accompanying fatigue.  In today's informal " reassessment, pt demonstrated improved ability with 30 second chair rise and also passed 3 of 4 conditions on the MCTSIB.  Pt was able to perform condition 4( eyes closed on foam), though she did not pass.  This is still significant, as pt could not even attempt this condition at eval due to fear and anxiety. Not surprisingly, pt's SSWS was a bit slower today than at time of eval.  Pt remains willing to try new balance challenges, though she remains anxious when attempting progressively more difficult tasks. See below for most recent comment on goal achievement.  Pt remains appropriate for OPPT to address her current deficits.  Continue with current POC.    Fransisca is progressing well towards her goals.   Pt prognosis is Fair.     Pt will continue to benefit from skilled outpatient physical therapy to address the deficits listed in the problem list box on initial evaluation, provide pt/family education and to maximize pt's level of independence in the home and community environment.     Pt's spiritual, cultural and educational needs considered and pt agreeable to plan of care and goals.     Anticipated barriers to physical therapy: emotional deficits, anxiety regarding balance training    Goals: Pt's spiritual, cultural and educational needs considered and pt agreeable to plan of care and goals as stated below:      GOALS:   Short term goals: 4 weeks, pt agrees to goals set.  1. Pt to be issued HEP and report compliance~ in progress; Goal MET, 4/4/19  2. Pt to improve 30 second chair rise to 10-11 trials to demonstrate improvement with this transition~progressing; Goal MET, 4/4/19  3. Pt to improve SSWS to 1.0m/sec for improved community ambulation with decreased fall risk~progressing; remains appropriate  4. Pt to improve MCTSIB score on condition # 3 to 6 seconds for improved balance on unstable surfaces~progressing; Goal MET, 4/4/19  5. Pt to perform SLS on either limb x 2-3 seconds for improved balance and  decreased fall risk~progressing( not tested today due to fatigue and pain, 4/4/19)     Long term goals: 8 weeks, pt agrees to goals set  6. Pt (I) with HEP~progressing  7. Pt to improve 30 second chair rise to 12-13 trials to demonstrate improvement with this transition~progressing  8. Pt to improve SSWS to 1.2 m/sec for improved community ambulation with decreased fall risk~progressing  9. Pt to improve MCTSIB score on condition # 3 to 9 seconds for improved balance on unstable surfaces~progressing; Goal MET, 4/4/19   10. Pt to perform SLS on either limb x 4-5 seconds for improved balance and decreased fall risk~ ~progressing  Plan     Outpatient physical therapy 2 times weekly for 8 weeks( until 4/26/2019) to include: Pt Education, HEP, therapeutic exercises, neuromuscular re-education, therapeutic activities, manual therapy, joint mobilizations, aquatic therapy and modalities PRN to achieve established goals. Pt may be seen by PTA as part of the rehabilitation team.     Abdias Solares, PT

## 2019-04-06 ENCOUNTER — DOCUMENTATION ONLY (OUTPATIENT)
Dept: REHABILITATION | Facility: HOSPITAL | Age: 49
End: 2019-04-06

## 2019-04-06 NOTE — PROGRESS NOTES
PT/PTA met face to face to discuss pt's treatment plan and progress towards established goals. Continue with current PT POC. Pt will be seen by physical therapist at least every 6th treatment day or every 30 days, whichever occurs first.      Martín Ojeda, PTA  04/06/2019

## 2019-04-09 ENCOUNTER — CLINICAL SUPPORT (OUTPATIENT)
Dept: REHABILITATION | Facility: HOSPITAL | Age: 49
End: 2019-04-09
Attending: FAMILY MEDICINE
Payer: COMMERCIAL

## 2019-04-09 DIAGNOSIS — R26.89 IMPAIRMENT OF BALANCE: ICD-10-CM

## 2019-04-09 DIAGNOSIS — Z74.09 DECREASED MOBILITY AND ENDURANCE: ICD-10-CM

## 2019-04-09 DIAGNOSIS — R29.898 BILATERAL LEG WEAKNESS: ICD-10-CM

## 2019-04-09 PROCEDURE — 97112 NEUROMUSCULAR REEDUCATION: CPT | Mod: PO

## 2019-04-09 PROCEDURE — 97110 THERAPEUTIC EXERCISES: CPT | Mod: PO

## 2019-04-09 NOTE — PROGRESS NOTES
"  Physical Therapy Daily Treatment Note     Name: Jillian Osullivan  Clinic Number: [id number]    Therapy Diagnosis:   Encounter Diagnoses   Name Primary?    Bilateral leg weakness     Impairment of balance     Decreased mobility and endurance      Physician: Dedrick, Provider    Visit Date: 4/9/2019  Physician Orders: PT Eval and Treat   Medical Diagnosis from Referral: multiple sclerosis, abnormality of gait, atypical depressive disorder  Evaluation Date: 3/1/2019  Authorization Period Expiration: 3/1/2019 to 12/31/2019  Plan of Care Expiration: 4/26/2019  Visit # / Visits authorized: 10/ 20    Time In: 0850  Time Out: 0935  Total Billable Time: 45 minutes    Precautions: Standard, Fall and impaired vision, emotional deficits    Subjective       Pt reports: she stayed in bed all day Saturday.  Pt also still reports pain due to her hidradenitis at her R inner thigh.    She states she was compliant with her HEP.  Response to previous treatment: no adverse effects  Functional change: ongoing    Pain: 8.5/10 to R inner thigh    Objective       Pt ambulates with SC mod I from lobby to gym( ~ 150 feet) and vice-versa. Pt demonstrates ER of feet, slow gait pattern with improved knee extension during stance phase.  Pt also ambulates with wide RYAN.       Fransisca received therapeutic exercises to develop strength, endurance, ROM, flexibility and posture for 10 minutes including:    X 9 min on SCI-FIT recumbent stepper using  B UE/B LE @ level 1.0 for improved UE/LE ROM, strength and CV endurance( cues for improved motor output).      2 x 30" B LE standing gastroc stretch with use of wooden incline and rail for B UE support        Patient participated in neuromuscular re-education activities to improve: Balance, Coordination, Kinesthetic, Sense, Proprioception and Posture for 35 minutes. The following activities were included:         Standing balance in parallel bars:    X 4 laps tandem ambulation, no UE support( " "occasional touchdown), min/CGA with associated trembling  X 4 laps forward marching with 2-3 second hold, 2 finger support, CGA    1 x 10 alternate step overs on 6 inch block with light UE support, CGA    On foam pad:  1 x 30" static standing, no UE support, EO, SBA~ min trembling and ankle sway  1 x 30" static standing, no UE support, EC, CGA~ min trembling  1 x 10 alternate SLS with toe tapping against medium cone, no UE support, min/CGA    2 x 10 alternate LE soccer ball rolls( forward and backward) with 2 finger support, CGA/SBA    Pt ascends and descends 12 six inch steps with B rails, mod I and use of non-reciprocal pattern.      Home Exercises Provided and Patient Education Provided     Education provided: Sitting therex including: B LE heel and toe raises, hip abduction against peach theraband, hip adduction squeezes with ball, LAQ, hip flex.  PT also educated pt regarding proper placement of SC in her hand when performing sit<> stand trials.    Written Home Exercises Provided: yes. PT provided pt with written copy of the above today( 3/14/19).  PT also provided pt with piece of peach theraband. No updates to HEP on this date( 4/9/19).  Exercises were reviewed and Fransisca was able to demonstrate them prior to the end of the session.  Fransisca demonstrated good  understanding of the education provided.     See EMR under Media for exercises provided 3/14/19.    Assessment     Fransisca tolerated her therapy session fairly today. Pt continues to be limited by fatigue and pain due to lesions to her R inner thigh secondary to hidradenitis. Pt also seems to not be getting adequate rest at night time. In addition, pt's emotional deficits and anxiety are also limiting her ability to fully focus during PT sessions.  Pt is willing to try new balance challenges, though she remains anxious when attempting progressively more difficult tasks. Pt's movements also continue to be performed slowly in most cases. Pt remains appropriate " for OPPT to address her current balance, strength and endurance deficits.  Continue with current POC.    Fransisca is progressing well towards her goals.   Pt prognosis is Fair.     Pt will continue to benefit from skilled outpatient physical therapy to address the deficits listed in the problem list box on initial evaluation, provide pt/family education and to maximize pt's level of independence in the home and community environment.     Pt's spiritual, cultural and educational needs considered and pt agreeable to plan of care and goals.     Anticipated barriers to physical therapy: emotional deficits, anxiety regarding balance training    Goals: Pt's spiritual, cultural and educational needs considered and pt agreeable to plan of care and goals as stated below:      GOALS:   Short term goals: 4 weeks, pt agrees to goals set.  1. Pt to be issued HEP and report compliance~ in progress; Goal MET, 4/4/19  2. Pt to improve 30 second chair rise to 10-11 trials to demonstrate improvement with this transition~progressing; Goal MET, 4/4/19  3. Pt to improve SSWS to 1.0m/sec for improved community ambulation with decreased fall risk~progressing; remains appropriate  4. Pt to improve MCTSIB score on condition # 3 to 6 seconds for improved balance on unstable surfaces~progressing; Goal MET, 4/4/19  5. Pt to perform SLS on either limb x 2-3 seconds for improved balance and decreased fall risk~progressing( not tested today due to fatigue and pain, 4/4/19)     Long term goals: 8 weeks, pt agrees to goals set  6. Pt (I) with HEP~progressing  7. Pt to improve 30 second chair rise to 12-13 trials to demonstrate improvement with this transition~progressing  8. Pt to improve SSWS to 1.2 m/sec for improved community ambulation with decreased fall risk~progressing  9. Pt to improve MCTSIB score on condition # 3 to 9 seconds for improved balance on unstable surfaces~progressing; Goal MET, 4/4/19   10. Pt to perform SLS on either limb x 4-5  seconds for improved balance and decreased fall risk~ ~progressing  Plan     Continue working on progressively more challenging balance tasks in parallel bars, including use of compliant surfaces. Plan also to focus on increasing conditions which require SLS with progressively decreased reliance on UE support.      Abdias Solares, PT

## 2019-04-10 NOTE — PROGRESS NOTES
"  Physical Therapy Daily Treatment Note     Name: Jillian Osullivan  Clinic Number: [id number]    Therapy Diagnosis:   Encounter Diagnoses   Name Primary?    Bilateral leg weakness     Impairment of balance     Decreased mobility and endurance      Physician: Dedrick, Provider    Visit Date: 4/11/2019  Physician Orders: PT Eval and Treat   Medical Diagnosis from Referral: multiple sclerosis, abnormality of gait, atypical depressive disorder  Evaluation Date: 3/1/2019  Authorization Period Expiration: 3/1/2019 to 12/31/2019  Plan of Care Expiration: 4/26/2019  Visit # / Visits authorized: 11/ 20    Time In: 0900  Time Out: 0945  Total Billable Time: 45 minutes    Precautions: Standard, Fall and impaired vision, emotional deficits    Subjective       Pt reports: she didn't sleep well last night.   She states she was compliant with her HEP.  Response to previous treatment: no adverse effects  Functional change: ongoing    Pain: 8.5/10 to R inner thigh. Pt also c/o intermittent abdominal pain which is not rated.    Objective       Pt ambulates with SC mod I from lobby to gym( ~ 150 feet) and vice-versa. Pt demonstrates ER of feet, slow gait pattern with improved knee extension during stance phase.  Pt also ambulates with wide RYAN.       Fransisca received therapeutic exercises to develop strength, endurance, ROM, flexibility and posture for 10 minutes including:    X 9 min on SCI-FIT recumbent stepper using  B UE/B LE @ level 1.0 for improved UE/LE ROM, strength and CV endurance( cues for improved motor output).      2 x 30" B LE standing gastroc stretch with use of wooden incline and rail for B UE support        Patient participated in neuromuscular re-education activities to improve: Balance, Coordination, Kinesthetic, Sense, Proprioception and Posture for 35 minutes. The following activities were included:         Standing balance in parallel bars:    X 4 laps tandem ambulation, 2 finger support, CGA   X 4 laps " "forward marching with 3 second hold, 2 finger support, CGA      On foam pad:  1 x 30" static standing, no UE support, EO, SBA~ min trembling and ankle sway  1 x 30" static standing, no UE support, EC, CGA~ min trembling  2 x 30" B alternate tandem standing, no UE support, CGA/min A~ min trembling      On foam fitter:  1 x 10 alternate B LE SLS with toe tapping against medium cone, no UE support, min/CGA  1 x 30" standing while holding yellow therapy ball in outstretched arms, CGA  1 x 30" standing while lifting ball up and down, CGA  1 x 30" standing while rotating trunk to R and L while holding ball, CGA    On Bosu( soft side up):  2 x 30" static stand, B UE support, CGA with moderate trembling noted        Home Exercises Provided and Patient Education Provided     Education provided: Sitting therex including: B LE heel and toe raises, hip abduction against peach theraband, hip adduction squeezes with ball, LAQ, hip flex.  PT also educated pt regarding proper placement of SC in her hand when performing sit<> stand trials.    Written Home Exercises Provided: yes. PT provided pt with written copy of the above today( 3/14/19).  PT also provided pt with piece of peach theraband. No updates to HEP on this date( 4/11/19).  Exercises were reviewed and Fransisca was able to demonstrate them prior to the end of the session.  Fransisca demonstrated good  understanding of the education provided.     See EMR under Media for exercises provided 3/14/19.    Assessment     Fransisca tolerated her therapy session fairly today. Pt continues to be limited by fatigue and pain due to lesions to her R inner thigh secondary to hidradenitis. Pt also presented to clinic today with a new complaint of abdominal pain, which pt did not rate on intensity scale. Pt is not sleeping well due to presence of C-PAP.  Despite all of pt's physical and emotional challenges, she is willing to try new balance challenges. A good example of this is her willingness to " stand on Bosu with soft side up.  Though pt demonstrated some anxiety here, she was able to complete 2 standing trials of 30 seconds( albeit with UE support on bars). Pt remains appropriate for OPPT to address her current balance, strength and endurance deficits.  Continue with current POC.    Fransisca is progressing well towards her goals.   Pt prognosis is Fair.     Pt will continue to benefit from skilled outpatient physical therapy to address the deficits listed in the problem list box on initial evaluation, provide pt/family education and to maximize pt's level of independence in the home and community environment.     Pt's spiritual, cultural and educational needs considered and pt agreeable to plan of care and goals.     Anticipated barriers to physical therapy: emotional deficits, anxiety regarding balance training    Goals: Pt's spiritual, cultural and educational needs considered and pt agreeable to plan of care and goals as stated below:      GOALS:   Short term goals: 4 weeks, pt agrees to goals set.  1. Pt to be issued HEP and report compliance~ in progress; Goal MET, 4/4/19  2. Pt to improve 30 second chair rise to 10-11 trials to demonstrate improvement with this transition~progressing; Goal MET, 4/4/19  3. Pt to improve SSWS to 1.0m/sec for improved community ambulation with decreased fall risk~progressing; remains appropriate  4. Pt to improve MCTSIB score on condition # 3 to 6 seconds for improved balance on unstable surfaces~progressing; Goal MET, 4/4/19  5. Pt to perform SLS on either limb x 2-3 seconds for improved balance and decreased fall risk~progressing( not tested today due to fatigue and pain, 4/4/19)     Long term goals: 8 weeks, pt agrees to goals set  6. Pt (I) with HEP~progressing  7. Pt to improve 30 second chair rise to 12-13 trials to demonstrate improvement with this transition~progressing  8. Pt to improve SSWS to 1.2 m/sec for improved community ambulation with decreased fall  risk~progressing  9. Pt to improve MCTSIB score on condition # 3 to 9 seconds for improved balance on unstable surfaces~progressing; Goal MET, 4/4/19   10. Pt to perform SLS on either limb x 4-5 seconds for improved balance and decreased fall risk~ ~progressing  Plan     Continue working on progressively more challenging balance tasks in parallel bars, including use of compliant surfaces. Plan also to focus on increasing conditions which require SLS with progressively decreased reliance on UE support. Continue introduction of Bosu with plan to gradually reduce UE support.      Abdias Solares, PT

## 2019-04-11 ENCOUNTER — CLINICAL SUPPORT (OUTPATIENT)
Dept: REHABILITATION | Facility: HOSPITAL | Age: 49
End: 2019-04-11
Attending: FAMILY MEDICINE
Payer: COMMERCIAL

## 2019-04-11 DIAGNOSIS — R26.89 IMPAIRMENT OF BALANCE: ICD-10-CM

## 2019-04-11 DIAGNOSIS — R29.898 BILATERAL LEG WEAKNESS: ICD-10-CM

## 2019-04-11 DIAGNOSIS — Z74.09 DECREASED MOBILITY AND ENDURANCE: ICD-10-CM

## 2019-04-11 PROCEDURE — 97112 NEUROMUSCULAR REEDUCATION: CPT | Mod: PO

## 2019-04-11 PROCEDURE — 97110 THERAPEUTIC EXERCISES: CPT | Mod: PO

## 2019-04-12 ENCOUNTER — OFFICE VISIT (OUTPATIENT)
Dept: PSYCHIATRY | Facility: CLINIC | Age: 49
End: 2019-04-12
Payer: COMMERCIAL

## 2019-04-12 VITALS
DIASTOLIC BLOOD PRESSURE: 71 MMHG | BODY MASS INDEX: 32.64 KG/M2 | WEIGHT: 177.38 LBS | HEIGHT: 62 IN | HEART RATE: 61 BPM | SYSTOLIC BLOOD PRESSURE: 118 MMHG

## 2019-04-12 DIAGNOSIS — F40.298 SPECIFIC PHOBIA: ICD-10-CM

## 2019-04-12 DIAGNOSIS — F42.9 OBSESSIVE-COMPULSIVE DISORDER, UNSPECIFIED TYPE: ICD-10-CM

## 2019-04-12 DIAGNOSIS — K58.8 OTHER IRRITABLE BOWEL SYNDROME: ICD-10-CM

## 2019-04-12 DIAGNOSIS — F41.1 GENERALIZED ANXIETY DISORDER: ICD-10-CM

## 2019-04-12 DIAGNOSIS — F40.01 PANIC DISORDER WITH AGORAPHOBIA: ICD-10-CM

## 2019-04-12 DIAGNOSIS — F32.1 MDD (MAJOR DEPRESSIVE DISORDER), SINGLE EPISODE, MODERATE: Primary | ICD-10-CM

## 2019-04-12 DIAGNOSIS — G25.81 RLS (RESTLESS LEGS SYNDROME): ICD-10-CM

## 2019-04-12 DIAGNOSIS — F33.2 SEVERE EPISODE OF RECURRENT MAJOR DEPRESSIVE DISORDER, WITHOUT PSYCHOTIC FEATURES: ICD-10-CM

## 2019-04-12 DIAGNOSIS — G47.33 OSA (OBSTRUCTIVE SLEEP APNEA): ICD-10-CM

## 2019-04-12 DIAGNOSIS — F41.1 GAD (GENERALIZED ANXIETY DISORDER): ICD-10-CM

## 2019-04-12 PROCEDURE — 90836 PR PSYCHOTHERAPY W/PATIENT W/E&M, 45 MIN (ADD ON): ICD-10-PCS | Mod: S$GLB,,, | Performed by: PSYCHIATRY & NEUROLOGY

## 2019-04-12 PROCEDURE — 3078F DIAST BP <80 MM HG: CPT | Mod: CPTII,S$GLB,, | Performed by: PSYCHIATRY & NEUROLOGY

## 2019-04-12 PROCEDURE — 3008F PR BODY MASS INDEX (BMI) DOCUMENTED: ICD-10-PCS | Mod: CPTII,S$GLB,, | Performed by: PSYCHIATRY & NEUROLOGY

## 2019-04-12 PROCEDURE — 99999 PR PBB SHADOW E&M-EST. PATIENT-LVL III: ICD-10-PCS | Mod: PBBFAC,,, | Performed by: PSYCHIATRY & NEUROLOGY

## 2019-04-12 PROCEDURE — 3078F PR MOST RECENT DIASTOLIC BLOOD PRESSURE < 80 MM HG: ICD-10-PCS | Mod: CPTII,S$GLB,, | Performed by: PSYCHIATRY & NEUROLOGY

## 2019-04-12 PROCEDURE — 3074F PR MOST RECENT SYSTOLIC BLOOD PRESSURE < 130 MM HG: ICD-10-PCS | Mod: CPTII,S$GLB,, | Performed by: PSYCHIATRY & NEUROLOGY

## 2019-04-12 PROCEDURE — 3074F SYST BP LT 130 MM HG: CPT | Mod: CPTII,S$GLB,, | Performed by: PSYCHIATRY & NEUROLOGY

## 2019-04-12 PROCEDURE — 99213 OFFICE O/P EST LOW 20 MIN: CPT | Mod: S$GLB,,, | Performed by: PSYCHIATRY & NEUROLOGY

## 2019-04-12 PROCEDURE — 90836 PSYTX W PT W E/M 45 MIN: CPT | Mod: S$GLB,,, | Performed by: PSYCHIATRY & NEUROLOGY

## 2019-04-12 PROCEDURE — 3008F BODY MASS INDEX DOCD: CPT | Mod: CPTII,S$GLB,, | Performed by: PSYCHIATRY & NEUROLOGY

## 2019-04-12 PROCEDURE — 99213 PR OFFICE/OUTPT VISIT, EST, LEVL III, 20-29 MIN: ICD-10-PCS | Mod: S$GLB,,, | Performed by: PSYCHIATRY & NEUROLOGY

## 2019-04-12 PROCEDURE — 99999 PR PBB SHADOW E&M-EST. PATIENT-LVL III: CPT | Mod: PBBFAC,,, | Performed by: PSYCHIATRY & NEUROLOGY

## 2019-04-12 RX ORDER — BUSPIRONE HYDROCHLORIDE 15 MG/1
22.5 TABLET ORAL 2 TIMES DAILY
Qty: 90 TABLET | Refills: 6 | Status: SHIPPED | OUTPATIENT
Start: 2019-04-12 | End: 2019-09-25 | Stop reason: SDUPTHER

## 2019-04-12 RX ORDER — CLONAZEPAM 0.5 MG/1
0.5 TABLET ORAL EVERY 6 HOURS
Qty: 120 TABLET | Refills: 5 | Status: SHIPPED | OUTPATIENT
Start: 2019-04-12 | End: 2019-09-25 | Stop reason: SDUPTHER

## 2019-04-12 RX ORDER — VENLAFAXINE HYDROCHLORIDE 75 MG/1
225 CAPSULE, EXTENDED RELEASE ORAL DAILY
Qty: 90 CAPSULE | Refills: 6 | Status: SHIPPED | OUTPATIENT
Start: 2019-04-12 | End: 2019-09-25 | Stop reason: SDUPTHER
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
## 2019-04-16 ENCOUNTER — CLINICAL SUPPORT (OUTPATIENT)
Dept: REHABILITATION | Facility: HOSPITAL | Age: 49
End: 2019-04-16
Attending: FAMILY MEDICINE
Payer: COMMERCIAL

## 2019-04-16 DIAGNOSIS — Z74.09 DECREASED MOBILITY AND ENDURANCE: ICD-10-CM

## 2019-04-16 DIAGNOSIS — R26.89 IMPAIRMENT OF BALANCE: ICD-10-CM

## 2019-04-16 DIAGNOSIS — R29.898 BILATERAL LEG WEAKNESS: ICD-10-CM

## 2019-04-16 PROCEDURE — 97112 NEUROMUSCULAR REEDUCATION: CPT | Mod: PO

## 2019-04-16 PROCEDURE — 97110 THERAPEUTIC EXERCISES: CPT | Mod: PO

## 2019-04-16 NOTE — PROGRESS NOTES
"  Physical Therapy Daily Treatment Note     Name: Jillian Osullivan  Clinic Number: [id number]    Therapy Diagnosis:   Encounter Diagnoses   Name Primary?    Bilateral leg weakness     Impairment of balance     Decreased mobility and endurance      Physician: Chayito Tse    Visit Date: 4/16/2019  Physician Orders: PT Eval and Treat   Medical Diagnosis from Referral: multiple sclerosis, abnormality of gait, atypical depressive disorder  Evaluation Date: 3/1/2019  Authorization Period Expiration: 3/1/2019 to 12/31/2019  Plan of Care Expiration: 4/26/2019  Visit # / Visits authorized: 12/ 20    Time In: 0900  Time Out: 0945  Total Billable Time: 45 minutes    Precautions: Standard, Fall and impaired vision, emotional deficits    Subjective       Pt reports: she went to her PCP yesterday and she is now being treated for UTI.  Pt does state her pain is better today.   She states she was compliant with her HEP.  Response to previous treatment: no adverse effects  Functional change: ongoing    Pain: 7.5/10 to R inner thigh. Pt also c/o intermittent abdominal pain which is not rated.    Objective       Pt ambulates with SC mod I from lobby to gym( ~ 150 feet) and vice-versa. Pt demonstrates ER of feet, slow gait pattern with improved knee extension during stance phase.  Pt also ambulates with wide RYAN.       Fransisca received therapeutic exercises to develop strength, endurance, ROM, flexibility and posture for 11 minutes including:    X 10 min on SCI-FIT recumbent stepper using  B UE/B LE @ level 1.0 for improved UE/LE ROM, strength and CV endurance( cues for improved motor output).      2 x 30" B LE standing gastroc stretch with use of wooden incline and rail for B UE support        Patient participated in neuromuscular re-education activities to improve: Balance, Coordination, Kinesthetic, Sense, Proprioception and Posture for 34 minutes. The following activities were included:         Standing balance in " "parallel bars:    X 4 laps tandem ambulation, 1 finger support, CGA   X 4 laps forward marching with 3 second hold, 1 finger support, CGA  X 4 laps stepping over 4 yoga blocks, light B UE support, S with cues for technique      On less stable rocker board:  2 x 30" working board in M/L direction, light UE support, CGA  1 x 30" balancing board in horizontal(M/L), no UE support, min/CGA  2 x 30" working board in A/P direction, light UE support, CGA  1 x 30" balancing board in horizontal(A/P), no UE support, min A      On Bosu( soft side up):  2 x 30" static stand, 1 UE support, CGA/min A with minimal trembling noted  1 x 10 B LE step ups with B UE support, CGA      Home Exercises Provided and Patient Education Provided     Education provided: Sitting therex including: B LE heel and toe raises, hip abduction against peach theraband, hip adduction squeezes with ball, LAQ, hip flex.  PT also educated pt regarding proper placement of SC in her hand when performing sit<> stand trials.    Written Home Exercises Provided: yes. PT provided pt with written copy of the above today( 3/14/19).  PT also provided pt with piece of peach theraband. No updates to HEP on this date( 4/16/19).  Exercises were reviewed and Fransisca was able to demonstrate them prior to the end of the session.  Fransisca demonstrated good  understanding of the education provided.     See EMR under Media for exercises provided 3/14/19.    Assessment     Fransisca tolerated her therapy session well today. Despite her report of being diagnosed with UTI, she seemed to feel better today and this showed in her performance.  Pt was able to progress tandem ambulation and forward marching in parallel bars with only 1 finger support.  Pt also tolerated less stable rocker board for balance training and exhibited minimal anxiety with this surface. Pt also stood on Bosu( soft side up) in parallel bars with only 1 UE support and minimal trembling.  Lastly, pt demonstrated good " ability to step over obstacles in parallel bars, even though pt utilized B UE support during performance of this task.  Despite all of pt's physical and emotional challenges, she is willing to try new balance challenges each session. Pt remains appropriate for additional OPPT visits to address her current balance, strength and endurance deficits.  Continue with current POC.    Fransisca is progressing well towards her goals.   Pt prognosis is Fair.     Pt will continue to benefit from skilled outpatient physical therapy to address the deficits listed in the problem list box on initial evaluation, provide pt/family education and to maximize pt's level of independence in the home and community environment.     Pt's spiritual, cultural and educational needs considered and pt agreeable to plan of care and goals.     Anticipated barriers to physical therapy: emotional deficits, anxiety regarding balance training    Goals: Pt's spiritual, cultural and educational needs considered and pt agreeable to plan of care and goals as stated below:      GOALS:   Short term goals: 4 weeks, pt agrees to goals set.  1. Pt to be issued HEP and report compliance~ in progress; Goal MET, 4/4/19  2. Pt to improve 30 second chair rise to 10-11 trials to demonstrate improvement with this transition~progressing; Goal MET, 4/4/19  3. Pt to improve SSWS to 1.0m/sec for improved community ambulation with decreased fall risk~progressing; remains appropriate  4. Pt to improve MCTSIB score on condition # 3 to 6 seconds for improved balance on unstable surfaces~progressing; Goal MET, 4/4/19  5. Pt to perform SLS on either limb x 2-3 seconds for improved balance and decreased fall risk~progressing( not tested today due to fatigue and pain, 4/4/19)     Long term goals: 8 weeks, pt agrees to goals set  6. Pt (I) with HEP~progressing  7. Pt to improve 30 second chair rise to 12-13 trials to demonstrate improvement with this transition~progressing  8. Pt to  improve SSWS to 1.2 m/sec for improved community ambulation with decreased fall risk~progressing  9. Pt to improve MCTSIB score on condition # 3 to 9 seconds for improved balance on unstable surfaces~progressing; Goal MET, 4/4/19   10. Pt to perform SLS on either limb x 4-5 seconds for improved balance and decreased fall risk~ ~progressing  Plan     Continue working on progressively more challenging balance tasks in parallel bars, including use of compliant surfaces. Plan also to focus on increasing conditions which require SLS with progressively decreased reliance on UE support. Continue introduction of Bosu with plan to gradually reduce UE support.      Abdias Solares, PT

## 2019-04-19 ENCOUNTER — DOCUMENTATION ONLY (OUTPATIENT)
Dept: REHABILITATION | Facility: HOSPITAL | Age: 49
End: 2019-04-19

## 2019-04-19 NOTE — PROGRESS NOTES
PT/PTA met face to face to discuss pt's treatment plan and progress towards established goals. Continue with current PT POC. Pt will be seen by physical therapist at least every 6th treatment day or every 30 days, whichever occurs first.      Martín Ojeda, PTA  04/19/2019

## 2019-04-23 ENCOUNTER — DOCUMENTATION ONLY (OUTPATIENT)
Dept: REHABILITATION | Facility: HOSPITAL | Age: 49
End: 2019-04-23

## 2019-04-23 NOTE — PROGRESS NOTES
Documentation Only/ No Show      Patient: Jillian Osullivan  Date of Session: 4/23/2019  Diagnosis: No diagnosis found.  MRN: [id number]  Jillian Osullivan did not attend his/her scheduled therapy appointment today. Fransisca did not call to cancel nor reschedule. This is the 1st appointment that she has not attended. Next appointment is scheduled for 4/26/19 will follow up with patient at that time. No charges have been posted today.     Mary Kate Merino, PTA  04/23/2019

## 2019-04-26 ENCOUNTER — CLINICAL SUPPORT (OUTPATIENT)
Dept: REHABILITATION | Facility: HOSPITAL | Age: 49
End: 2019-04-26
Attending: FAMILY MEDICINE
Payer: COMMERCIAL

## 2019-04-26 DIAGNOSIS — R26.89 IMPAIRMENT OF BALANCE: ICD-10-CM

## 2019-04-26 DIAGNOSIS — Z74.09 DECREASED MOBILITY AND ENDURANCE: ICD-10-CM

## 2019-04-26 DIAGNOSIS — R29.898 BILATERAL LEG WEAKNESS: ICD-10-CM

## 2019-04-26 PROCEDURE — 97110 THERAPEUTIC EXERCISES: CPT | Mod: PO

## 2019-04-26 PROCEDURE — 97112 NEUROMUSCULAR REEDUCATION: CPT | Mod: PO

## 2019-04-26 PROCEDURE — 97116 GAIT TRAINING THERAPY: CPT | Mod: PO

## 2019-04-26 NOTE — PROGRESS NOTES
Physical Therapy Daily Treatment Note     Name: Jillian Osullivan  Clinic Number: [id number]    Therapy Diagnosis:   Encounter Diagnoses   Name Primary?    Bilateral leg weakness     Impairment of balance     Decreased mobility and endurance      Physician: Dedrick, Provider    Visit Date: 4/26/2019  Physician Orders: PT Eval and Treat   Medical Diagnosis from Referral: multiple sclerosis, abnormality of gait, atypical depressive disorder  Evaluation Date: 3/1/2019  Authorization Period Expiration: 3/1/2019 to 12/31/2019  Plan of Care Expiration: 4/26/2019  Extended Plan of Care Expiration: 4/26/19 to 6/21/19  Visit # / Visits authorized: 13/ 20    Time In: 1345  Time Out: 1430  Total Billable Time: 45 minutes    Precautions: Standard, Fall and impaired vision, emotional deficits    Subjective       Pt reports: she got a shot of B12 today from her mother. Pt reports fatigue today; she also thinks her UTI is back.   She states she was compliant with her HEP.  Response to previous treatment: no adverse effects  Functional change: ongoing    Pain: 0/10     Objective       Pt ambulates with SC mod I from lobby to gym( ~ 150 feet) and vice-versa. Pt demonstrates ER of feet, slow gait pattern with improved knee extension during stance phase.  Pt also ambulates with wide RYAN.       Fransisca received therapeutic exercises to develop strength, endurance, ROM, flexibility and posture for 15 minutes including:    X 8 min on SCI-FIT recumbent stepper using  B UE/B LE @ level 1.0 for improved UE/LE ROM, strength and CV endurance( cues for improved motor output).        Lower Extremity Strength~ tested in sitting  Right LE  3/1/19 4/26/19 Left LE  3/1/19 4/26/19   Hip Flexion: 3-/5 3/5 Hip Flexion: 3/5 3/5   Hip Extension:  4/5 4-/5 Hip Extension: 4-/5 4/5   Hip Abduction: 4/5 4+/5 Hip Abduction: 4/5 4+/5   Hip Adduction: 4+/5 5/5 Hip Adduction 4+/5 5/5   Knee Extension: 3/5 4+/5 Knee Extension: 3+/5 4/5   Knee Flexion: 3+/5  4(-)/5 Knee Flexion: 4/5 4+/5   Ankle Dorsiflexion: 3/5 4+/5 Ankle Dorsiflexion: 4-/5 4/5   Ankle Plantarflexion: 4/5 4+/5 Ankle Plantarflexion: 4/5 4+/5               Patient participated in neuromuscular re-education activities to improve: Balance, Coordination, Kinesthetic, Sense, Proprioception and Posture for 20 minutes. The following activities were included:           Evaluation 4/26/19   Single Limb Stance R LE Unable to perform, fear  (<10 sec = HIGH FALL RISK) 6 seconds  (<10 sec = HIGH FALL RISK)   Single Limb Stance L LE Unable to perform, fear  (<10 sec = HIGH FALL RISK) 5 seconds  (<10 sec = HIGH FALL RISK)   30 second Chair Rise 9 completed with arms 9 completed with arms          Postural control:  MCTSIB:  1. Eyes Open/feet together/Firm: 30 seconds, P no sway, slight tremor  2. Eyes Closed/feet together/Firm: 30 seconds, P, with min sway  3. Eyes Open/feet together/Foam: 30 seconds, P with increased ankle sway   4. Eyes Closed/feet together/Foam: 10 seconds, F, increased sway and trembling      Standing balance in parallel bars:    X 4 laps tandem ambulation, 1 finger support, CGA ~ moderate trembling noted  X 2 laps forward marching with 3 second hold, 1 finger support, CGA~ moderate trembling noted    Patient participated in gait training activities to normalize gait pattern for 10 minutes. The following activities were included:   Gait belt used for safety. Assistive device:none, SC             Evaluation 4/26/19   Timed Up and Go 12 sec 12 sec   Self Selected Walking Speed 0.86 m/sec (6m/7s) 0.75 m/sec(6m/8s)   Fast Walking Speed 1.0 m/sec (6m/6s) 1.0m/sec(6m/6s)      Also included in above is time pt spends ambulating to and from gym.        Home Exercises Provided and Patient Education Provided     Education provided: Sitting therex including: B LE heel and toe raises, hip abduction against peach theraband, hip adduction squeezes with ball, LAQ, hip flex.  PT also educated pt regarding proper  placement of SC in her hand when performing sit<> stand trials.    Written Home Exercises Provided: yes. PT provided pt with written copy of the above today( 3/14/19).  PT also provided pt with piece of peach theraband. No updates to HEP on this date( 4/26/19).  Exercises were reviewed and Fransisca was able to demonstrate them prior to the end of the session.  Fransisca demonstrated good  understanding of the education provided.     See EMR under Media for exercises provided 3/14/19.    Assessment     Fransisca tolerated her formal reassessment well today. Though she perhaps did not feel particularly well today, pt demonstrated improvement in several areas.  First, pt was able to demonstrate improved MMT scores to both LEs when tested in sitting.  Pt also was able to perform SLS on each LE( which she could not do at time of eval due to fear and anxiety). However, her scores are still below 10 seconds here, which places her at risk for falls. Pt also passed 3 of 4 conditions on the MCTSIB.  Even though pt failed condition # 4( eyes closed on foam), this is a huge progression for her, as she was unable to even attempt this condition at time of eval. Unfortunately, pt did not demonstrate any change in her 30 second chair rise( 9 repetitions performed today and at eval) and pt's TUG score also remained the same as at time of eval( 12 seconds). Pt's SSWS declined slightly from 0.86 m/sec to 0.75 m/sec, and her FSWS remained at 1.0 m/sec.  Beyond all the above tests and measures, pt has demonstrated a willingness to try new balance tasks and she is also learning to perform these with zero to light, 1 finger support at times. Pt remains appropriate for additional OPPT visits to address remaining balance and strength deficits.  PT would like to extend plan of care until 6/21/19.  See below for most recent updates regarding goal achievement and any revisions.    Fransisca is progressing well towards her goals.   Pt prognosis is Fair.     Pt  will continue to benefit from skilled outpatient physical therapy to address the deficits listed in the problem list box on initial evaluation, provide pt/family education and to maximize pt's level of independence in the home and community environment.     Pt's spiritual, cultural and educational needs considered and pt agreeable to plan of care and goals.     Anticipated barriers to physical therapy: emotional deficits, anxiety regarding balance training    Goals: Pt's spiritual, cultural and educational needs considered and pt agreeable to plan of care and goals as stated below:      GOALS:   Short term goals: 4 weeks, pt agrees to goals set.  1. Pt to be issued HEP and report compliance~ in progress; Goal MET, 4/4/19  2. Pt to improve 30 second chair rise to 10-11 trials to demonstrate improvement with this transition~progressing; Goal MET, 4/4/19; Goal not MET on 4/26/19~ remains appropriate  3. Pt to improve SSWS to 1.0m/sec for improved community ambulation with decreased fall risk~progressing; remains appropriate  4. Pt to improve MCTSIB score on condition # 3 to 6 seconds for improved balance on unstable surfaces~progressing; Goal MET, 4/4/19  5. Pt to perform SLS on either limb x 2-3 seconds for improved balance and decreased fall risk~progressing( not tested today due to fatigue and pain, 4/4/19); Goal MET 4/26/19     Long term goals: 8 weeks, pt agrees to goals set  6. Pt (I) with HEP~progressing  7. Pt to improve 30 second chair rise to 12-13 trials to demonstrate improvement with this transition~progressing. Revise this goal to 11-12 trials, 4/26/19  8. Pt to improve SSWS to 1.2 m/sec for improved community ambulation with decreased fall risk~progressing  9. Pt to improve MCTSIB score on condition # 3 to 9 seconds for improved balance on unstable surfaces~progressing; Goal MET, 4/4/19.     10. Pt to perform SLS on either limb x 4-5 seconds for improved balance and decreased fall risk~ ~progressing;  Goal MET, 4/26/19.  Revise goal to perform SLS on either limb x 7-8 seconds, 4/26/19   11. NEW GOAL( 4/26/19):  Pt to tolerate 15 seconds during condition # 4 of the MCTSIB( foam with eyes closed)  Plan     Continue outpatient physical therapy 2 x weekly x 8 weeks under updated Plan of Care, 4/26/19 to 6/21/19, with treatment to include: pt education, HEP, therapeutic exercises, neuromuscular re-education/balance exercises, therapeutic activities, joint mobilizations, and modalities PRN, to work towards established goals. Pt may be seen by PTA to carry out plan of care.           Abdias Solares, PT

## 2019-04-30 ENCOUNTER — CLINICAL SUPPORT (OUTPATIENT)
Dept: REHABILITATION | Facility: HOSPITAL | Age: 49
End: 2019-04-30
Attending: FAMILY MEDICINE
Payer: COMMERCIAL

## 2019-04-30 DIAGNOSIS — Z74.09 DECREASED MOBILITY AND ENDURANCE: ICD-10-CM

## 2019-04-30 DIAGNOSIS — R29.898 BILATERAL LEG WEAKNESS: ICD-10-CM

## 2019-04-30 DIAGNOSIS — R26.89 IMPAIRMENT OF BALANCE: ICD-10-CM

## 2019-04-30 PROCEDURE — 97112 NEUROMUSCULAR REEDUCATION: CPT | Mod: PO

## 2019-04-30 PROCEDURE — 97110 THERAPEUTIC EXERCISES: CPT | Mod: PO

## 2019-04-30 NOTE — PROGRESS NOTES
"  Physical Therapy Daily Treatment Note     Name: Jillian Osullivan  Clinic Number: [id number]    Therapy Diagnosis:   Encounter Diagnoses   Name Primary?    Bilateral leg weakness     Impairment of balance     Decreased mobility and endurance      Physician: Dedrick Provider    Visit Date: 4/30/2019  Physician Orders: PT Eval and Treat   Medical Diagnosis from Referral: multiple sclerosis, abnormality of gait, atypical depressive disorder  Evaluation Date: 3/1/2019  Authorization Period Expiration: 3/1/2019 to 12/31/2019  Plan of Care Expiration: 4/26/2019  Extended Plan of Care Expiration: 4/26/19 to 6/21/19  Visit # / Visits authorized: 14/ 20    Time In: 0945  Time Out: 1030  Total Billable Time: 45 minutes    Precautions: Standard, Fall and impaired vision, emotional deficits    Subjective       Pt reports: she had a rough day yesterday.  She also states her hidradenitis lesions are back to her R inner thigh.   She states she was compliant with her HEP.  Response to previous treatment: no adverse effects  Functional change: ongoing    Pain: 3-4/10   Location: R inner thigh    Objective       Pt ambulates with SC mod I from lobby to gym( ~ 150 feet) and vice-versa. Pt demonstrates ER of feet, slow gait pattern with improved knee extension during stance phase.  Pt also ambulates with wide RYAN.       Fransisca received therapeutic exercises to develop strength, endurance, ROM, flexibility and posture for 12 minutes including:    X 10 min on SCI-FIT recumbent stepper using  B UE/B LE @ level 1.0 for improved UE/LE ROM, strength and CV endurance( cues for improved motor output).     2 x 30" standing gastroc stretches with use of rail for UE support                     Patient participated in neuromuscular re-education activities to improve: Balance, Coordination, Kinesthetic, Sense, Proprioception and Posture for 33 minutes. The following activities were included:           Standing balance in parallel bars:    2 " "x 30" B alternate tandem stance, no UE support, min/CGA~ moderate trembling    On Foam fitter:  2 x 30" static stand, no UE support, EC, CGA~min trembling  1 x 10 step ups , no UE support, CGA~ min trembling with occasional touchdown on bar  1 x 10 alternate step overs, no UE support, CGA~ min trembling    On Foam pad:  2 x 30" static standing , no UE support, EC, SBA ~ moderate ankle sway and trembling~ one episode of UE touchdown for support      X 4 laps tandem ambulation, no UE support, min A ~ moderate trembling noted  X 4 laps forward marching with 3 second hold,  no UE support, min/CGA~ minimal trembling noted    OTHER: Pt required 2 seated rest breaks before exiting clinic today( fatigue).    Home Exercises Provided and Patient Education Provided     Education provided: Sitting therex including: B LE heel and toe raises, hip abduction against peach theraband, hip adduction squeezes with ball, LAQ, hip flex.  PT also educated pt regarding proper placement of SC in her hand when performing sit<> stand trials.    Written Home Exercises Provided: yes. PT provided pt with written copy of the above today( 3/14/19).  PT also provided pt with piece of peach theraband. No updates to HEP on this date( 4/30/19).  Exercises were reviewed and Fransisca was able to demonstrate them prior to the end of the session.  Fransisca demonstrated good  understanding of the education provided.     See EMR under Media for exercises provided 3/14/19.    Assessment     Fransisca tolerated her therapy session fairly today secondary to fatigue.  PT challenged pt in several ways during today's treatment.  First, pt performed x 10 minutes on SCI-FIT recumbent stepper, which is 1-2 minutes more than some previous sessions.  PT also had pt perform all standing balance challenges in parallel bars without UE support.  As expected, this resulted in some anxiety and increased trembling during performance. Pt has potential for further improvement, but " emotional deficits are somewhat limiting.  Pt is very dependent upon this therapist to guide her sessions, and she tries her best not to schedule with other clinicians. Pt remains appropriate for additional OPPT visits to address remaining balance and strength deficits. Continue with current POC.    Fransisca is progressing well towards her goals.   Pt prognosis is Fair.     Pt will continue to benefit from skilled outpatient physical therapy to address the deficits listed in the problem list box on initial evaluation, provide pt/family education and to maximize pt's level of independence in the home and community environment.     Pt's spiritual, cultural and educational needs considered and pt agreeable to plan of care and goals.     Anticipated barriers to physical therapy: emotional deficits, anxiety regarding balance training    Goals: Pt's spiritual, cultural and educational needs considered and pt agreeable to plan of care and goals as stated below:      GOALS:   Short term goals: 4 weeks, pt agrees to goals set.  1. Pt to be issued HEP and report compliance~ in progress; Goal MET, 4/4/19  2. Pt to improve 30 second chair rise to 10-11 trials to demonstrate improvement with this transition~progressing; Goal MET, 4/4/19; Goal not MET on 4/26/19~ remains appropriate  3. Pt to improve SSWS to 1.0m/sec for improved community ambulation with decreased fall risk~progressing; remains appropriate  4. Pt to improve MCTSIB score on condition # 3 to 6 seconds for improved balance on unstable surfaces~progressing; Goal MET, 4/4/19  5. Pt to perform SLS on either limb x 2-3 seconds for improved balance and decreased fall risk~progressing( not tested today due to fatigue and pain, 4/4/19); Goal MET 4/26/19     Long term goals: 8 weeks, pt agrees to goals set  6. Pt (I) with HEP~progressing  7. Pt to improve 30 second chair rise to 12-13 trials to demonstrate improvement with this transition~progressing. Revise this goal to 11-12  trials, 4/26/19  8. Pt to improve SSWS to 1.2 m/sec for improved community ambulation with decreased fall risk~progressing  9. Pt to improve MCTSIB score on condition # 3 to 9 seconds for improved balance on unstable surfaces~progressing; Goal MET, 4/4/19.     10. Pt to perform SLS on either limb x 4-5 seconds for improved balance and decreased fall risk~ ~progressing; Goal MET, 4/26/19.  Revise goal to perform SLS on either limb x 7-8 seconds, 4/26/19   11. NEW GOAL( 4/26/19):  Pt to tolerate 15 seconds during condition # 4 of the MCTSIB( foam with eyes closed)  Plan     Cont to address balance deficits in parallel bars with use of compliant surfaces( encourage performance without UE support); continue with use of recumbent stepper for B UE/LE strengthening and CV/muscular endurance.        Abdias Solares, PT

## 2019-05-02 ENCOUNTER — CLINICAL SUPPORT (OUTPATIENT)
Dept: REHABILITATION | Facility: HOSPITAL | Age: 49
End: 2019-05-02
Attending: FAMILY MEDICINE
Payer: COMMERCIAL

## 2019-05-02 DIAGNOSIS — R26.89 IMPAIRMENT OF BALANCE: ICD-10-CM

## 2019-05-02 DIAGNOSIS — R29.898 BILATERAL LEG WEAKNESS: ICD-10-CM

## 2019-05-02 DIAGNOSIS — Z74.09 DECREASED MOBILITY AND ENDURANCE: ICD-10-CM

## 2019-05-02 PROCEDURE — 97110 THERAPEUTIC EXERCISES: CPT | Mod: PO

## 2019-05-02 PROCEDURE — 97112 NEUROMUSCULAR REEDUCATION: CPT | Mod: PO

## 2019-05-02 NOTE — PROGRESS NOTES
"  Physical Therapy Daily Treatment Note     Name: Jillian Osullivan  Clinic Number: [id number]    Therapy Diagnosis:   Encounter Diagnoses   Name Primary?    Bilateral leg weakness     Impairment of balance     Decreased mobility and endurance      Physician: Chayito Tse    Visit Date: 5/2/2019  Physician Orders: PT Eval and Treat   Medical Diagnosis from Referral: multiple sclerosis, abnormality of gait, atypical depressive disorder  Evaluation Date: 3/1/2019  Authorization Period Expiration: 3/1/2019 to 12/31/2019  Plan of Care Expiration: 4/26/2019  Extended Plan of Care Expiration: 4/26/19 to 6/21/19  Visit # / Visits authorized: 15/ 20    Time In: 0900  Time Out: 0945  Total Billable Time: 45 minutes    Precautions: Standard, Fall and impaired vision, emotional deficits    Subjective       Pt reports: she feels better today than she did her previous session.   She states she was compliant with her HEP.  Response to previous treatment: no adverse effects  Functional change: ongoing    Pain: 3-4/10   Location: R inner thigh    Objective       Pt ambulates with SC mod I from lobby to gym( ~ 150 feet) and vice-versa. Pt demonstrates ER of feet, slow gait pattern with improved knee extension during stance phase.  Pt also ambulates with wide RYAN.       Fransisca received therapeutic exercises to develop strength, endurance, ROM, flexibility and posture for 12 minutes including:    X 10 min on SCI-FIT recumbent stepper using  B UE/B LE @ level 1.0 for improved UE/LE ROM, strength and CV endurance( cues for improved motor output).     2 x 30" standing gastroc stretches with use of rail for UE support         Patient participated in neuromuscular re-education activities to improve: Balance, Coordination, Kinesthetic, Sense, Proprioception and Posture for 33 minutes. The following activities were included:       Standing balance in parallel bars:    2 x 30" B alternate tandem stance, no UE support, min/CGA~ " minimal/moderate trembling    X 4 laps tandem ambulation, no UE support, CGA ~ minimal trembling noted  X 3 laps forward marching with 2- 3 second hold,  no UE support, min/CGA~ minimal trembling noted    On Foam fitter:    1 x 10 step ups , no UE support, CGA~ min trembling   2 x 10 B alternate SLS while rolling soccer ball forward/backward with other LE, no UE support, min/CGA  1 x 10 B alternate step overs, no UE support, min/CGA~ min trembling  X 2 minutes tossing soccer ball back and forth with PT tech, CGA      Outside parallel bars:    Ascend and descend 20 six inch steps, B rails, mod I( pt attempted with 1 rail but did not feel comfortable)    Home Exercises Provided and Patient Education Provided     Education provided: Sitting therex including: B LE heel and toe raises, hip abduction against peach theraband, hip adduction squeezes with ball, LAQ, hip flex.  PT also educated pt regarding proper placement of SC in her hand when performing sit<> stand trials.    Written Home Exercises Provided: yes. PT provided pt with written copy of the above today( 3/14/19).  PT also provided pt with piece of peach theraband. No updates to HEP on this date( 5/2/19).  Exercises were reviewed and Fransisca was able to demonstrate them prior to the end of the session.  Fransisca demonstrated good  understanding of the education provided.     See EMR under Media for exercises provided 3/14/19.    Assessment     Fransisca tolerated her therapy session well today, with improved ability to perform standing balance challenges without use of UE support. Pt also demonstrated far less trembling when performing these tasks( when compared to previous session). Pt continues to trust this therapist more so than others, and she is willing to try new tasks with encouragement. While PT is obviously concerned with progressing pt in a physical sense, she is also opening up more emotionally. This is nice to see and PT hopes this continues. Pt has potential  to improve further with respect to balance, strength, endurance and postural control.  Pt remains appropriate for additional OPPT visits to address deficits as described above. Continue with current POC.    Fransisca is progressing well towards her goals.   Pt prognosis is Fair.     Pt will continue to benefit from skilled outpatient physical therapy to address the deficits listed in the problem list box on initial evaluation, provide pt/family education and to maximize pt's level of independence in the home and community environment.     Pt's spiritual, cultural and educational needs considered and pt agreeable to plan of care and goals.     Anticipated barriers to physical therapy: emotional deficits, anxiety regarding balance training    Goals: Pt's spiritual, cultural and educational needs considered and pt agreeable to plan of care and goals as stated below:      GOALS:   Short term goals: 4 weeks, pt agrees to goals set.  1. Pt to be issued HEP and report compliance~ in progress; Goal MET, 4/4/19  2. Pt to improve 30 second chair rise to 10-11 trials to demonstrate improvement with this transition~progressing; Goal MET, 4/4/19; Goal not MET on 4/26/19~ remains appropriate  3. Pt to improve SSWS to 1.0m/sec for improved community ambulation with decreased fall risk~progressing; remains appropriate  4. Pt to improve MCTSIB score on condition # 3 to 6 seconds for improved balance on unstable surfaces~progressing; Goal MET, 4/4/19  5. Pt to perform SLS on either limb x 2-3 seconds for improved balance and decreased fall risk~progressing( not tested today due to fatigue and pain, 4/4/19); Goal MET 4/26/19     Long term goals: 8 weeks, pt agrees to goals set  6. Pt (I) with HEP~progressing  7. Pt to improve 30 second chair rise to 12-13 trials to demonstrate improvement with this transition~progressing. Revise this goal to 11-12 trials, 4/26/19  8. Pt to improve SSWS to 1.2 m/sec for improved community ambulation with  decreased fall risk~progressing  9. Pt to improve MCTSIB score on condition # 3 to 9 seconds for improved balance on unstable surfaces~progressing; Goal MET, 4/4/19.     10. Pt to perform SLS on either limb x 4-5 seconds for improved balance and decreased fall risk~ ~progressing; Goal MET, 4/26/19.  Revise goal to perform SLS on either limb x 7-8 seconds, 4/26/19   11. NEW GOAL( 4/26/19):  Pt to tolerate 15 seconds during condition # 4 of the MCTSIB( foam with eyes closed)  Plan     Cont to address balance deficits in parallel bars with use of compliant surfaces( encourage performance without UE support); continue with use of recumbent stepper for B UE/LE strengthening and CV/muscular endurance.        Abdias Solares, PT

## 2019-05-06 ENCOUNTER — DOCUMENTATION ONLY (OUTPATIENT)
Dept: REHABILITATION | Facility: HOSPITAL | Age: 49
End: 2019-05-06

## 2019-05-06 NOTE — PROGRESS NOTES
PT/PTA met face to face to discuss pt's treatment plan and progress towards established goals.  Continue with current PT POC with focus on endurance, strengthening and balance, try no UE support.  Patient will be seen by physical therapist at least every sixth treatment or 30 days, whichever occurs first.    Mary Kate Merino, PTA  05/06/2019

## 2019-05-07 ENCOUNTER — CLINICAL SUPPORT (OUTPATIENT)
Dept: REHABILITATION | Facility: HOSPITAL | Age: 49
End: 2019-05-07
Attending: FAMILY MEDICINE
Payer: COMMERCIAL

## 2019-05-07 DIAGNOSIS — R26.89 IMPAIRMENT OF BALANCE: ICD-10-CM

## 2019-05-07 DIAGNOSIS — R29.898 BILATERAL LEG WEAKNESS: ICD-10-CM

## 2019-05-07 DIAGNOSIS — Z74.09 DECREASED MOBILITY AND ENDURANCE: ICD-10-CM

## 2019-05-07 PROCEDURE — 97110 THERAPEUTIC EXERCISES: CPT | Mod: PO

## 2019-05-07 PROCEDURE — 97112 NEUROMUSCULAR REEDUCATION: CPT | Mod: PO

## 2019-05-07 NOTE — PROGRESS NOTES
"  Physical Therapy Daily Treatment Note     Name: Jillian Osullivan  Clinic Number: [id number]    Therapy Diagnosis:   No diagnosis found.  Physician: Dedrick, Provider    Visit Date: 5/7/2019  Physician Orders: PT Eval and Treat   Medical Diagnosis from Referral: multiple sclerosis, abnormality of gait, atypical depressive disorder  Evaluation Date: 3/1/2019  Authorization Period Expiration: 3/1/2019 to 12/31/2019  Plan of Care Expiration: 4/26/2019  Extended Plan of Care Expiration: 4/26/19 to 6/21/19  Visit # / Visits authorized: 16/ 20    Time In: 1120  Time Out: 1205  Total Billable Time: 45 minutes    Precautions: Standard, Fall and impaired vision, emotional deficits    Subjective       Pt reports: going to receive an MRI early next week followed by , neurologist seeing him early next week  She states she was compliant with her HEP.  Response to previous treatment: no adverse effects  Functional change: ongoing    Pain: 7/10   Location: R inner thigh    Objective       Pt ambulates with SC mod I from lobby to gym( ~ 150 feet) and vice-versa. Pt demonstrates ER of feet, slow gait pattern with improved knee extension during stance phase.  Pt also ambulates with wide RYAN.       Fransisca received therapeutic exercises to develop strength, endurance, ROM, flexibility and posture for 8 minutes including:    X 5 min on recumbent bike @ level 6.0 for improved strength and CV endurance( cues for improved motor output).     2 x 30" standing gastroc stretches with use of rail for UE support         Patient participated in neuromuscular re-education activities to improve: Balance, Coordination, Kinesthetic, Sense, Proprioception and Posture for 37 minutes. The following activities were included:       Standing balance in ballet bar:    X 4 laps tandem ambulation, no UE support, CGA ~ mod trembling noted  X 3 laps forward marching with 2- 3 second hold,  no UE support, min/CGA~ mod trembling noted    On Foam fitter:    1 " x 10 step ups , no UE support, CGA~ min trembling   2 x 10 B alternate SLS while rolling soccer ball forward/backward with other LE, occasional UE support, min/CGA  1 x 10 B alternate step overs, no UE support, min/CGA~ min/mod trembling  X 2 minutes tossing soccer ball back and forth with PT tech, CGA        Home Exercises Provided and Patient Education Provided    Education provided: Sitting therex including: B LE heel and toe raises, hip abduction against peach theraband, hip adduction squeezes with ball, LAQ, hip flex.  PT also educated pt regarding proper placement of SC in her hand when performing sit<> stand trials.    Written Home Exercises Provided: yes. PT provided pt with written copy of the above today( 3/14/19).  PT also provided pt with piece of peach theraband. No updates to HEP on this date( 5/2/19).  Exercises were reviewed and Fransisca was able to demonstrate them prior to the end of the session.  Fransisca demonstrated good  understanding of the education provided.     See EMR under Media for exercises provided 3/14/19.    Assessment   Fransisca tolerated her therapy session well today, but exhibited increased trembling when compared to the last tx visit. All exercises were almost identical to the last tx sessions in order to keep familiarity in tx since she was working with someone else besides her PT of record. I believe some trust was built during today's tx visit and hope to expand on that if another session permits. Pt has potential to improve further with respect to balance, strength, endurance and postural control.  Pt remains appropriate for additional OPPT visits to address deficits as described above. Continue with current POC.    Fransisca is progressing well towards her goals.   Pt prognosis is Fair.     Pt will continue to benefit from skilled outpatient physical therapy to address the deficits listed in the problem list box on initial evaluation, provide pt/family education and to maximize pt's level  of independence in the home and community environment.     Pt's spiritual, cultural and educational needs considered and pt agreeable to plan of care and goals.     Anticipated barriers to physical therapy: emotional deficits, anxiety regarding balance training    Goals: Pt's spiritual, cultural and educational needs considered and pt agreeable to plan of care and goals as stated below:      GOALS:   Short term goals: 4 weeks, pt agrees to goals set.  1. Pt to be issued HEP and report compliance~ in progress; Goal MET, 4/4/19  2. Pt to improve 30 second chair rise to 10-11 trials to demonstrate improvement with this transition~progressing; Goal MET, 4/4/19; Goal not MET on 4/26/19~ remains appropriate  3. Pt to improve SSWS to 1.0m/sec for improved community ambulation with decreased fall risk~progressing; remains appropriate  4. Pt to improve MCTSIB score on condition # 3 to 6 seconds for improved balance on unstable surfaces~progressing; Goal MET, 4/4/19  5. Pt to perform SLS on either limb x 2-3 seconds for improved balance and decreased fall risk~progressing( not tested today due to fatigue and pain, 4/4/19); Goal MET 4/26/19     Long term goals: 8 weeks, pt agrees to goals set  6. Pt (I) with HEP~progressing  7. Pt to improve 30 second chair rise to 12-13 trials to demonstrate improvement with this transition~progressing. Revise this goal to 11-12 trials, 4/26/19  8. Pt to improve SSWS to 1.2 m/sec for improved community ambulation with decreased fall risk~progressing  9. Pt to improve MCTSIB score on condition # 3 to 9 seconds for improved balance on unstable surfaces~progressing; Goal MET, 4/4/19.     10. Pt to perform SLS on either limb x 4-5 seconds for improved balance and decreased fall risk~ ~progressing; Goal MET, 4/26/19.  Revise goal to perform SLS on either limb x 7-8 seconds, 4/26/19   11. NEW GOAL( 4/26/19):  Pt to tolerate 15 seconds during condition # 4 of the MCTSIB( foam with eyes closed)  Plan      Cont to address balance deficits in parallel bars with use of compliant surfaces( encourage performance without UE support); continue with use of recumbent stepper for B UE/LE strengthening and CV/muscular endurance.        Martín Ojeda, PTA

## 2019-05-09 ENCOUNTER — DOCUMENTATION ONLY (OUTPATIENT)
Dept: REHABILITATION | Facility: HOSPITAL | Age: 49
End: 2019-05-09

## 2019-05-09 ENCOUNTER — CLINICAL SUPPORT (OUTPATIENT)
Dept: REHABILITATION | Facility: HOSPITAL | Age: 49
End: 2019-05-09
Attending: FAMILY MEDICINE
Payer: COMMERCIAL

## 2019-05-09 DIAGNOSIS — R26.89 IMPAIRMENT OF BALANCE: ICD-10-CM

## 2019-05-09 DIAGNOSIS — Z74.09 DECREASED MOBILITY AND ENDURANCE: ICD-10-CM

## 2019-05-09 DIAGNOSIS — R29.898 BILATERAL LEG WEAKNESS: ICD-10-CM

## 2019-05-09 PROCEDURE — 97110 THERAPEUTIC EXERCISES: CPT | Mod: PO

## 2019-05-09 PROCEDURE — 97112 NEUROMUSCULAR REEDUCATION: CPT | Mod: PO

## 2019-05-09 NOTE — PROGRESS NOTES
"  Physical Therapy Daily Treatment Note     Name: Jillian Osullivan  Clinic Number: [id number]    Therapy Diagnosis:   Encounter Diagnoses   Name Primary?    Bilateral leg weakness     Impairment of balance     Decreased mobility and endurance      Physician: Chayito Tse    Visit Date: 5/9/2019  Physician Orders: PT Eval and Treat   Medical Diagnosis from Referral: multiple sclerosis, abnormality of gait, atypical depressive disorder  Evaluation Date: 3/1/2019  Authorization Period Expiration: 3/1/2019 to 12/31/2019  Plan of Care Expiration: 4/26/2019  Extended Plan of Care Expiration: 4/26/19 to 6/21/19  Visit # / Visits authorized: 17/ 20    Time In: 0900  Time Out: 0945  Total Billable Time: 45 minutes    Precautions: Standard, Fall and impaired vision, emotional deficits    Subjective       Pt reports: she feels a little lethargic today.   She states she was compliant with her HEP.  Response to previous treatment: no adverse effects  Functional change: ongoing    Pain: 5/10   Location: R inner thigh    Objective       Pt ambulates with SC mod I from lobby to gym( ~ 150 feet) and vice-versa. Pt demonstrates ER of feet, slow gait pattern with improved knee extension during stance phase.  Pt also ambulates with wide RYAN.       Fransisca received therapeutic exercises to develop strength, endurance, ROM, flexibility and posture for 12 minutes including:    X 10 min on SCI-FIT recumbent stepper using  B UE/B LE @ level 1.0 for improved UE/LE ROM, strength and CV endurance( cues for improved motor output).     2 x 30" standing gastroc stretches with use of incline and rail for UE support         Patient participated in neuromuscular re-education activities to improve: Balance, Coordination, Kinesthetic, Sense, Proprioception and Posture for 33 minutes. The following activities were included:       Standing balance in parallel bars:    2 x 30" B alternate tandem stance, no UE support, min/CGA~ minimal/moderate " "trembling    X 4 laps tandem ambulation, no UE support, min/CGA ~ minimal/moderate trembling noted      On Foam fitter:    1 x 10 step ups , no UE support, CGA~ min trembling   1 x 10 B alternate step overs, no UE support, min/CGA~ min trembling  X 2 minutes tossing soccer ball back and forth with PT tech, CGA    1 x 10 side step overs with half foam roller, no UE support, CGA~ min trembling  1 x 10 forward/backward step overs with half foam roller, no UE support, min/CGA~ mod trembling    On Bosu( soft side up):  2 x 30 static stand, no UE support, min A, moderate trembling    On Bosu( firm side up):  2 x 30" static stand, no UE support, min A,  minimal/moderate trembling      Home Exercises Provided and Patient Education Provided     Education provided: Sitting therex including: B LE heel and toe raises, hip abduction against peach theraband, hip adduction squeezes with ball, LAQ, hip flex.  PT also educated pt regarding proper placement of SC in her hand when performing sit<> stand trials.    Written Home Exercises Provided: yes. PT provided pt with written copy of the above today( 3/14/19).  PT also provided pt with piece of peach theraband. No updates to HEP on this date( 5/9/19).  Exercises were reviewed and Fransisca was able to demonstrate them prior to the end of the session.  Fransisca demonstrated good  understanding of the education provided.     See EMR under Media for exercises provided 3/14/19.    Assessment     Fransisca tolerated her therapy session fairly well today, though she did tend to demonstrate considerable trembling during most balance tasks performed. One pleasant surprise was pt's performance with side-stepping over half foam roller.  Pt struggled a bit more when performing forward/backward stepping over half foam roller( particularly with backward direction). Pt did see another therapist her previous session, which for her is an improvement in itself( pt is very linked to this therapist and seems to " have considerable trust issues with others). PT feels part of pt's difficulty with some tasks performed today had to do with her fatigue level. Pt also tends to have some anxiety when performing tasks for the first time.  Pt has potential to improve further with respect to balance, strength, endurance and postural control.  Pt remains appropriate for additional OPPT visits to address deficits as described above. Continue with current POC.    Fransisca is progressing well towards her goals.   Pt prognosis is Fair.     Pt will continue to benefit from skilled outpatient physical therapy to address the deficits listed in the problem list box on initial evaluation, provide pt/family education and to maximize pt's level of independence in the home and community environment.     Pt's spiritual, cultural and educational needs considered and pt agreeable to plan of care and goals.     Anticipated barriers to physical therapy: emotional deficits, anxiety regarding balance training    Goals: Pt's spiritual, cultural and educational needs considered and pt agreeable to plan of care and goals as stated below:      GOALS:   Short term goals: 4 weeks, pt agrees to goals set.  1. Pt to be issued HEP and report compliance~ in progress; Goal MET, 4/4/19  2. Pt to improve 30 second chair rise to 10-11 trials to demonstrate improvement with this transition~progressing; Goal MET, 4/4/19; Goal not MET on 4/26/19~ remains appropriate  3. Pt to improve SSWS to 1.0m/sec for improved community ambulation with decreased fall risk~progressing; remains appropriate  4. Pt to improve MCTSIB score on condition # 3 to 6 seconds for improved balance on unstable surfaces~progressing; Goal MET, 4/4/19  5. Pt to perform SLS on either limb x 2-3 seconds for improved balance and decreased fall risk~progressing( not tested today due to fatigue and pain, 4/4/19); Goal MET 4/26/19     Long term goals: 8 weeks, pt agrees to goals set  6. Pt (I) with  HEP~progressing  7. Pt to improve 30 second chair rise to 12-13 trials to demonstrate improvement with this transition~progressing. Revise this goal to 11-12 trials, 4/26/19  8. Pt to improve SSWS to 1.2 m/sec for improved community ambulation with decreased fall risk~progressing  9. Pt to improve MCTSIB score on condition # 3 to 9 seconds for improved balance on unstable surfaces~progressing; Goal MET, 4/4/19.     10. Pt to perform SLS on either limb x 4-5 seconds for improved balance and decreased fall risk~ ~progressing; Goal MET, 4/26/19.  Revise goal to perform SLS on either limb x 7-8 seconds, 4/26/19   11. NEW GOAL( 4/26/19):  Pt to tolerate 15 seconds during condition # 4 of the MCTSIB( foam with eyes closed)  Plan     Cont to address balance deficits in parallel bars with use of compliant surfaces( encourage performance without UE support); continue with use of recumbent stepper for B UE/LE strengthening and CV/muscular endurance.        Abdias Solares, PT

## 2019-05-09 NOTE — PROGRESS NOTES
PT/PTA met face to face to discuss pt's treatment plan and progress towards established goals. Continue with current PT POC, including: stepper and balance. Pt will be seen by physical therapist at least every 6th treatment day or every 30 days, whichever occurs first.      Martín Ojeda, PTA  5/7/19

## 2019-05-09 NOTE — PROGRESS NOTES
Face to face meeting completed with Abdias Solares PT regarding current status and progress of   Jillian Osullivan . Balance  Omar Jade, PTA

## 2019-05-28 ENCOUNTER — CLINICAL SUPPORT (OUTPATIENT)
Dept: REHABILITATION | Facility: HOSPITAL | Age: 49
End: 2019-05-28
Attending: FAMILY MEDICINE
Payer: COMMERCIAL

## 2019-05-28 DIAGNOSIS — R29.898 BILATERAL LEG WEAKNESS: ICD-10-CM

## 2019-05-28 DIAGNOSIS — R26.89 IMPAIRMENT OF BALANCE: ICD-10-CM

## 2019-05-28 DIAGNOSIS — Z74.09 DECREASED MOBILITY AND ENDURANCE: ICD-10-CM

## 2019-05-28 PROCEDURE — 97110 THERAPEUTIC EXERCISES: CPT | Mod: PO

## 2019-05-28 PROCEDURE — 97112 NEUROMUSCULAR REEDUCATION: CPT | Mod: PO

## 2019-05-28 NOTE — PROGRESS NOTES
"  Physical Therapy Daily Treatment Note     Name: Jillian Osullivan  Clinic Number: [id number]    Therapy Diagnosis:   Encounter Diagnoses   Name Primary?    Bilateral leg weakness     Impairment of balance     Decreased mobility and endurance      Physician: Dedrick, Provider    Visit Date: 5/28/2019  Physician Orders: PT Eval and Treat   Medical Diagnosis from Referral: multiple sclerosis, abnormality of gait, atypical depressive disorder  Evaluation Date: 3/1/2019  Authorization Period Expiration: 3/1/2019 to 12/31/2019  Plan of Care Expiration: 4/26/2019  Extended Plan of Care Expiration: 4/26/19 to 6/21/19  Visit # / Visits authorized: 18/ 20    Time In: 0900  Time Out: 0945  Total Billable Time: 45 minutes    Precautions: Standard, Fall and impaired vision, emotional deficits    Subjective       Pt reports: she reports that she now has 10 MS lesions to her brain after a recent MRI done in [place]. Pt also reports more hidradenitis lesions to her R thigh.   She states she had to cancel some of her recent sessions due to pain and not feeling well.  Response to previous treatment: no adverse effects  Functional change: ongoing    Pain: 10/10   Location: R inner thigh    Objective       Pt ambulates with SC mod I from lobby to gym( ~ 150 feet) and vice-versa. Pt demonstrates ER of feet, slow gait pattern with improved knee extension during stance phase.  Pt also ambulates with wide RYAN.       Fransisca received therapeutic exercises to develop strength, endurance, ROM, flexibility and posture for 8 minutes including:    X 4 and 2 min on SCI-FIT recumbent stepper using  B UE/B LE @ level 1.0 for improved UE/LE ROM, strength and CV endurance( cues for improved motor output as pt allows screen to disengage during the above trials).     2 x 30" standing gastroc stretches with use of incline and rail for UE support         Patient participated in neuromuscular re-education activities to improve: Balance, " "Coordination, Kinesthetic, Sense, Proprioception and Posture for 37 minutes. The following activities were included:       Standing balance in parallel bars:      On foam pad:  2 x 30" static standing, no UE support, CGA( occasional touchdown), min trembling    On firm ground:  1 x 30" B alternate tandem stance, no UE support, CGA/min A( occasional touchdown), min trembling    X 2 laps forward marching with 2 " hold, light 2 finger support, CGA, min trembling  X 2 laps tandem walking, light 2 finger support, CGA, min/mod trembling    On Foam fitter:  1 x 30" static standing, no UE support, eyes open, CGA  2 x 30" static standing, no UE support, eyes closed, CGA/min A  2 x 30" standing while holding soccer ball in outstretched arms, CGA/min A, min trembling  1 x 10 step- ups , light B UE 1 finger support, CGA~ min trembling   1 x 10 B alternate step overs, light B UE 1 finger support, CGA~ min trembling        Home Exercises Provided and Patient Education Provided     Education provided: Sitting therex including: B LE heel and toe raises, hip abduction against peach theraband, hip adduction squeezes with ball, LAQ, hip flex.  PT also educated pt regarding proper placement of SC in her hand when performing sit<> stand trials.    Written Home Exercises Provided: yes. PT provided pt with written copy of the above today( 3/14/19).  PT also provided pt with piece of peach theraband. No updates to HEP on this date( 5/28/19).  Exercises were reviewed and Fransisca was able to demonstrate them prior to the end of the session.  Fransisca demonstrated good  understanding of the education provided.     See EMR under Media for exercises provided 3/14/19.    Assessment     Fransisca tolerated her therapy session fairly today, and it is clear that pt has not been feeling well since she returned from her doctor's appointment in [place]. Pt appears a bit troubled by her most recent MRI report and is also encountering more hidradenitis lesions to " her R thigh, which are very painful. Consequently, PT kept today's session fairly simple with respect to balance challenges. PT is hopeful that as pt begins regularly attending her sessions again, she will begin to feel and perform better. Despite her current troubles, pt has potential to improve further with respect to balance, strength, endurance and postural control.  Pt remains appropriate for additional OPPT visits to address deficits as described above. Continue with current POC.    Fransisca is progressing well towards her goals.   Pt prognosis is Fair.     Pt will continue to benefit from skilled outpatient physical therapy to address the deficits listed in the problem list box on initial evaluation, provide pt/family education and to maximize pt's level of independence in the home and community environment.     Pt's spiritual, cultural and educational needs considered and pt agreeable to plan of care and goals.     Anticipated barriers to physical therapy: emotional deficits, anxiety regarding balance training    Goals: Pt's spiritual, cultural and educational needs considered and pt agreeable to plan of care and goals as stated below:      GOALS:   Short term goals: 4 weeks, pt agrees to goals set.  1. Pt to be issued HEP and report compliance~ in progress; Goal MET, 4/4/19  2. Pt to improve 30 second chair rise to 10-11 trials to demonstrate improvement with this transition~progressing; Goal MET, 4/4/19; Goal not MET on 4/26/19~ remains appropriate  3. Pt to improve SSWS to 1.0m/sec for improved community ambulation with decreased fall risk~progressing; remains appropriate  4. Pt to improve MCTSIB score on condition # 3 to 6 seconds for improved balance on unstable surfaces~progressing; Goal MET, 4/4/19  5. Pt to perform SLS on either limb x 2-3 seconds for improved balance and decreased fall risk~progressing( not tested today due to fatigue and pain, 4/4/19); Goal MET 4/26/19     Long term goals: 8 weeks, pt  agrees to goals set  6. Pt (I) with HEP~progressing  7. Pt to improve 30 second chair rise to 12-13 trials to demonstrate improvement with this transition~progressing. Revise this goal to 11-12 trials, 4/26/19  8. Pt to improve SSWS to 1.2 m/sec for improved community ambulation with decreased fall risk~progressing  9. Pt to improve MCTSIB score on condition # 3 to 9 seconds for improved balance on unstable surfaces~progressing; Goal MET, 4/4/19.     10. Pt to perform SLS on either limb x 4-5 seconds for improved balance and decreased fall risk~ ~progressing; Goal MET, 4/26/19.  Revise goal to perform SLS on either limb x 7-8 seconds, 4/26/19   11. NEW GOAL( 4/26/19):  Pt to tolerate 15 seconds during condition # 4 of the MCTSIB( foam with eyes closed)  Plan     Cont to address balance deficits in parallel bars with use of compliant surfaces( encourage performance without UE support); continue with use of recumbent stepper for B UE/LE strengthening and CV/muscular endurance.        Abdias Solares, PT

## 2019-05-31 ENCOUNTER — OFFICE VISIT (OUTPATIENT)
Dept: SLEEP MEDICINE | Facility: CLINIC | Age: 49
End: 2019-05-31
Payer: COMMERCIAL

## 2019-05-31 VITALS
DIASTOLIC BLOOD PRESSURE: 90 MMHG | HEIGHT: 62 IN | BODY MASS INDEX: 32.42 KG/M2 | HEART RATE: 58 BPM | SYSTOLIC BLOOD PRESSURE: 128 MMHG | WEIGHT: 176.19 LBS

## 2019-05-31 DIAGNOSIS — F32.1 MDD (MAJOR DEPRESSIVE DISORDER), SINGLE EPISODE, MODERATE: ICD-10-CM

## 2019-05-31 DIAGNOSIS — G47.33 OSA (OBSTRUCTIVE SLEEP APNEA): ICD-10-CM

## 2019-05-31 DIAGNOSIS — G25.81 RLS (RESTLESS LEGS SYNDROME): Primary | ICD-10-CM

## 2019-05-31 PROCEDURE — 99214 PR OFFICE/OUTPT VISIT, EST, LEVL IV, 30-39 MIN: ICD-10-PCS | Mod: S$GLB,,, | Performed by: NURSE PRACTITIONER

## 2019-05-31 PROCEDURE — 3008F BODY MASS INDEX DOCD: CPT | Mod: CPTII,S$GLB,, | Performed by: NURSE PRACTITIONER

## 2019-05-31 PROCEDURE — 3008F PR BODY MASS INDEX (BMI) DOCUMENTED: ICD-10-PCS | Mod: CPTII,S$GLB,, | Performed by: NURSE PRACTITIONER

## 2019-05-31 PROCEDURE — 3074F PR MOST RECENT SYSTOLIC BLOOD PRESSURE < 130 MM HG: ICD-10-PCS | Mod: CPTII,S$GLB,, | Performed by: NURSE PRACTITIONER

## 2019-05-31 PROCEDURE — 99999 PR PBB SHADOW E&M-EST. PATIENT-LVL IV: CPT | Mod: PBBFAC,,, | Performed by: NURSE PRACTITIONER

## 2019-05-31 PROCEDURE — 3074F SYST BP LT 130 MM HG: CPT | Mod: CPTII,S$GLB,, | Performed by: NURSE PRACTITIONER

## 2019-05-31 PROCEDURE — 3080F PR MOST RECENT DIASTOLIC BLOOD PRESSURE >= 90 MM HG: ICD-10-PCS | Mod: CPTII,S$GLB,, | Performed by: NURSE PRACTITIONER

## 2019-05-31 PROCEDURE — 99999 PR PBB SHADOW E&M-EST. PATIENT-LVL IV: ICD-10-PCS | Mod: PBBFAC,,, | Performed by: NURSE PRACTITIONER

## 2019-05-31 PROCEDURE — 99214 OFFICE O/P EST MOD 30 MIN: CPT | Mod: S$GLB,,, | Performed by: NURSE PRACTITIONER

## 2019-05-31 PROCEDURE — 3080F DIAST BP >= 90 MM HG: CPT | Mod: CPTII,S$GLB,, | Performed by: NURSE PRACTITIONER

## 2019-05-31 NOTE — PROGRESS NOTES
This 48 y.o. female patient returns for the management of obstructive sleep apnea and insomnia. Last seen 2-mos ago       Continues to apply CPAP mask nightly but typically removes mask during sleep. Keeping it on longer though. Using butler fx mask now. Pain and mood can affect mask removal. ESS=12. Sometimes has to nap. Attending PT 2x/week. Nuvigil 150gm caused her to feel jittery. Getting B12 inj but not helping her daytime sleepiness/fatigue. Wants to continue uswing her PAP mask.  Gabapentin 300mg 2-3 qhs helps RLS symptoms/feelings.     PSG 7/2016 (168#) AHI 16.4  Titration study 2015, 7cm effective    HISTORY  1. CHRIS   She is attempting CPAP nightly, but sometimes difficulty due to her other ailments. Last night she managed to sleep with it for 7+ hours.  Still Feeling exhausted. No snoring    She is using a Resmed nasal pillows mask.  CPAP interrogation Dream Station not auto capable, set at 8 cm: 2874 hours used; 3.4 hours/n ave; 14/30 days >4 hours       DME = [employer]    2. She continues to have difficulties with anxiety and insomnia. This is her main complaint today.  She feels that this is somewhat better since being off of her injectible MS meds.  Also Buspar was increased by psych.  Sleep is worse and more fragmented. Increased worry and stress.    Some dozing and napping around 2 pm in the afternoon.  Bedtime 10:30 pm - MN  Once in bed, sleep onset ranges from 30 mins - 1 hour  WASO 5-6 awakenings; 30-90 mins of wake time  Awakens around 4 am, then very light and fragmented sleep, dozing in and out of sleep until wake time of 5:45 -6 am.    Takes Buspar, clonazepam (4 times a day for GI related issue) - stopped by Dr. Montiel (start on ativan), Valium 10 mg (for high anxiety), Effexor - managed by Dr. Montiel  She is currently taking melatonin 5 mg; Takes Gabapentin  She has tried Rozeram, Trazodone 100 mg (it worked and then stopped working)    3. RLS  - Gabapentin 300 mg was initially effective in  "controlling RLS and helping her sleep better. It "worked" for 3 nights, but then lost its effectiveness. She has been using it intermittently, because she is concerned about habituation. She is also concerned about inability to lose weight. Increasing gabapentin 900 mg may be helping somewhat, though she complains of morning sedation. Her  calls her a "zombie". Recently only taking 300 mg.    RLS feelings: Often feels need to move her legs at night; Urge; Moving constantly; Using adjustable bed;  Mostly in the evenings and around bed time.    SLEEP ROUTINE:   Time to bed: 11:30 pm   Sleep onset latency: a while   Disruptions or awakenings: 3-4   Wakeup time: 6 am   Perceived sleep quality: poor   Daytime naps: every few days    3/22/19:  Continues to apply CPAP mask nightly but typically removes mask during sleep. Keeping it on 3-4hr. Soooo sleepy/tired. Has NOT gotten new mask. Went to [facility] after last visit but they could n ot help her at the time and no appt scheduled. No chin strap. + anxiety, sees Dr. Montiel.Using golifeforwomen mask, often has strap marks despite pads. Sees MD at [facility] for MS. Continued fatigue, not sleeping well. Gabapentin 300mg 2-3 qhs helps RLS symptoms/feelings. ESS=17      REVIEW OF SYSTEMS:  Sleep related symptoms as per HPI; gait impairment using cane.  Stable wgt. +anxiety and depression (seeing psyche). Otherwise a balance review of 10-systems is negative.       PHYSICAL EXAM:  BP (!) 128/90 (BP Location: Left arm, Patient Position: Sitting, BP Method: Medium (Manual))   Pulse (!) 58   Ht 5' 2" (1.575 m)   Wt 79.9 kg (176 lb 3.2 oz)   BMI 32.23 kg/m²   GENERAL: Well groomed; Normally developed; obese      ASSESSMENT:    1. Obstructive Sleep Apnea, moderate severity. CHRIS appears to be moderately well controlled on CPAP. She is using an effective setting, determined by titration sleep study 2016 with similar wft. Recent adherence is down due to ongoing health issues. She is " using an approved nasal pillows CPAP mask, which forms appropriate seal and provides PAP therapy when used. Mask removal and mask interface continuing to affect use. Not gotten new mask yet 5/31/19: Continued adherecne with apap, AHI<5. Effective but mask removal affecting use at times. Doing better though and motivated to continue using it.   2. Sleep disturbances/Insomnia - underlying  appears to be anxiety/depression.  She feels like anxiety is getting worse, despite medical management. Cause for anxiety unclear - also multi-factorial, being managed by Dr. Montiel. She has implicated MS or Avonex as possible root cause, since she did not have any anxiety at all prior to her MS diagnosis and treatment.     3. Restless legs feelings at night / akithesia - in some cases PLMS can be potentiated by Effexor; No evidence of PLMS on sleep study; RLS can occur just as a feeling (without acutal limb kicks) that can contribute to night time anxiety and insomnia. RLS appears to have responded to higher doses of gabapentin. She is so focused on the effects of her underlying anxiety, it is difficult to ascertain whether she is getting any benefit from this.1/15/19 and 3/22/19 and 5/31/19: Improved with gabapentin    4. Fatigue/hypersomnia with sleep apnea- multi-factorial; certainly MS, medication, and depression can be playing a role; as well as suboptimal mask on time    PLAN:  IMPROVE mask on time, continue CPAP 8cm. GET loaner apap machine 7-14cm so to assess AHI. Her machine has no capability. rETRIAL nuvigil 150mg but take 1/2 tab qd prn. RTC 3-mos adherence    Continue gabapentin at 600 mg qhs and see psychiatry for mgt of mood(Tiana) and see neuro re: MS mgt;/on new infusion med

## 2019-06-03 ENCOUNTER — DOCUMENTATION ONLY (OUTPATIENT)
Dept: REHABILITATION | Facility: HOSPITAL | Age: 49
End: 2019-06-03

## 2019-06-03 NOTE — PROGRESS NOTES
PT/PTA met face to face to discuss pt's treatment plan and progress towards established goals.  Continue with current PT POC with focus on endurance, strengthening, higher level balance.  Patient will be seen by physical therapist at least every sixth treatment or 30 days, whichever occurs first.    Mary Kate Merino, PTA  06/03/2019

## 2019-06-05 ENCOUNTER — DOCUMENTATION ONLY (OUTPATIENT)
Dept: REHABILITATION | Facility: HOSPITAL | Age: 49
End: 2019-06-05

## 2019-06-05 NOTE — PROGRESS NOTES
Face to face meeting completed with Abdias Solares  PT regarding current status and progress of   Jillian Osullivan . Balance  Omar Jade, SHABNAM         Meeting occurred on 6/3/19.  Abdias Solares, PT

## 2019-06-06 ENCOUNTER — DOCUMENTATION ONLY (OUTPATIENT)
Dept: REHABILITATION | Facility: HOSPITAL | Age: 49
End: 2019-06-06

## 2019-06-06 ENCOUNTER — CLINICAL SUPPORT (OUTPATIENT)
Dept: REHABILITATION | Facility: HOSPITAL | Age: 49
End: 2019-06-06
Attending: FAMILY MEDICINE
Payer: COMMERCIAL

## 2019-06-06 DIAGNOSIS — R26.89 IMPAIRMENT OF BALANCE: ICD-10-CM

## 2019-06-06 DIAGNOSIS — R29.898 BILATERAL LEG WEAKNESS: ICD-10-CM

## 2019-06-06 DIAGNOSIS — Z74.09 DECREASED MOBILITY AND ENDURANCE: ICD-10-CM

## 2019-06-06 PROCEDURE — 97110 THERAPEUTIC EXERCISES: CPT | Mod: PO

## 2019-06-06 PROCEDURE — 97112 NEUROMUSCULAR REEDUCATION: CPT | Mod: PO

## 2019-06-06 NOTE — PROGRESS NOTES
PT/PTA met face to face to discuss pt's treatment plan and progress towards established goals. Continue with current PT POC, including: balance. Pt will be seen by physical therapist at least every 6th treatment day or every 30 days, whichever occurs first.      Martín Ojeda, PTA  6/04/19

## 2019-06-06 NOTE — PROGRESS NOTES
"  Physical Therapy Daily Treatment Note     Name: Jillian Osullivan  Clinic Number: [id number]    Therapy Diagnosis:   Encounter Diagnoses   Name Primary?    Bilateral leg weakness     Impairment of balance     Decreased mobility and endurance      Physician: Chayito Tse    Visit Date: 6/6/2019  Physician Orders: PT Eval and Treat   Medical Diagnosis from Referral: multiple sclerosis, abnormality of gait, atypical depressive disorder  Evaluation Date: 3/1/2019  Authorization Period Expiration: 3/1/2019 to 12/31/2019  Plan of Care Expiration: 4/26/2019  Extended Plan of Care Expiration: 4/26/19 to 6/21/19  Visit # / Visits authorized: 19/ 20    Time In: 0857  Time Out: 0944  Total Billable Time: 47 minutes    Precautions: Standard, Fall and impaired vision, emotional deficits    Subjective       Pt reports: she will be undergoing a gland removal procedure as on OP to help treat her problems with hidradenitis.   She states she is semi-compliant with her home exercises.  Response to previous treatment: no adverse effects  Functional change: ongoing    Pain: 5/10   Location: R inner thigh    Objective       Pt ambulates with SC mod I from lobby to gym( ~ 150 feet) and vice-versa. Pt demonstrates ER of feet, slow gait pattern with improved knee extension during stance phase.  Pt also ambulates with wide RYAN.       Fransisca received therapeutic exercises to develop strength, endurance, ROM, flexibility and posture for 12 minutes including:    X 10 min on SCI-FIT recumbent stepper using  B UE/B LE @ level 1.0 for improved UE/LE ROM, strength and CV endurance    2 x 30" standing gastroc stretches with use of incline and rail for UE support         Patient participated in neuromuscular re-education activities to improve: Balance, Coordination, Kinesthetic, Sense, Proprioception and Posture for 35 minutes. The following activities were included:       Standing balance in parallel bars:      On foam pad:  2 x 30" " "static standing, eyes open, no UE support, SBA, min trembling  2 x 30" static standing, eyes closed, no UE support, CGA/min A, min trembling    On firm ground:  2 x 30" B alternate tandem stance, no UE support, CGA/min A, min/mod trembling, occasional touchdown      On foam fitter:  1 x 10 B LE step ups, no UE support, CGA  1 x 10 alternate LE step overs, no UE support, CGA  1 x 30" standing while holding soccer ball in outstretched arms, CGA~ min trembling  1 x 30" standing while lifting ball up and down, CGA~min trembling  1 x 30" standing while holding and rotating ball to R and L, CGA, min trembling    X 4 laps forward marching with 2 " hold, light 2 finger support, CGA, min trembling  X 4 laps tandem walking, no UE support, CGA/min A, min/mod trembling    On Bosu( soft side up):  2 x 30" static standing, no UE support, min/mod A~ min/mod trembling        Home Exercises Provided and Patient Education Provided     Education provided: Sitting therex including: B LE heel and toe raises, hip abduction against peach theraband, hip adduction squeezes with ball, LAQ, hip flex.  PT also educated pt regarding proper placement of SC in her hand when performing sit<> stand trials.    Written Home Exercises Provided: yes. PT provided pt with written copy of the above today( 3/14/19).  PT also provided pt with piece of peach theraband. No updates to HEP on this date( 6/6/19).  Exercises were reviewed and Fransisca was able to demonstrate them prior to the end of the session.  Fransisca demonstrated good  understanding of the education provided.     See EMR under Media for exercises provided 3/14/19.    Assessment     Fransisca tolerated her therapy session fairly well today, and she seems to be feeling better with respect to her R inner thigh pain/discomfort. Pt is taking oral medications and using topical ointment to help with this problem. Eventually, pt will have a surgical procedure which involves gland removal.  With respect to her " performance today, pt was able to tolerate a full 10 minutes on the recumbent stepper with no issues. Pt also was able to perform several balance exercises in standing, even static standing on the Bosu( which pt has not performed in quite some time). Pt still demonstrates min/mod trembling with some balance exercises, but PT is hopeful that pt will continue to progress here.  Pt remains appropriate for additional OPPT visits to address deficits with  balance, strength, endurance and postural control. Continue with current POC.    Fransisca is progressing well towards her goals.   Pt prognosis is Fair.     Pt will continue to benefit from skilled outpatient physical therapy to address the deficits listed in the problem list box on initial evaluation, provide pt/family education and to maximize pt's level of independence in the home and community environment.     Pt's spiritual, cultural and educational needs considered and pt agreeable to plan of care and goals.     Anticipated barriers to physical therapy: emotional deficits, anxiety regarding balance training    Goals: Pt's spiritual, cultural and educational needs considered and pt agreeable to plan of care and goals as stated below:      GOALS:   Short term goals: 4 weeks, pt agrees to goals set.  1. Pt to be issued HEP and report compliance~ in progress; Goal MET, 4/4/19  2. Pt to improve 30 second chair rise to 10-11 trials to demonstrate improvement with this transition~progressing; Goal MET, 4/4/19; Goal not MET on 4/26/19~ remains appropriate  3. Pt to improve SSWS to 1.0m/sec for improved community ambulation with decreased fall risk~progressing; remains appropriate  4. Pt to improve MCTSIB score on condition # 3 to 6 seconds for improved balance on unstable surfaces~progressing; Goal MET, 4/4/19  5. Pt to perform SLS on either limb x 2-3 seconds for improved balance and decreased fall risk~progressing( not tested today due to fatigue and pain, 4/4/19); Goal MET  4/26/19     Long term goals: 8 weeks, pt agrees to goals set  6. Pt (I) with HEP~progressing  7. Pt to improve 30 second chair rise to 12-13 trials to demonstrate improvement with this transition~progressing. Revise this goal to 11-12 trials, 4/26/19  8. Pt to improve SSWS to 1.2 m/sec for improved community ambulation with decreased fall risk~progressing  9. Pt to improve MCTSIB score on condition # 3 to 9 seconds for improved balance on unstable surfaces~progressing; Goal MET, 4/4/19.     10. Pt to perform SLS on either limb x 4-5 seconds for improved balance and decreased fall risk~ ~progressing; Goal MET, 4/26/19.  Revise goal to perform SLS on either limb x 7-8 seconds, 4/26/19   11. NEW GOAL( 4/26/19):  Pt to tolerate 15 seconds during condition # 4 of the MCTSIB( foam with eyes closed)  Plan     Cont to address balance deficits in parallel bars with use of compliant surfaces( encourage performance without UE support); continue with use of recumbent stepper for B UE/LE strengthening and CV/muscular endurance.        Abdias Solares, PT

## 2019-06-11 ENCOUNTER — CLINICAL SUPPORT (OUTPATIENT)
Dept: REHABILITATION | Facility: HOSPITAL | Age: 49
End: 2019-06-11
Attending: FAMILY MEDICINE
Payer: COMMERCIAL

## 2019-06-11 DIAGNOSIS — R26.89 IMPAIRMENT OF BALANCE: ICD-10-CM

## 2019-06-11 DIAGNOSIS — R29.898 BILATERAL LEG WEAKNESS: ICD-10-CM

## 2019-06-11 DIAGNOSIS — Z74.09 DECREASED MOBILITY AND ENDURANCE: ICD-10-CM

## 2019-06-11 PROCEDURE — 97112 NEUROMUSCULAR REEDUCATION: CPT | Mod: PO

## 2019-06-11 PROCEDURE — 97110 THERAPEUTIC EXERCISES: CPT | Mod: PO

## 2019-06-11 NOTE — PROGRESS NOTES
"  Physical Therapy Daily Treatment Note     Name: Jillian Osullivan  Clinic Number: [id number]    Therapy Diagnosis:   Encounter Diagnoses   Name Primary?    Bilateral leg weakness     Impairment of balance     Decreased mobility and endurance      Physician: Dedrick, Provider    Visit Date: 6/11/2019  Physician Orders: PT Eval and Treat   Medical Diagnosis from Referral: multiple sclerosis, abnormality of gait, atypical depressive disorder  Evaluation Date: 3/1/2019  Authorization Period Expiration: 3/1/2019 to 12/31/2019  Plan of Care Expiration: 4/26/2019  Extended Plan of Care Expiration: 4/26/19 to 6/21/19  Visit # / Visits authorized: 20/ 20    Time In: 0900  Time Out: 0945  Total Billable Time: 45 minutes    Precautions: Standard, Fall and impaired vision, emotional deficits    Subjective       Pt reports: she feels good this morning. She feels the combination of injections, oral meds and creams have decreased her pain to R inner thigh considerably. Pt also reports she had a fall while shopping on Saturday.   She states she is compliant with her home exercises.  Response to previous treatment: no adverse effects  Functional change: ongoing    Pain: 0/10   Location: N/A    Objective       Pt ambulates with SC mod I from lobby to gym( ~ 150 feet) and vice-versa. Pt demonstrates ER of feet, slow gait pattern with improved knee extension during stance phase.  Pt also ambulates with wide RYAN.       Fransisca received therapeutic exercises to develop strength, endurance, ROM, flexibility and posture for 12 minutes including:    X 10 min on SCI-FIT recumbent stepper using  B LE @ level 1.0 for improved  B LE ROM, strength and CV endurance    2 x 30" standing gastroc stretches with use of incline and rail for UE support         Patient participated in neuromuscular re-education activities to improve: Balance, Coordination, Kinesthetic, Sense, Proprioception and Posture for 33 minutes. The following activities were " "included:     Standing at ballet bar:    Foam pad:  2 x 30" static stand with no UE support, eyes closed, CGA~ min trembling    Firm ground:  1 x 30" B alternate tandem stance, no UE support, CGA~ no trembling    Foam fitter:  2 x 30" static stand with no UE support, eyes closed, CGA~ min to slight trembling      Standing balance in parallel bars:      X 4 laps forward marching with 2 " hold, light 2 finger support, CGA  X 4 laps tandem walking, light 2 finger support, CGA    1 x 10 side step overs using small blue bolster, 2 finger support, SBA  1 x 10 forward/backward step overs using small blue bolster, 2 finger support, CGA/SBA    Standing at Pilates Box:  2 x 10 B LE alternate hip and knee flex/ext with 1 UE support, 2 white springs and 1 black spring on level 1, CGA for balance    Home Exercises Provided and Patient Education Provided     Education provided: Sitting therex including: B LE heel and toe raises, hip abduction against peach theraband, hip adduction squeezes with ball, LAQ, hip flex.  PT also educated pt regarding proper placement of SC in her hand when performing sit<> stand trials.    Written Home Exercises Provided: yes. PT provided pt with written copy of the above today( 3/14/19).  PT also provided pt with piece of peach theraband. No updates to HEP on this date( 6/11/19).  Exercises were reviewed and Fransisca was able to demonstrate them prior to the end of the session.  Fransisca demonstrated good  understanding of the education provided.     See EMR under Media for exercises provided 3/14/19.    Assessment     Fransisca tolerated her therapy session very well today and was in an excellent mood. Pt clearly felt better today and it showed in her demeanor, as well as her performance. Pt was much more talkative throughout the session, even joking with PT on a number of occasions. Due to pt feeling better, PT introduced new challenge of Pilates box to work on SLS.  Pt responded well to this task and this will " be revisited in future sessions. Pt also demonstrated much less trembling with many balance tasks, even though these were performed at ballet bar instead of inside parallel bars. Pt's ability to side step and forward/backward step over small bolster in parallel bars was further indicative of how well pt was feeling today. PT is hopeful that this upward trend will continue.  Pt remains appropriate for additional OPPT visits to address deficits with  balance, strength, endurance and postural control. Continue with current POC.    Fransisca is progressing well towards her goals.   Pt prognosis is Fair.     Pt will continue to benefit from skilled outpatient physical therapy to address the deficits listed in the problem list box on initial evaluation, provide pt/family education and to maximize pt's level of independence in the home and community environment.     Pt's spiritual, cultural and educational needs considered and pt agreeable to plan of care and goals.     Anticipated barriers to physical therapy: emotional deficits, anxiety regarding balance training    Goals: Pt's spiritual, cultural and educational needs considered and pt agreeable to plan of care and goals as stated below:      GOALS:   Short term goals: 4 weeks, pt agrees to goals set.  1. Pt to be issued HEP and report compliance~ in progress; Goal MET, 4/4/19  2. Pt to improve 30 second chair rise to 10-11 trials to demonstrate improvement with this transition~progressing; Goal MET, 4/4/19; Goal not MET on 4/26/19~ remains appropriate  3. Pt to improve SSWS to 1.0m/sec for improved community ambulation with decreased fall risk~progressing; remains appropriate  4. Pt to improve MCTSIB score on condition # 3 to 6 seconds for improved balance on unstable surfaces~progressing; Goal MET, 4/4/19  5. Pt to perform SLS on either limb x 2-3 seconds for improved balance and decreased fall risk~progressing( not tested today due to fatigue and pain, 4/4/19); Goal MET  4/26/19     Long term goals: 8 weeks, pt agrees to goals set  6. Pt (I) with HEP~progressing  7. Pt to improve 30 second chair rise to 12-13 trials to demonstrate improvement with this transition~progressing. Revise this goal to 11-12 trials, 4/26/19  8. Pt to improve SSWS to 1.2 m/sec for improved community ambulation with decreased fall risk~progressing  9. Pt to improve MCTSIB score on condition # 3 to 9 seconds for improved balance on unstable surfaces~progressing; Goal MET, 4/4/19.     10. Pt to perform SLS on either limb x 4-5 seconds for improved balance and decreased fall risk~ ~progressing; Goal MET, 4/26/19.  Revise goal to perform SLS on either limb x 7-8 seconds, 4/26/19   11. NEW GOAL( 4/26/19):  Pt to tolerate 15 seconds during condition # 4 of the MCTSIB( foam with eyes closed)  Plan     Cont to address balance deficits in parallel bars with use of compliant surfaces( encourage performance without UE support); continue with use of recumbent stepper for B UE/LE strengthening and CV/muscular endurance. Add use of Pilates box to address SLS balance.        Abdias Solares, PT

## 2019-06-13 ENCOUNTER — CLINICAL SUPPORT (OUTPATIENT)
Dept: REHABILITATION | Facility: HOSPITAL | Age: 49
End: 2019-06-13
Attending: FAMILY MEDICINE
Payer: COMMERCIAL

## 2019-06-13 DIAGNOSIS — R29.898 BILATERAL LEG WEAKNESS: ICD-10-CM

## 2019-06-13 DIAGNOSIS — Z74.09 DECREASED MOBILITY AND ENDURANCE: ICD-10-CM

## 2019-06-13 DIAGNOSIS — R26.89 IMPAIRMENT OF BALANCE: ICD-10-CM

## 2019-06-13 PROCEDURE — 97110 THERAPEUTIC EXERCISES: CPT | Mod: PO

## 2019-06-13 PROCEDURE — 97112 NEUROMUSCULAR REEDUCATION: CPT | Mod: PO

## 2019-06-13 NOTE — PROGRESS NOTES
Physical Therapy Daily Treatment Note     Name: Jillian Osullivan  Clinic Number: [id number]    Therapy Diagnosis:   Encounter Diagnoses   Name Primary?    Bilateral leg weakness     Impairment of balance     Decreased mobility and endurance      Physician: Referring, Unknown    Visit Date: 6/13/2019  Physician Orders: PT Eval and Treat   Medical Diagnosis from Referral: multiple sclerosis, abnormality of gait, atypical depressive disorder  Evaluation Date: 3/1/2019  Authorization Period Expiration: 6/13/2019 to 12/31/2019  Plan of Care Expiration: 4/26/2019  Extended Plan of Care Expiration: 4/26/19 to 6/21/19  Visit # / Visits authorized: 1/12    Time In: 0900  Time Out: 0945  Total Billable Time: 30 minutes due to concurrent treatment with another patient    Precautions: Standard, Fall and impaired vision, emotional deficits    Subjective       Pt reports: she feels good this morning although she only had about 2 hours of sleep last night.   She states she is compliant with her home exercises.  Response to previous treatment: no adverse effects  Functional change: ongoing    Pain: 0/10   Location: N/A    Objective       Pt ambulates with SC mod I from lobby to gym( ~ 150 feet) and vice-versa. Pt demonstrates ER of feet, slow gait pattern with improved knee extension during stance phase.  Pt also ambulates with wide RYAN.       Fransisca received therapeutic exercises to develop strength, endurance, ROM, flexibility and posture for 15 minutes including:    X 10 min on SCI-FIT recumbent stepper using  B LE @ level 1.0 for improved  B LE ROM, strength and CV endurance    2 standing sustained gastroc stretches with use of incline and rail for UE support ~ 1st trial 90 seconds and 2nd trial 60 seconds        Patient participated in neuromuscular re-education activities to improve: Balance, Coordination, Kinesthetic, Sense, Proprioception and Posture for 30 minutes. The following activities were included:     Standing  "in parallel bars:    2 x 30" B alternate tandem stance, no UE support, CGA      On 2 Foam pads:  1 x 30" static stand with no UE support, eyes open, CGA~slight trembling  2 x 30" static stand with no UE support, eyes closed, CGA/min A  1 x 30" standing while holding soccer ball in outstretched arms,CGA/min A~ min trembling  1 x 30" standing while lifting ball up and down, CGA~ min trembling      On Bosu( soft side up):  2 x 30" static stand, no UE support, CGA/min A~ min trembling    On Bosu( firm side up):  3 x 30" static stand, no UE support, min A trial 1, min/CGA trial 2, min A trial 3~ min trembling    X 2 laps tandem ambulation, no UE support, CGA with min trembling      Home Exercises Provided and Patient Education Provided     Education provided: Sitting therex including: B LE heel and toe raises, hip abduction against peach theraband, hip adduction squeezes with ball, LAQ, hip flex.  PT also educated pt regarding proper placement of SC in her hand when performing sit<> stand trials.    Written Home Exercises Provided: yes. PT provided pt with written copy of the above today( 3/14/19).  PT also provided pt with piece of peach theraband. No updates to HEP on this date( 6/13/19).  Exercises were reviewed and Fransisca was able to demonstrate them prior to the end of the session.  Fransisca demonstrated good  understanding of the education provided.     See EMR under Media for exercises provided 3/14/19.    Assessment     Fransisca tolerated her therapy session very well today and continues to come to clinic in good spirits. Pt's performance with selected standing balance tasks continues to improve and she is demonstrating less trembling than in previous sessions. Even with standing on Bosu( both soft and firm sides), pt demonstrates only minimal trembling. PT also introduced concept of standing on 2 foam pads and also used soccer ball to engage pt's core musculature. Pt did well with these tasks and PT may consider using " this set up in future sessions. Tandem ambulation without use of hands also looked good with minimal trembling. PT is hopeful that pt's current upward trend will continue.  Pt remains appropriate for additional OPPT visits to address deficits with  balance, strength, endurance and postural control. Continue with current POC.    Fransisca is progressing well towards her goals.   Pt prognosis is Fair.     Pt will continue to benefit from skilled outpatient physical therapy to address the deficits listed in the problem list box on initial evaluation, provide pt/family education and to maximize pt's level of independence in the home and community environment.     Pt's spiritual, cultural and educational needs considered and pt agreeable to plan of care and goals.     Anticipated barriers to physical therapy: emotional deficits, anxiety regarding balance training    Goals: Pt's spiritual, cultural and educational needs considered and pt agreeable to plan of care and goals as stated below:      GOALS:   Short term goals: 4 weeks, pt agrees to goals set.  1. Pt to be issued HEP and report compliance~ in progress; Goal MET, 4/4/19  2. Pt to improve 30 second chair rise to 10-11 trials to demonstrate improvement with this transition~progressing; Goal MET, 4/4/19; Goal not MET on 4/26/19~ remains appropriate  3. Pt to improve SSWS to 1.0m/sec for improved community ambulation with decreased fall risk~progressing; remains appropriate  4. Pt to improve MCTSIB score on condition # 3 to 6 seconds for improved balance on unstable surfaces~progressing; Goal MET, 4/4/19  5. Pt to perform SLS on either limb x 2-3 seconds for improved balance and decreased fall risk~progressing( not tested today due to fatigue and pain, 4/4/19); Goal MET 4/26/19     Long term goals: 8 weeks, pt agrees to goals set  6. Pt (I) with HEP~progressing  7. Pt to improve 30 second chair rise to 12-13 trials to demonstrate improvement with this  transition~progressing. Revise this goal to 11-12 trials, 4/26/19  8. Pt to improve SSWS to 1.2 m/sec for improved community ambulation with decreased fall risk~progressing  9. Pt to improve MCTSIB score on condition # 3 to 9 seconds for improved balance on unstable surfaces~progressing; Goal MET, 4/4/19.     10. Pt to perform SLS on either limb x 4-5 seconds for improved balance and decreased fall risk~ ~progressing; Goal MET, 4/26/19.  Revise goal to perform SLS on either limb x 7-8 seconds, 4/26/19   11. NEW GOAL( 4/26/19):  Pt to tolerate 15 seconds during condition # 4 of the MCTSIB( foam with eyes closed)  Plan     Cont to address balance deficits in parallel bars with use of compliant surfaces( encourage performance without UE support); continue with use of recumbent stepper for B UE/LE strengthening and CV/muscular endurance. Add use of Pilates box to address SLS balance. Add use of 2 foam pads for balance tasks with use of ball to engage core musculature.        Abdias Solares, PT

## 2019-06-18 ENCOUNTER — CLINICAL SUPPORT (OUTPATIENT)
Dept: REHABILITATION | Facility: HOSPITAL | Age: 49
End: 2019-06-18
Attending: FAMILY MEDICINE
Payer: COMMERCIAL

## 2019-06-18 DIAGNOSIS — R26.89 IMPAIRMENT OF BALANCE: ICD-10-CM

## 2019-06-18 DIAGNOSIS — R29.898 BILATERAL LEG WEAKNESS: ICD-10-CM

## 2019-06-18 DIAGNOSIS — Z74.09 DECREASED MOBILITY AND ENDURANCE: ICD-10-CM

## 2019-06-18 PROCEDURE — 97116 GAIT TRAINING THERAPY: CPT | Mod: PO

## 2019-06-18 PROCEDURE — 97110 THERAPEUTIC EXERCISES: CPT | Mod: PO

## 2019-06-18 PROCEDURE — 97112 NEUROMUSCULAR REEDUCATION: CPT | Mod: PO

## 2019-06-18 NOTE — PROGRESS NOTES
Physical Therapy Daily Treatment Note     Name: Jillian Osullivan  Clinic Number: [id number]    Therapy Diagnosis:   Encounter Diagnoses   Name Primary?    Bilateral leg weakness     Impairment of balance     Decreased mobility and endurance      Physician: Dedrick, Provider    Visit Date: 6/18/2019  Physician Orders: PT Eval and Treat   Medical Diagnosis from Referral: multiple sclerosis, abnormality of gait, atypical depressive disorder  Evaluation Date: 3/1/2019  Authorization Period Expiration: 6/13/2019 to 12/31/2019  Plan of Care Expiration: 4/26/2019  Extended Plan of Care Expiration: 4/26/19 to 6/21/19  Updated Plan of Care Expiration: 6/18/19 to 8/13/19  Visit # / Visits authorized: 2/12    Time In: 0945  Time Out: 1030  Total Billable Time: 45 minutes    Precautions: Standard, Fall and impaired vision, emotional deficits    Subjective       Pt reports: she feels lethargic and did not sleep well.   She states she is compliant with her home exercises.  Response to previous treatment: no adverse effects  Functional change: ongoing    Pain: 0/10   Location: N/A    Objective       Pt ambulates with SC mod I from lobby to gym( ~ 150 feet) and vice-versa. Pt demonstrates ER of feet, slow gait pattern with improved knee extension during stance phase.  Pt also ambulates with wide RYAN.       Fransisca received therapeutic exercises to develop strength, endurance, ROM, flexibility and posture for 15 minutes including:    Lower Extremity Strength~ tested in sitting  Right LE  3/1/19 4/26/19 6/18/19 Left LE  3/1/19 4/26/19 6/18/19   Hip Flexion: 3-/5 3/5 3/5 Hip Flexion: 3/5 3/5 3/5   Hip Extension:  4/5 4-/5 4/5 Hip Extension: 4-/5 4/5 4(-)/5   Hip Abduction: 4/5 4+/5 5/5 Hip Abduction: 4/5 4+/5 5/5   Hip Adduction: 4+/5 5/5 5/5 Hip Adduction 4+/5 5/5 5/5   Knee Extension: 3/5 4+/5 4+/5 Knee Extension: 3+/5 4/5 4+/5   Knee Flexion: 3+/5 4(-)/5 4/5 Knee Flexion: 4/5 4+/5 4+/5   Ankle Dorsiflexion: 3/5 4+/5 4+/5  "Ankle Dorsiflexion: 4-/5 4/5 4+/5   Ankle Plantarflexion: 4/5 4+/5 5/5 Ankle Plantarflexion: 4/5 4+/5 5/5             Patient participated in neuromuscular re-education activities to improve: Balance, Coordination, Kinesthetic, Sense, Proprioception and Posture for 20 minutes. The following activities were included:       Evaluation 4/26/19 6/18/19   Single Limb Stance R LE Unable to perform, fear  (<10 sec = HIGH FALL RISK) 6 seconds  (<10 sec = HIGH FALL RISK) 6 seconds(<10 sec = HIGH FALL RISK)   Single Limb Stance L LE Unable to perform, fear  (<10 sec = HIGH FALL RISK) 5 seconds  (<10 sec = HIGH FALL RISK) 9 seconds  (<10 sec = HIGH FALL RISK)   30 second Chair Rise 9 completed with arms 9 completed with arms 10 completed with arms     4/26/19  Postural control:  MCTSIB:  1. Eyes Open/feet together/Firm: 30 seconds, P no sway, slight tremor  2. Eyes Closed/feet together/Firm: 30 seconds, P, with min sway  3. Eyes Open/feet together/Foam: 30 seconds, P with increased ankle sway   4. Eyes Closed/feet together/Foam: 10 seconds, F, increased sway and trembling    6/18/19  Postural control:  MCTSIB:  1. Eyes Open/feet together/Firm: 30 seconds, P, no sway, slight tremor  2. Eyes Closed/feet together/Firm: 30 seconds, P, with min sway and tremor  3. Eyes Open/feet together/Foam: 30 seconds, P with mild sway and tremor  4. Eyes Closed/feet together/Foam: 30 seconds, P, mild sway and tremor          Standing in parallel bars:    1 x 30" B alternate tandem stance, no UE support, CGA~ min tremor      On Bosu( soft side up):  2 x 30" static stand, no UE support, CGA/min A~ min trembling    On Bosu( firm side up):  2 x 30" static stand, no UE support, min A/CGA~ min tremor    X 4 laps tandem ambulation, no UE support, CGA with min trembling        Patient participated in gait training activities to normalize gait pattern for 10 minutes. The following activities were included:   Gait belt used for safety. Assistive device: " none              Evaluation 4/26/19 6/18/19   Timed Up and Go 12 sec 12 sec 10 sec    Self Selected Walking Speed 0.86 m/sec (6m/7s) 0.75 m/sec(6m/8s) 1.0 m/sec(6m/6s)   Fast Walking Speed 1.0 m/sec (6m/6s) 1.0m/sec(6m/6s) 1.2 m/sec(6m/5s)       * Pt performs several trials of the above tests. Pt demonstrates slightly wider RYAN when ambulating without her SC.          Home Exercises Provided and Patient Education Provided     Education provided: Sitting therex including: B LE heel and toe raises, hip abduction against peach theraband, hip adduction squeezes with ball, LAQ, hip flex.  PT also educated pt regarding proper placement of SC in her hand when performing sit<> stand trials.    Written Home Exercises Provided: yes. PT provided pt with written copy of the above today( 3/14/19).  PT also provided pt with piece of peach theraband. No updates to HEP on this date( 6/18/19).  Exercises were reviewed and Fransisca was able to demonstrate them prior to the end of the session.  Fransisca demonstrated good  understanding of the education provided.     See EMR under Media for exercises provided 3/14/19.    Assessment     Fransisca tolerated her formal reassessment for updated plan of care very well today. Pt continues to be free of pain and her mood has been considerably improved during recent visits. Pt still admits to not sleeping well, though this does not seem to be negatively impacting her performance in therapy. Pt's progress in all areas has been considerable since time of evaluation. With regard to today's tests and measures, pt demonstrated 10 repetitions during 30 second chair rise, 1 more than previous assessment. Pt's SLS remains largely unchanged, though she passed all 4 conditions on the MCTSIB today!!! This is signficant, considering pt's difficulty with this test at time of evaluation. Pt also demonstrated improved scores on the TUG, SSWS and FSWS( see above chart). Pt also demonstrated improved strength grades to  several LE muscles during MMT( see above chart). Pt's standing balance continues to improve, and her tendency to demonstrate trembling and tremor activity with balance challenges continues to decline. PT is hopeful that pt's current upward trend will continue. PT would like to extend pt's current plan of care for another 8 weeks( until 8/13/19) to continue working with pt to improve strength, balance and activity tolerance. See below for most recent comments regarding goal achievement and any updates/revisions made.    Fransisca is progressing well towards her goals.   Pt prognosis is Fair to Good.     Pt will continue to benefit from skilled outpatient physical therapy to address the deficits listed in the problem list box on initial evaluation, provide pt/family education and to maximize pt's level of independence in the home and community environment.     Pt's spiritual, cultural and educational needs considered and pt agreeable to plan of care and goals.     Anticipated barriers to physical therapy: emotional deficits, anxiety regarding balance training    Goals: Pt's spiritual, cultural and educational needs considered and pt agreeable to plan of care and goals as stated below:      GOALS:   Short term goals: 4 weeks, pt agrees to goals set.  1. Pt to be issued HEP and report compliance~ in progress; Goal MET, 4/4/19  2. Pt to improve 30 second chair rise to 10-11 trials to demonstrate improvement with this transition~progressing; Goal MET, 4/4/19; Goal not MET on 4/26/19~ remains appropriate  3. Pt to improve SSWS to 1.0m/sec for improved community ambulation with decreased fall risk~Goal MET, 6/18/19  4. Pt to improve MCTSIB score on condition # 3 to 6 seconds for improved balance on unstable surfaces~ Goal MET, 4/4/19  5. Pt to perform SLS on either limb x 2-3 seconds for improved balance and decreased fall risk~ Goal MET 4/26/19     Long term goals: 8 weeks, pt agrees to goals set  6. Pt (I) with  HEP~progressing  7. Pt to improve 30 second chair rise to 12-13 trials to demonstrate improvement with this transition~progressing. Revise this goal to 11-12 trials, 4/26/19: goal remains appropriate, 6/18/19  8. Pt to improve SSWS to 1.2 m/sec for improved community ambulation with decreased fall risk~progressing  9. Pt to improve MCTSIB score on condition # 3 to 9 seconds for improved balance on unstable surfaces~ Goal MET, 4/4/19.     10. Pt to perform SLS on either limb x 4-5 seconds for improved balance and decreased fall risk~ ~Goal MET, 4/26/19.  Revise goal to perform SLS on either limb x 7-8 seconds, 4/26/19~ partially met for L LE, 6/18/19   11. NEW GOAL( 4/26/19):  Pt to tolerate 15 seconds during condition # 4 of the MCTSIB( foam with eyes closed); Goal MET, 6/18/19  Plan     Continue outpatient physical therapy 2 x weekly under updated Plan of Care, 6/18/19 to 8/13/19, with treatment to include: pt education, HEP, therapeutic exercises, neuromuscular re-education/balance exercises, therapeutic activities, joint mobilizations, and modalities PRN, to work towards established goals. Pt may be seen by PTA to carry out plan of care.           Abdias Solares, PT    Wound Care: Petrolatum

## 2019-06-20 ENCOUNTER — CLINICAL SUPPORT (OUTPATIENT)
Dept: REHABILITATION | Facility: HOSPITAL | Age: 49
End: 2019-06-20
Attending: FAMILY MEDICINE
Payer: COMMERCIAL

## 2019-06-20 DIAGNOSIS — R26.89 IMPAIRMENT OF BALANCE: ICD-10-CM

## 2019-06-20 DIAGNOSIS — R29.898 BILATERAL LEG WEAKNESS: ICD-10-CM

## 2019-06-20 DIAGNOSIS — Z74.09 DECREASED MOBILITY AND ENDURANCE: ICD-10-CM

## 2019-06-20 PROCEDURE — 97112 NEUROMUSCULAR REEDUCATION: CPT | Mod: PO

## 2019-06-20 PROCEDURE — 97110 THERAPEUTIC EXERCISES: CPT | Mod: PO

## 2019-06-20 NOTE — PROGRESS NOTES
"  Physical Therapy Daily Treatment Note     Name: Jillian Osullivan  Clinic Number: [id number]    Therapy Diagnosis:   Encounter Diagnoses   Name Primary?    Bilateral leg weakness     Impairment of balance     Decreased mobility and endurance      Physician: Dedrick, Provider    Visit Date: 6/20/2019  Physician Orders: PT Eval and Treat   Medical Diagnosis from Referral: multiple sclerosis, abnormality of gait, atypical depressive disorder  Evaluation Date: 3/1/2019  Authorization Period Expiration: 6/13/2019 to 12/31/2019  Plan of Care Expiration: 4/26/2019  Extended Plan of Care Expiration: 4/26/19 to 6/21/19  Updated Plan of Care Expiration: 6/18/19 to 8/13/19  Visit # / Visits authorized: 3/12    Time In: 0900  Time Out: 0945  Total Billable Time: 45 minutes    Precautions: Standard, Fall and impaired vision, emotional deficits    Subjective       Pt reports: she has a HA this morning. Pt states she took ibuprofen.   She states she is compliant with her home exercises.  Response to previous treatment: no adverse effects  Functional change: ongoing    Pain: 7/10   Location: head    Objective       Pt ambulates with SC mod I from lobby to gym( ~ 150 feet) and vice-versa. Pt demonstrates ER of feet, slow gait pattern with improved knee extension during stance phase.  Pt also ambulates with wide RYAN.       Fransisca received therapeutic exercises to develop strength, endurance, ROM, flexibility and posture for 13 minutes including:    X 10 min on SCI-FIT recumbent stepper using  B LE @ level 1.0-1.5  for improved  B LE ROM, strength and CV endurance    2 x 30" gastroc streches at incline with use of rail for UE support        Patient participated in neuromuscular re-education activities to improve: Balance, Coordination, Kinesthetic, Sense, Proprioception and Posture for 32 minutes. The following activities were included:         Standing balance in parallel bars:    On 2 Foam pads:  1 x 30" static stand with no " "UE support, eyes open, CGA~slight trembling  2 x 30" static stand with no UE support, eyes closed, CGA/min A  2 x 30" standing while holding soccer ball in outstretched arms,CGA~ slight trembling  1 x 30" standing while lifting ball up and down, CGA  1 x 30" standing while holding ball in outstretched arms and rotating trunk to R and L, CGA    On foam pad:  2 x 30" B alternate tandem stance, no UE support, CGA/min A~ min trembling      Standing at Pilates Box:  2 x 10 B LE alternate hip and knee flex/ext with 2 white springs on level 1, 1 UE support, CGA/SBA    At Hyphen 8et bar:  X 4 laps tandem ambulation, light finger support of 1 UE, S  1 x 10 B LE steps ups on foam fitter, no UE support, CGA/min A with occasional touchdown on bar  1 x 10 alternate LE step overs on foam fitter, no UE support, CGA/min A    Ladder agility:  X 2 laps stepping along ladder placing both feet in each rung, CGA              Home Exercises Provided and Patient Education Provided     Education provided: Sitting therex including: B LE heel and toe raises, hip abduction against peach theraband, hip adduction squeezes with ball, LAQ, hip flex.  PT also educated pt regarding proper placement of SC in her hand when performing sit<> stand trials.    Written Home Exercises Provided: yes. PT provided pt with written copy of the above today( 3/14/19).  PT also provided pt with piece of peach theraband. No updates to HEP on this date( 6/20/19).  Exercises were reviewed and Fransisca was able to demonstrate them prior to the end of the session.  Fransisca demonstrated good  understanding of the education provided.     See EMR under Media for exercises provided 3/14/19.    Assessment     Fransisca tolerated her therapy session well today, despite entering clinic with report of a HA. Pt continues to perform well with standing balance activities performed on 2 foam pads. Not only is pt demonstrating less need for PT assistance here, pt is also exhibiting less trembling " with performance. Pt also did well with performance of tandem ambulation at ballet bar, as well as step ups and step overs on foam fitter.   PT introduced Pilates Box for a modified version of SLS practice, which pt tolerated very well with use of 1 UE support for balance. PT also had pt begin with a simple ladder agility task, in the hope of gradually increasing the complexity of tasks performed over time. Pt continues to demonstrate an upbeat mood and is receptive to new challenges PT presents.  Pt remains appropriate for additional OPPT visits to address deficits with  balance, strength, endurance and postural control. Continue with current POC.      Fransisca is progressing well towards her goals.   Pt prognosis is Fair to Good.     Pt will continue to benefit from skilled outpatient physical therapy to address the deficits listed in the problem list box on initial evaluation, provide pt/family education and to maximize pt's level of independence in the home and community environment.     Pt's spiritual, cultural and educational needs considered and pt agreeable to plan of care and goals.     Anticipated barriers to physical therapy: emotional deficits, anxiety regarding balance training    Goals: Pt's spiritual, cultural and educational needs considered and pt agreeable to plan of care and goals as stated below:      GOALS:   Short term goals: 4 weeks, pt agrees to goals set.  1. Pt to be issued HEP and report compliance~ in progress; Goal MET, 4/4/19  2. Pt to improve 30 second chair rise to 10-11 trials to demonstrate improvement with this transition~progressing; Goal MET, 4/4/19; Goal not MET on 4/26/19~ remains appropriate  3. Pt to improve SSWS to 1.0m/sec for improved community ambulation with decreased fall risk~Goal MET, 6/18/19  4. Pt to improve MCTSIB score on condition # 3 to 6 seconds for improved balance on unstable surfaces~ Goal MET, 4/4/19  5. Pt to perform SLS on either limb x 2-3 seconds for  improved balance and decreased fall risk~ Goal MET 4/26/19     Long term goals: 8 weeks, pt agrees to goals set  6. Pt (I) with HEP~progressing  7. Pt to improve 30 second chair rise to 12-13 trials to demonstrate improvement with this transition~progressing. Revise this goal to 11-12 trials, 4/26/19: goal remains appropriate, 6/18/19  8. Pt to improve SSWS to 1.2 m/sec for improved community ambulation with decreased fall risk~progressing  9. Pt to improve MCTSIB score on condition # 3 to 9 seconds for improved balance on unstable surfaces~ Goal MET, 4/4/19.     10. Pt to perform SLS on either limb x 4-5 seconds for improved balance and decreased fall risk~ ~Goal MET, 4/26/19.  Revise goal to perform SLS on either limb x 7-8 seconds, 4/26/19~ partially met for L LE, 6/18/19   11. NEW GOAL( 4/26/19):  Pt to tolerate 15 seconds during condition # 4 of the MCTSIB( foam with eyes closed); Goal MET, 6/18/19  Plan     Cont to address balance deficits in parallel bars with use of compliant surfaces( encourage performance without UE support); continue with use of recumbent stepper for B UE/LE strengthening and CV/muscular endurance. Add use of Pilates box to address SLS balance. Add use of 2 foam pads for balance tasks with use of ball to engage core musculature. Add basic ladder agility skills with gradual progressions.      Abdias Solares, PT

## 2019-06-25 ENCOUNTER — CLINICAL SUPPORT (OUTPATIENT)
Dept: REHABILITATION | Facility: HOSPITAL | Age: 49
End: 2019-06-25
Attending: FAMILY MEDICINE
Payer: COMMERCIAL

## 2019-06-25 ENCOUNTER — OFFICE VISIT (OUTPATIENT)
Dept: PSYCHIATRY | Facility: CLINIC | Age: 49
End: 2019-06-25
Payer: COMMERCIAL

## 2019-06-25 VITALS
BODY MASS INDEX: 33.02 KG/M2 | WEIGHT: 180.56 LBS | SYSTOLIC BLOOD PRESSURE: 124 MMHG | HEART RATE: 86 BPM | DIASTOLIC BLOOD PRESSURE: 74 MMHG

## 2019-06-25 DIAGNOSIS — F42.9 OBSESSIVE-COMPULSIVE DISORDER, UNSPECIFIED TYPE: ICD-10-CM

## 2019-06-25 DIAGNOSIS — F41.1 GENERALIZED ANXIETY DISORDER: ICD-10-CM

## 2019-06-25 DIAGNOSIS — R26.89 IMPAIRMENT OF BALANCE: ICD-10-CM

## 2019-06-25 DIAGNOSIS — Z74.09 DECREASED MOBILITY AND ENDURANCE: ICD-10-CM

## 2019-06-25 DIAGNOSIS — G47.33 OSA (OBSTRUCTIVE SLEEP APNEA): ICD-10-CM

## 2019-06-25 DIAGNOSIS — F32.1 MDD (MAJOR DEPRESSIVE DISORDER), SINGLE EPISODE, MODERATE: Primary | ICD-10-CM

## 2019-06-25 DIAGNOSIS — F40.01 PANIC DISORDER WITH AGORAPHOBIA: ICD-10-CM

## 2019-06-25 DIAGNOSIS — F40.298 SPECIFIC PHOBIA: ICD-10-CM

## 2019-06-25 DIAGNOSIS — G25.81 RLS (RESTLESS LEGS SYNDROME): ICD-10-CM

## 2019-06-25 DIAGNOSIS — R29.898 BILATERAL LEG WEAKNESS: ICD-10-CM

## 2019-06-25 PROCEDURE — 99999 PR PBB SHADOW E&M-EST. PATIENT-LVL II: CPT | Mod: PBBFAC,,, | Performed by: PSYCHIATRY & NEUROLOGY

## 2019-06-25 PROCEDURE — 3078F DIAST BP <80 MM HG: CPT | Mod: CPTII,S$GLB,, | Performed by: PSYCHIATRY & NEUROLOGY

## 2019-06-25 PROCEDURE — 97112 NEUROMUSCULAR REEDUCATION: CPT | Mod: PO

## 2019-06-25 PROCEDURE — 3074F SYST BP LT 130 MM HG: CPT | Mod: CPTII,S$GLB,, | Performed by: PSYCHIATRY & NEUROLOGY

## 2019-06-25 PROCEDURE — 99213 OFFICE O/P EST LOW 20 MIN: CPT | Mod: S$GLB,,, | Performed by: PSYCHIATRY & NEUROLOGY

## 2019-06-25 PROCEDURE — 99999 PR PBB SHADOW E&M-EST. PATIENT-LVL II: ICD-10-PCS | Mod: PBBFAC,,, | Performed by: PSYCHIATRY & NEUROLOGY

## 2019-06-25 PROCEDURE — 3008F BODY MASS INDEX DOCD: CPT | Mod: CPTII,S$GLB,, | Performed by: PSYCHIATRY & NEUROLOGY

## 2019-06-25 PROCEDURE — 3008F PR BODY MASS INDEX (BMI) DOCUMENTED: ICD-10-PCS | Mod: CPTII,S$GLB,, | Performed by: PSYCHIATRY & NEUROLOGY

## 2019-06-25 PROCEDURE — 3078F PR MOST RECENT DIASTOLIC BLOOD PRESSURE < 80 MM HG: ICD-10-PCS | Mod: CPTII,S$GLB,, | Performed by: PSYCHIATRY & NEUROLOGY

## 2019-06-25 PROCEDURE — 90836 PR PSYCHOTHERAPY W/PATIENT W/E&M, 45 MIN (ADD ON): ICD-10-PCS | Mod: S$GLB,,, | Performed by: PSYCHIATRY & NEUROLOGY

## 2019-06-25 PROCEDURE — 90836 PSYTX W PT W E/M 45 MIN: CPT | Mod: S$GLB,,, | Performed by: PSYCHIATRY & NEUROLOGY

## 2019-06-25 PROCEDURE — 99213 PR OFFICE/OUTPT VISIT, EST, LEVL III, 20-29 MIN: ICD-10-PCS | Mod: S$GLB,,, | Performed by: PSYCHIATRY & NEUROLOGY

## 2019-06-25 PROCEDURE — 97110 THERAPEUTIC EXERCISES: CPT | Mod: PO

## 2019-06-25 PROCEDURE — 3074F PR MOST RECENT SYSTOLIC BLOOD PRESSURE < 130 MM HG: ICD-10-PCS | Mod: CPTII,S$GLB,, | Performed by: PSYCHIATRY & NEUROLOGY

## 2019-06-25 RX ORDER — CYANOCOBALAMIN 1000 UG/ML
1 INJECTION, SOLUTION INTRAMUSCULAR; SUBCUTANEOUS WEEKLY
COMMUNITY
Start: 2019-04-15

## 2019-06-25 RX ORDER — GABAPENTIN 300 MG/1
CAPSULE ORAL
Qty: 120 CAPSULE | Refills: 6 | Status: SHIPPED | OUTPATIENT
Start: 2019-06-25 | End: 2019-09-25 | Stop reason: SDUPTHER
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
## 2019-06-25 NOTE — PROGRESS NOTES
"  Physical Therapy Daily Treatment Note     Name: Jillian Osullivan  Clinic Number: [id number]    Therapy Diagnosis:   Encounter Diagnoses   Name Primary?    Bilateral leg weakness     Impairment of balance     Decreased mobility and endurance      Physician: Dedrick Provider    Visit Date: 6/25/2019  Physician Orders: PT Eval and Treat   Medical Diagnosis from Referral: multiple sclerosis, abnormality of gait, atypical depressive disorder  Evaluation Date: 3/1/2019  Authorization Period Expiration: 6/13/2019 to 12/31/2019  Plan of Care Expiration: 4/26/2019  Extended Plan of Care Expiration: 4/26/19 to 6/21/19  Updated Plan of Care Expiration: 6/18/19 to 8/13/19  Visit # / Visits authorized: 4/12    Time In: 0900  Time Out: 0945  Total Billable Time: 45 minutes    Precautions: Standard, Fall and impaired vision, emotional deficits    Subjective     Pt reports: that she is feeling some fatigue this morning but no HA; pt also reports falling at a store a few weeks ago and her mother was not able to get her to stand. Pt's mother had to go get her father who was waiting in the car to help Fransisca stand up.    She states she is compliant with her home exercises.  Response to previous treatment: no adverse effects  Functional change: ongoing    Pain: 0/10   Location: N/A    Objective       Pt ambulates with SC mod I from lobby to gym( ~ 150 feet) and vice-versa. Pt demonstrates ER of feet, slow gait pattern with improved knee extension during stance phase.  Pt also ambulates with wide RYAN.       Fransisca received therapeutic exercises to develop strength, endurance, ROM, flexibility and posture for 13 minutes including:    X 10 min on SCI-FIT recumbent stepper using  B LE @ level 1.5  for improved  B LE ROM, strength and CV endurance    2 x 30" gastroc streches at incline with use of rail for UE support      Patient participated in neuromuscular re-education activities to improve: Balance, Coordination, Kinesthetic, " "Sense, Proprioception and Posture for 32 minutes. The following activities were included:       Standing balance at [facility]:    On 2 Foam pads:  2 x 30" static stand with no UE support, eyes open, CGA~slight trembling  2 x 30" static stand with no UE support, eyes closed, CGA  2 x 30" standing while holding soccer ball in outstretched arms,CGA~ slight trembling  1 x 30" standing while lifting ball up and down, CGA  1 x 30" standing while holding ball in outstretched arms and rotating trunk to R and L, CGA    4 pt fitter:  2 x 30" bilaterally, SLS with contralateral LE on soccer ball, light finger support, CGA  1 x 10 alternate LE step overs on foam fitter, no UE support, CGA    Standing at Pilates Box:  2 x 10 B LE alternate hip and knee flex/ext with 2 white springs on level 1 and 2 black springs on level 1, 1 UE support, SBA    Ladder agility:  X 2 laps stepping along ladder placing both feet in each rung, CGA        Home Exercises Provided and Patient Education Provided     Education provided: Sitting therex including: B LE heel and toe raises, hip abduction against peach theraband, hip adduction squeezes with ball, LAQ, hip flex.  PT also educated pt regarding proper placement of SC in her hand when performing sit<> stand trials.    Written Home Exercises Provided: yes. PT provided pt with written copy of the above today( 3/14/19).  PT also provided pt with piece of peach theraband. No updates to HEP on this date( 6/20/19).  Exercises were reviewed and Fransisca was able to demonstrate them prior to the end of the session.  Fransisca demonstrated good  understanding of the education provided.     See EMR under Media for exercises provided 3/14/19.    Assessment     Fransisca presented with fatigue upon arrival but noted that she did not have a HA this morning. Pt demonstrated good stability with static balance activities as pt did not require any UE support with most activities. Pt only required light finger support with " single leg stance on 4 pt fitter with contralateral LE on a soccer ball. Pt continued to use Pilates Box for a modified version of SLS practice and was able to increase resistance and maintain balance. Pt continued simple ladder agility task, and could benefit from increasing the complexity of tasks performed during the next tx visit. Pt could also benefit from performing floor to stand transfers as pt fell about 2 weeks ago in a store and could not get back up without assistance. Pt continues to demonstrate an upbeat mood and is receptive to new challenges PT presents.  Pt remains appropriate for additional OPPT visits to address deficits with  balance, strength, endurance and postural control. Continue with current POC.      Fransisca is progressing well towards her goals.   Pt prognosis is Fair to Good.     Pt will continue to benefit from skilled outpatient physical therapy to address the deficits listed in the problem list box on initial evaluation, provide pt/family education and to maximize pt's level of independence in the home and community environment.     Pt's spiritual, cultural and educational needs considered and pt agreeable to plan of care and goals.     Anticipated barriers to physical therapy: emotional deficits, anxiety regarding balance training    Goals: Pt's spiritual, cultural and educational needs considered and pt agreeable to plan of care and goals as stated below:      GOALS:   Short term goals: 4 weeks, pt agrees to goals set.  1. Pt to be issued HEP and report compliance~ in progress; Goal MET, 4/4/19  2. Pt to improve 30 second chair rise to 10-11 trials to demonstrate improvement with this transition~progressing; Goal MET, 4/4/19; Goal not MET on 4/26/19~ remains appropriate  3. Pt to improve SSWS to 1.0m/sec for improved community ambulation with decreased fall risk~Goal MET, 6/18/19  4. Pt to improve MCTSIB score on condition # 3 to 6 seconds for improved balance on unstable surfaces~  Goal MET, 4/4/19  5. Pt to perform SLS on either limb x 2-3 seconds for improved balance and decreased fall risk~ Goal MET 4/26/19     Long term goals: 8 weeks, pt agrees to goals set  6. Pt (I) with HEP~progressing  7. Pt to improve 30 second chair rise to 12-13 trials to demonstrate improvement with this transition~progressing. Revise this goal to 11-12 trials, 4/26/19: goal remains appropriate, 6/18/19  8. Pt to improve SSWS to 1.2 m/sec for improved community ambulation with decreased fall risk~progressing  9. Pt to improve MCTSIB score on condition # 3 to 9 seconds for improved balance on unstable surfaces~ Goal MET, 4/4/19.     10. Pt to perform SLS on either limb x 4-5 seconds for improved balance and decreased fall risk~ ~Goal MET, 4/26/19.  Revise goal to perform SLS on either limb x 7-8 seconds, 4/26/19~ partially met for L LE, 6/18/19   11. NEW GOAL( 4/26/19):  Pt to tolerate 15 seconds during condition # 4 of the MCTSIB( foam with eyes closed); Goal MET, 6/18/19  Plan     Cont to address balance deficits in parallel bars with use of compliant surfaces( encourage performance without UE support); continue with use of recumbent stepper for B UE/LE strengthening and CV/muscular endurance. Add use of Pilates box to address SLS balance. Add use of 2 foam pads for balance tasks with use of ball to engage core musculature. Add basic ladder agility skills with gradual progressions.      Martín Ojeda, PTA

## 2019-06-27 ENCOUNTER — CLINICAL SUPPORT (OUTPATIENT)
Dept: REHABILITATION | Facility: HOSPITAL | Age: 49
End: 2019-06-27
Attending: FAMILY MEDICINE
Payer: COMMERCIAL

## 2019-06-27 DIAGNOSIS — Z74.09 DECREASED MOBILITY AND ENDURANCE: ICD-10-CM

## 2019-06-27 DIAGNOSIS — R26.89 IMPAIRMENT OF BALANCE: ICD-10-CM

## 2019-06-27 DIAGNOSIS — R29.898 BILATERAL LEG WEAKNESS: ICD-10-CM

## 2019-06-27 PROCEDURE — 97112 NEUROMUSCULAR REEDUCATION: CPT | Mod: PO

## 2019-06-27 NOTE — PROGRESS NOTES
Physical Therapy Daily Treatment Note     Name: Jillian Osullivan  Clinic Number: [id number]    Therapy Diagnosis:   Encounter Diagnoses   Name Primary?    Bilateral leg weakness     Impairment of balance     Decreased mobility and endurance      Physician: Dedrick, Provider    Visit Date: 6/27/2019  Physician Orders: PT Eval and Treat   Medical Diagnosis from Referral: multiple sclerosis, abnormality of gait, atypical depressive disorder  Evaluation Date: 3/1/2019  Authorization Period Expiration: 6/13/2019 to 12/31/2019  Plan of Care Expiration: 4/26/2019  Extended Plan of Care Expiration: 4/26/19 to 6/21/19  Updated Plan of Care Expiration: 6/18/19 to 8/13/19  Visit # / Visits authorized: 5/12    Time In: 0955  Time Out: 1040  Total Billable Time: 35 minutes due to concurrent treatment with another patient    Precautions: Standard, Fall and impaired vision, emotional deficits    Subjective       Pt reports: she feels stressed due to having difficulty parking.   She states she is compliant with her home exercises.  Response to previous treatment: no adverse effects  Functional change: ongoing    Pain: 0/10   Location: N/A    Objective       Pt ambulates with SC mod I from lobby to gym( ~ 150 feet) and vice-versa. Pt demonstrates ER of feet, slow gait pattern with improved knee extension during stance phase.  Pt also ambulates with wide RYAN.       Fransisca received therapeutic exercises to develop strength, endurance, ROM, flexibility and posture for 10 minutes including:    X 10 min on SCI-FIT recumbent stepper using  B LE @ level 1.5  for improved  B LE ROM, strength and CV endurance            Patient participated in neuromuscular re-education activities to improve: Balance, Coordination, Kinesthetic, Sense, Proprioception and Posture for 35 minutes. The following activities were included:         Standing balance in parallel bars:    X 4 laps tandem ambulation, no UE support, CGA, slight trembling  X 3  "laps forward marching with 2-3 second hold, no UE support, CGA, slight trembling    On 2 Foam pads:  1 x 30" static stand with no UE support, eyes open, SBA~slight trembling  2 x 30" static stand with no UE support, eyes closed, CGA~ slight trembling    On foam pad:  1 x 30" B alternate tandem stance, no UE support, CGA/min A~ min trembling    On Bosu( soft side up):  2 x 30" static standing, no UE support, min A~ min trembling    On Bosu( soft side up):  2 x 30" static standing, no UE support, min A~slight trembling      Standing at Pilates Box:  2 x 10 B LE alternate hip and knee flex/ext with 2 white springs on level 1, no UE support, CGA      Ladder agility:  X 2 laps stepping along ladder placing both feet in each rung, CGA  X 2 laps stepping along ladder placing one foot in each rung reciprocally, CGA~ min trembling  X 1 lap stepping in with L foot, holding R foot in air x 3 seconds, stepping out and side stepping down and repeating along length of ladder, min/CGA with min trembling  X 1 lap stepping in with R foot, holding L foot in air x 3 seconds, stepping out and side stepping down and repeating along length of ladder, min/CGA with min trembling  X 1 lap moving forward placing L foot in ladder only, CGA  X 1 lap moving forward placing R foot in ladder only, CGA              Home Exercises Provided and Patient Education Provided     Education provided: Sitting therex including: B LE heel and toe raises, hip abduction against peach theraband, hip adduction squeezes with ball, LAQ, hip flex.  PT also educated pt regarding proper placement of SC in her hand when performing sit<> stand trials.    Written Home Exercises Provided: yes. PT provided pt with written copy of the above today( 3/14/19).  PT also provided pt with piece of peach theraband. No updates to HEP on this date( 6/27/19).  Exercises were reviewed and Fransisca was able to demonstrate them prior to the end of the session.  Fransisca demonstrated good  " understanding of the education provided.     See EMR under Media for exercises provided 3/14/19.    Assessment     Fransisca tolerated her therapy session well today, despite entering clinic somewhat stressed due to inability to find a parking space. Once pt settled into her session, she performed all balance activities nicely with decreased amount of trembling compared to previous sessions. Pt also demonstrated progression at [facility] box, performing this task without UE support and only CGA for balance. PT is also very pleased with pt's performance when participating in ladder agility skills. Pt performed several new tasks here without much anxiety or imbalance. Pt clearly needed most assist when performing stepping in with one foot and holding the other in the air x 3 seconds. PT feels pt has good potential for further progress here. PT also pleased with pt's performance when standing on BOSU. While pt still needs min A here, her amount of trembling is significantly decreased. Pt continues to demonstrate an upbeat mood and is receptive to new challenges PT presents.  Pt remains appropriate for additional OPPT visits to address deficits with  balance, strength, endurance and postural control. Continue with current POC.      Fransisca is progressing well towards her goals.   Pt prognosis is Fair to Good.     Pt will continue to benefit from skilled outpatient physical therapy to address the deficits listed in the problem list box on initial evaluation, provide pt/family education and to maximize pt's level of independence in the home and community environment.     Pt's spiritual, cultural and educational needs considered and pt agreeable to plan of care and goals.     Anticipated barriers to physical therapy: emotional deficits, anxiety regarding balance training    Goals: Pt's spiritual, cultural and educational needs considered and pt agreeable to plan of care and goals as stated below:      GOALS:   Short term goals: 4  weeks, pt agrees to goals set.  1. Pt to be issued HEP and report compliance~ in progress; Goal MET, 4/4/19  2. Pt to improve 30 second chair rise to 10-11 trials to demonstrate improvement with this transition~progressing; Goal MET, 4/4/19; Goal not MET on 4/26/19~ remains appropriate  3. Pt to improve SSWS to 1.0m/sec for improved community ambulation with decreased fall risk~Goal MET, 6/18/19  4. Pt to improve MCTSIB score on condition # 3 to 6 seconds for improved balance on unstable surfaces~ Goal MET, 4/4/19  5. Pt to perform SLS on either limb x 2-3 seconds for improved balance and decreased fall risk~ Goal MET 4/26/19     Long term goals: 8 weeks, pt agrees to goals set  6. Pt (I) with HEP~progressing  7. Pt to improve 30 second chair rise to 12-13 trials to demonstrate improvement with this transition~progressing. Revise this goal to 11-12 trials, 4/26/19: goal remains appropriate, 6/18/19  8. Pt to improve SSWS to 1.2 m/sec for improved community ambulation with decreased fall risk~progressing  9. Pt to improve MCTSIB score on condition # 3 to 9 seconds for improved balance on unstable surfaces~ Goal MET, 4/4/19.     10. Pt to perform SLS on either limb x 4-5 seconds for improved balance and decreased fall risk~ ~Goal MET, 4/26/19.  Revise goal to perform SLS on either limb x 7-8 seconds, 4/26/19~ partially met for L LE, 6/18/19   11. NEW GOAL( 4/26/19):  Pt to tolerate 15 seconds during condition # 4 of the MCTSIB( foam with eyes closed); Goal MET, 6/18/19  Plan     Cont to address balance deficits in parallel bars with use of compliant surfaces( encourage performance without UE support); continue with use of recumbent stepper for B UE/LE strengthening and CV/muscular endurance. Add use of Pilates box to address SLS balance. Add use of 2 foam pads for balance tasks with use of ball to engage core musculature. Add basic ladder agility skills with gradual progressions.      Abdias Solares, PT

## 2019-07-01 ENCOUNTER — DOCUMENTATION ONLY (OUTPATIENT)
Dept: REHABILITATION | Facility: HOSPITAL | Age: 49
End: 2019-07-01

## 2019-07-01 NOTE — PROGRESS NOTES
PT/PTA met face to face to discuss pt's treatment plan and progress towards established goals.  Continue with current PT POC with focus on higher level balance and endurance.  Patient will be seen by physical therapist at least every sixth treatment or 30 days, whichever occurs first.    Mary Kate Merino, PTA  07/01/2019

## 2019-07-05 ENCOUNTER — TELEPHONE (OUTPATIENT)
Dept: REHABILITATION | Facility: HOSPITAL | Age: 49
End: 2019-07-05

## 2019-07-05 ENCOUNTER — DOCUMENTATION ONLY (OUTPATIENT)
Dept: REHABILITATION | Facility: HOSPITAL | Age: 49
End: 2019-07-05

## 2019-07-05 NOTE — PROGRESS NOTES
PT/PTA met face to face to discuss pt's treatment plan and progress towards established goals. Continue with current PT POC, including: balance and coordination. Pt will be seen by physical therapist at least every 6th treatment day or every 30 days, whichever occurs first.      Martín Ojeda, PTA  7/01/19

## 2019-07-09 ENCOUNTER — CLINICAL SUPPORT (OUTPATIENT)
Dept: REHABILITATION | Facility: HOSPITAL | Age: 49
End: 2019-07-09
Attending: FAMILY MEDICINE
Payer: COMMERCIAL

## 2019-07-09 DIAGNOSIS — R29.898 BILATERAL LEG WEAKNESS: ICD-10-CM

## 2019-07-09 DIAGNOSIS — R26.89 IMPAIRMENT OF BALANCE: ICD-10-CM

## 2019-07-09 DIAGNOSIS — Z74.09 DECREASED MOBILITY AND ENDURANCE: ICD-10-CM

## 2019-07-09 PROCEDURE — 97112 NEUROMUSCULAR REEDUCATION: CPT | Mod: PO

## 2019-07-09 PROCEDURE — 97110 THERAPEUTIC EXERCISES: CPT | Mod: PO

## 2019-07-09 NOTE — PROGRESS NOTES
"  Physical Therapy Daily Treatment Note     Name: Jillian Osullivan  Clinic Number: [id number]    Therapy Diagnosis:   Encounter Diagnoses   Name Primary?    Bilateral leg weakness     Impairment of balance     Decreased mobility and endurance      Physician: Dedrick, Provider    Visit Date: 7/9/2019  Physician Orders: PT Eval and Treat   Medical Diagnosis from Referral: multiple sclerosis, abnormality of gait, atypical depressive disorder  Evaluation Date: 3/1/2019  Authorization Period Expiration: 6/13/2019 to 12/31/2019  Plan of Care Expiration: 4/26/2019  Extended Plan of Care Expiration: 4/26/19 to 6/21/19  Updated Plan of Care Expiration: 6/18/19 to 8/13/19  Visit # / Visits authorized: 6/12    Time In: 0855  Time Out: 0940  Total Billable Time: 45 minutes    Precautions: Standard, Fall and impaired vision, emotional deficits    Subjective       Pt reports: " it's way too hot."  Pt reports feeling lethargic and having some difficulty breathing.   She states she is compliant with her home exercises.  Response to previous treatment: no adverse effects  Functional change: ongoing    Pain: 0/10   Location: N/A    Objective       Pt ambulates with SC mod I from lobby to gym( ~ 150 feet) and vice-versa. Pt demonstrates ER of feet, slow gait pattern with improved knee extension during stance phase.  Pt also ambulates with wide RYAN.       Fransisca received therapeutic exercises to develop strength, endurance, ROM, flexibility and posture for 10 minutes including:    X 10 min on SCI-FIT recumbent stepper using  B LE @ level 1.5  for improved  B LE ROM, strength and CV endurance. Pt needs cues for improved motor output.            Patient participated in neuromuscular re-education activities to improve: Balance, Coordination, Kinesthetic, Sense, Proprioception and Posture for 35 minutes. The following activities were included:         Standing balance in parallel bars:    Firm ground:  1 x 30" B alternate tandem " "standing, no UE support, CGA with slight to min trembling    On foam pad:  1 x 30" B alternate tandem stance, no UE support, CGA/min A~ min trembling    Foam fitter:  1 x 10 B LE step ups/downs, no UE support, CGA  1 x 10 alternate LE step overs, no UE support, CGA    X 4 laps tandem ambulation, no UE support, CGA, min trembling  X 4 laps forward marching with 2-3 second hold, no UE support, CGA, slight trembling    On 2 Foam pads:  1 x 30" static stand with no UE support, eyes open, CGA~min trembling  2 x 30" static stand with no UE support, eyes closed, CGA/min A~ both trials halted after 20 seconds due to dizziness( min/mod trembling)      On Bosu( soft side up):  1 x 30" static standing, no UE support, min A~ mod trembling    On Bosu( firm side up):  2 x 30" static standing, no UE support, min A~min trembling      Standing at Pilates Box:  2 x 10 B LE alternate hip and knee flex/ext with 2 white springs on level 1 and 1 black spring on level 1, no UE support, CGA        Home Exercises Provided and Patient Education Provided     Education provided: Sitting therex including: B LE heel and toe raises, hip abduction against peach theraband, hip adduction squeezes with ball, LAQ, hip flex.  PT also educated pt regarding proper placement of SC in her hand when performing sit<> stand trials.    Written Home Exercises Provided: yes. PT provided pt with written copy of the above today( 3/14/19).  PT also provided pt with piece of peach theraband. No updates to HEP on this date( 7/9/19).  Exercises were reviewed and Fransisca was able to demonstrate them prior to the end of the session.  Fransisca demonstrated good  understanding of the education provided.     See EMR under Media for exercises provided 3/14/19.    Assessment     Fransisca tolerated her therapy session fairly today and it appears the considerable heat may be affecting her performance. Pt was limited by dizziness during performance of eyes closed condition while standing " on 2 foam pads. Pt also seemed to demonstrate more trembling with most balance tasks performed in parallel bars. On a positive note, pt did demonstrate ability to perform at Pilates box with increased resistance when compared to previous session. PT did not attempt ladder agility tasks on this date and will wait to see if pt feels better when she returns for her next session. PT would also like to continue practicing balance on BOSU, as pt struggled today with soft side up condition. Unlike the past few sessions, pt's mood was not quite as upbeat. Pt remains appropriate for additional OPPT visits to address deficits with  balance, strength, endurance and postural control. Continue with current POC.      Fransisca is progressing well towards her goals.   Pt prognosis is Fair to Good.     Pt will continue to benefit from skilled outpatient physical therapy to address the deficits listed in the problem list box on initial evaluation, provide pt/family education and to maximize pt's level of independence in the home and community environment.     Pt's spiritual, cultural and educational needs considered and pt agreeable to plan of care and goals.     Anticipated barriers to physical therapy: emotional deficits, anxiety regarding balance training    Goals: Pt's spiritual, cultural and educational needs considered and pt agreeable to plan of care and goals as stated below:      GOALS:   Short term goals: 4 weeks, pt agrees to goals set.  1. Pt to be issued HEP and report compliance~ in progress; Goal MET, 4/4/19  2. Pt to improve 30 second chair rise to 10-11 trials to demonstrate improvement with this transition~progressing; Goal MET, 4/4/19; Goal not MET on 4/26/19~ remains appropriate  3. Pt to improve SSWS to 1.0m/sec for improved community ambulation with decreased fall risk~Goal MET, 6/18/19  4. Pt to improve MCTSIB score on condition # 3 to 6 seconds for improved balance on unstable surfaces~ Goal MET, 4/4/19  5. Pt to  perform SLS on either limb x 2-3 seconds for improved balance and decreased fall risk~ Goal MET 4/26/19     Long term goals: 8 weeks, pt agrees to goals set  6. Pt (I) with HEP~progressing  7. Pt to improve 30 second chair rise to 12-13 trials to demonstrate improvement with this transition~progressing. Revise this goal to 11-12 trials, 4/26/19: goal remains appropriate, 6/18/19  8. Pt to improve SSWS to 1.2 m/sec for improved community ambulation with decreased fall risk~progressing  9. Pt to improve MCTSIB score on condition # 3 to 9 seconds for improved balance on unstable surfaces~ Goal MET, 4/4/19.     10. Pt to perform SLS on either limb x 4-5 seconds for improved balance and decreased fall risk~ ~Goal MET, 4/26/19.  Revise goal to perform SLS on either limb x 7-8 seconds, 4/26/19~ partially met for L LE, 6/18/19   11. NEW GOAL( 4/26/19):  Pt to tolerate 15 seconds during condition # 4 of the MCTSIB( foam with eyes closed); Goal MET, 6/18/19  Plan     Cont to address balance deficits in parallel bars with use of compliant surfaces( encourage performance without UE support); continue with use of recumbent stepper for B UE/LE strengthening and CV/muscular endurance. Add use of Pilates box to address SLS balance. Add use of 2 foam pads for balance tasks with use of ball to engage core musculature. Add basic ladder agility skills with gradual progressions.      Abdias Solares, PT

## 2019-07-16 ENCOUNTER — CLINICAL SUPPORT (OUTPATIENT)
Dept: REHABILITATION | Facility: HOSPITAL | Age: 49
End: 2019-07-16
Attending: FAMILY MEDICINE
Payer: COMMERCIAL

## 2019-07-16 DIAGNOSIS — R29.898 BILATERAL LEG WEAKNESS: ICD-10-CM

## 2019-07-16 DIAGNOSIS — Z74.09 DECREASED MOBILITY AND ENDURANCE: ICD-10-CM

## 2019-07-16 DIAGNOSIS — R26.89 IMPAIRMENT OF BALANCE: ICD-10-CM

## 2019-07-16 PROCEDURE — 97112 NEUROMUSCULAR REEDUCATION: CPT | Mod: PO

## 2019-07-16 PROCEDURE — 97110 THERAPEUTIC EXERCISES: CPT | Mod: PO

## 2019-07-16 NOTE — PROGRESS NOTES
"  Physical Therapy Daily Treatment Note     Name: Jillian Osullivan  Clinic Number: [id number]    Therapy Diagnosis:   No diagnosis found.  Physician: Dedrick Provider    Visit Date: 7/16/2019  Physician Orders: PT Eval and Treat   Medical Diagnosis from Referral: multiple sclerosis, abnormality of gait, atypical depressive disorder  Evaluation Date: 3/1/2019  Authorization Period Expiration: 6/13/2019 to 12/31/2019  Plan of Care Expiration: 4/26/2019  Extended Plan of Care Expiration: 4/26/19 to 6/21/19  Updated Plan of Care Expiration: 6/18/19 to 8/13/19  Visit # / Visits authorized: 7/12    Time In: 1120  Time Out: 1205  Total Billable Time: 45    Precautions: Standard, Fall and impaired vision, emotional deficits    Subjective       Pt reports: having a hard time breathing due to extreme temperatures.     She states she is compliant with her home exercises.  Response to previous treatment: no adverse effects  Functional change: ongoing    Pain: 0/10   Location: N/A    Objective       Pt ambulates with SC mod I from lobby to gym( ~ 150 feet) and vice-versa. Pt demonstrates ER of feet, slow gait pattern, and wide RYAN.       Fransisca received therapeutic exercises to develop strength, endurance, ROM, flexibility and posture for 10 minutes including:    X 10 min on SCI-FIT recumbent stepper using  B LE @ level 1.5  for improved  B LE ROM, strength and CV endurance. Pt needs cues for improved motor output.      Patient participated in neuromuscular re-education activities to improve: Balance, Coordination, Kinesthetic, Sense, Proprioception and Posture for 35 minutes. The following activities were included:       Standing balance in parallel bars:    Firm ground: ballet bar  1 x 30" B alternate tandem standing, no UE support, CGA with min trembling  10 x in each direction, of lateral step overs using light blue Yoga blocks, started activity with 2 UE support/CGA and ended activity with occasional UE support/min " "A  10 x in each direction, of fwd/bkw step overs using light blue Yoga blocks, started activity with 1 UE support/CGA and ended activity with occasional UE support/min A    On foam pad:  1 x 30" B alternate tandem stance, no UE support, CGA/min A with min trembling    Foam fitter:  1 x 10 B LE step ups/downs, no UE support, CGA  1 x 10 alternate LE step overs, no UE support, CGA    On Bosu( soft side up):  2 x 30" static standing, no UE support, min A~ mod trembling    On Bosu( soft side up):  2 x 30" static standing, no UE support, min A~min trembling      Standing at Pilates Box:  2 x 10 B LE alternate hip and knee flex/ext with 2 white springs on level 1 and 1 black spring on level 1, no UE support, CGA        Home Exercises Provided and Patient Education Provided     Education provided: Sitting therex including: B LE heel and toe raises, hip abduction against peach theraband, hip adduction squeezes with ball, LAQ, hip flex.  PT also educated pt regarding proper placement of SC in her hand when performing sit<> stand trials.    Written Home Exercises Provided: yes. PT provided pt with written copy of the above today( 3/14/19).  PT also provided pt with piece of peach theraband. No updates to HEP on this date( 7/9/19).  Exercises were reviewed and Fransisca was able to demonstrate them prior to the end of the session.  Fransisca demonstrated good  understanding of the education provided.     See EMR under Media for exercises provided 3/14/19.    Assessment     Fransisca tolerated her therapy session fairly well today. It was noted that pt c/o of a slight HA during the balance exercises but no dizziness during the whole tx. Step overs using yoga blocks was initiated during today's tx visit in order to promote tolerance with SL activities with ADLs and reduce the probability of falls. Ladder agility tasks were not attempted on this date but will attempt to continue it during her next session. It was also noted that balancing " activities were continued to be performed on BOSU, with pt demonstrating improved tolerance when compared to the previous tx visit. Pt's mood continued not to be as favorable as it was during previous tx visits. Pt remains appropriate for additional OPPT visits to address deficits with  balance, strength, endurance and postural control. Continue with current POC.      Fransisca is progressing well towards her goals.   Pt prognosis is Fair to Good.     Pt will continue to benefit from skilled outpatient physical therapy to address the deficits listed in the problem list box on initial evaluation, provide pt/family education and to maximize pt's level of independence in the home and community environment.     Pt's spiritual, cultural and educational needs considered and pt agreeable to plan of care and goals.     Anticipated barriers to physical therapy: emotional deficits, anxiety regarding balance training    Goals: Pt's spiritual, cultural and educational needs considered and pt agreeable to plan of care and goals as stated below:      GOALS:   Short term goals: 4 weeks, pt agrees to goals set.  1. Pt to be issued HEP and report compliance~ in progress; Goal MET, 4/4/19  2. Pt to improve 30 second chair rise to 10-11 trials to demonstrate improvement with this transition~progressing; Goal MET, 4/4/19; Goal not MET on 4/26/19~ remains appropriate  3. Pt to improve SSWS to 1.0m/sec for improved community ambulation with decreased fall risk~Goal MET, 6/18/19  4. Pt to improve MCTSIB score on condition # 3 to 6 seconds for improved balance on unstable surfaces~ Goal MET, 4/4/19  5. Pt to perform SLS on either limb x 2-3 seconds for improved balance and decreased fall risk~ Goal MET 4/26/19     Long term goals: 8 weeks, pt agrees to goals set  6. Pt (I) with HEP~progressing  7. Pt to improve 30 second chair rise to 12-13 trials to demonstrate improvement with this transition~progressing. Revise this goal to 11-12 trials,  4/26/19: goal remains appropriate, 6/18/19  8. Pt to improve SSWS to 1.2 m/sec for improved community ambulation with decreased fall risk~progressing  9. Pt to improve MCTSIB score on condition # 3 to 9 seconds for improved balance on unstable surfaces~ Goal MET, 4/4/19.     10. Pt to perform SLS on either limb x 4-5 seconds for improved balance and decreased fall risk~ ~Goal MET, 4/26/19.  Revise goal to perform SLS on either limb x 7-8 seconds, 4/26/19~ partially met for L LE, 6/18/19   11. NEW GOAL( 4/26/19):  Pt to tolerate 15 seconds during condition # 4 of the MCTSIB( foam with eyes closed); Goal MET, 6/18/19  Plan     Cont to address balance deficits in parallel bars with use of compliant surfaces( encourage performance without UE support); continue with use of recumbent stepper for B UE/LE strengthening and CV/muscular endurance. Add use of Pilates box to address SLS balance. Add use of 2 foam pads for balance tasks with use of ball to engage core musculature. Add basic ladder agility skills with gradual progressions.      Martín Ojeda, PTA    able

## 2019-07-19 ENCOUNTER — CLINICAL SUPPORT (OUTPATIENT)
Dept: REHABILITATION | Facility: HOSPITAL | Age: 49
End: 2019-07-19
Attending: FAMILY MEDICINE
Payer: COMMERCIAL

## 2019-07-19 DIAGNOSIS — R26.89 IMPAIRMENT OF BALANCE: ICD-10-CM

## 2019-07-19 DIAGNOSIS — R29.898 BILATERAL LEG WEAKNESS: ICD-10-CM

## 2019-07-19 DIAGNOSIS — Z74.09 DECREASED MOBILITY AND ENDURANCE: ICD-10-CM

## 2019-07-19 PROCEDURE — 97112 NEUROMUSCULAR REEDUCATION: CPT | Mod: PO

## 2019-07-19 PROCEDURE — 97110 THERAPEUTIC EXERCISES: CPT | Mod: PO

## 2019-07-19 NOTE — PROGRESS NOTES
Physical Therapy Daily Treatment Note     Name: Jillian Osullivan  Clinic Number: [id number]    Therapy Diagnosis:   No diagnosis found.  Physician: Dedrick, Provider    Visit Date: 7/19/2019  Physician Orders: PT Eval and Treat   Medical Diagnosis from Referral: multiple sclerosis, abnormality of gait, atypical depressive disorder  Evaluation Date: 3/1/2019  Authorization Period Expiration: 6/13/2019 to 12/31/2019  Plan of Care Expiration: 4/26/2019  Extended Plan of Care Expiration: 4/26/19 to 6/21/19  Updated Plan of Care Expiration: 6/18/19 to 8/13/19  Visit # / Visits authorized: 8/12    Time In: 1100  Time Out: 1200  Total Billable Time: 60    Precautions: Standard, Fall and impaired vision, emotional deficits    Subjective   Pt reports: had some dizziness during the past few days and still feeling more lethargic than usual; pt also stated that she has trouble getting up from chairs/sofas.   She states she is compliant with her home exercises.  Response to previous treatment: no adverse effects  Functional change: ongoing    Pain: 0/10   Location: N/A    Objective       Pt ambulates with SC mod I from lobby to gym( ~ 150 feet) and vice-versa. Pt demonstrates ER of feet, slow gait pattern, and wide RYAN.       Fransisca received therapeutic exercises to develop strength, endurance, ROM, flexibility and posture for 20 minutes including:    X 10 min on SCI-FIT recumbent stepper using  B LE @ level 1.5  for improved  B LE ROM, strength and CV endurance. Pt needs cues for improved motor output.    Glut dominant squats with BUE support (ballet bar) with glut set upon standing: 2 x 10 reps with 3 sec hold  Step ups on 4 inch step, A aligning knee with ankle and pressing from heel to promote glut activation       Patient participated in neuromuscular re-education activities to improve: Balance, Coordination, Kinesthetic, Sense, Proprioception and Posture for 40 minutes. The following activities were included:  "      Standing balance in parallel bars:    Firm ground: ballet bar  2 x 30" B alternate tandem standing, no UE support, CGA with min trembling  10 x in each direction, of lateral step overs using light blue Yoga blocks, started activity with 2 UE support/CGA and ended activity with occasional UE support/min A  10 x in each direction, of fwd/bkw step overs using light blue Yoga blocks, started activity with 1 UE support/CGA and ended activity with occasional UE support/min A    On foam pad:  1 x 30" B alternate tandem stance, no UE support, CGA/min A with min trembling    2 pt fitter:  Static standing, no UE support, CGA: 2 x 30 sec  Fwd/bkw, no UE support, CGA: 2 x 30 sec    On Bosu( soft side up):  2 x 30" static standing, no UE support, min A~ mod trembling    On Bosu( soft side up):  2 x 30" static standing, no UE support, min A~min trembling      Standing at Pilates Box:  2 x 10 B LE alternate hip and knee flex/ext with 2 white springs on level 1 and 1 black spring on level 1, no UE support, CGA        Home Exercises Provided and Patient Education Provided     Education provided: Sitting therex including: B LE heel and toe raises, hip abduction against peach theraband, hip adduction squeezes with ball, LAQ, hip flex.  PT also educated pt regarding proper placement of SC in her hand when performing sit<> stand trials.    Written Home Exercises Provided: yes. PT provided pt with written copy of the above today( 3/14/19).  PT also provided pt with piece of peach theraband. No updates to HEP on this date( 7/9/19).  Exercises were reviewed and Fransisca was able to demonstrate them prior to the end of the session.  Fransisca demonstrated good  understanding of the education provided.     See EMR under Media for exercises provided 3/14/19.    Assessment   Fransisca tolerated her therapy session well today. Pt was able to tolerated glut dominant squats and step ups with no adverse effects. These exercises were initiated in order to " promote glut/quad strengthening and  smoother sit to stand transfers. Pt's mood improved during today's tx visit.  Pt remains appropriate for additional OPPT visits to address deficits with  balance, strength, endurance and postural control. Continue with current POC.    Fransisca is progressing well towards her goals.   Pt prognosis is Fair to Good.     Pt will continue to benefit from skilled outpatient physical therapy to address the deficits listed in the problem list box on initial evaluation, provide pt/family education and to maximize pt's level of independence in the home and community environment.     Pt's spiritual, cultural and educational needs considered and pt agreeable to plan of care and goals.     Anticipated barriers to physical therapy: emotional deficits, anxiety regarding balance training    Goals: Pt's spiritual, cultural and educational needs considered and pt agreeable to plan of care and goals as stated below:      GOALS:   Short term goals: 4 weeks, pt agrees to goals set.  1. Pt to be issued HEP and report compliance~ in progress; Goal MET, 4/4/19  2. Pt to improve 30 second chair rise to 10-11 trials to demonstrate improvement with this transition~progressing; Goal MET, 4/4/19; Goal not MET on 4/26/19~ remains appropriate  3. Pt to improve SSWS to 1.0m/sec for improved community ambulation with decreased fall risk~Goal MET, 6/18/19  4. Pt to improve MCTSIB score on condition # 3 to 6 seconds for improved balance on unstable surfaces~ Goal MET, 4/4/19  5. Pt to perform SLS on either limb x 2-3 seconds for improved balance and decreased fall risk~ Goal MET 4/26/19     Long term goals: 8 weeks, pt agrees to goals set  6. Pt (I) with HEP~progressing  7. Pt to improve 30 second chair rise to 12-13 trials to demonstrate improvement with this transition~progressing. Revise this goal to 11-12 trials, 4/26/19: goal remains appropriate, 6/18/19  8. Pt to improve SSWS to 1.2 m/sec for improved community  ambulation with decreased fall risk~progressing  9. Pt to improve MCTSIB score on condition # 3 to 9 seconds for improved balance on unstable surfaces~ Goal MET, 4/4/19.     10. Pt to perform SLS on either limb x 4-5 seconds for improved balance and decreased fall risk~ ~Goal MET, 4/26/19.  Revise goal to perform SLS on either limb x 7-8 seconds, 4/26/19~ partially met for L LE, 6/18/19   11. NEW GOAL( 4/26/19):  Pt to tolerate 15 seconds during condition # 4 of the MCTSIB( foam with eyes closed); Goal MET, 6/18/19  Plan     Cont to address balance deficits in parallel bars with use of compliant surfaces( encourage performance without UE support); continue with use of recumbent stepper for B UE/LE strengthening and CV/muscular endurance. Add use of Pilates box to address SLS balance. Add use of 2 foam pads for balance tasks with use of ball to engage core musculature. Add basic ladder agility skills with gradual progressions.      Martín Ojeda, PTA

## 2019-07-23 ENCOUNTER — CLINICAL SUPPORT (OUTPATIENT)
Dept: REHABILITATION | Facility: HOSPITAL | Age: 49
End: 2019-07-23
Attending: FAMILY MEDICINE
Payer: COMMERCIAL

## 2019-07-23 DIAGNOSIS — Z74.09 DECREASED MOBILITY AND ENDURANCE: ICD-10-CM

## 2019-07-23 DIAGNOSIS — R26.89 IMPAIRMENT OF BALANCE: ICD-10-CM

## 2019-07-23 DIAGNOSIS — R29.898 BILATERAL LEG WEAKNESS: ICD-10-CM

## 2019-07-23 PROCEDURE — 97112 NEUROMUSCULAR REEDUCATION: CPT | Mod: PO

## 2019-07-23 PROCEDURE — 97110 THERAPEUTIC EXERCISES: CPT | Mod: PO

## 2019-07-23 NOTE — PROGRESS NOTES
Physical Therapy Daily Treatment Note     Name: Jillian Osullivan  Clinic Number: [id number]    Therapy Diagnosis:   No diagnosis found.  Physician: Dedrick, Provider    Visit Date: 7/23/2019  Physician Orders: PT Eval and Treat   Medical Diagnosis from Referral: multiple sclerosis, abnormality of gait, atypical depressive disorder  Evaluation Date: 3/1/2019  Authorization Period Expiration: 6/13/2019 to 12/31/2019  Plan of Care Expiration: 4/26/2019  Extended Plan of Care Expiration: 4/26/19 to 6/21/19  Updated Plan of Care Expiration: 6/18/19 to 8/13/19  Visit # / Visits authorized: 9/12    Time In: 0952  Time Out: 1030  Total Billable Time: 38    Precautions: Standard, Fall and impaired vision, emotional deficits    Subjective   Pt reports: continues to feel very lethargic and sleeping more during the day; also reports moderate pain in the sternum region.  She states she is compliant with her home exercises.  Response to previous treatment: no adverse effects  Functional change: ongoing    Pain: 6 or 7/10   Location: pain in mid chest/sternum     Objective       Pt ambulates with SC mod I from lobby to gym( ~ 150 feet) and vice-versa. Pt demonstrates ER of feet, slow gait pattern, and wide RYAN.       Fransisca received therapeutic exercises to develop strength, endurance, ROM, flexibility and posture for 20 minutes including:    X 7 min on Nu-Step recumbent stepper using  B LE @ level 5.0  for improved  B LE ROM, strength and CV endurance. Pt needs cues for improved motor output.      Wall mini-squats with orange physioball with min A for stability: 2 x 10 with 3 sec glut set   Alternating step overs at ballet bar on 6 inch step with focus on eccentric control, 1 UE support, CGA: 8 x Bilaterally         Patient participated in neuromuscular re-education activities to improve: Balance, Coordination, Kinesthetic, Sense, Proprioception and Posture for 25 minutes. The following activities were included:  "      Standing balance in parallel bars:    Firm ground: ballet bar  2 x 30" B alternate tandem standing, no UE support, CGA with min trembling  10 x in each direction, of lateral step overs using light blue Yoga blocks, started activity with 2 UE support/CGA and ended activity with occasional UE support/CGA  10 x in each direction, of fwd/bkw step overs using light blue Yoga blocks, started activity with 1 UE support/CGA and ended activity with occasional UE support/CGA    On foam pad:  2 x 30" static standing with narrow RYAN, EC, no UE support, CGA/min A with min trembling  10 x each LE, single leg step ups, no UE support, CGA/min A with min trembling    Standing at Pilates Box:  1 x 10 B LE alternate hip and knee flex/ext with 2 white springs on level 1 and 1 black spring on level 1, no UE support, CGA        Home Exercises Provided and Patient Education Provided     Education provided: Sitting therex including: B LE heel and toe raises, hip abduction against peach theraband, hip adduction squeezes with ball, LAQ, hip flex.  PT also educated pt regarding proper placement of SC in her hand when performing sit<> stand trials.    Written Home Exercises Provided: yes. PT provided pt with written copy of the above today( 3/14/19).  PT also provided pt with piece of peach theraband. No updates to HEP on this date( 7/9/19).  Exercises were reviewed and Fransisca was able to demonstrate them prior to the end of the session.  Fransisca demonstrated good  understanding of the education provided.     See EMR under Media for exercises provided 3/14/19.    Assessment   Fransisca tolerated her therapy session well today. Pt was able to tolerate wall squats and step overs on a 6 inch step with no adverse effects. These exercises were initiated in order to promote glut/quad strengthening and  smoother sit to stand transfers. It was noted that pt c/o chest pain in the mid sternum area. PT of record was advised of pain and pt was advised to " contact PCP. Pt remains appropriate for additional OPPT visits to address deficits with  balance, strength, endurance and postural control. Continue with current POC.    Fransisca is progressing well towards her goals.   Pt prognosis is Fair to Good.     Pt will continue to benefit from skilled outpatient physical therapy to address the deficits listed in the problem list box on initial evaluation, provide pt/family education and to maximize pt's level of independence in the home and community environment.     Pt's spiritual, cultural and educational needs considered and pt agreeable to plan of care and goals.     Anticipated barriers to physical therapy: emotional deficits, anxiety regarding balance training    Goals: Pt's spiritual, cultural and educational needs considered and pt agreeable to plan of care and goals as stated below:      GOALS:   Short term goals: 4 weeks, pt agrees to goals set.  1. Pt to be issued HEP and report compliance~ in progress; Goal MET, 4/4/19  2. Pt to improve 30 second chair rise to 10-11 trials to demonstrate improvement with this transition~progressing; Goal MET, 4/4/19; Goal not MET on 4/26/19~ remains appropriate  3. Pt to improve SSWS to 1.0m/sec for improved community ambulation with decreased fall risk~Goal MET, 6/18/19  4. Pt to improve MCTSIB score on condition # 3 to 6 seconds for improved balance on unstable surfaces~ Goal MET, 4/4/19  5. Pt to perform SLS on either limb x 2-3 seconds for improved balance and decreased fall risk~ Goal MET 4/26/19     Long term goals: 8 weeks, pt agrees to goals set  6. Pt (I) with HEP~progressing  7. Pt to improve 30 second chair rise to 12-13 trials to demonstrate improvement with this transition~progressing. Revise this goal to 11-12 trials, 4/26/19: goal remains appropriate, 6/18/19  8. Pt to improve SSWS to 1.2 m/sec for improved community ambulation with decreased fall risk~progressing  9. Pt to improve MCTSIB score on condition # 3 to 9  seconds for improved balance on unstable surfaces~ Goal MET, 4/4/19.     10. Pt to perform SLS on either limb x 4-5 seconds for improved balance and decreased fall risk~ ~Goal MET, 4/26/19.  Revise goal to perform SLS on either limb x 7-8 seconds, 4/26/19~ partially met for L LE, 6/18/19   11. NEW GOAL( 4/26/19):  Pt to tolerate 15 seconds during condition # 4 of the MCTSIB( foam with eyes closed); Goal MET, 6/18/19  Plan     Cont to address balance deficits in parallel bars with use of compliant surfaces( encourage performance without UE support); continue with use of recumbent stepper for B UE/LE strengthening and CV/muscular endurance. Add use of Pilates box to address SLS balance. Add use of 2 foam pads for balance tasks with use of ball to engage core musculature. Add basic ladder agility skills with gradual progressions.      Martín Ojeda, PTA

## 2019-08-05 ENCOUNTER — DOCUMENTATION ONLY (OUTPATIENT)
Dept: REHABILITATION | Facility: HOSPITAL | Age: 49
End: 2019-08-05

## 2019-08-05 NOTE — PROGRESS NOTES
PT/PTA met face to face to discuss pt's treatment plan and progress towards established goals. Continue with current PT POC, including: higher level balance with different surfaces. Pt will be seen by physical therapist at least every 6th treatment day or every 30 days, whichever occurs first.      Martín Ojeda, PTA  8/05/19

## 2019-08-05 NOTE — PROGRESS NOTES
PT/PTA met face to face to discuss pt's treatment plan and progress towards established goals.  Continue with current PT POC with focus on endurance, and higher level balance.  Patient will be seen by physical therapist at least every sixth treatment or 30 days, whichever occurs first.    Mary Kate Merino, PTA  08/05/2019

## 2019-08-06 ENCOUNTER — CLINICAL SUPPORT (OUTPATIENT)
Dept: REHABILITATION | Facility: HOSPITAL | Age: 49
End: 2019-08-06
Attending: FAMILY MEDICINE
Payer: COMMERCIAL

## 2019-08-06 DIAGNOSIS — R29.898 BILATERAL LEG WEAKNESS: ICD-10-CM

## 2019-08-06 DIAGNOSIS — R26.89 IMPAIRMENT OF BALANCE: ICD-10-CM

## 2019-08-06 DIAGNOSIS — Z74.09 DECREASED MOBILITY AND ENDURANCE: ICD-10-CM

## 2019-08-06 PROCEDURE — 97110 THERAPEUTIC EXERCISES: CPT | Mod: PO

## 2019-08-06 PROCEDURE — 97112 NEUROMUSCULAR REEDUCATION: CPT | Mod: PO

## 2019-08-06 NOTE — PROGRESS NOTES
"  Physical Therapy Daily Treatment Note     Name: Jillian Osullivan  Clinic Number: [id number]    Therapy Diagnosis:   Encounter Diagnoses   Name Primary?    Bilateral leg weakness     Impairment of balance     Decreased mobility and endurance      Physician: Dedrick, Provider    Visit Date: 8/6/2019  Physician Orders: PT Eval and Treat   Medical Diagnosis from Referral: multiple sclerosis, abnormality of gait, atypical depressive disorder  Evaluation Date: 3/1/2019  Authorization Period Expiration: 6/13/2019 to 12/31/2019  Plan of Care Expiration: 4/26/2019  Extended Plan of Care Expiration: 4/26/19 to 6/21/19  Updated Plan of Care Expiration: 6/18/19 to 8/13/19  Visit # / Visits authorized: 10/20    Time In: 0855  Time Out: 0940  Total Billable Time: 45 minutes    Precautions: Standard, Fall and impaired vision, emotional deficits    Subjective       Pt reports: " Tired."  Pt states her trip to [place] for her infusion went well but a table fell on her foot.  Pt also reports her chest pain which she had experienced in a past therapy session turned out to be a panic attack( pt saw her primary care physician).   She states she is compliant with her home exercises.  Response to previous treatment: no adverse effects  Functional change: ongoing    Pain: 0/10   Location: N/A    Objective       Pt ambulates with SC mod I from lobby to gym( ~ 150 feet) and vice-versa. Pt demonstrates ER of feet, slow gait pattern with improved knee extension during stance phase.  Pt also ambulates with wide RYAN.       Fransisca received therapeutic exercises to develop strength, endurance, ROM, flexibility and posture for 10 minutes including:    X 10 min on SCI-FIT recumbent stepper using  B LE @ level 1.5  for improved  B LE ROM, strength and CV endurance. Pt needs cues for improved motor output.      Patient participated in neuromuscular re-education activities to improve: Balance, Coordination, Kinesthetic, Sense, Proprioception " "and Posture for 35 minutes. The following activities were included:         Standing balance in parallel bars:    X 4 laps tandem ambulation, no UE support, CGA, min trembling  X 4 laps forward marching with 2-3 second hold, no UE support, CGA, min trembling        Firm ground:  1 x 30" B alternate tandem standing, no UE support, CGA with slight to min trembling        On Bosu( soft side up):  2 x 30" static standing, no UE support, min A~ min/mod trembling    On Bosu( firm side up):  2 x 30" static standing, no UE support, min A~min trembling    On less stable rocker board:  3 x 30" working board in M/L direction, light UE support, CGA  2 x 30" holding board in horizontal position, no UE support, min A      Transitions: 1 x 10 sit<> stand from elevated mat with 2 finger support on bedside table and feet resting on foam fitter, CGA~ cues for technique    Standing at Pilates Box:  2 x 10 B LE alternate hip and knee flex/ext with 2 white springs on level 1 and 1 black spring on level 1, no UE support, CGA        Home Exercises Provided and Patient Education Provided     Education provided: Sitting therex including: B LE heel and toe raises, hip abduction against peach theraband, hip adduction squeezes with ball, LAQ, hip flex.  PT also educated pt regarding proper placement of SC in her hand when performing sit<> stand trials.    Written Home Exercises Provided: yes. PT provided pt with written copy of the above today( 3/14/19).  PT also provided pt with piece of peach theraband. No updates to HEP on this date( 7/9/19).  Exercises were reviewed and Fransisca was able to demonstrate them prior to the end of the session.  Fransisca demonstrated good  understanding of the education provided.     See EMR under Media for exercises provided 3/14/19.    Assessment     Fransisca tolerated her therapy session well today with only minimal trembling during the performance of some balance exercises. Pt tolerated less stable rocker board well, " though she required light UE support to perform task properly. PT also added sit<> stand transitions with feet resting on foam fitter. Here, pt required 2 finger support on bedside table and cues for proper performance. Pt's performance at [employer] Box was very sound and PT feels pt can progress to add 2nd black spring to level 1 in the coming sessions. Pt is due for a plan of care reassessment at next visit and PT is curious to see how pt performs with formal testing. From all indications today, pt appears to be improving with her mobility and balance skills. Pt's mood today seemed back to her usual state, with considerable smiling and conversation throughout session. Pt remains appropriate for additional OPPT visits to address deficits with  balance, strength, endurance and postural control. Continue with current POC.      Fransisca is progressing well towards her goals.   Pt prognosis is Fair to Good.     Pt will continue to benefit from skilled outpatient physical therapy to address the deficits listed in the problem list box on initial evaluation, provide pt/family education and to maximize pt's level of independence in the home and community environment.     Pt's spiritual, cultural and educational needs considered and pt agreeable to plan of care and goals.     Anticipated barriers to physical therapy: emotional deficits, anxiety regarding balance training    Goals: Pt's spiritual, cultural and educational needs considered and pt agreeable to plan of care and goals as stated below:      GOALS:   Short term goals: 4 weeks, pt agrees to goals set.  1. Pt to be issued HEP and report compliance~ in progress; Goal MET, 4/4/19  2. Pt to improve 30 second chair rise to 10-11 trials to demonstrate improvement with this transition~progressing; Goal MET, 4/4/19; Goal not MET on 4/26/19~ remains appropriate  3. Pt to improve SSWS to 1.0m/sec for improved community ambulation with decreased fall risk~Goal MET, 6/18/19  4. Pt  to improve MCTSIB score on condition # 3 to 6 seconds for improved balance on unstable surfaces~ Goal MET, 4/4/19  5. Pt to perform SLS on either limb x 2-3 seconds for improved balance and decreased fall risk~ Goal MET 4/26/19     Long term goals: 8 weeks, pt agrees to goals set  6. Pt (I) with HEP~progressing  7. Pt to improve 30 second chair rise to 12-13 trials to demonstrate improvement with this transition~progressing. Revise this goal to 11-12 trials, 4/26/19: goal remains appropriate, 6/18/19  8. Pt to improve SSWS to 1.2 m/sec for improved community ambulation with decreased fall risk~progressing  9. Pt to improve MCTSIB score on condition # 3 to 9 seconds for improved balance on unstable surfaces~ Goal MET, 4/4/19.     10. Pt to perform SLS on either limb x 4-5 seconds for improved balance and decreased fall risk~ ~Goal MET, 4/26/19.  Revise goal to perform SLS on either limb x 7-8 seconds, 4/26/19~ partially met for L LE, 6/18/19   11. NEW GOAL( 4/26/19):  Pt to tolerate 15 seconds during condition # 4 of the MCTSIB( foam with eyes closed); Goal MET, 6/18/19  Plan     Cont to address balance deficits in parallel bars with use of compliant surfaces( encourage performance without UE support); continue with use of recumbent stepper for B UE/LE strengthening and CV/muscular endurance.Continue use of Pilates box to address SLS balance. Add basic ladder agility skills with gradual progressions. Add sit<> stand trials from elevated mat with feet resting on foam fitter.      Abdias Solares, PT

## 2019-08-08 ENCOUNTER — CLINICAL SUPPORT (OUTPATIENT)
Dept: REHABILITATION | Facility: HOSPITAL | Age: 49
End: 2019-08-08
Attending: FAMILY MEDICINE
Payer: COMMERCIAL

## 2019-08-08 DIAGNOSIS — Z74.09 DECREASED MOBILITY AND ENDURANCE: ICD-10-CM

## 2019-08-08 DIAGNOSIS — R29.898 BILATERAL LEG WEAKNESS: ICD-10-CM

## 2019-08-08 DIAGNOSIS — R26.89 IMPAIRMENT OF BALANCE: ICD-10-CM

## 2019-08-08 PROCEDURE — 97116 GAIT TRAINING THERAPY: CPT | Mod: PO

## 2019-08-08 PROCEDURE — 97112 NEUROMUSCULAR REEDUCATION: CPT | Mod: PO

## 2019-08-08 PROCEDURE — 97110 THERAPEUTIC EXERCISES: CPT | Mod: PO

## 2019-08-08 NOTE — PROGRESS NOTES
Physical Therapy Daily Treatment Note     Name: Jillian Osullivan  Clinic Number: [id number]    Therapy Diagnosis:   Encounter Diagnoses   Name Primary?    Bilateral leg weakness     Impairment of balance     Decreased mobility and endurance      Physician: Dedrick, Provider    Visit Date: 8/8/2019  Physician Orders: PT Eval and Treat   Medical Diagnosis from Referral: multiple sclerosis, abnormality of gait, atypical depressive disorder  Evaluation Date: 3/1/2019  Authorization Period Expiration: 6/13/2019 to 12/31/2019  Plan of Care Expiration: 4/26/2019  Extended Plan of Care Expiration: 4/26/19 to 6/21/19  Updated Plan of Care Expiration: 6/18/19 to 8/13/19  Updated Plan of Care Expiration: 8/8/19 to 10/3/19  Visit # / Visits authorized: 11/20    Time In: 1030  Time Out: 1115  Total Billable Time: 45 minutes    Precautions: Standard, Fall and impaired vision, emotional deficits    Subjective       Pt reports: she feels tired today. Pt also states she sees her endocrinologist tomorrow.   She states she is compliant with her home exercises.  Response to previous treatment: no adverse effects  Functional change: ongoing    Pain: 0/10   Location: N/A    Objective       Pt ambulates with SC mod I from lobby to gym( ~ 150 feet) and vice-versa. Pt demonstrates ER of feet, slow gait pattern with improved knee extension during stance phase.  Pt also ambulates with wide RYAN.       Fransisca received therapeutic exercises to develop strength, endurance, ROM, flexibility and posture for 15 minutes including:    Lower Extremity Strength~ tested in sitting  Right LE  3/1/19 4/26/19 6/18/19 8/8/19 Left LE  3/1/19 4/26/19 6/18/19 8/8/19   Hip Flexion: 3-/5 3/5 3/5 3+/5 Hip Flexion: 3/5 3/5 3/5 3+/5   Hip Extension:  4/5 4-/5 4/5 4-/5 Hip Extension: 4-/5 4/5 4(-)/5 4-/5   Hip Abduction: 4/5 4+/5 5/5 4+/5 Hip Abduction: 4/5 4+/5 5/5 4+/5   Hip Adduction: 4+/5 5/5 5/5 5/5 Hip Adduction 4+/5 5/5 5/5 5/5   Knee Extension: 3/5  "4+/5 4+/5 4+/5 Knee Extension: 3+/5 4/5 4+/5 5/5   Knee Flexion: 3+/5 4(-)/5 4/5 4/5 Knee Flexion: 4/5 4+/5 4+/5 5/5   Ankle Dorsiflexion: 3/5 4+/5 4+/5 4+/5 Ankle Dorsiflexion: 4-/5 4/5 4+/5 4+/5   Ankle Plantarflexion: 4/5 4+/5 5/5 5/5 Ankle Plantarflexion: 4/5 4+/5 5/5 5/5         X 5 min on SCI-FIT recumbent stepper using  B LE @ level 2.0 for improved  B LE ROM, strength and CV endurance. Pt needs cues for improved motor output.      Patient participated in neuromuscular re-education activities to improve: Balance, Coordination, Kinesthetic, Sense, Proprioception and Posture for 20 minutes. The following activities were included:         Evaluation 4/26/19 6/18/19 8/8/19   Single Limb Stance R LE Unable to perform, fear  (<10 sec = HIGH FALL RISK) 6 seconds  (<10 sec = HIGH FALL RISK) 6 seconds(<10 sec = HIGH FALL RISK) 10 seconds   Single Limb Stance L LE Unable to perform, fear  (<10 sec = HIGH FALL RISK) 5 seconds  (<10 sec = HIGH FALL RISK) 9 seconds  (<10 sec = HIGH FALL RISK) 12 seconds   30 second Chair Rise 9 completed with arms 9 completed with arms 10 completed with arms 10 completed with arms         6/18/19  Postural control:  MCTSIB:  1. Eyes Open/feet together/Firm: 30 seconds, P, no sway, slight tremor  2. Eyes Closed/feet together/Firm: 30 seconds, P, with min sway and tremor  3. Eyes Open/feet together/Foam: 30 seconds, P with mild sway and tremor  4. Eyes Closed/feet together/Foam: 30 seconds, P, mild sway and tremor    8/8/19  Postural control:  MCTSIB:  1. Eyes Open/feet together/Firm: 30 seconds, P, no sway, slight tremor  2. Eyes Closed/feet together/Firm: 30 seconds, P, with min sway and tremor  3. Eyes Open/feet together/Foam: 30 seconds, P with mild sway and tremor  4. Eyes Closed/feet together/Foam: 12 seconds, F, min/mod sway and tremor        Standing balance in parallel bars:    On Bosu( soft side up):  2 x 30" static standing, no UE support, min A/CGA~ min trembling  1 x 10 B LE " "step ups/downs with light B UE support, CGA/min A    On Bosu( firm side up):  2 x 30" static standing, no UE support, min A( post lean)~min trembling    On less stable rocker board:  2 x 30" working board in M/L direction, light UE support to no UE support, CGA  2 x 30" holding board in horizontal position, no UE support, min/CGA        Patient participated in gait training activities to normalize gait pattern for 10 minutes. The following activities were included:   Gait belt used for safety. Assistive device: SC, none               Evaluation 4/26/19 6/18/19 8/8/19   Timed Up and Go 12 sec 12 sec 10 sec  10 sec without SC   Self Selected Walking Speed 0.86 m/sec (6m/7s) 0.75 m/sec(6m/8s) 1.0 m/sec(6m/6s) 1.0 m/sec(6m/6s) with SC      Fast Walking Speed 1.0 m/sec (6m/6s) 1.0m/sec(6m/6s) 1.2 m/sec(6m/5s) 1.0 m/sec(6m/6s) with SC     * Pt performs 2 trials of the above tests. Pt demonstrates slightly wider RYAN when ambulating without her SC.        Home Exercises Provided and Patient Education Provided     Education provided: Sitting therex including: B LE heel and toe raises, hip abduction against peach theraband, hip adduction squeezes with ball, LAQ, hip flex.  PT also educated pt regarding proper placement of SC in her hand when performing sit<> stand trials.    Written Home Exercises Provided: yes. PT provided pt with written copy of the above today( 3/14/19).  PT also provided pt with piece of peach theraband. No updates to HEP on this date( 8/8/19).  Exercises were reviewed and Fransisca was able to demonstrate them prior to the end of the session.  Fransisca demonstrated good  understanding of the education provided.     See EMR under Media for exercises provided 3/14/19.    Assessment     Fransisca tolerated her therapy session for updated plan of care fairly well today, though many of her scores on the performed tests and measures did not show significant improvement. PT feels this is due to the variable nature of MS and " the fact that she is likely nearing a ceiling with regard to her best performance. Pt's LE strength grades did show mild improvement with B hip flex and L knee flexion/ extension( 1/2 grade increases). Pt also improved her SLS time on R LE by 4 seconds and L LE by 3 seconds! This is actually quite impressive considering her past scores here. Unfortunately, pt did not pass condition # 4 on the MCTSIB( which she passed at last reassessment) and her TUG, SSWS and FSWS results all remained unchanged. Pt's 30 second chair rise also remained at 10 repetitions, which it was at last reassessment. Still, PT feels pt has potential for additional improvement with consistent OPPT intervention. Therefore, PT would like to extend pt's plan of care for another 8 weeks( until 10/3/19), with the likelihood that pt will be discharged from therapy at that time. Pt understands this and is in agreement with this course of action. Pt remains appropriate for additional OPPT visits to address deficits with balance, strength, endurance and postural control. See below for most recent comments regarding goal achievement and any updates or revisions.    Fransisca is progressing well towards her goals.   Pt prognosis is Fair to Good.     Pt will continue to benefit from skilled outpatient physical therapy to address the deficits listed in the problem list box on initial evaluation, provide pt/family education and to maximize pt's level of independence in the home and community environment.     Pt's spiritual, cultural and educational needs considered and pt agreeable to plan of care and goals.     Anticipated barriers to physical therapy: emotional deficits, anxiety regarding balance training    Goals: Pt's spiritual, cultural and educational needs considered and pt agreeable to plan of care and goals as stated below:      GOALS:   Short term goals: 4 weeks, pt agrees to goals set.  1. Pt to be issued HEP and report compliance~ in progress; Goal  MET, 4/4/19  2. Pt to improve 30 second chair rise to 10-11 trials to demonstrate improvement with this transition~progressing; Goal MET, 4/4/19; Goal not MET on 4/26/19~ remains appropriate  3. Pt to improve SSWS to 1.0m/sec for improved community ambulation with decreased fall risk~Goal MET, 6/18/19  4. Pt to improve MCTSIB score on condition # 3 to 6 seconds for improved balance on unstable surfaces~ Goal MET, 4/4/19  5. Pt to perform SLS on either limb x 2-3 seconds for improved balance and decreased fall risk~ Goal MET 4/26/19     Long term goals: 8 weeks, pt agrees to goals set  6. Pt (I) with HEP~progressing  7. Pt to improve 30 second chair rise to 12-13 trials to demonstrate improvement with this transition~progressing. Revise this goal to 11-12 trials, 4/26/19: goal remains appropriate, 6/18/19; goal remains appropriate, 8/8/19  8. Pt to improve SSWS to 1.2 m/sec for improved community ambulation with decreased fall risk~ongoing  9. Pt to improve MCTSIB score on condition # 3 to 9 seconds for improved balance on unstable surfaces~ Goal MET, 4/4/19.     10. Pt to perform SLS on either limb x 4-5 seconds for improved balance and decreased fall risk~ ~Goal MET, 4/26/19.  Revise goal to perform SLS on either limb x 7-8 seconds, 4/26/19~ partially met for L LE, 6/18/19; Goal MET, 8/8/19   11. NEW GOAL( 4/26/19):  Pt to tolerate 15 seconds during condition # 4 of the MCTSIB( foam with eyes closed); Goal MET, 6/18/19; Not MET on 8/8/19~ this goal remains appropriate  Plan     Continue outpatient physical therapy 2 x weekly under updated Plan of Care, 8/8/19 to 10/3/19 , with treatment to include: pt education, HEP, therapeutic exercises, neuromuscular re-education/balance exercises, therapeutic activities, joint mobilizations, and modalities PRN, to work towards established goals. Pt may be seen by PTA to carry out plan of care.           Abdias Solares, PT

## 2019-08-09 ENCOUNTER — DOCUMENTATION ONLY (OUTPATIENT)
Dept: REHABILITATION | Facility: HOSPITAL | Age: 49
End: 2019-08-09

## 2019-08-09 NOTE — PROGRESS NOTES
Face to face meeting completed with Abdias Solares  PT regarding current status and progress of   Jillian Osullivan .   Omar Jade, PTA Meeting on 8/5/19.

## 2019-08-13 ENCOUNTER — CLINICAL SUPPORT (OUTPATIENT)
Dept: REHABILITATION | Facility: HOSPITAL | Age: 49
End: 2019-08-13
Attending: FAMILY MEDICINE
Payer: COMMERCIAL

## 2019-08-13 DIAGNOSIS — Z74.09 DECREASED MOBILITY AND ENDURANCE: ICD-10-CM

## 2019-08-13 DIAGNOSIS — R29.898 BILATERAL LEG WEAKNESS: ICD-10-CM

## 2019-08-13 DIAGNOSIS — R26.89 IMPAIRMENT OF BALANCE: ICD-10-CM

## 2019-08-13 PROCEDURE — 97110 THERAPEUTIC EXERCISES: CPT | Mod: PO

## 2019-08-13 PROCEDURE — 97112 NEUROMUSCULAR REEDUCATION: CPT | Mod: PO

## 2019-08-13 NOTE — PROGRESS NOTES
"  Physical Therapy Daily Treatment Note     Name: iJllian Osullivan  Clinic Number: [id number]    Therapy Diagnosis:   Encounter Diagnoses   Name Primary?    Bilateral leg weakness     Impairment of balance     Decreased mobility and endurance      Physician: Dedrick Provider    Visit Date: 8/13/2019  Physician Orders: PT Eval and Treat   Medical Diagnosis from Referral: multiple sclerosis, abnormality of gait, atypical depressive disorder  Evaluation Date: 3/1/2019  Authorization Period Expiration: 6/13/2019 to 12/31/2019  Plan of Care Expiration: 4/26/2019  Extended Plan of Care Expiration: 4/26/19 to 6/21/19  Updated Plan of Care Expiration: 6/18/19 to 8/13/19  Updated Plan of Care Expiration: 8/8/19 to 10/3/19  Visit # / Visits authorized: 12/20    Time In: 0857  Time Out: 0942  Total Billable Time: 45 minutes    Precautions: Standard, Fall and impaired vision, emotional deficits    Subjective       Pt reports: " I feel better than I have been."   She states she is compliant with her home exercises.  Response to previous treatment: no adverse effects  Functional change: ongoing    Pain: 0/10   Location: N/A    Objective       Pt ambulates with SC mod I from lobby to gym( ~ 150 feet) and vice-versa. Pt demonstrates ER of feet, slow gait pattern with improved knee extension during stance phase.  Pt also ambulates with wide RYAN.       Fransisca received therapeutic exercises to develop strength, endurance, ROM, flexibility and posture for 18 minutes including:      X 10 min on SCI-FIT recumbent stepper using  B LE @ level 2.0 for improved  B LE ROM, strength and CV endurance. Pt needs cues for improved motor output.      Horizontal leg press:  3 x 10 reps with 60 # applied    2 x 30" standing B gastroc stretches with use of incline and rail for support      Patient participated in neuromuscular re-education activities to improve: Balance, Coordination, Kinesthetic, Sense, Proprioception and Posture for 27 " "minutes. The following activities were included:       Standing balance in parallel bars:    On Bosu( soft side up):  2 x 30" static standing, no UE support, min A/CGA~  slight trembling  1 x 10 B LE step ups/downs with light B UE support, CGA    On Bosu( firm side up):  3 x 30" static standing, no UE support, min A/CGA~ slight trembling    Foam pad:   2 x 30" B alternate tandem standing, no UE support, CGA to occasional min A with slight to min trembling         Transitions: 1 x 10 sit<> stand from elevated mat while holding soccer ball in outstretched arms and feet resting on foam fitter, CGA~ cues for technique     Standing at Pilates Box:  1 x 10 B LE alternate hip and knee flex/ext with 2 white springs on level 1 and 2 black springs on level 1, no UE support, CGA to occasional min A       Ladder agility:   X 1 lap stepping into each rung of ladder with L foot and holding R foot in air x 3 seconds, CGA/min A~ pt progresses along ladder sidewards and repeats  X 1 lap stepping into each rung of ladder with R foot and holding L foot in air x 3 seconds, CGA/min A~ pt progresses along ladder sidewards and repeats      Home Exercises Provided and Patient Education Provided     Education provided: Sitting therex including: B LE heel and toe raises, hip abduction against peach theraband, hip adduction squeezes with ball, LAQ, hip flex.  PT also educated pt regarding proper placement of SC in her hand when performing sit<> stand trials.    Written Home Exercises Provided: yes. PT provided pt with written copy of the above today( 3/14/19).  PT also provided pt with piece of peach theraband. No updates to HEP on this date( 8/13/19).  Exercises were reviewed and Fransisca was able to demonstrate them prior to the end of the session.  Fransisca demonstrated good  understanding of the education provided.     See EMR under Media for exercises provided 3/14/19.    Assessment     Fransisca tolerated her therapy session well today with only " slight to minimal trembling during the performance of all balance exercises. Pt also demonstrated improved stability and less need for PT assistance when standing on Bosu( both soft and firm sides) and also did well performing tandem stance on foam pad. Sit<> stand transitions with feet resting on foam fitter demonstrated improvement from previous attempt, as pt now able to perform while holding ball in outstretched arms. Pt's performance at [employer] Box was very sound and she tolerated all 4 springs set at level 1 for the first time.  Pt tolerated horizontal leg press well for additional LE strengthening task. Finally, pt did well with limited ladder agility skills which targeted single limb stance. Pt is also demonstrating a very positive attitude lately and remains engaged in her therapy progression. Pt remains appropriate for additional OPPT visits to address deficits with  balance, strength, endurance and postural control. Continue with current POC.    Fransisca is progressing well towards her goals.   Pt prognosis is Fair to Good.     Pt will continue to benefit from skilled outpatient physical therapy to address the deficits listed in the problem list box on initial evaluation, provide pt/family education and to maximize pt's level of independence in the home and community environment.     Pt's spiritual, cultural and educational needs considered and pt agreeable to plan of care and goals.     Anticipated barriers to physical therapy: emotional deficits, anxiety regarding balance training    Goals: Pt's spiritual, cultural and educational needs considered and pt agreeable to plan of care and goals as stated below:      GOALS:   Short term goals: 4 weeks, pt agrees to goals set.  1. Pt to be issued HEP and report compliance~ in progress; Goal MET, 4/4/19  2. Pt to improve 30 second chair rise to 10-11 trials to demonstrate improvement with this transition~progressing; Goal MET, 4/4/19; Goal not MET on 4/26/19~  remains appropriate  3. Pt to improve SSWS to 1.0m/sec for improved community ambulation with decreased fall risk~Goal MET, 6/18/19  4. Pt to improve MCTSIB score on condition # 3 to 6 seconds for improved balance on unstable surfaces~ Goal MET, 4/4/19  5. Pt to perform SLS on either limb x 2-3 seconds for improved balance and decreased fall risk~ Goal MET 4/26/19     Long term goals: 8 weeks, pt agrees to goals set  6. Pt (I) with HEP~progressing  7. Pt to improve 30 second chair rise to 12-13 trials to demonstrate improvement with this transition~progressing. Revise this goal to 11-12 trials, 4/26/19: goal remains appropriate, 6/18/19; goal remains appropriate, 8/8/19  8. Pt to improve SSWS to 1.2 m/sec for improved community ambulation with decreased fall risk~ongoing  9. Pt to improve MCTSIB score on condition # 3 to 9 seconds for improved balance on unstable surfaces~ Goal MET, 4/4/19.     10. Pt to perform SLS on either limb x 4-5 seconds for improved balance and decreased fall risk~ ~Goal MET, 4/26/19.  Revise goal to perform SLS on either limb x 7-8 seconds, 4/26/19~ partially met for L LE, 6/18/19; Goal MET, 8/8/19   11. NEW GOAL( 4/26/19):  Pt to tolerate 15 seconds during condition # 4 of the MCTSIB( foam with eyes closed); Goal MET, 6/18/19; Not MET on 8/8/19~ this goal remains appropriate  Plan     Cont to address balance deficits in parallel bars with use of compliant surfaces( encourage performance without UE support); continue with use of recumbent stepper for B UE/LE strengthening and CV/muscular endurance.Continue use of Pilates box to address SLS balance. Add basic ladder agility skills with gradual progressions. Add sit<> stand trials from elevated mat with feet resting on foam fitter.        Abdias Solares, PT

## 2019-08-15 ENCOUNTER — CLINICAL SUPPORT (OUTPATIENT)
Dept: REHABILITATION | Facility: HOSPITAL | Age: 49
End: 2019-08-15
Attending: FAMILY MEDICINE
Payer: COMMERCIAL

## 2019-08-15 DIAGNOSIS — R26.89 IMPAIRMENT OF BALANCE: ICD-10-CM

## 2019-08-15 DIAGNOSIS — Z74.09 DECREASED MOBILITY AND ENDURANCE: ICD-10-CM

## 2019-08-15 DIAGNOSIS — R29.898 BILATERAL LEG WEAKNESS: ICD-10-CM

## 2019-08-15 PROCEDURE — 97110 THERAPEUTIC EXERCISES: CPT | Mod: PO

## 2019-08-15 PROCEDURE — 97112 NEUROMUSCULAR REEDUCATION: CPT | Mod: PO

## 2019-08-15 NOTE — PROGRESS NOTES
"  Physical Therapy Daily Treatment Note     Name: Jillian Osullivan  Clinic Number: [id number]    Therapy Diagnosis:   Encounter Diagnoses   Name Primary?    Bilateral leg weakness     Impairment of balance     Decreased mobility and endurance      Physician: Dedrick, Provider    Visit Date: 8/15/2019  Physician Orders: PT Eval and Treat   Medical Diagnosis from Referral: multiple sclerosis, abnormality of gait, atypical depressive disorder  Evaluation Date: 3/1/2019  Authorization Period Expiration: 6/13/2019 to 12/31/2019  Plan of Care Expiration: 4/26/2019  Extended Plan of Care Expiration: 4/26/19 to 6/21/19  Updated Plan of Care Expiration: 6/18/19 to 8/13/19  Updated Plan of Care Expiration: 8/8/19 to 10/3/19  Visit # / Visits authorized: 13/20    Time In: 0855  Time Out: 0900  Total Billable Time: 45 minutes    Precautions: Standard, Fall and impaired vision, emotional deficits    Subjective       Pt reports: " A little lethargic."   She states she is compliant with her home exercises.  Response to previous treatment: no adverse effects  Functional change: ongoing    Pain: 0/10   Location: N/A    Objective       Pt ambulates with SC mod I from lobby to gym( ~ 150 feet) and vice-versa. Pt demonstrates ER of feet, slow gait pattern with improved knee extension during stance phase.  Pt also ambulates with wide RYAN.       Fransisca received therapeutic exercises to develop strength, endurance, ROM, flexibility and posture for 18 minutes including:      X 10 min on SCI-FIT recumbent stepper using  B LE @ level 2.0 for improved  B LE ROM, strength and CV endurance.       Horizontal leg press:  3 x 10 reps with 65 # applied    2 x 30" standing B gastroc stretches with use of incline and rail for support      Patient participated in neuromuscular re-education activities to improve: Balance, Coordination, Kinesthetic, Sense, Proprioception and Posture for 27 minutes. The following activities were included: " "      Standing balance in parallel bars:    X 4 laps tandem walking, no UE support, CGA, slight trembling  X 4 laps forward marching with 2-3 second hold, no UE support, CGA, slight trembling  X 4 laps grapevine stepping, light UE support, S    On Bosu( soft side up):  2 x 30" static standing, no UE support, min A/CGA~  slight trembling    On Bosu( firm side up):  2 x 30" static standing, no UE support, min A/CGA~ slight trembling     On 2 Foam pads:   2 x 30" static standing with EC and narrow RYAN, no UE support, min A trial 1 due to post lean; min/CGA trial 2~ min trembling       Standing at [employer] Box:  2 x 10 B LE alternate hip and knee flex/ext with 2 white springs on level 1 and 2 black springs on level 1, no UE support, CGA to SBA( less touching assist by PT on trial 2)             Home Exercises Provided and Patient Education Provided     Education provided: Sitting therex including: B LE heel and toe raises, hip abduction against peach theraband, hip adduction squeezes with ball, LAQ, hip flex.  PT also educated pt regarding proper placement of SC in her hand when performing sit<> stand trials.    Written Home Exercises Provided: yes. PT provided pt with written copy of the above today( 3/14/19).  PT also provided pt with piece of peach theraband. No updates to HEP on this date( 8/15/19).  Exercises were reviewed and Fransicsa was able to demonstrate them prior to the end of the session.  Fransisca demonstrated good  understanding of the education provided.     See EMR under Media for exercises provided 3/14/19.    Assessment     Fransisca tolerated her therapy session well today with only slight to minimal trembling during the performance of all balance exercises. Pt also demonstrated improved stability and less need for PT assistance when standing on Bosu( both soft and firm sides) and also did well performing eyes closed, narrow base of support standing on foam pad.  Pt's performance at Pilates Box was also improved, " with PT providing only occasional touching assistance.  Pt tolerated horizontal leg press with additional 5 # from previous session for additional LE strengthening. Pt continues to demonstrate a very positive attitude and remains engaged in her therapy progression. Pt remains appropriate for additional OPPT visits to address deficits with  balance, strength, endurance and postural control. Continue with current POC.    Fransisca is progressing well towards her goals.   Pt prognosis is Fair to Good.     Pt will continue to benefit from skilled outpatient physical therapy to address the deficits listed in the problem list box on initial evaluation, provide pt/family education and to maximize pt's level of independence in the home and community environment.     Pt's spiritual, cultural and educational needs considered and pt agreeable to plan of care and goals.     Anticipated barriers to physical therapy: emotional deficits, anxiety regarding balance training    Goals: Pt's spiritual, cultural and educational needs considered and pt agreeable to plan of care and goals as stated below:      GOALS:   Short term goals: 4 weeks, pt agrees to goals set.  1. Pt to be issued HEP and report compliance~ in progress; Goal MET, 4/4/19  2. Pt to improve 30 second chair rise to 10-11 trials to demonstrate improvement with this transition~progressing; Goal MET, 4/4/19; Goal not MET on 4/26/19~ remains appropriate  3. Pt to improve SSWS to 1.0m/sec for improved community ambulation with decreased fall risk~Goal MET, 6/18/19  4. Pt to improve MCTSIB score on condition # 3 to 6 seconds for improved balance on unstable surfaces~ Goal MET, 4/4/19  5. Pt to perform SLS on either limb x 2-3 seconds for improved balance and decreased fall risk~ Goal MET 4/26/19     Long term goals: 8 weeks, pt agrees to goals set  6. Pt (I) with HEP~progressing  7. Pt to improve 30 second chair rise to 12-13 trials to demonstrate improvement with this  transition~progressing. Revise this goal to 11-12 trials, 4/26/19: goal remains appropriate, 6/18/19; goal remains appropriate, 8/8/19  8. Pt to improve SSWS to 1.2 m/sec for improved community ambulation with decreased fall risk~ongoing  9. Pt to improve MCTSIB score on condition # 3 to 9 seconds for improved balance on unstable surfaces~ Goal MET, 4/4/19.     10. Pt to perform SLS on either limb x 4-5 seconds for improved balance and decreased fall risk~ ~Goal MET, 4/26/19.  Revise goal to perform SLS on either limb x 7-8 seconds, 4/26/19~ partially met for L LE, 6/18/19; Goal MET, 8/8/19   11. NEW GOAL( 4/26/19):  Pt to tolerate 15 seconds during condition # 4 of the MCTSIB( foam with eyes closed); Goal MET, 6/18/19; Not MET on 8/8/19~ this goal remains appropriate  Plan     Cont to address balance deficits in parallel bars with use of compliant surfaces( encourage performance without UE support); continue with use of recumbent stepper for B UE/LE strengthening and CV/muscular endurance.Continue use of Pilates box to address SLS balance. Add basic ladder agility skills with gradual progressions. Add sit<> stand trials from elevated mat with feet resting on foam fitter.        Abdias Solares, PT

## 2019-08-20 ENCOUNTER — CLINICAL SUPPORT (OUTPATIENT)
Dept: REHABILITATION | Facility: HOSPITAL | Age: 49
End: 2019-08-20
Attending: FAMILY MEDICINE
Payer: COMMERCIAL

## 2019-08-20 DIAGNOSIS — R29.898 BILATERAL LEG WEAKNESS: ICD-10-CM

## 2019-08-20 DIAGNOSIS — R26.89 IMPAIRMENT OF BALANCE: ICD-10-CM

## 2019-08-20 DIAGNOSIS — Z74.09 DECREASED MOBILITY AND ENDURANCE: ICD-10-CM

## 2019-08-20 PROCEDURE — 97110 THERAPEUTIC EXERCISES: CPT | Mod: PO

## 2019-08-20 PROCEDURE — 97112 NEUROMUSCULAR REEDUCATION: CPT | Mod: PO

## 2019-08-20 NOTE — PROGRESS NOTES
"  Physical Therapy Daily Treatment Note     Name: Jillian Osullivan  Clinic Number: [id number]    Therapy Diagnosis:   Encounter Diagnoses   Name Primary?    Bilateral leg weakness     Impairment of balance     Decreased mobility and endurance      Physician: Dedrick, Provider    Visit Date: 8/20/2019  Physician Orders: PT Eval and Treat   Medical Diagnosis from Referral: multiple sclerosis, abnormality of gait, atypical depressive disorder  Evaluation Date: 3/1/2019  Authorization Period Expiration: 6/13/2019 to 12/31/2019  Plan of Care Expiration: 4/26/2019  Extended Plan of Care Expiration: 4/26/19 to 6/21/19  Updated Plan of Care Expiration: 6/18/19 to 8/13/19  Updated Plan of Care Expiration: 8/8/19 to 10/3/19  Visit # / Visits authorized: 14/20    Time In: 0900  Time Out: 0945  Total Billable Time: 45 minutes    Precautions: Standard, Fall and impaired vision, emotional deficits    Subjective       Pt reports:  She has a HA this morning.   She states she is compliant with her home exercises.  Response to previous treatment: no adverse effects  Functional change: ongoing    Pain: 7.5/10   Location: head    Objective       Pt ambulates with SC mod I from lobby to gym( ~ 150 feet) and vice-versa. Pt demonstrates ER of feet, slow gait pattern with improved knee extension during stance phase.  Pt also ambulates with wide RYAN.       Fransisca received therapeutic exercises to develop strength, endurance, ROM, flexibility and posture for 16 minutes including:      X 10 min on SCI-FIT recumbent stepper using  B LE @ level 2.0-2.2 for improved  B LE ROM, strength and CV endurance.       Horizontal leg press:  3 x 10 reps with 70 # applied    2 x 30" standing B gastroc stretches with use of incline and rail for support      Patient participated in neuromuscular re-education activities to improve: Balance, Coordination, Kinesthetic, Sense, Proprioception and Posture for 29 minutes. The following activities were " "included:       Standing balance in parallel bars:    X 4 laps tandem walking, no UE support, CGA, mild trembling  X 2 laps forward marching with 2-3 second hold, no UE support, CGA, slight trembling  X 4 laps grapevine stepping, no UE support, SBA, slight trembling    On Bosu( soft side up):  2 x 30" static standing, no UE support, min A/CGA~  mild trembling    On Bosu( firm side up):  2 x 30" static standing, no UE support, min A/CGA~ slight trembling  2 x 30" working Bosu in M/L directions, no UE support, CGA/min A      Outside bars:    1 x 10 B LE step ups/downs on foam fitter, no UE support, CGA  1 x 10 alternate LE step overs on foam fitter, no UE support, CGA       Standing at Pilates Box:  2 x 10 B LE alternate hip and knee flex/ext with 2 white springs on level 1 and 2 black springs on level 1, no UE support, CGA to SBA           Home Exercises Provided and Patient Education Provided     Education provided: Sitting therex including: B LE heel and toe raises, hip abduction against peach theraband, hip adduction squeezes with ball, LAQ, hip flex.  PT also educated pt regarding proper placement of SC in her hand when performing sit<> stand trials.    Written Home Exercises Provided: yes. PT provided pt with written copy of the above today( 3/14/19).  PT also provided pt with piece of peach theraband. No updates to HEP on this date( 8/20/19).  Exercises were reviewed and Fransisca was able to demonstrate them prior to the end of the session.  Fransisca demonstrated good  understanding of the education provided.     See EMR under Media for exercises provided 3/14/19.    Assessment     Fransisca tolerated her therapy session well today with only slight to minimal trembling during the performance of all balance exercises. Pt was able to perform grapevine stepping without UE support, which is a nice improvement for her. Pt's ability to perform modified SLS at Pilates Box also continues to improve, with increased ability to " maintain erect posture while working foot pad up and down. Pt also looked confident when performing step ups/downs and step overs on foam fitter outside the parallel bars. Pt tolerated horizontal leg press with an additional 5 # from previous session( 65 # to 70#) for additional LE strengthening. Pt also tolerated mildly increased resistance on the recumbent stepper. Pt continues to demonstrate a very positive attitude and remains engaged in her therapy progression. Pt remains appropriate for additional OPPT visits to address deficits with  balance, strength, endurance and postural control. Continue with current POC.    Fransisca is progressing well towards her goals.   Pt prognosis is Fair to Good.     Pt will continue to benefit from skilled outpatient physical therapy to address the deficits listed in the problem list box on initial evaluation, provide pt/family education and to maximize pt's level of independence in the home and community environment.     Pt's spiritual, cultural and educational needs considered and pt agreeable to plan of care and goals.     Anticipated barriers to physical therapy: emotional deficits, anxiety regarding balance training    Goals: Pt's spiritual, cultural and educational needs considered and pt agreeable to plan of care and goals as stated below:      GOALS:   Short term goals: 4 weeks, pt agrees to goals set.  1. Pt to be issued HEP and report compliance~ in progress; Goal MET, 4/4/19  2. Pt to improve 30 second chair rise to 10-11 trials to demonstrate improvement with this transition~progressing; Goal MET, 4/4/19; Goal not MET on 4/26/19~ remains appropriate  3. Pt to improve SSWS to 1.0m/sec for improved community ambulation with decreased fall risk~Goal MET, 6/18/19  4. Pt to improve MCTSIB score on condition # 3 to 6 seconds for improved balance on unstable surfaces~ Goal MET, 4/4/19  5. Pt to perform SLS on either limb x 2-3 seconds for improved balance and decreased fall  risk~ Goal MET 4/26/19     Long term goals: 8 weeks, pt agrees to goals set  6. Pt (I) with HEP~progressing  7. Pt to improve 30 second chair rise to 12-13 trials to demonstrate improvement with this transition~progressing. Revise this goal to 11-12 trials, 4/26/19: goal remains appropriate, 6/18/19; goal remains appropriate, 8/8/19  8. Pt to improve SSWS to 1.2 m/sec for improved community ambulation with decreased fall risk~ongoing  9. Pt to improve MCTSIB score on condition # 3 to 9 seconds for improved balance on unstable surfaces~ Goal MET, 4/4/19.     10. Pt to perform SLS on either limb x 4-5 seconds for improved balance and decreased fall risk~ ~Goal MET, 4/26/19.  Revise goal to perform SLS on either limb x 7-8 seconds, 4/26/19~ partially met for L LE, 6/18/19; Goal MET, 8/8/19   11. NEW GOAL( 4/26/19):  Pt to tolerate 15 seconds during condition # 4 of the MCTSIB( foam with eyes closed); Goal MET, 6/18/19; Not MET on 8/8/19~ this goal remains appropriate  Plan     Cont to address balance deficits in parallel bars with use of compliant surfaces( encourage performance without UE support); continue with use of recumbent stepper for B UE/LE strengthening and CV/muscular endurance.Continue use of Pilates box to address SLS balance. Add basic ladder agility skills with gradual progressions. Add sit<> stand trials from elevated mat with feet resting on foam fitter.        Abdias Solares, PT

## 2019-08-22 ENCOUNTER — CLINICAL SUPPORT (OUTPATIENT)
Dept: REHABILITATION | Facility: HOSPITAL | Age: 49
End: 2019-08-22
Attending: FAMILY MEDICINE
Payer: COMMERCIAL

## 2019-08-22 DIAGNOSIS — Z74.09 DECREASED MOBILITY AND ENDURANCE: ICD-10-CM

## 2019-08-22 DIAGNOSIS — R29.898 BILATERAL LEG WEAKNESS: ICD-10-CM

## 2019-08-22 DIAGNOSIS — R26.89 IMPAIRMENT OF BALANCE: ICD-10-CM

## 2019-08-22 PROCEDURE — 97110 THERAPEUTIC EXERCISES: CPT | Mod: PO

## 2019-08-22 PROCEDURE — 97112 NEUROMUSCULAR REEDUCATION: CPT | Mod: PO

## 2019-08-22 NOTE — PROGRESS NOTES
"  Physical Therapy Daily Treatment Note     Name: Jillian Osullivan  Clinic Number: [id number]    Therapy Diagnosis:   Encounter Diagnoses   Name Primary?    Bilateral leg weakness     Impairment of balance     Decreased mobility and endurance      Physician: Dedrick, Provider    Visit Date: 8/22/2019  Physician Orders: PT Eval and Treat   Medical Diagnosis from Referral: multiple sclerosis, abnormality of gait, atypical depressive disorder  Evaluation Date: 3/1/2019  Authorization Period Expiration: 6/13/2019 to 12/31/2019  Plan of Care Expiration: 4/26/2019  Extended Plan of Care Expiration: 4/26/19 to 6/21/19  Updated Plan of Care Expiration: 6/18/19 to 8/13/19  Updated Plan of Care Expiration: 8/8/19 to 10/3/19  Visit # / Visits authorized: 15/20    Time In: 0940  Time Out: 1025  Total Billable Time: 45 minutes    Precautions: Standard, Fall and impaired vision, emotional deficits    Subjective       Pt reports:  " You killed me last time."   She states she is compliant with her home exercises.  Response to previous treatment: pt slept x 4 hours when she got home after previous session  Functional change: ongoing    Pain: 0/10   Location: N/A    Objective       Pt ambulates with SC mod I from lobby to gym( ~ 150 feet) and vice-versa. Pt demonstrates ER of feet, slow gait pattern with improved knee extension during stance phase.  Pt also ambulates with wide RYAN.       Fransisca received therapeutic exercises to develop strength, endurance, ROM, flexibility and posture for 12 minutes including:      X 10 min on SCI-FIT recumbent stepper using  B LE @ level 2.3 for improved  B LE ROM, strength and CV endurance.       2 x 30" standing B gastroc stretches with use of incline and rail for support      Patient participated in neuromuscular re-education activities to improve: Balance, Coordination, Kinesthetic, Sense, Proprioception and Posture for 33 minutes. The following activities were included:       Standing " "balance in parallel bars:    X 4 laps tandem walking, no UE support, CGA, slight trembling  X 4 laps grapevine stepping, no UE support, SBA, slight trembling      1 x 10 B LE forward and backward stepping over small blue foam roller, no UE support,  SBA  1 x 10 B LE sideward stepping over small blue foam roller, no UE support, SBA      On Bosu( soft side up):  2 x 30" static standing, no UE support, CGA~  mild trembling  1 x 30" static standing, no UE support, EC, min/mod A, moderate trembling    On Bosu( firm side up):  2 x 30" static standing, no UE support, CGA~ slight trembling  2 x 30" working Bosu in M/L directions, no UE support, CGA~ one episode of touching down on bar  1 x 30" static standing, no UE support, EC, min A, min trembling    Outside parallel bars:  X 1.5 minutes standing on foam fitter and bouncing orange physioball to PT tech, CGA  X 1.5 minutes standing on foam fitter and tossing orange physioball to PT tech, CGA     Standing at [employer] Box:  1 x 10 B LE alternate hip and knee flex/ext with 1 white spring on level 1, one white spring on level 2 and 2 black springs on level 1, no UE support, CGA           Home Exercises Provided and Patient Education Provided     Education provided: Sitting therex including: B LE heel and toe raises, hip abduction against peach theraband, hip adduction squeezes with ball, LAQ, hip flex.  PT also educated pt regarding proper placement of SC in her hand when performing sit<> stand trials.    Written Home Exercises Provided: yes. PT provided pt with written copy of the above today( 3/14/19).  PT also provided pt with piece of peach theraband. No updates to HEP on this date( 8/22/19).  Exercises were reviewed and Fransisca was able to demonstrate them prior to the end of the session.  Fransisca demonstrated good  understanding of the education provided.     See EMR under Media for exercises provided 3/14/19.    Assessment     Fransisca tolerated her therapy session well today with " only slight to minimal trembling during the performance of all balance exercises. Pt demonstrated improved static standing on both sides of Bosu and even completed 1 repetition of eyes closed on each side. This was challenging, especially when standing on soft side of Bosu. Pt's ability to perform modified SLS at Pilates Box also continues to improve, with increased ability to maintain erect posture while working foot pad up and down. PT was also able to increase resistance slightly by moving one white spring to level 2. Perhaps most impressive today was pt's ability to perform forward/backward and side stepping over small foam roller with no UE support. Pt also tolerated mildly increased resistance on the recumbent stepper( 2.3 today). Pt continues to demonstrate a very positive attitude and remains engaged in her therapy progression. Pt remains appropriate for additional OPPT visits to address deficits with  balance, strength, endurance and postural control. Continue with current POC.    Fransisca is progressing well towards her goals.   Pt prognosis is Fair to Good.     Pt will continue to benefit from skilled outpatient physical therapy to address the deficits listed in the problem list box on initial evaluation, provide pt/family education and to maximize pt's level of independence in the home and community environment.     Pt's spiritual, cultural and educational needs considered and pt agreeable to plan of care and goals.     Anticipated barriers to physical therapy: emotional deficits, anxiety regarding balance training    Goals: Pt's spiritual, cultural and educational needs considered and pt agreeable to plan of care and goals as stated below:      GOALS:   Short term goals: 4 weeks, pt agrees to goals set.  1. Pt to be issued HEP and report compliance~ in progress; Goal MET, 4/4/19  2. Pt to improve 30 second chair rise to 10-11 trials to demonstrate improvement with this transition~progressing; Goal MET,  4/4/19; Goal not MET on 4/26/19~ remains appropriate  3. Pt to improve SSWS to 1.0m/sec for improved community ambulation with decreased fall risk~Goal MET, 6/18/19  4. Pt to improve MCTSIB score on condition # 3 to 6 seconds for improved balance on unstable surfaces~ Goal MET, 4/4/19  5. Pt to perform SLS on either limb x 2-3 seconds for improved balance and decreased fall risk~ Goal MET 4/26/19     Long term goals: 8 weeks, pt agrees to goals set  6. Pt (I) with HEP~progressing  7. Pt to improve 30 second chair rise to 12-13 trials to demonstrate improvement with this transition~progressing. Revise this goal to 11-12 trials, 4/26/19: goal remains appropriate, 6/18/19; goal remains appropriate, 8/8/19  8. Pt to improve SSWS to 1.2 m/sec for improved community ambulation with decreased fall risk~ongoing  9. Pt to improve MCTSIB score on condition # 3 to 9 seconds for improved balance on unstable surfaces~ Goal MET, 4/4/19.     10. Pt to perform SLS on either limb x 4-5 seconds for improved balance and decreased fall risk~ ~Goal MET, 4/26/19.  Revise goal to perform SLS on either limb x 7-8 seconds, 4/26/19~ partially met for L LE, 6/18/19; Goal MET, 8/8/19   11. NEW GOAL( 4/26/19):  Pt to tolerate 15 seconds during condition # 4 of the MCTSIB( foam with eyes closed); Goal MET, 6/18/19; Not MET on 8/8/19~ this goal remains appropriate  Plan     Cont to address balance deficits in parallel bars with use of compliant surfaces( encourage performance without UE support); continue with use of recumbent stepper for B UE/LE strengthening and CV/muscular endurance.Continue use of Pilates box to address SLS balance. Add basic ladder agility skills with gradual progressions. Add sit<> stand trials from elevated mat with feet resting on foam fitter.        Abdias Solares, PT

## 2019-08-27 ENCOUNTER — CLINICAL SUPPORT (OUTPATIENT)
Dept: REHABILITATION | Facility: HOSPITAL | Age: 49
End: 2019-08-27
Attending: FAMILY MEDICINE
Payer: COMMERCIAL

## 2019-08-27 DIAGNOSIS — R26.89 IMPAIRMENT OF BALANCE: ICD-10-CM

## 2019-08-27 DIAGNOSIS — Z74.09 DECREASED MOBILITY AND ENDURANCE: ICD-10-CM

## 2019-08-27 DIAGNOSIS — R29.898 BILATERAL LEG WEAKNESS: ICD-10-CM

## 2019-08-27 PROCEDURE — 97112 NEUROMUSCULAR REEDUCATION: CPT | Mod: PO

## 2019-08-27 PROCEDURE — 97110 THERAPEUTIC EXERCISES: CPT | Mod: PO

## 2019-09-04 ENCOUNTER — CLINICAL SUPPORT (OUTPATIENT)
Dept: REHABILITATION | Facility: HOSPITAL | Age: 49
End: 2019-09-04
Attending: FAMILY MEDICINE
Payer: COMMERCIAL

## 2019-09-04 DIAGNOSIS — R29.898 BILATERAL LEG WEAKNESS: ICD-10-CM

## 2019-09-04 DIAGNOSIS — Z74.09 DECREASED MOBILITY AND ENDURANCE: ICD-10-CM

## 2019-09-04 DIAGNOSIS — R26.89 IMPAIRMENT OF BALANCE: ICD-10-CM

## 2019-09-04 PROCEDURE — 97110 THERAPEUTIC EXERCISES: CPT | Mod: PO

## 2019-09-04 PROCEDURE — 97112 NEUROMUSCULAR REEDUCATION: CPT | Mod: PO

## 2019-09-04 NOTE — PROGRESS NOTES
"  Physical Therapy Daily Treatment Note     Name: Jillian Osullivan  Clinic Number: [id number]    Therapy Diagnosis:   Encounter Diagnoses   Name Primary?    Bilateral leg weakness     Impairment of balance     Decreased mobility and endurance      Physician: Erik Collins MD    Visit Date: 9/4/2019  Physician Orders: PT Eval and Treat   Medical Diagnosis from Referral: multiple sclerosis, abnormality of gait, atypical depressive disorder  Evaluation Date: 3/1/2019  Authorization Period Expiration: 6/13/2019 to 12/31/2019  Plan of Care Expiration: 4/26/2019  Extended Plan of Care Expiration: 4/26/19 to 6/21/19  Updated Plan of Care Expiration: 6/18/19 to 8/13/19  Updated Plan of Care Expiration: 8/8/19 to 10/3/19  Visit # / Visits authorized: 17/20    Time In: 0855  Time Out: 0940  Total Billable Time: 45 minutes    Precautions: Standard, Fall and impaired vision, emotional deficits    Subjective       Pt reports:  " I'm exhausted."   " My legs gave out on me last night."   Pt also mentions considerable pain to her legs last night.   She states she is compliant with her home exercises.  Response to previous treatment: no adverse effects  Functional change: ongoing    Pain: 7/10   Location: B legs    Objective       Pt ambulates with SC mod I from lobby to gym( ~ 150 feet) and vice-versa. Pt demonstrates ER of feet, slow gait pattern with improved knee extension during stance phase.  Pt also ambulates with wide RYAN.       Fransisca received therapeutic exercises to develop strength, endurance, ROM, flexibility and posture for 13 minutes including:      X 10 min on SCI-FIT recumbent stepper using  B LE @ level 2.3 for improved  B LE ROM, strength and CV endurance.       2 x 30" standing B gastroc stretches with use of incline and rail for support      Patient participated in neuromuscular re-education activities to improve: Balance, Coordination, Kinesthetic, Sense, Proprioception and Posture for 32 minutes. The " "following activities were included:       Standing balance in parallel bars:    X 4 laps tandem walking, no UE support, CGA, min trembling  X 4 laps grapevine stepping, no UE support, SBA/CGA( due to 2 episodes of loss of concentration), slight trembling      1 x 10 B LE forward and backward stepping over small blue foam roller, no UE support,  CGA  1 x 10 B LE sideward stepping over small blue foam roller, no UE support, SBA      On Bosu( soft side up):  2 x 30" static standing, no UE support, CGA/min A trial 1 and min A trial 2~  min trembling      On Bosu( firm side up):  2 x 30" static standing, no UE support, min/CGA~ slight trembling        Outside parallel bars:    1 x 10 B LE step ups/downs on foam fitter, no UE support, CGA  1 x 10 alternate LE step overs, no UE support, CGA/min A     X 4 minutes rolling basketball back and forth with PT tech using alternate feet, CGA    Standing at [facility] Box:  1 x 10 B LE alternate hip and knee flex/ext with 1 white spring on level 1, one white spring on level 2 and 2 black springs on level 1, no UE support, CGA           Home Exercises Provided and Patient Education Provided     Education provided: Sitting therex including: B LE heel and toe raises, hip abduction against peach theraband, hip adduction squeezes with ball, LAQ, hip flex.  PT also educated pt regarding proper placement of SC in her hand when performing sit<> stand trials.    Written Home Exercises Provided: yes. PT provided pt with written copy of the above today( 3/14/19).  PT also provided pt with piece of peach theraband. No updates to HEP on this date( 9/4/19).  Exercises were reviewed and Fransisca was able to demonstrate them prior to the end of the session.  Fransisca demonstrated good  understanding of the education provided.     See EMR under Media for exercises provided 3/14/19.    Assessment     Fransisca tolerated her therapy session fairly well today, though she demonstrated more trembling during the " performance of all balance exercises. While pt was able to stand on both sides of Bosu with min/CGA, PT did not have pt perform eyes closed condition here today( due to increased trembling and more difficulty maintaining balance compared to previous session). Pt's ability to perform modified SLS at Pilates Box continues to improve gradually, with increased ability to maintain erect posture while working foot pad up and down. However, pt needs cues to relax her arms at her sides when performing this task. PT was pleasantly surprised by pt's ability to roll basketball back and forth to PT tech using alternate feet. Pt also did well with forward/backward and side stepping over small blue foam roller. Pt continues to demonstrate a very positive attitude and remains engaged in her therapy progression, though she may distract herself at times with her own conversation and tendency to make jokes with PT. PT would like to return to performance of sit<> stand trials from mat( with feet resting on foam fitter) during pt's next therapy session. Pt remains appropriate for additional OPPT visits to address deficits with  balance, strength, endurance and postural control. Continue with current POC.    Fransisca is progressing well towards her goals.   Pt prognosis is Fair to Good.     Pt will continue to benefit from skilled outpatient physical therapy to address the deficits listed in the problem list box on initial evaluation, provide pt/family education and to maximize pt's level of independence in the home and community environment.     Pt's spiritual, cultural and educational needs considered and pt agreeable to plan of care and goals.     Anticipated barriers to physical therapy: emotional deficits, anxiety regarding balance training    Goals: Pt's spiritual, cultural and educational needs considered and pt agreeable to plan of care and goals as stated below:      GOALS:   Short term goals: 4 weeks, pt agrees to goals  set.  1. Pt to be issued HEP and report compliance~ in progress; Goal MET, 4/4/19  2. Pt to improve 30 second chair rise to 10-11 trials to demonstrate improvement with this transition~progressing; Goal MET, 4/4/19; Goal not MET on 4/26/19~ remains appropriate  3. Pt to improve SSWS to 1.0m/sec for improved community ambulation with decreased fall risk~Goal MET, 6/18/19  4. Pt to improve MCTSIB score on condition # 3 to 6 seconds for improved balance on unstable surfaces~ Goal MET, 4/4/19  5. Pt to perform SLS on either limb x 2-3 seconds for improved balance and decreased fall risk~ Goal MET 4/26/19     Long term goals: 8 weeks, pt agrees to goals set  6. Pt (I) with HEP~progressing  7. Pt to improve 30 second chair rise to 12-13 trials to demonstrate improvement with this transition~progressing. Revise this goal to 11-12 trials, 4/26/19: goal remains appropriate, 6/18/19; goal remains appropriate, 8/8/19  8. Pt to improve SSWS to 1.2 m/sec for improved community ambulation with decreased fall risk~ongoing  9. Pt to improve MCTSIB score on condition # 3 to 9 seconds for improved balance on unstable surfaces~ Goal MET, 4/4/19.     10. Pt to perform SLS on either limb x 4-5 seconds for improved balance and decreased fall risk~ ~Goal MET, 4/26/19.  Revise goal to perform SLS on either limb x 7-8 seconds, 4/26/19~ partially met for L LE, 6/18/19; Goal MET, 8/8/19   11. NEW GOAL( 4/26/19):  Pt to tolerate 15 seconds during condition # 4 of the MCTSIB( foam with eyes closed); Goal MET, 6/18/19; Not MET on 8/8/19~ this goal remains appropriate  Plan     Cont to address balance deficits in parallel bars with use of compliant surfaces( encourage performance without UE support); continue with use of recumbent stepper for B UE/LE strengthening and CV/muscular endurance.Continue use of Pilates box to address SLS balance. Add basic ladder agility skills with gradual progressions. Add sit<> stand trials from elevated mat with  feet resting on foam fitter.        Abdias Solares, PT

## 2019-09-06 ENCOUNTER — CLINICAL SUPPORT (OUTPATIENT)
Dept: REHABILITATION | Facility: HOSPITAL | Age: 49
End: 2019-09-06
Attending: FAMILY MEDICINE
Payer: COMMERCIAL

## 2019-09-06 DIAGNOSIS — R29.898 BILATERAL LEG WEAKNESS: ICD-10-CM

## 2019-09-06 DIAGNOSIS — R26.89 IMPAIRMENT OF BALANCE: ICD-10-CM

## 2019-09-06 DIAGNOSIS — Z74.09 DECREASED MOBILITY AND ENDURANCE: ICD-10-CM

## 2019-09-06 PROCEDURE — 97112 NEUROMUSCULAR REEDUCATION: CPT | Mod: PO

## 2019-09-06 PROCEDURE — 97110 THERAPEUTIC EXERCISES: CPT | Mod: PO

## 2019-09-06 NOTE — PROGRESS NOTES
"  Physical Therapy Daily Treatment Note     Name: Jillian Osullivan  Clinic Number: [id number]    Therapy Diagnosis:   No diagnosis found.  Physician: Erik Collins MD    Visit Date: 9/6/2019  Physician Orders: PT Eval and Treat   Medical Diagnosis from Referral: multiple sclerosis, abnormality of gait, atypical depressive disorder  Evaluation Date: 3/1/2019  Authorization Period Expiration: 6/13/2019 to 12/31/2019  Plan of Care Expiration: 4/26/2019  Extended Plan of Care Expiration: 4/26/19 to 6/21/19  Updated Plan of Care Expiration: 6/18/19 to 8/13/19  Updated Plan of Care Expiration: 8/8/19 to 10/3/19  Visit # / Visits authorized: 18/20    Time In: 1033  Time Out: 1115  Total Billable Time: 42 minutes    Precautions: Standard, Fall and impaired vision, emotional deficits    Subjective       Pt reports:  " The same thing happened again last night." Pt referring to her legs feeling weak in the afternoon/evening.  Pt also states she had a hard time getting moving this morning. Pt also mentions considerable pain to her legs last night.    She states she is compliant with her home exercises.  Response to previous treatment: no adverse effects  Functional change: ongoing    Pain: 7/10   Location: B legs    Objective       Pt arrived 3 minutes late to session and PT unable to accommodate.    Pt ambulates with SC mod I from lobby to gym( ~ 150 feet) and vice-versa. Pt demonstrates ER of feet, slow gait pattern with improved knee extension during stance phase.  Pt also ambulates with wide RYAN.       Fransisca received therapeutic exercises to develop strength, endurance, ROM, flexibility and posture for 15 minutes including:      X 4 min on upright bicycle  @ level 1.0 for improved  B LE ROM, strength and CV endurance. PT has to provide pt with manual assistance and activity halted due to difficulty performing/ LE fatigue.      2 x 30" standing B gastroc stretches with use of incline and rail for support    3 x 10 " "reps on horizontal leg press with 60 # applied      Patient participated in neuromuscular re-education activities to improve: Balance, Coordination, Kinesthetic, Sense, Proprioception and Posture for 27 minutes. The following activities were included:       Standing balance in parallel bars:    X 4 laps tandem walking, no UE support, CGA, min trembling  X 3 laps forward marching with 2 second hold, no UE support, CGA, slight to min trembling      1 x 10 B LE forward and backward stepping over small blue foam roller, no UE support,  CGA with intermittent touching of bar  1 x 10 B LE sideward stepping over small blue foam roller, no UE support, CGA    Near ballet bar:  On foam pad:  1 x 30" B alternate tandem standing, no UE support, CGA to slight min A~ min trembling    On 2 foam pads:  2 x 30" static standing with NBOS, no UE support, EC, CGA/min A~ min trembling      Outside parallel bars:      Standing at Pilates Box:  2 x 10 B LE alternate hip and knee flex/ext with 1 white spring on level 1, one white spring on level 2 and 2 black springs on level 1, no UE support, CGA           Home Exercises Provided and Patient Education Provided     Education provided: Sitting therex including: B LE heel and toe raises, hip abduction against peach theraband, hip adduction squeezes with ball, LAQ, hip flex.  PT also educated pt regarding proper placement of SC in her hand when performing sit<> stand trials.    Written Home Exercises Provided: yes. PT provided pt with written copy of the above today( 3/14/19).  PT also provided pt with piece of peach theraband. No updates to HEP on this date( 9/4/19).  Exercises were reviewed and Fransisca was able to demonstrate them prior to the end of the session.  Fransisca demonstrated good  understanding of the education provided.     See EMR under Media for exercises provided 3/14/19.    Assessment     Fransisca tolerated her therapy session fairly well today, though she continues to report LE pain " and weakness in the evenings. PT had pt try upright bicycle but this proved to be too difficult an exercise, even with assistance from PT. PT returned to use of horizontal leg press and pt did well here with 60 # applied. Pt also seemed to demonstrate a bit less trembling today during the performance of balance exercises. Pt's ability to perform modified SLS at [employer] Box continues to improve gradually, with increased ability to maintain erect posture while working foot pad up and down. However, pt needs cues to relax her arms at her sides when performing this task.  Pt also did well with forward/backward and side stepping over small blue foam roller. Pt continues to demonstrate a very positive attitude and remains engaged in her therapy progression, though she may distract herself at times with her own conversation and tendency to make jokes with PT. PT would like to return to performance of sit<> stand trials from mat( with feet resting on foam fitter) during pt's next therapy session. Pt remains appropriate for additional OPPT visits to address deficits with  balance, strength, endurance and postural control. Continue with current POC.    Fransisca is progressing well towards her goals.   Pt prognosis is Fair to Good.     Pt will continue to benefit from skilled outpatient physical therapy to address the deficits listed in the problem list box on initial evaluation, provide pt/family education and to maximize pt's level of independence in the home and community environment.     Pt's spiritual, cultural and educational needs considered and pt agreeable to plan of care and goals.     Anticipated barriers to physical therapy: emotional deficits, anxiety regarding balance training    Goals: Pt's spiritual, cultural and educational needs considered and pt agreeable to plan of care and goals as stated below:      GOALS:   Short term goals: 4 weeks, pt agrees to goals set.  1. Pt to be issued HEP and report compliance~ in  progress; Goal MET, 4/4/19  2. Pt to improve 30 second chair rise to 10-11 trials to demonstrate improvement with this transition~progressing; Goal MET, 4/4/19; Goal not MET on 4/26/19~ remains appropriate  3. Pt to improve SSWS to 1.0m/sec for improved community ambulation with decreased fall risk~Goal MET, 6/18/19  4. Pt to improve MCTSIB score on condition # 3 to 6 seconds for improved balance on unstable surfaces~ Goal MET, 4/4/19  5. Pt to perform SLS on either limb x 2-3 seconds for improved balance and decreased fall risk~ Goal MET 4/26/19     Long term goals: 8 weeks, pt agrees to goals set  6. Pt (I) with HEP~progressing  7. Pt to improve 30 second chair rise to 12-13 trials to demonstrate improvement with this transition~progressing. Revise this goal to 11-12 trials, 4/26/19: goal remains appropriate, 6/18/19; goal remains appropriate, 8/8/19  8. Pt to improve SSWS to 1.2 m/sec for improved community ambulation with decreased fall risk~ongoing  9. Pt to improve MCTSIB score on condition # 3 to 9 seconds for improved balance on unstable surfaces~ Goal MET, 4/4/19.     10. Pt to perform SLS on either limb x 4-5 seconds for improved balance and decreased fall risk~ ~Goal MET, 4/26/19.  Revise goal to perform SLS on either limb x 7-8 seconds, 4/26/19~ partially met for L LE, 6/18/19; Goal MET, 8/8/19   11. NEW GOAL( 4/26/19):  Pt to tolerate 15 seconds during condition # 4 of the MCTSIB( foam with eyes closed); Goal MET, 6/18/19; Not MET on 8/8/19~ this goal remains appropriate  Plan     Cont to address balance deficits in parallel bars with use of compliant surfaces( encourage performance without UE support); continue with use of recumbent stepper for B UE/LE strengthening and CV/muscular endurance.Continue use of Pilates box to address SLS balance. Add basic ladder agility skills with gradual progressions. Add sit<> stand trials from elevated mat with feet resting on foam fitter.        Abdias Solares, PT

## 2019-09-10 ENCOUNTER — CLINICAL SUPPORT (OUTPATIENT)
Dept: REHABILITATION | Facility: HOSPITAL | Age: 49
End: 2019-09-10
Attending: FAMILY MEDICINE
Payer: COMMERCIAL

## 2019-09-10 DIAGNOSIS — Z74.09 DECREASED MOBILITY AND ENDURANCE: ICD-10-CM

## 2019-09-10 DIAGNOSIS — R29.898 BILATERAL LEG WEAKNESS: ICD-10-CM

## 2019-09-10 DIAGNOSIS — R26.89 IMPAIRMENT OF BALANCE: ICD-10-CM

## 2019-09-10 PROCEDURE — 97110 THERAPEUTIC EXERCISES: CPT | Mod: PO

## 2019-09-10 PROCEDURE — 97112 NEUROMUSCULAR REEDUCATION: CPT | Mod: PO

## 2019-09-10 NOTE — PROGRESS NOTES
"  Physical Therapy Daily Treatment Note     Name: Jillian Osullivan  Clinic Number: [id number]    Therapy Diagnosis:   Encounter Diagnoses   Name Primary?    Bilateral leg weakness     Impairment of balance     Decreased mobility and endurance      Physician: Erik Collins MD    Visit Date: 9/10/2019  Physician Orders: PT Eval and Treat   Medical Diagnosis from Referral: multiple sclerosis, abnormality of gait, atypical depressive disorder  Evaluation Date: 3/1/2019  Authorization Period Expiration: 6/13/2019 to 12/31/2019  Plan of Care Expiration: 4/26/2019  Extended Plan of Care Expiration: 4/26/19 to 6/21/19  Updated Plan of Care Expiration: 6/18/19 to 8/13/19  Updated Plan of Care Expiration: 8/8/19 to 10/3/19  Visit # / Visits authorized: 19/20; 38 total visits in 2019    Time In: 0900  Time Out: 0945  Total Billable Time: 38 minutes charged due to 7 minutes of concurrent treatment with another patient.    Precautions: Standard, Fall and impaired vision, emotional deficits    Subjective       Pt reports:  "I'm still having that problem with my legs." Pt still reporting pain and fatigue to her legs at night.   She states she is compliant with her home exercises.  Response to previous treatment: no adverse effects  Functional change: ongoing    Pain: 5/10   Location: B legs    Objective       Pt ambulates with SC mod I from lobby to gym( ~ 150 feet) and vice-versa. Pt demonstrates ER of feet, slow gait pattern with improved knee extension during stance phase.  Pt also ambulates with wide RYAN.       Fransisca received therapeutic exercises to develop strength, endurance, ROM, flexibility and posture for 12 minutes including:    X 10 min on SCI-FIT recumbent stepper using  B LE @ level 2.4-2.5 for improved  B LE ROM, strength and CV endurance.       2 x 30" standing B gastroc stretches with use of incline and rail for support      Patient participated in neuromuscular re-education activities to improve: " "Balance, Coordination, Kinesthetic, Sense, Proprioception and Posture for 33 minutes. The following activities were included:       Standing balance in parallel bars:    X 4 laps tandem walking, no UE support, CGA, min trembling  X 4 laps grapevine stepping, no UE support, S~ cues to recall proper techinque    1 x 10 B LE forward and backward stepping over small blue foam roller, no UE support,  CGA   1 x 10 B LE sideward stepping R and L over small blue foam roller, no UE support, CGA    Inside parallel bars:    On Bosu( soft side up):  2 x 30" static standing, no UE support, CGA/min A trial 1 and more min A trial 2~ min trembling    On Bosu( firm side):  2 x 30" static standing, no UE support, CGA/min A~ slight trembling    Outside parallel bars:      Transitions and standing balance from/near elevated mat:  1 x 10 sit<> stand trials with feet resting on 2 foam pads, CGA( no UE use for support)  1 x 30" standing on 2 foam pads while working body blade with B UE in horizontal, CGA  1 x 30" standing on 2 foam pads while working body blade with R UE in vertical, CGA  1 x 30" standing on 2 foam pads while working body blade with L UE in vertical, CGA  1 x 30" standing on 2 foam pads while working body blade with B UE in vertical, CGA      Home Exercises Provided and Patient Education Provided     Education provided: Sitting therex including: B LE heel and toe raises, hip abduction against peach theraband, hip adduction squeezes with ball, LAQ, hip flex.  PT also educated pt regarding proper placement of SC in her hand when performing sit<> stand trials.    Written Home Exercises Provided: yes. PT provided pt with written copy of the above today( 3/14/19).  PT also provided pt with piece of peach theraband. No updates to HEP on this date( 9/4/19).  Exercises were reviewed and Fransisca was able to demonstrate them prior to the end of the session.  Fransisca demonstrated good  understanding of the education provided.     See EMR " under Media for exercises provided 3/14/19.    Assessment     Fransisca tolerated her therapy session fairly well today, though she continues to report LE pain and weakness in the evenings. PT returned to use of the recumbent stepper for strengthening and endurance, and pt was able to tolerate slightly increased resistance here( levels 2.4-2.5). Pt still demonstrates at least mild trembling with all balance exercises, though this may have been a bit better from the previous session. Pt's ability to perform grapevine stepping in parallel bars also improved with nearly no trembling during performance. PT resumed sit<> stand transitions from elevated mat and feet resting on compliant surface( 2 foam pads). Pt did well here and also was introduced to use of body blade while standing on foam to target core musculature. Pt continues to demonstrate a very positive attitude and remains engaged in her therapy progression, though she may distract herself at times with her own conversation and tendency to make jokes with PT.  Pt remains appropriate for additional OPPT visits to address deficits with  balance, strength, endurance and postural control. Continue with current POC.    Fransisca is progressing well towards her goals.   Pt prognosis is Fair to Good.     Pt will continue to benefit from skilled outpatient physical therapy to address the deficits listed in the problem list box on initial evaluation, provide pt/family education and to maximize pt's level of independence in the home and community environment.     Pt's spiritual, cultural and educational needs considered and pt agreeable to plan of care and goals.     Anticipated barriers to physical therapy: emotional deficits, anxiety regarding balance training    Goals: Pt's spiritual, cultural and educational needs considered and pt agreeable to plan of care and goals as stated below:      GOALS:   Short term goals: 4 weeks, pt agrees to goals set.  1. Pt to be issued HEP and  report compliance~ in progress; Goal MET, 4/4/19  2. Pt to improve 30 second chair rise to 10-11 trials to demonstrate improvement with this transition~progressing; Goal MET, 4/4/19; Goal not MET on 4/26/19~ remains appropriate  3. Pt to improve SSWS to 1.0m/sec for improved community ambulation with decreased fall risk~Goal MET, 6/18/19  4. Pt to improve MCTSIB score on condition # 3 to 6 seconds for improved balance on unstable surfaces~ Goal MET, 4/4/19  5. Pt to perform SLS on either limb x 2-3 seconds for improved balance and decreased fall risk~ Goal MET 4/26/19     Long term goals: 8 weeks, pt agrees to goals set  6. Pt (I) with HEP~progressing  7. Pt to improve 30 second chair rise to 12-13 trials to demonstrate improvement with this transition~progressing. Revise this goal to 11-12 trials, 4/26/19: goal remains appropriate, 6/18/19; goal remains appropriate, 8/8/19  8. Pt to improve SSWS to 1.2 m/sec for improved community ambulation with decreased fall risk~ongoing  9. Pt to improve MCTSIB score on condition # 3 to 9 seconds for improved balance on unstable surfaces~ Goal MET, 4/4/19.     10. Pt to perform SLS on either limb x 4-5 seconds for improved balance and decreased fall risk~ ~Goal MET, 4/26/19.  Revise goal to perform SLS on either limb x 7-8 seconds, 4/26/19~ partially met for L LE, 6/18/19; Goal MET, 8/8/19   11. NEW GOAL( 4/26/19):  Pt to tolerate 15 seconds during condition # 4 of the MCTSIB( foam with eyes closed); Goal MET, 6/18/19; Not MET on 8/8/19~ this goal remains appropriate  Plan     Cont to address balance deficits in parallel bars with use of compliant surfaces( encourage performance without UE support); continue with use of recumbent stepper for B UE/LE strengthening and CV/muscular endurance.Continue use of Pilates box to address SLS balance. Add basic ladder agility skills with gradual progressions. Add sit<> stand trials from elevated mat with feet resting on foam  gilbert.        Abdias Solares, PT

## 2019-09-11 ENCOUNTER — DOCUMENTATION ONLY (OUTPATIENT)
Dept: REHABILITATION | Facility: HOSPITAL | Age: 49
End: 2019-09-11

## 2019-09-11 NOTE — PROGRESS NOTES
PT/PTA met face to face to discuss pt's treatment plan and progress towards established goals. Continue with current PT POC, including: balance and endurance. Pt will be seen by physical therapist at least every 6th treatment day or every 30 days, whichever occurs first.      Martín Ojeda, PTA  9/09/19

## 2019-09-12 ENCOUNTER — CLINICAL SUPPORT (OUTPATIENT)
Dept: REHABILITATION | Facility: HOSPITAL | Age: 49
End: 2019-09-12
Attending: FAMILY MEDICINE
Payer: COMMERCIAL

## 2019-09-12 DIAGNOSIS — Z74.09 DECREASED MOBILITY AND ENDURANCE: ICD-10-CM

## 2019-09-12 DIAGNOSIS — R26.89 IMPAIRMENT OF BALANCE: ICD-10-CM

## 2019-09-12 DIAGNOSIS — R29.898 BILATERAL LEG WEAKNESS: ICD-10-CM

## 2019-09-12 PROCEDURE — 97110 THERAPEUTIC EXERCISES: CPT | Mod: PO

## 2019-09-12 PROCEDURE — 97112 NEUROMUSCULAR REEDUCATION: CPT | Mod: PO

## 2019-09-12 NOTE — PROGRESS NOTES
"  Physical Therapy Daily Treatment Note     Name: Jillian Osullivan  Clinic Number: [id number]    Therapy Diagnosis:   Encounter Diagnoses   Name Primary?    Bilateral leg weakness     Impairment of balance     Decreased mobility and endurance      Physician: Erik Collins MD    Visit Date: 9/12/2019  Physician Orders: PT Eval and Treat   Medical Diagnosis from Referral: multiple sclerosis, abnormality of gait, atypical depressive disorder  Evaluation Date: 3/1/2019  Authorization Period Expiration: 6/13/2019 to 12/31/2019  Plan of Care Expiration: 4/26/2019  Extended Plan of Care Expiration: 4/26/19 to 6/21/19  Updated Plan of Care Expiration: 6/18/19 to 8/13/19  Updated Plan of Care Expiration: 8/8/19 to 10/3/19  Visit # / Visits authorized: 20/20; 39 total visits in 2019    Time In: 0943  Time Out: 1028  Total Billable Time: 45 minutes    Precautions: Standard, Fall and impaired vision, emotional deficits    Subjective       Pt reports:  "I'm exhausted. I had my appointment with my new Neurologist at [facility] yesterday.  I was there from 1-5pm."  Pt still endorses leg pain at night.   She states she is compliant with her home exercises.  Response to previous treatment: no adverse effects  Functional change: ongoing    Pain: 5/10   Location: B legs    Objective       Pt ambulates with SC mod I from lobby to gym( ~ 150 feet) and vice-versa. Pt demonstrates ER of feet, slow gait pattern with improved knee extension during stance phase.  Pt also ambulates with wide RYAN.       Fransisca received therapeutic exercises to develop strength, endurance, ROM, flexibility and posture for 12 minutes including:    X 10 min on SCI-FIT recumbent stepper using  B LE @ level 2.4 for improved  B LE ROM, strength and CV endurance.       2 x 30" standing B gastroc stretches with use of incline and rail for support      Patient participated in neuromuscular re-education activities to improve: Balance, Coordination, Kinesthetic, " "Sense, Proprioception and Posture for 33 minutes. The following activities were included:       Standing balance near [employer] bar:  1 x 10 B LE step ups/downs on foam fitter, CGA  1 x 10 alternate LE step overs on foam fitter, CGA    Standing at Pilates Box:  2 x 10 B LE alternate hip and knee flex/ext with 1 white spring on level 1, one white spring on level 2 and 2 black springs on level 1, no UE support, CGA       Ladder agility:  X 4 laps reciprocal stepping, CGA  X 4 laps sideward stepping, placing B feet in each rung  X 4 laps forward marching with 2 " hold, placing alternate feet in each rung, CGA with mild trembling  X 1 lap of placing L foot in each rung, holding opposite limb in air x 3 seconds, stepping back, then sidestepping along ladder and repeating, CGA to slight min A with mild trembling  X 1 lap of placing R foot in each rung, holding opposite limb in air x 3 seconds, stepping back, then sidestepping along ladder and repeating, CGA to slight min A with mild trembling          Transitions and standing balance from/near elevated mat:  1 x 10 sit<> stand trials with feet resting on 2 foam pads, CGA( no UE use for support)  1 x 30" standing on 2 foam pads while working body blade with B UE in horizontal, CGA  1 x 30" standing on 2 foam pads while working body blade with B UE in vertical, CGA      Home Exercises Provided and Patient Education Provided     Education provided: Sitting therex including: B LE heel and toe raises, hip abduction against peach theraband, hip adduction squeezes with ball, LAQ, hip flex.  PT also educated pt regarding proper placement of SC in her hand when performing sit<> stand trials.    Written Home Exercises Provided: yes. PT provided pt with written copy of the above today( 3/14/19).  PT also provided pt with piece of peach theraband. No updates to HEP on this date( 9/4/19).  Exercises were reviewed and Fransisca was able to demonstrate them prior to the end of the session.  " Fransisca demonstrated good  understanding of the education provided.     See EMR under Media for exercises provided 3/14/19.    Assessment     Fransisca tolerated her therapy session fairly well today, though she continues to report LE pain and weakness in the evenings. Pt was perhaps even more tired than usual today due to her lengthy Neurology appointment she attended yesterday. Fortunately, pt likes her new physician and she will not have to travel to [place] any longer. Pt performed well with all activities chosen today.  Pt impressed PT most with her performance during ladder agility skills, not only with balance but with very minimal trembling noted. PT continued sit<> stand transitions from elevated mat and feet resting on compliant surface( 2 foam pads). Pt did well here and also performed with use of body blade while standing on foam to target core musculature. Pt continues to demonstrate a very positive attitude and remains engaged in her therapy progression. Pt remains appropriate for additional OPPT visits to address deficits with  balance, strength, endurance and postural control. Continue with current POC.    Fransisca is progressing well towards her goals.   Pt prognosis is Fair to Good.     Pt will continue to benefit from skilled outpatient physical therapy to address the deficits listed in the problem list box on initial evaluation, provide pt/family education and to maximize pt's level of independence in the home and community environment.     Pt's spiritual, cultural and educational needs considered and pt agreeable to plan of care and goals.     Anticipated barriers to physical therapy: emotional deficits, anxiety regarding balance training    Goals: Pt's spiritual, cultural and educational needs considered and pt agreeable to plan of care and goals as stated below:      GOALS:   Short term goals: 4 weeks, pt agrees to goals set.  1. Pt to be issued HEP and report compliance~ in progress; Goal MET,  4/4/19  2. Pt to improve 30 second chair rise to 10-11 trials to demonstrate improvement with this transition~progressing; Goal MET, 4/4/19; Goal not MET on 4/26/19~ remains appropriate  3. Pt to improve SSWS to 1.0m/sec for improved community ambulation with decreased fall risk~Goal MET, 6/18/19  4. Pt to improve MCTSIB score on condition # 3 to 6 seconds for improved balance on unstable surfaces~ Goal MET, 4/4/19  5. Pt to perform SLS on either limb x 2-3 seconds for improved balance and decreased fall risk~ Goal MET 4/26/19     Long term goals: 8 weeks, pt agrees to goals set  6. Pt (I) with HEP~progressing  7. Pt to improve 30 second chair rise to 12-13 trials to demonstrate improvement with this transition~progressing. Revise this goal to 11-12 trials, 4/26/19: goal remains appropriate, 6/18/19; goal remains appropriate, 8/8/19  8. Pt to improve SSWS to 1.2 m/sec for improved community ambulation with decreased fall risk~ongoing  9. Pt to improve MCTSIB score on condition # 3 to 9 seconds for improved balance on unstable surfaces~ Goal MET, 4/4/19.     10. Pt to perform SLS on either limb x 4-5 seconds for improved balance and decreased fall risk~ ~Goal MET, 4/26/19.  Revise goal to perform SLS on either limb x 7-8 seconds, 4/26/19~ partially met for L LE, 6/18/19; Goal MET, 8/8/19   11. NEW GOAL( 4/26/19):  Pt to tolerate 15 seconds during condition # 4 of the MCTSIB( foam with eyes closed); Goal MET, 6/18/19; Not MET on 8/8/19~ this goal remains appropriate  Plan     Cont to address balance deficits in parallel bars with use of compliant surfaces( encourage performance without UE support); continue with use of recumbent stepper for B UE/LE strengthening and CV/muscular endurance.Continue use of Pilates box to address SLS balance. Add basic ladder agility skills with gradual progressions. Add sit<> stand trials from elevated mat with feet resting on foam fitter/foam pads.        Abdias Solares, PT

## 2019-09-17 ENCOUNTER — CLINICAL SUPPORT (OUTPATIENT)
Dept: REHABILITATION | Facility: HOSPITAL | Age: 49
End: 2019-09-17
Attending: FAMILY MEDICINE
Payer: COMMERCIAL

## 2019-09-17 DIAGNOSIS — R26.89 IMPAIRMENT OF BALANCE: ICD-10-CM

## 2019-09-17 DIAGNOSIS — Z74.09 DECREASED MOBILITY AND ENDURANCE: ICD-10-CM

## 2019-09-17 DIAGNOSIS — R29.898 BILATERAL LEG WEAKNESS: ICD-10-CM

## 2019-09-17 PROCEDURE — 97110 THERAPEUTIC EXERCISES: CPT | Mod: PO

## 2019-09-17 PROCEDURE — 97112 NEUROMUSCULAR REEDUCATION: CPT | Mod: PO

## 2019-09-17 NOTE — PROGRESS NOTES
"  Physical Therapy Daily Treatment Note     Name: Jillian Osullivan  Clinic Number: [id number]    Therapy Diagnosis:   Encounter Diagnoses   Name Primary?    Bilateral leg weakness     Impairment of balance     Decreased mobility and endurance      Physician: Erik Collins MD    Visit Date: 9/17/2019  Physician Orders: PT Eval and Treat   Medical Diagnosis from Referral: multiple sclerosis, abnormality of gait, atypical depressive disorder  Evaluation Date: 3/1/2019  Authorization Period Expiration: 6/13/2019 to 12/31/2019  Plan of Care Expiration: 4/26/2019  Extended Plan of Care Expiration: 4/26/19 to 6/21/19  Updated Plan of Care Expiration: 6/18/19 to 8/13/19  Updated Plan of Care Expiration: 8/8/19 to 10/3/19  Visit # / Visits authorized: 21/30; 40 total visits in 2019    Time In: 0902  Time Out: 0947  Total Billable Time: 45 minutes    Precautions: Standard, Fall and impaired vision, emotional deficits    Subjective       Pt reports:  "I'm all bruised up." Pt states she had a fall last Thursday.   She states she is compliant with her home exercises.  Response to previous treatment: no adverse effects  Functional change: ongoing    Pain:6/10   Location: L leg( abrasion noted)    Objective       Pt ambulates with SC mod I from lobby to gym( ~ 150 feet) and vice-versa. Pt demonstrates ER of feet, slow gait pattern with improved knee extension during stance phase.  Pt also ambulates with wide RYAN.       Fransisca received therapeutic exercises to develop strength, endurance, ROM, flexibility and posture for 12 minutes including:    X 10 min on SCI-FIT recumbent stepper using  B LE @ level 1.0 for improved  B LE ROM, strength and CV endurance.       2 x 30" standing B gastroc stretches with use of incline and rail for support      Patient participated in neuromuscular re-education activities to improve: Balance, Coordination, Kinesthetic, Sense, Proprioception and Posture for 33 minutes. The following " "activities were included:       Standing balance activities in parallel bars:    X 4 laps tandem ambulation, no UE support, CGA to occasional min A~ min/mod trembling    1 x 10 B LE step ups/downs on foam fitter, no UE support, CGA  1 x 10 alternate single leg step ups on foam fitter while holding the opposite foot in air x 3 seconds, no UE support, CGA/min A    Firm ground:  1 x 30" B alternate tandem stance, no UE support, CGA/min A~ min trembling    Foam pad:  2 x 30" static standing with no UE support, EC, CGA/min A~ min trembling    On Bosu( soft side up):  2 x 30" static standing, no UE support, min A to occasional mod A( due to post lean)~ min trembling    On Bosu( firm side up):  2 x 30" static standing, no UE support, CGA to min A~ slight  trembling      Outside parallel bars:    Standing at Pilates Box:  1 x 10 B LE alternate hip and knee flex/ext with 2 white springs on level 1 and 2 black springs on level 1, no UE support, CGA         Transitions from elevated mat:  1 x 10 sit<> stand trials with feet resting on 2 foam pads, CGA( no UE use for support)        Home Exercises Provided and Patient Education Provided     Education provided: Sitting therex including: B LE heel and toe raises, hip abduction against peach theraband, hip adduction squeezes with ball, LAQ, hip flex.  PT also educated pt regarding proper placement of SC in her hand when performing sit<> stand trials.    Written Home Exercises Provided: yes. PT provided pt with written copy of the above today( 3/14/19).  PT also provided pt with piece of peach theraband. No updates to HEP on this date( 9/17/19).  Exercises were reviewed and Fransisca was able to demonstrate them prior to the end of the session.  Fransisca demonstrated good  understanding of the education provided.     See EMR under Media for exercises provided 3/14/19.    Assessment     Fransisca tolerated her therapy session fairly today, clearly still shaken from a fall she had last Thursday. " Due to this fact, PT simplified pt's typical routine with respect to balance challenges and intensity on the recumbent stepper( this was lowered from level 2.4 the previous session to level 1.0 today). Pt also tended to demonstrate more trembling with balance tasks than she has in recent visits. None of this was surprising to PT, due to the aforementioned fall pt experienced last week. Pt still did relatively well with sit<> stand transitions from elevated mat( and feet resting on 2 foam pads). Hopefully, pt can put the experience of her fall behind her and return to the form she has exhibited most recently. Pt remains appropriate for additional OPPT visits to address deficits with  balance, strength, endurance and postural control. Continue with current POC.    Fransisca is progressing well towards her goals.   Pt prognosis is Fair to Good.     Pt will continue to benefit from skilled outpatient physical therapy to address the deficits listed in the problem list box on initial evaluation, provide pt/family education and to maximize pt's level of independence in the home and community environment.     Pt's spiritual, cultural and educational needs considered and pt agreeable to plan of care and goals.     Anticipated barriers to physical therapy: emotional deficits, anxiety regarding balance training    Goals: Pt's spiritual, cultural and educational needs considered and pt agreeable to plan of care and goals as stated below:      GOALS:   Short term goals: 4 weeks, pt agrees to goals set.  1. Pt to be issued HEP and report compliance~ in progress; Goal MET, 4/4/19  2. Pt to improve 30 second chair rise to 10-11 trials to demonstrate improvement with this transition~progressing; Goal MET, 4/4/19; Goal not MET on 4/26/19~ remains appropriate  3. Pt to improve SSWS to 1.0m/sec for improved community ambulation with decreased fall risk~Goal MET, 6/18/19  4. Pt to improve MCTSIB score on condition # 3 to 6 seconds for  improved balance on unstable surfaces~ Goal MET, 4/4/19  5. Pt to perform SLS on either limb x 2-3 seconds for improved balance and decreased fall risk~ Goal MET 4/26/19     Long term goals: 8 weeks, pt agrees to goals set  6. Pt (I) with HEP~progressing  7. Pt to improve 30 second chair rise to 12-13 trials to demonstrate improvement with this transition~progressing. Revise this goal to 11-12 trials, 4/26/19: goal remains appropriate, 6/18/19; goal remains appropriate, 8/8/19  8. Pt to improve SSWS to 1.2 m/sec for improved community ambulation with decreased fall risk~ongoing  9. Pt to improve MCTSIB score on condition # 3 to 9 seconds for improved balance on unstable surfaces~ Goal MET, 4/4/19.     10. Pt to perform SLS on either limb x 4-5 seconds for improved balance and decreased fall risk~ ~Goal MET, 4/26/19.  Revise goal to perform SLS on either limb x 7-8 seconds, 4/26/19~ partially met for L LE, 6/18/19; Goal MET, 8/8/19   11. NEW GOAL( 4/26/19):  Pt to tolerate 15 seconds during condition # 4 of the MCTSIB( foam with eyes closed); Goal MET, 6/18/19; Not MET on 8/8/19~ this goal remains appropriate  Plan     Cont to address balance deficits in parallel bars with use of compliant surfaces( encourage performance without UE support); continue with use of recumbent stepper for B UE/LE strengthening and CV/muscular endurance.Continue use of Pilates box to address SLS balance. Add basic ladder agility skills with gradual progressions. Add sit<> stand trials from elevated mat with feet resting on foam fitter/foam pads.        Abdias Solares, PT

## 2019-09-20 ENCOUNTER — CLINICAL SUPPORT (OUTPATIENT)
Dept: REHABILITATION | Facility: HOSPITAL | Age: 49
End: 2019-09-20
Attending: FAMILY MEDICINE
Payer: COMMERCIAL

## 2019-09-20 DIAGNOSIS — Z74.09 DECREASED MOBILITY AND ENDURANCE: ICD-10-CM

## 2019-09-20 DIAGNOSIS — R29.898 BILATERAL LEG WEAKNESS: ICD-10-CM

## 2019-09-20 DIAGNOSIS — R26.89 IMPAIRMENT OF BALANCE: ICD-10-CM

## 2019-09-20 PROCEDURE — 97110 THERAPEUTIC EXERCISES: CPT | Mod: PO

## 2019-09-20 PROCEDURE — 97112 NEUROMUSCULAR REEDUCATION: CPT | Mod: PO

## 2019-09-20 NOTE — PROGRESS NOTES
"  Physical Therapy Daily Treatment Note     Name: Jillian Osullivan  Clinic Number: [id number]    Therapy Diagnosis:   Encounter Diagnoses   Name Primary?    Bilateral leg weakness     Impairment of balance     Decreased mobility and endurance      Physician: Erik Collins MD    Visit Date: 9/20/2019  Physician Orders: PT Eval and Treat   Medical Diagnosis from Referral: multiple sclerosis, abnormality of gait, atypical depressive disorder  Evaluation Date: 3/1/2019  Authorization Period Expiration: 6/13/2019 to 12/31/2019  Plan of Care Expiration: 4/26/2019  Extended Plan of Care Expiration: 4/26/19 to 6/21/19  Updated Plan of Care Expiration: 6/18/19 to 8/13/19  Updated Plan of Care Expiration: 8/8/19 to 10/3/19  Visit # / Visits authorized: 22/30; 41 total visits in 2019    Time In: 1030  Time Out: 1115  Total Billable Time: 45 minutes    Precautions: Standard, Fall and impaired vision, emotional deficits    Subjective       Pt reports:  She is doing better today.   She states she is compliant with her home exercises.  Response to previous treatment: no adverse effects  Functional change: ongoing    Pain:5/10   Location: L foot    Objective       Pt ambulates with SC mod I from lobby to gym( ~ 150 feet) and vice-versa. Pt demonstrates ER of feet, slow gait pattern with improved knee extension during stance phase.  Pt also ambulates with wide RYAN.       Fransisca received therapeutic exercises to develop strength, endurance, ROM, flexibility and posture for 10 minutes including:    X 8 min on SCI-FIT recumbent stepper using  B LE @ level 2.4 for improved  B LE ROM, strength and CV endurance.       2 x 30" standing B gastroc stretches with use of incline and rail for support      Patient participated in neuromuscular re-education activities to improve: Balance, Coordination, Kinesthetic, Sense, Proprioception and Posture for 35 minutes. The following activities were included:       Standing balance activities " "in parallel bars:    X 4 laps tandem ambulation, no UE support, CGA to slight min A~ min trembling~ one episode of light touching of bar  X 4 laps forward marching with 2" hold, no UE support, CGA~ mild trembling      On Bosu( soft side up):  2 x 30" static standing, no UE support, CGA/min A ~ min trembling    On Bosu( firm side up):  2 x 30" static standing, no UE support, CGA to slight min A~ slight  trembling    1 x 10 B LE forward and backward stepping over small blue foam roller, no UE support,  CGA  1 x 10 B LE sideward stepping over small blue foam roller, no UE support, CGA    On less stable rocker board:  2 x 30" working board in M/L directions, no UE support, CGA  2 x 30" working board in A/P directions, no UE support, CGA      Outside parallel bars:    Standing at Pilates Box:  2 x 10 B LE alternate hip and knee flex/ext with 2 white springs on level 1 and 2 black springs on level 1, no UE support, CGA/SBA           Home Exercises Provided and Patient Education Provided     Education provided: Sitting therex including: B LE heel and toe raises, hip abduction against peach theraband, hip adduction squeezes with ball, LAQ, hip flex.  PT also educated pt regarding proper placement of SC in her hand when performing sit<> stand trials.    Written Home Exercises Provided: yes. PT provided pt with written copy of the above today( 3/14/19).  PT also provided pt with piece of peach theraband. No updates to HEP on this date( 9/20/19).  Exercises were reviewed and Fransisca was able to demonstrate them prior to the end of the session.  Fransisca demonstrated good  understanding of the education provided.     See EMR under Media for exercises provided 3/14/19.    Assessment     Fransisca tolerated her therapy session well today and seems to have recovered emotionally from a recent fall. Pt does endorse some pain to her L foot, but this did not interfere with her session. Pt returned to performing on the recumbent stepper at level " 2.4 resistance and no complaints. Pt performed her standing balance tasks in parallel bars well with less tremor activity. Pt did particularly well when working rocker board in M/L and A/P directions. Pt was also a little more stable today when performing modified SLS at the uFaber Box. Standing on Bosu( soft and firm sides) remains appropriately challenging. Pt remains appropriate for additional OPPT visits to address deficits with  balance, strength, endurance and postural control. Continue with current POC.    Fransisca is progressing well towards her goals.   Pt prognosis is Fair to Good.     Pt will continue to benefit from skilled outpatient physical therapy to address the deficits listed in the problem list box on initial evaluation, provide pt/family education and to maximize pt's level of independence in the home and community environment.     Pt's spiritual, cultural and educational needs considered and pt agreeable to plan of care and goals.     Anticipated barriers to physical therapy: emotional deficits, anxiety regarding balance training    Goals: Pt's spiritual, cultural and educational needs considered and pt agreeable to plan of care and goals as stated below:      GOALS:   Short term goals: 4 weeks, pt agrees to goals set.  1. Pt to be issued HEP and report compliance~ in progress; Goal MET, 4/4/19  2. Pt to improve 30 second chair rise to 10-11 trials to demonstrate improvement with this transition~progressing; Goal MET, 4/4/19; Goal not MET on 4/26/19~ remains appropriate  3. Pt to improve SSWS to 1.0m/sec for improved community ambulation with decreased fall risk~Goal MET, 6/18/19  4. Pt to improve MCTSIB score on condition # 3 to 6 seconds for improved balance on unstable surfaces~ Goal MET, 4/4/19  5. Pt to perform SLS on either limb x 2-3 seconds for improved balance and decreased fall risk~ Goal MET 4/26/19     Long term goals: 8 weeks, pt agrees to goals set  6. Pt (I) with  HEP~progressing  7. Pt to improve 30 second chair rise to 12-13 trials to demonstrate improvement with this transition~progressing. Revise this goal to 11-12 trials, 4/26/19: goal remains appropriate, 6/18/19; goal remains appropriate, 8/8/19  8. Pt to improve SSWS to 1.2 m/sec for improved community ambulation with decreased fall risk~ongoing  9. Pt to improve MCTSIB score on condition # 3 to 9 seconds for improved balance on unstable surfaces~ Goal MET, 4/4/19.     10. Pt to perform SLS on either limb x 4-5 seconds for improved balance and decreased fall risk~ ~Goal MET, 4/26/19.  Revise goal to perform SLS on either limb x 7-8 seconds, 4/26/19~ partially met for L LE, 6/18/19; Goal MET, 8/8/19   11. NEW GOAL( 4/26/19):  Pt to tolerate 15 seconds during condition # 4 of the MCTSIB( foam with eyes closed); Goal MET, 6/18/19; Not MET on 8/8/19~ this goal remains appropriate  Plan     Cont to address balance deficits in parallel bars with use of compliant surfaces( encourage performance without UE support); continue with use of recumbent stepper for B UE/LE strengthening and CV/muscular endurance.Continue use of Pilates box to address SLS balance. Add basic ladder agility skills with gradual progressions. Add sit<> stand trials from elevated mat with feet resting on foam fitter/foam pads.        Abdias Solares, PT

## 2019-09-24 ENCOUNTER — CLINICAL SUPPORT (OUTPATIENT)
Dept: REHABILITATION | Facility: HOSPITAL | Age: 49
End: 2019-09-24
Attending: FAMILY MEDICINE
Payer: COMMERCIAL

## 2019-09-24 DIAGNOSIS — R26.89 IMPAIRMENT OF BALANCE: ICD-10-CM

## 2019-09-24 DIAGNOSIS — Z74.09 DECREASED MOBILITY AND ENDURANCE: ICD-10-CM

## 2019-09-24 DIAGNOSIS — R29.898 BILATERAL LEG WEAKNESS: ICD-10-CM

## 2019-09-24 PROCEDURE — 97110 THERAPEUTIC EXERCISES: CPT | Mod: PO

## 2019-09-24 PROCEDURE — 97112 NEUROMUSCULAR REEDUCATION: CPT | Mod: PO

## 2019-09-24 NOTE — PROGRESS NOTES
"  Physical Therapy Daily Treatment Note     Name: Jillian Osullivan  Clinic Number: [id number]    Therapy Diagnosis:   Encounter Diagnoses   Name Primary?    Bilateral leg weakness     Impairment of balance     Decreased mobility and endurance      Physician: Erik Collins MD    Visit Date: 9/24/2019  Physician Orders: PT Eval and Treat   Medical Diagnosis from Referral: multiple sclerosis, abnormality of gait, atypical depressive disorder  Evaluation Date: 3/1/2019  Authorization Period Expiration: 6/13/2019 to 12/31/2019  Plan of Care Expiration: 4/26/2019  Extended Plan of Care Expiration: 4/26/19 to 6/21/19  Updated Plan of Care Expiration: 6/18/19 to 8/13/19  Updated Plan of Care Expiration: 8/8/19 to 10/3/19  Visit # / Visits authorized: 23/30; 42 total visits in 2019    Time In: 1114  Time Out: 1159  Total Billable Time: 45 minutes    Precautions: Standard, Fall and impaired vision, emotional deficits    Subjective       Pt reports:  " I almost fell twice this weekend."  Pt reports she has a HA from trying to park.   She states she is compliant with her home exercises.  Response to previous treatment: no adverse effects  Functional change: ongoing    Pain:8/10   Location: head    Objective       Pt ambulates with SC mod I from lobby to gym( ~ 150 feet) and vice-versa. Pt demonstrates ER of feet, slow gait pattern with improved knee extension during stance phase.  Pt also ambulates with wide RYAN.       Fransisca received therapeutic exercises to develop strength, endurance, ROM, flexibility and posture for 18 minutes including:    X 8 min on SCI-FIT recumbent stepper using  B LE @ level 2.4 for improved  B LE ROM, strength and CV endurance.     2 x 30" standing B gastroc stretches with use of incline and rail for support    Inside parallel bars:  1 x 10 B step ups/downs on 6 inch step with light 1 finger support, CGA    X 4 laps sidestepping against resistance of green power strap( hip " "abductors)      Patient participated in neuromuscular re-education activities to improve: Balance, Coordination, Kinesthetic, Sense, Proprioception and Posture for 27 minutes. The following activities were included:       Standing balance activities in parallel bars:    On less stable rocker board:  2 x 30" working board in M/L directions, no UE support, CGA  2 x 30" working board in A/P directions, no UE support, CGA with occasional touching of bar      X 4 laps tandem ambulation, no UE support, CGA ~ mild trembling  X 4 laps forward marching with 2" hold, no UE support, CGA~ mild trembling    Outside parallel bars:    1 x 10 B LE step ups/downs on foam fitter, no UE support, CGA  1 x 10 alternate LE step overs on foam fitter, no UE support, CGA    At high/low mat:    1 x 10 sit<> stands from elevated mat and feet resting on foam fitter, CGA with cues for technique  1 x 30" standing on fitter while lifting basketball up and down, CGA  1 x 30" standing on fitter while rotating basketball R and L, CGA    1 x 5 sit<> stands while holding orange physioball in outstretched hands and feet resting on foam fitter, CGA to stand and CGA/min A to sit        Home Exercises Provided and Patient Education Provided     Education provided: Sitting therex including: B LE heel and toe raises, hip abduction against peach theraband, hip adduction squeezes with ball, LAQ, hip flex.  PT also educated pt regarding proper placement of SC in her hand when performing sit<> stand trials.    Written Home Exercises Provided: yes. PT provided pt with written copy of the above today( 3/14/19).  PT also provided pt with piece of peach theraband. No updates to HEP on this date( 9/24/19).  Exercises were reviewed and Fransisca was able to demonstrate them prior to the end of the session.  Fransisca demonstrated good  understanding of the education provided.     See EMR under Media for exercises provided 3/14/19.    Assessment     Fransisca tolerated her therapy " session well today and continues to accept gradual increases with regard to exercise and balance challenges. Pt tolerated a mild increase in resistance when working on the recumbent stepper( 2.4 to 2.5). PT also added sidestepping activity in parallel bars against resistance of green power strap to target hip abductors and step ups/downs on 6 inch block for hip and knee strengthening. Pt also performed her standing balance tasks in parallel bars well with less tremor activity. PT returned to performance of sit<> stand transitions from elevated mat with feet resting on unstable surface( foam fitter). Pt did well here but tends to fatigue after performing several transitions( resulting in more effort needed to come to stand and return to sit)  Pt remains appropriate for additional OPPT visits to address deficits with  balance, strength, endurance and postural control. Pt has a plan of care update due at her next session. Continue with current POC.    Fransisca is progressing well towards her goals.   Pt prognosis is Fair to Good.     Pt will continue to benefit from skilled outpatient physical therapy to address the deficits listed in the problem list box on initial evaluation, provide pt/family education and to maximize pt's level of independence in the home and community environment.     Pt's spiritual, cultural and educational needs considered and pt agreeable to plan of care and goals.     Anticipated barriers to physical therapy: emotional deficits, anxiety regarding balance training    Goals: Pt's spiritual, cultural and educational needs considered and pt agreeable to plan of care and goals as stated below:      GOALS:   Short term goals: 4 weeks, pt agrees to goals set.  1. Pt to be issued HEP and report compliance~ in progress; Goal MET, 4/4/19  2. Pt to improve 30 second chair rise to 10-11 trials to demonstrate improvement with this transition~progressing; Goal MET, 4/4/19; Goal not MET on 4/26/19~ remains  appropriate  3. Pt to improve SSWS to 1.0m/sec for improved community ambulation with decreased fall risk~Goal MET, 6/18/19  4. Pt to improve MCTSIB score on condition # 3 to 6 seconds for improved balance on unstable surfaces~ Goal MET, 4/4/19  5. Pt to perform SLS on either limb x 2-3 seconds for improved balance and decreased fall risk~ Goal MET 4/26/19     Long term goals: 8 weeks, pt agrees to goals set  6. Pt (I) with HEP~progressing  7. Pt to improve 30 second chair rise to 12-13 trials to demonstrate improvement with this transition~progressing. Revise this goal to 11-12 trials, 4/26/19: goal remains appropriate, 6/18/19; goal remains appropriate, 8/8/19  8. Pt to improve SSWS to 1.2 m/sec for improved community ambulation with decreased fall risk~ongoing  9. Pt to improve MCTSIB score on condition # 3 to 9 seconds for improved balance on unstable surfaces~ Goal MET, 4/4/19.     10. Pt to perform SLS on either limb x 4-5 seconds for improved balance and decreased fall risk~ ~Goal MET, 4/26/19.  Revise goal to perform SLS on either limb x 7-8 seconds, 4/26/19~ partially met for L LE, 6/18/19; Goal MET, 8/8/19   11. NEW GOAL( 4/26/19):  Pt to tolerate 15 seconds during condition # 4 of the MCTSIB( foam with eyes closed); Goal MET, 6/18/19; Not MET on 8/8/19~ this goal remains appropriate  Plan     Plan for next session: perform updated plan of care      Cont to address balance deficits in parallel bars with use of compliant surfaces( encourage performance without UE support); continue with use of recumbent stepper for B UE/LE strengthening and CV/muscular endurance.Continue use of Pilates box to address SLS balance. Add basic ladder agility skills with gradual progressions. Add sit<> stand trials from elevated mat with feet resting on foam fitter/foam pads.        Abdias Solares, PT

## 2019-09-25 ENCOUNTER — OFFICE VISIT (OUTPATIENT)
Dept: PSYCHIATRY | Facility: CLINIC | Age: 49
End: 2019-09-25
Payer: COMMERCIAL

## 2019-09-25 VITALS
DIASTOLIC BLOOD PRESSURE: 80 MMHG | WEIGHT: 180 LBS | BODY MASS INDEX: 33.13 KG/M2 | HEIGHT: 62 IN | HEART RATE: 71 BPM | SYSTOLIC BLOOD PRESSURE: 132 MMHG

## 2019-09-25 DIAGNOSIS — G47.33 OSA (OBSTRUCTIVE SLEEP APNEA): ICD-10-CM

## 2019-09-25 DIAGNOSIS — F40.298 SPECIFIC PHOBIA: ICD-10-CM

## 2019-09-25 DIAGNOSIS — F41.1 GAD (GENERALIZED ANXIETY DISORDER): ICD-10-CM

## 2019-09-25 DIAGNOSIS — F40.01 PANIC DISORDER WITH AGORAPHOBIA: ICD-10-CM

## 2019-09-25 DIAGNOSIS — F42.9 OBSESSIVE-COMPULSIVE DISORDER, UNSPECIFIED TYPE: ICD-10-CM

## 2019-09-25 DIAGNOSIS — F41.1 GENERALIZED ANXIETY DISORDER: ICD-10-CM

## 2019-09-25 DIAGNOSIS — F33.2 SEVERE EPISODE OF RECURRENT MAJOR DEPRESSIVE DISORDER, WITHOUT PSYCHOTIC FEATURES: ICD-10-CM

## 2019-09-25 DIAGNOSIS — F32.1 MDD (MAJOR DEPRESSIVE DISORDER), SINGLE EPISODE, MODERATE: Primary | ICD-10-CM

## 2019-09-25 DIAGNOSIS — K58.8 OTHER IRRITABLE BOWEL SYNDROME: ICD-10-CM

## 2019-09-25 DIAGNOSIS — G25.81 RLS (RESTLESS LEGS SYNDROME): ICD-10-CM

## 2019-09-25 PROCEDURE — 90836 PSYTX W PT W E/M 45 MIN: CPT | Mod: S$GLB,,, | Performed by: PSYCHIATRY & NEUROLOGY

## 2019-09-25 PROCEDURE — 3008F PR BODY MASS INDEX (BMI) DOCUMENTED: ICD-10-PCS | Mod: CPTII,S$GLB,, | Performed by: PSYCHIATRY & NEUROLOGY

## 2019-09-25 PROCEDURE — 99999 PR PBB SHADOW E&M-EST. PATIENT-LVL III: CPT | Mod: PBBFAC,,, | Performed by: PSYCHIATRY & NEUROLOGY

## 2019-09-25 PROCEDURE — 3075F SYST BP GE 130 - 139MM HG: CPT | Mod: CPTII,S$GLB,, | Performed by: PSYCHIATRY & NEUROLOGY

## 2019-09-25 PROCEDURE — 3075F PR MOST RECENT SYSTOLIC BLOOD PRESS GE 130-139MM HG: ICD-10-PCS | Mod: CPTII,S$GLB,, | Performed by: PSYCHIATRY & NEUROLOGY

## 2019-09-25 PROCEDURE — 3079F DIAST BP 80-89 MM HG: CPT | Mod: CPTII,S$GLB,, | Performed by: PSYCHIATRY & NEUROLOGY

## 2019-09-25 PROCEDURE — 3079F PR MOST RECENT DIASTOLIC BLOOD PRESSURE 80-89 MM HG: ICD-10-PCS | Mod: CPTII,S$GLB,, | Performed by: PSYCHIATRY & NEUROLOGY

## 2019-09-25 PROCEDURE — 99999 PR PBB SHADOW E&M-EST. PATIENT-LVL III: ICD-10-PCS | Mod: PBBFAC,,, | Performed by: PSYCHIATRY & NEUROLOGY

## 2019-09-25 PROCEDURE — 90836 PR PSYCHOTHERAPY W/PATIENT W/E&M, 45 MIN (ADD ON): ICD-10-PCS | Mod: S$GLB,,, | Performed by: PSYCHIATRY & NEUROLOGY

## 2019-09-25 PROCEDURE — 3008F BODY MASS INDEX DOCD: CPT | Mod: CPTII,S$GLB,, | Performed by: PSYCHIATRY & NEUROLOGY

## 2019-09-25 PROCEDURE — 99213 PR OFFICE/OUTPT VISIT, EST, LEVL III, 20-29 MIN: ICD-10-PCS | Mod: S$GLB,,, | Performed by: PSYCHIATRY & NEUROLOGY

## 2019-09-25 PROCEDURE — 99213 OFFICE O/P EST LOW 20 MIN: CPT | Mod: S$GLB,,, | Performed by: PSYCHIATRY & NEUROLOGY

## 2019-09-25 RX ORDER — VENLAFAXINE HYDROCHLORIDE 75 MG/1
225 CAPSULE, EXTENDED RELEASE ORAL DAILY
Qty: 90 CAPSULE | Refills: 6 | Status: SHIPPED | OUTPATIENT
Start: 2019-09-25 | End: 2020-02-27

## 2019-09-25 RX ORDER — BUSPIRONE HYDROCHLORIDE 15 MG/1
22.5 TABLET ORAL 2 TIMES DAILY
Qty: 90 TABLET | Refills: 6 | Status: SHIPPED | OUTPATIENT
Start: 2019-09-25 | End: 2019-12-26

## 2019-09-25 RX ORDER — GABAPENTIN 300 MG/1
CAPSULE ORAL
Qty: 120 CAPSULE | Refills: 6 | Status: SHIPPED | OUTPATIENT
Start: 2019-09-25 | End: 2020-02-27

## 2019-09-25 RX ORDER — CLONAZEPAM 0.5 MG/1
0.5 TABLET ORAL EVERY 6 HOURS
Qty: 120 TABLET | Refills: 5 | Status: SHIPPED | OUTPATIENT
Start: 2019-09-25 | End: 2019-12-18 | Stop reason: SDUPTHER
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
## 2019-09-27 ENCOUNTER — CLINICAL SUPPORT (OUTPATIENT)
Dept: REHABILITATION | Facility: HOSPITAL | Age: 49
End: 2019-09-27
Attending: FAMILY MEDICINE
Payer: COMMERCIAL

## 2019-09-27 DIAGNOSIS — Z74.09 DECREASED MOBILITY AND ENDURANCE: ICD-10-CM

## 2019-09-27 DIAGNOSIS — R29.898 BILATERAL LEG WEAKNESS: ICD-10-CM

## 2019-09-27 DIAGNOSIS — R26.89 IMPAIRMENT OF BALANCE: ICD-10-CM

## 2019-09-27 PROCEDURE — 97110 THERAPEUTIC EXERCISES: CPT | Mod: PO

## 2019-09-27 PROCEDURE — 97116 GAIT TRAINING THERAPY: CPT | Mod: PO

## 2019-09-27 PROCEDURE — 97112 NEUROMUSCULAR REEDUCATION: CPT | Mod: PO

## 2019-09-27 NOTE — PROGRESS NOTES
"  Physical Therapy Daily Treatment Note     Name: Jillian Osullivan  Clinic Number: [id number]    Therapy Diagnosis:   Encounter Diagnoses   Name Primary?    Bilateral leg weakness     Impairment of balance     Decreased mobility and endurance      Physician: Erik Collins MD    Visit Date: 9/27/2019  Physician Orders: PT Eval and Treat   Medical Diagnosis from Referral: multiple sclerosis, abnormality of gait, atypical depressive disorder  Evaluation Date: 3/1/2019  Authorization Period Expiration: 6/13/2019 to 12/31/2019  Plan of Care Expiration: 4/26/2019  Extended Plan of Care Expiration: 4/26/19 to 6/21/19  Updated Plan of Care Expiration: 6/18/19 to 8/13/19  Updated Plan of Care Expiration: 8/8/19 to 10/3/19  Updated Plan of Care Expiration: 09/27/19 to 11/22/19  Visit # / Visits authorized: 24/30; 43 total visits in 2019    Time In: 1115  Time Out: 1200  Total Billable Time: 45 minutes    Precautions: Standard, Fall and impaired vision, emotional deficits    Subjective       Pt reports:  "same old."    She states she is compliant with her home exercises.  Response to previous treatment: no adverse effects  Functional change: ongoing    Pain:0/10   Location: N/A    Objective       Pt ambulates with SC mod I from lobby to gym( ~ 150 feet) and vice-versa. Pt demonstrates ER of feet, slow gait pattern with improved knee extension during stance phase.  Pt also ambulates with wide RYAN.       Fransisca received therapeutic exercises to develop strength, endurance, ROM, flexibility and posture for 16 minutes including:      Lower Extremity Strength~ tested in sitting  Right LE  3/1/19 4/26/19 6/18/19 8/8/19 9/27/19 Left LE  3/1/19 4/26/19 6/18/19 8/8/19 9/27/19   Hip Flexion: 3-/5 3/5 3/5 3+/5 3+/5 Hip Flexion: 3/5 3/5 3/5 3+/5 3+/5   Hip Extension:  4/5 4-/5 4/5 4-/5 4(-)/5 Hip Extension: 4-/5 4/5 4(-)/5 4-/5 4(-)/5   Hip Abduction: 4/5 4+/5 5/5 4+/5 4+/5 Hip Abduction: 4/5 4+/5 5/5 4+/5 4+/5   Hip Adduction: " 4+/5 5/5 5/5 5/5 4+/5 Hip Adduction 4+/5 5/5 5/5 5/5 4+/5   Knee Extension: 3/5 4+/5 4+/5 4+/5 4/5 Knee Extension: 3+/5 4/5 4+/5 5/5 4+/5   Knee Flexion: 3+/5 4(-)/5 4/5 4/5 4/5 Knee Flexion: 4/5 4+/5 4+/5 5/5 4+/5   Ankle Dorsiflexion: 3/5 4+/5 4+/5 4+/5 4(-)/5 Ankle Dorsiflexion: 4-/5 4/5 4+/5 4+/5 4/5   Ankle Plantarflexion: 4/5 4+/5 5/5 5/5 4+/5 Ankle Plantarflexion: 4/5 4+/5 5/5 5/5 4+/5             X 6 min on SCI-FIT recumbent stepper using  B LE @ level 2.5 for improved  B LE ROM, strength and CV endurance.         Patient participated in neuromuscular re-education activities to improve: Balance, Coordination, Kinesthetic, Sense, Proprioception and Posture for 21 minutes. The following activities were included:       Evaluation 4/26/19 6/18/19 8/8/19 9/27/19   Single Limb Stance R LE Unable to perform, fear  (<10 sec = HIGH FALL RISK) 6 seconds  (<10 sec = HIGH FALL RISK) 6 seconds(<10 sec = HIGH FALL RISK) 10 seconds  (<10 sec = HIGH FALL RISK) 10 seconds  (<10 sec = HIGH FALL RISK)   Single Limb Stance L LE Unable to perform, fear  (<10 sec = HIGH FALL RISK) 5 seconds  (<10 sec = HIGH FALL RISK) 9 seconds  (<10 sec = HIGH FALL RISK) 12 seconds  (<10 sec = HIGH FALL RISK) 10 seconds  (<10 sec = HIGH FALL RISK)   30 second Chair Rise 9 completed with arms 9 completed with arms 10 completed with arms 10 completed with arms 9.5 completed with arms       8/8/19  Postural control:  MCTSIB:  1. Eyes Open/feet together/Firm: 30 seconds, P, no sway, slight tremor  2. Eyes Closed/feet together/Firm: 30 seconds, P, with min sway and tremor  3. Eyes Open/feet together/Foam: 30 seconds, P with mild sway and tremor  4. Eyes Closed/feet together/Foam: 12 seconds, F, min/mod sway and tremor    9/27/19  Postural control:  MCTSIB:  1. Eyes Open/feet together/Firm: 30 seconds, P, no sway  2. Eyes Closed/feet together/Firm: 30 seconds, P, with mild sway  3. Eyes Open/feet together/Foam: 30 seconds, P with mild sway and  "tremor  4. Eyes Closed/feet together/Foam: 26 seconds, F, min/mod sway and tremor          Standing balance activities in parallel bars:      X 4 laps tandem ambulation, no UE support, CGA ~ mild trembling  X 4 laps forward marching with 2" hold, no UE support, CGA~ mild trembling        Patient participated in gait training activities to normalize gait pattern for 8 minutes. The following activities were included:   Gait belt used for safety. Assistive device: SC                Evaluation 4/26/19 6/18/19 8/8/19 9/27/19   Timed Up and Go 12 sec 12 sec 10 sec  10 sec without SC 9 sec with SC   Self Selected Walking Speed 0.86 m/sec (6m/7s) 0.75 m/sec(6m/8s) 1.0 m/sec(6m/6s) 1.0 m/sec(6m/6s) with SC    1.0 m/sec(6m/6s) with SC   Fast Walking Speed 1.0 m/sec (6m/6s) 1.0m/sec(6m/6s) 1.2 m/sec(6m/5s) 1.0 m/sec(6m/6s) with SC 1.2 m/sec(6m/5s) with SC                     Home Exercises Provided and Patient Education Provided     Education provided: Sitting therex including: B LE heel and toe raises, hip abduction against peach theraband, hip adduction squeezes with ball, LAQ, hip flex.  PT also educated pt regarding proper placement of SC in her hand when performing sit<> stand trials.    Written Home Exercises Provided: yes. PT provided pt with written copy of the above today( 3/14/19).  PT also provided pt with piece of peach theraband. No updates to HEP on this date( 9/27/19).  Exercises were reviewed and Fransisca was able to demonstrate them prior to the end of the session.  Fransisca demonstrated good  understanding of the education provided.     See EMR under Media for exercises provided 3/14/19.    Assessment     Fransisca tolerated her therapy session for updated plan of care fairly today, having considerable difficulty performing some of her tests and measures properly. Several times today, PT had to explain how to perform certain tests appropriately, and these are tests pt has performed many times in the past. With regard to " pt's results, she demonstrated some mild decline with respect to B LE MMT( see above chart). PT feels this is due to the variable nature of MS and the fact that today's appointment was later in the morning compared to pt's typical appointment times. Pt's TUG score improved by 1 second as did her FSWS. SSWS has remained constant at 1.0 mph for the past 3 assessments. Unfortunately, pt did not pass condition # 4 on the MCTSIB, though her performance here of 26 seconds is more than double her score from the previous reassessment. Pt's 30 second chair rise decreased slightly at 9.5 repetitions. Another possibility for pt's less than stellar performance is the fact the she fell recently and had another near fall not that long ago. Pt is also going on vacation tomorrow and could be thinking mostly of this. Still, PT would like to extend pt's plan of care for another 8 weeks( until 11/22/19), with the likelihood that pt will be discharged from therapy OR her frequency of visits dropped to 1 x/week. Pt understands this and is in agreement with this course of action. Pt remains appropriate for additional OPPT visits to address deficits with balance, strength, endurance and postural control. See below for most recent comments regarding goal achievement and any updates or revisions.       Fransisca is progressing well towards her goals.   Pt prognosis is Fair to Good.     Pt will continue to benefit from skilled outpatient physical therapy to address the deficits listed in the problem list box on initial evaluation, provide pt/family education and to maximize pt's level of independence in the home and community environment.     Pt's spiritual, cultural and educational needs considered and pt agreeable to plan of care and goals.     Anticipated barriers to physical therapy: emotional deficits, anxiety regarding balance training    Goals: Pt's spiritual, cultural and educational needs considered and pt agreeable to plan of care and  goals as stated below:      GOALS:   Short term goals: 4 weeks, pt agrees to goals set.  1. Pt to be issued HEP and report compliance~ in progress; Goal MET, 4/4/19  2. Pt to improve 30 second chair rise to 10-11 trials to demonstrate improvement with this transition~progressing; Goal MET, 4/4/19; Goal not MET on 4/26/19; Goal met on 6/1819, 8/8/19; Not MET, 9/27/19~ remains appropriate  3. Pt to improve SSWS to 1.0m/sec for improved community ambulation with decreased fall risk~Goal MET, 6/18/19  4. Pt to improve MCTSIB score on condition # 3 to 6 seconds for improved balance on unstable surfaces~ Goal MET, 4/4/19  5. Pt to perform SLS on either limb x 2-3 seconds for improved balance and decreased fall risk~ Goal MET 4/26/19     Long term goals: 8 weeks, pt agrees to goals set  6. Pt (I) with HEP~progressing  7. Pt to improve 30 second chair rise to 12-13 trials to demonstrate improvement with this transition~progressing. Revise this goal to 11-12 trials, 4/26/19: goal remains appropriate, 6/18/19; goal remains appropriate, 8/8/19~ REVISE goal to 11-12 repetitions, 9/27/19  8. Pt to improve SSWS to 1.2 m/sec for improved community ambulation with decreased fall risk~ongoing  9. Pt to improve MCTSIB score on condition # 3 to 9 seconds for improved balance on unstable surfaces~ Goal MET, 4/4/19.     10. Pt to perform SLS on either limb x 4-5 seconds for improved balance and decreased fall risk~ ~Goal MET, 4/26/19.  Revise goal to perform SLS on either limb x 7-8 seconds, 4/26/19~ partially met for L LE, 6/18/19; Goal MET, 8/8/19   11. NEW GOAL( 4/26/19):  Pt to tolerate 15 seconds during condition # 4 of the MCTSIB( foam with eyes closed); Goal MET, 6/18/19; Not MET on 8/8/19~ Goal MET, 9/27/19~ REVISE this goal to 30 seconds  Plan     Continue outpatient physical therapy  2 x weekly under updated Plan of Care, 9/27/19 to 11/22/19, with treatment to include: pt education, HEP, therapeutic exercises, neuromuscular  re-education/balance exercises, therapeutic activities, joint mobilizations, and modalities PRN, to work towards established goals. Pt may be seen by PTA to carry out plan of care.                 Abdias Solares, PT

## 2019-10-07 ENCOUNTER — DOCUMENTATION ONLY (OUTPATIENT)
Dept: REHABILITATION | Facility: HOSPITAL | Age: 49
End: 2019-10-07

## 2019-10-07 NOTE — PROGRESS NOTES
PT/PTA met face to face to discuss pt's treatment plan and progress towards established goals.  Continue with current PT POC with focus on endurance, balance and transitions.  Patient will be seen by physical therapist at least every sixth treatment or 30 days, whichever occurs first.    Mary Kate Merino, PTA  10/07/2019

## 2019-10-09 ENCOUNTER — DOCUMENTATION ONLY (OUTPATIENT)
Dept: REHABILITATION | Facility: HOSPITAL | Age: 49
End: 2019-10-09

## 2019-10-09 NOTE — PROGRESS NOTES
PT/PTA met face to face to discuss pt's treatment plan and progress towards established goals. Continue with current PT POC, including: endurance, balance and transitions. Pt will be seen by physical therapist at least every 6th treatment day or every 30 days, whichever occurs first.      Martín Ojeda, PTA  10/07/19

## 2019-10-09 NOTE — PROGRESS NOTES
Face to face meeting 10/7/19 completed with Abdias Solares  PT regarding current status and progress of   Jillian Osullivan .high level balance   Omar Jade, PTA

## 2019-10-12 DIAGNOSIS — K58.8 OTHER IRRITABLE BOWEL SYNDROME: ICD-10-CM

## 2019-10-12 DIAGNOSIS — F41.1 GAD (GENERALIZED ANXIETY DISORDER): ICD-10-CM

## 2019-10-16 RX ORDER — CLONAZEPAM 0.5 MG/1
TABLET ORAL
Qty: 120 TABLET | Refills: 0 | OUTPATIENT
Start: 2019-10-16

## 2019-10-17 ENCOUNTER — CLINICAL SUPPORT (OUTPATIENT)
Dept: REHABILITATION | Facility: HOSPITAL | Age: 49
End: 2019-10-17
Attending: FAMILY MEDICINE
Payer: COMMERCIAL

## 2019-10-17 DIAGNOSIS — R29.898 BILATERAL LEG WEAKNESS: ICD-10-CM

## 2019-10-17 DIAGNOSIS — R26.89 IMPAIRMENT OF BALANCE: ICD-10-CM

## 2019-10-17 DIAGNOSIS — Z74.09 DECREASED MOBILITY AND ENDURANCE: ICD-10-CM

## 2019-10-17 PROCEDURE — 97110 THERAPEUTIC EXERCISES: CPT | Mod: PO

## 2019-10-17 PROCEDURE — 97112 NEUROMUSCULAR REEDUCATION: CPT | Mod: PO

## 2019-10-17 NOTE — PROGRESS NOTES
"  Physical Therapy Daily Treatment Note     Name: Jillian Osullivan  Clinic Number: [id number]    Therapy Diagnosis:   Encounter Diagnoses   Name Primary?    Bilateral leg weakness     Impairment of balance     Decreased mobility and endurance      Physician: Erik Collins MD    Visit Date: 10/17/2019  Physician Orders: PT Eval and Treat   Medical Diagnosis from Referral: multiple sclerosis, abnormality of gait, atypical depressive disorder  Evaluation Date: 3/1/2019  Authorization Period Expiration: 6/13/2019 to 12/31/2019  Plan of Care Expiration: 4/26/2019  Extended Plan of Care Expiration: 4/26/19 to 6/21/19  Updated Plan of Care Expiration: 6/18/19 to 8/13/19  Updated Plan of Care Expiration: 8/8/19 to 10/3/19  Updated Plan of Care Expiration: 09/27/19 to 11/22/19  Visit # / Visits authorized: 25/30; 44 total visits in 2019    Time In: 0900  Time Out: 0945  Total Billable Time: 45 minutes    Precautions: Standard, Fall and impaired vision, emotional deficits    Subjective       Pt reports:  She had a good vacation at the beach.  She does report she ran out of her anxiety medicine though.  Pt also reports she also had to buy some compression socks due to LE pain.   She states she did perform some exercises while on vacation and did some walking and swimming.  Response to previous treatment: no adverse effects  Functional change: ongoing    Pain:3/10   Location: B legs    Objective       Pt ambulates with SC mod I from lobby to gym( ~ 150 feet) and vice-versa. Pt demonstrates ER of feet, slow gait pattern with improved knee extension during stance phase.  Pt also ambulates with wide RYAN.       Fransisca received therapeutic exercises to develop strength, endurance, ROM, flexibility and posture for 15 minutes including:        X 10 min on SCI-FIT recumbent stepper using  B LE @ level 3.0 for improved  B LE ROM, strength and CV endurance.       2 x 30" standing B gastroc stretches with use of incline and rail " "for support     Inside parallel bars:  1 x 10 B step ups/downs on 6 inch step with no UE support, CGA     X 6 laps sidestepping against resistance of red power strap( hip abductors)            Patient participated in neuromuscular re-education activities to improve: Balance, Coordination, Kinesthetic, Sense, Proprioception and Posture for 30 minutes. The following activities were included:     Standing balance activities in parallel bars:     On Bosu( soft side up):  2 x 30" static standing, no UE support, CGA to slight min A~ mild trembling    On Bosu( firm side up):  2 x 30" static standing, no UE support, CGA to slight min A~ mild trembling       X 4 laps tandem ambulation, no UE support, CGA ~ min trembling  X 4 laps forward marching with 2" hold, no UE support, CGA~ mild trembling      On long air ex foam balance beam:  1 x 30" B alternate tandem stance, no UE support, CGA/min A~ min trembling     Outside parallel bars:     1 x 10 B LE step ups/downs on foam fitter, no UE support, CGA  1 x 10 alternate LE step overs on foam fitter, no UE support, CGA       Transitions:  1 x 10 sit<> stand trials from elevated mat with feet resting on foam fitter, CGA  1 x 3 sit<> stand trials from elevated mat with feet resting on air ex foam balance beam, CGA/min A          Home Exercises Provided and Patient Education Provided     Education provided: Sitting therex including: B LE heel and toe raises, hip abduction against peach theraband, hip adduction squeezes with ball, LAQ, hip flex.  PT also educated pt regarding proper placement of SC in her hand when performing sit<> stand trials.    Written Home Exercises Provided: yes. PT provided pt with written copy of the above today( 3/14/19).  PT also provided pt with piece of peach theraband. No updates to HEP on this date( 10/17/19).  Exercises were reviewed and Fransisca was able to demonstrate them prior to the end of the session.  Fransisca demonstrated good  understanding of the " education provided.     See EMR under Media for exercises provided 3/14/19.    Assessment     Fransisca tolerated her therapy session well today and presented in good spirits after returning from her vacation at the beach. Pt tolerated a nice increase in resistance when working on the recumbent stepper(2.5 to 3.0). Pt also progressed with sidestepping activity in parallel bars, using resistance of red power strap to target hip abductors. Pt performed stepping up and down on 6 inch block with no UE support today( pt required light finger support previous session). Pt also performed her standing balance tasks in parallel bars well but demonstrated slightly increased tremor activity in some cases. PT returned to performance of sit<> stand transitions from elevated mat with feet resting on unstable surfaces( foam fitter and foam balance pad). PT plans to return to this activity in future sessions.  Pt remains appropriate for additional OPPT visits to address deficits with  balance, strength, endurance and postural control. Continue with current POC.          Fransisca is progressing well towards her goals.   Pt prognosis is Fair to Good.     Pt will continue to benefit from skilled outpatient physical therapy to address the deficits listed in the problem list box on initial evaluation, provide pt/family education and to maximize pt's level of independence in the home and community environment.     Pt's spiritual, cultural and educational needs considered and pt agreeable to plan of care and goals.     Anticipated barriers to physical therapy: emotional deficits, anxiety regarding balance training    Goals: Pt's spiritual, cultural and educational needs considered and pt agreeable to plan of care and goals as stated below:      GOALS:   Short term goals: 4 weeks, pt agrees to goals set.  1. Pt to be issued HEP and report compliance~ in progress; Goal MET, 4/4/19  2. Pt to improve 30 second chair rise to 10-11 trials to demonstrate  improvement with this transition~progressing; Goal MET, 4/4/19; Goal not MET on 4/26/19; Goal met on 6/1819, 8/8/19; Not MET, 9/27/19~ remains appropriate  3. Pt to improve SSWS to 1.0m/sec for improved community ambulation with decreased fall risk~Goal MET, 6/18/19  4. Pt to improve MCTSIB score on condition # 3 to 6 seconds for improved balance on unstable surfaces~ Goal MET, 4/4/19  5. Pt to perform SLS on either limb x 2-3 seconds for improved balance and decreased fall risk~ Goal MET 4/26/19     Long term goals: 8 weeks, pt agrees to goals set  6. Pt (I) with HEP~progressing  7. Pt to improve 30 second chair rise to 12-13 trials to demonstrate improvement with this transition~progressing. Revise this goal to 11-12 trials, 4/26/19: goal remains appropriate, 6/18/19; goal remains appropriate, 8/8/19~ REVISE goal to 11-12 repetitions, 9/27/19  8. Pt to improve SSWS to 1.2 m/sec for improved community ambulation with decreased fall risk~ongoing  9. Pt to improve MCTSIB score on condition # 3 to 9 seconds for improved balance on unstable surfaces~ Goal MET, 4/4/19.     10. Pt to perform SLS on either limb x 4-5 seconds for improved balance and decreased fall risk~ ~Goal MET, 4/26/19.  Revise goal to perform SLS on either limb x 7-8 seconds, 4/26/19~ partially met for L LE, 6/18/19; Goal MET, 8/8/19   11. NEW GOAL( 4/26/19):  Pt to tolerate 15 seconds during condition # 4 of the MCTSIB( foam with eyes closed); Goal MET, 6/18/19; Not MET on 8/8/19~ Goal MET, 9/27/19~ REVISE this goal to 30 seconds  Plan         Cont to address balance deficits in parallel bars with use of compliant surfaces( encourage performance without UE support); continue with use of recumbent stepper for B UE/LE strengthening and CV/muscular endurance.Continue use of Pilates box to address SLS balance. Add basic ladder agility skills with gradual progressions. Add sit<> stand trials from elevated mat with feet resting on foam fitter/foam  pads.          Abdias Solares, PT

## 2019-10-22 ENCOUNTER — CLINICAL SUPPORT (OUTPATIENT)
Dept: REHABILITATION | Facility: HOSPITAL | Age: 49
End: 2019-10-22
Attending: FAMILY MEDICINE
Payer: COMMERCIAL

## 2019-10-22 DIAGNOSIS — R26.89 IMPAIRMENT OF BALANCE: ICD-10-CM

## 2019-10-22 DIAGNOSIS — R29.898 BILATERAL LEG WEAKNESS: ICD-10-CM

## 2019-10-22 DIAGNOSIS — Z74.09 DECREASED MOBILITY AND ENDURANCE: ICD-10-CM

## 2019-10-22 PROCEDURE — 97112 NEUROMUSCULAR REEDUCATION: CPT | Mod: PO

## 2019-10-22 PROCEDURE — 97110 THERAPEUTIC EXERCISES: CPT | Mod: PO

## 2019-10-22 NOTE — PROGRESS NOTES
"  Physical Therapy Daily Treatment Note     Name: Jillian Osullivan  Clinic Number: [id number]    Therapy Diagnosis:   Encounter Diagnoses   Name Primary?    Bilateral leg weakness     Impairment of balance     Decreased mobility and endurance      Physician: Erik Collins MD    Visit Date: 10/22/2019  Physician Orders: PT Eval and Treat   Medical Diagnosis from Referral: multiple sclerosis, abnormality of gait, atypical depressive disorder  Evaluation Date: 3/1/2019  Authorization Period Expiration: 6/13/2019 to 12/31/2019  Plan of Care Expiration: 4/26/2019  Extended Plan of Care Expiration: 4/26/19 to 6/21/19  Updated Plan of Care Expiration: 6/18/19 to 8/13/19  Updated Plan of Care Expiration: 8/8/19 to 10/3/19  Updated Plan of Care Expiration: 09/27/19 to 11/22/19  Visit # / Visits authorized: 26/30; 45 total visits in 2019    Time In: 0858  Time Out: 0943  Total Billable Time: 45 minutes    Precautions: Standard, Fall and impaired vision, emotional deficits    Subjective       Pt reports:  She feels a little lethargic today.  Pt states she had to take a pill yesterday for dizziness.   She states she did her HEP since the previous session.  Response to previous treatment: no adverse effects  Functional change: ongoing    Pain:0/10   Location: N/A    Objective       Pt ambulates with SC mod I from lobby to gym( ~ 150 feet) and vice-versa. Pt demonstrates ER of feet, slow gait pattern with improved knee extension during stance phase.  Pt also ambulates with wide RYAN.       Fransisca received therapeutic exercises to develop strength, endurance, ROM, flexibility and posture for 20 minutes including:      X 10 min on SCI-FIT recumbent stepper using  B LE @ level 3.0 for improved  B LE ROM, strength and CV endurance.       2 x 30" standing B gastroc stretches with use of incline and rail for support     Inside parallel bars:  1 x 10 B step ups/downs on 6 inch step with no UE support, CGA     Horizontal leg " "press  2 x 10 reps with 80 # applied        Patient participated in neuromuscular re-education activities to improve: Balance, Coordination, Kinesthetic, Sense, Proprioception and Posture for 25 minutes. The following activities were included:         Standing at Pilates Box:  2 x 10 B alternate hip and knee extension against foot pad with 2 black springs on level 1, 2 white springs on level 2, no UE support, CGA    Standing balance activities in parallel bars:     On Bosu( soft side up):  2 x 30" static standing, no UE support, CGA to slight min A~ mild trembling    On Bosu( firm side up):  2 x 30" static standing, no UE support, CGA to slight min A~ mild trembling        Outside parallel bars:     1 x 10 B LE step ups/downs on foam fitter, no UE support, CGA  1 x 10 alternate LE step overs on foam fitter, no UE support, CGA       Transitions:  1 x 10 sit<> stand trials from elevated mat with feet resting on foam fitter, CGA  1 x 10 sit<> stand trials from elevated mat with feet resting on air ex foam balance beam, CGA    Ladder agility:  X 4 laps of forward marching with 2 " hold and reciprocal placing of feet in each rung of ladder, CGA      Home Exercises Provided and Patient Education Provided     Education provided: Sitting therex including: B LE heel and toe raises, hip abduction against peach theraband, hip adduction squeezes with ball, LAQ, hip flex.  PT also educated pt regarding proper placement of SC in her hand when performing sit<> stand trials.    Written Home Exercises Provided: yes. PT provided pt with written copy of the above today( 3/14/19).  PT also provided pt with piece of peach theraband. No updates to HEP on this date( 10/22/19).  Exercises were reviewed and Fransisca was able to demonstrate them prior to the end of the session.  Fransisca demonstrated good  understanding of the education provided.     See EMR under Media for exercises provided 3/14/19.    Assessment     Fransisca tolerated her therapy " session well today and presented in good spirits due to the cooler weather.  Pt continues to work on the recumbent stepper at level 3.0 without difficulty.  Pt did well with step ups on 6 inch block and PT returned to horizontal leg press for additional LE strengthening. Pt also performed her step ups/overs on foam fitter very well and managed to complete 2 sets of working foot pad up and down at Pilates Box with 2 white springs set on level 2, 2 black springs set on level 1. Pt continues to practice sit<> stand transitions from elevated mat with feet resting on unstable surfaces( foam fitter and foam balance pad) and did well here. Perhaps most impressive today was pt's performance during ladder agility drill of forward marching. Pt remains appropriate for additional OPPT visits to address deficits with  balance, strength, endurance and postural control. Continue with current POC.          Fransisca is progressing well towards her goals.   Pt prognosis is Fair to Good.     Pt will continue to benefit from skilled outpatient physical therapy to address the deficits listed in the problem list box on initial evaluation, provide pt/family education and to maximize pt's level of independence in the home and community environment.     Pt's spiritual, cultural and educational needs considered and pt agreeable to plan of care and goals.     Anticipated barriers to physical therapy: emotional deficits, anxiety regarding balance training    Goals: Pt's spiritual, cultural and educational needs considered and pt agreeable to plan of care and goals as stated below:      GOALS:   Short term goals: 4 weeks, pt agrees to goals set.  1. Pt to be issued HEP and report compliance~ in progress; Goal MET, 4/4/19  2. Pt to improve 30 second chair rise to 10-11 trials to demonstrate improvement with this transition~progressing; Goal MET, 4/4/19; Goal not MET on 4/26/19; Goal met on 6/1819, 8/8/19; Not MET, 9/27/19~ remains appropriate  3. Pt  to improve SSWS to 1.0m/sec for improved community ambulation with decreased fall risk~Goal MET, 6/18/19  4. Pt to improve MCTSIB score on condition # 3 to 6 seconds for improved balance on unstable surfaces~ Goal MET, 4/4/19  5. Pt to perform SLS on either limb x 2-3 seconds for improved balance and decreased fall risk~ Goal MET 4/26/19     Long term goals: 8 weeks, pt agrees to goals set  6. Pt (I) with HEP~progressing  7. Pt to improve 30 second chair rise to 12-13 trials to demonstrate improvement with this transition~progressing. Revise this goal to 11-12 trials, 4/26/19: goal remains appropriate, 6/18/19; goal remains appropriate, 8/8/19~ REVISE goal to 11-12 repetitions, 9/27/19  8. Pt to improve SSWS to 1.2 m/sec for improved community ambulation with decreased fall risk~ongoing  9. Pt to improve MCTSIB score on condition # 3 to 9 seconds for improved balance on unstable surfaces~ Goal MET, 4/4/19.     10. Pt to perform SLS on either limb x 4-5 seconds for improved balance and decreased fall risk~ ~Goal MET, 4/26/19.  Revise goal to perform SLS on either limb x 7-8 seconds, 4/26/19~ partially met for L LE, 6/18/19; Goal MET, 8/8/19   11. NEW GOAL( 4/26/19):  Pt to tolerate 15 seconds during condition # 4 of the MCTSIB( foam with eyes closed); Goal MET, 6/18/19; Not MET on 8/8/19~ Goal MET, 9/27/19~ REVISE this goal to 30 seconds  Plan         Cont to address balance deficits in parallel bars with use of compliant surfaces( encourage performance without UE support); continue with use of recumbent stepper for B UE/LE strengthening and CV/muscular endurance.Continue use of Pilates box to address SLS balance. Add basic ladder agility skills with gradual progressions. Add sit<> stand trials from elevated mat with feet resting on foam fitter/foam pads.          Abdias Solares, PT

## 2019-10-29 ENCOUNTER — CLINICAL SUPPORT (OUTPATIENT)
Dept: REHABILITATION | Facility: HOSPITAL | Age: 49
End: 2019-10-29
Attending: FAMILY MEDICINE
Payer: COMMERCIAL

## 2019-10-29 DIAGNOSIS — R26.89 IMPAIRMENT OF BALANCE: ICD-10-CM

## 2019-10-29 DIAGNOSIS — R29.898 BILATERAL LEG WEAKNESS: ICD-10-CM

## 2019-10-29 DIAGNOSIS — Z74.09 DECREASED MOBILITY AND ENDURANCE: ICD-10-CM

## 2019-10-29 PROCEDURE — 97110 THERAPEUTIC EXERCISES: CPT | Mod: PO

## 2019-10-29 PROCEDURE — 97112 NEUROMUSCULAR REEDUCATION: CPT | Mod: PO

## 2019-10-29 NOTE — PROGRESS NOTES
"  Physical Therapy Daily Treatment Note     Name: Jillian Osullivan  Clinic Number: [id number]    Therapy Diagnosis:   Encounter Diagnoses   Name Primary?    Bilateral leg weakness     Impairment of balance     Decreased mobility and endurance      Physician: Erik Collins MD    Visit Date: 10/29/2019  Physician Orders: PT Eval and Treat   Medical Diagnosis from Referral: multiple sclerosis, abnormality of gait, atypical depressive disorder  Evaluation Date: 3/1/2019  Authorization Period Expiration: 6/13/2019 to 12/31/2019  Plan of Care Expiration: 4/26/2019  Extended Plan of Care Expiration: 4/26/19 to 6/21/19  Updated Plan of Care Expiration: 6/18/19 to 8/13/19  Updated Plan of Care Expiration: 8/8/19 to 10/3/19  Updated Plan of Care Expiration: 09/27/19 to 11/22/19  Visit # / Visits authorized: 27/30; 46 total visits in 2019    Time In: 0903  Time Out: 0945  Total Billable Time: 42 minutes    Precautions: Standard, Fall and impaired vision, emotional deficits    Subjective       Pt reports:  " I have a UTI, my back is killing me." Pt states she just started Cipro last night.   She states she did some of her HEP since the previous session.  Response to previous treatment: no adverse effects  Functional change: ongoing    Pain:7.5/10   Location:  L flank    Objective     Pt arrived 3 minutes late to session and PT unable to accommodate.    Pt ambulates with SC mod I from lobby to gym( ~ 150 feet) and vice-versa. Pt demonstrates ER of feet, slow gait pattern with improved knee extension during stance phase.  Pt also ambulates with wide RYAN.       Fransisca received therapeutic exercises to develop strength, endurance, ROM, flexibility and posture for 15 minutes including:      X 5 min on SCI-FIT recumbent stepper using  B LE @ level 1.0 for improved  B LE ROM, strength and CV endurance.       Horizontal leg press:  3 x 10 reps with 60 # applied    2 x 30" standing B gastroc stretches with use of incline and " "rail for support            Patient participated in neuromuscular re-education activities to improve: Balance, Coordination, Kinesthetic, Sense, Proprioception and Posture for 27 minutes. The following activities were included:             Standing balance activities in parallel bars:     On Bosu( soft side up):  3 x 30" static standing, no UE support, CGA to min A~ mild trembling    On Bosu( firm side up):  3 x 30" static standing, no UE support, CGA to min A~ mild trembling    On air ex foam balance beam:  1 x 30" B LE alternate tandem stance, no UE support, CGA~ mild trembling  X 3 laps tandem walking forward and backward, light B UE support, S~ mild trembling       On 4 point foam fitter:  1 x 30" standing while holding soccer ball in outstretched arms, SBA( some back pain reported so this task discontinued)  2 x 30" standing with NBOS while holding arms out to the side, eyes closed, CGA/SBA~ one episode of grasping bars due to post loss of balance      Outside parallel bars:     1 x 10 B LE step ups/downs on foam fitter, no UE support, CGA  1 x 10 alternate LE step overs on foam fitter, no UE support, CGA         Ladder agility:  X 4 laps of forward ambulation and reciprocal placing of feet in each rung of ladder, CGA      Home Exercises Provided and Patient Education Provided     Education provided: Sitting therex including: B LE heel and toe raises, hip abduction against peach theraband, hip adduction squeezes with ball, LAQ, hip flex.  PT also educated pt regarding proper placement of SC in her hand when performing sit<> stand trials.    Written Home Exercises Provided: yes. PT provided pt with written copy of the above today( 3/14/19).  PT also provided pt with piece of peach theraband. No updates to HEP on this date( 10/29/19).  Exercises were reviewed and Fransisca was able to demonstrate them prior to the end of the session.  Fransisca demonstrated good  understanding of the education provided.     See EMR under " Media for exercises provided 3/14/19.    Assessment     Fransisca tolerated her therapy session fairly today due to L flank pain from a reported UTI. PT did his best to have pt perform exercises and balance training which would not aggravate this site. However, many of the activities chosen had to be modified to some degree. Pt performed with less weight on horizontal leg press, less time and resistance on recumbent stepper and PT avoided Pilates Box modified SLS activity. PT also did not have pt perform any sit<> stand transitions. Pt did perform her step ups/overs on foam fitter very well and also did well with reciprocal walking through floor ladder. PT is hopeful that pt will return on Thursday with decreased report of pain, now that she is taking an antibiotic. Pt remains appropriate for additional OPPT visits to address deficits with balance, strength, endurance and postural control. Continue with current POC.          Fransisca is progressing well towards her goals.   Pt prognosis is Fair to Good.     Pt will continue to benefit from skilled outpatient physical therapy to address the deficits listed in the problem list box on initial evaluation, provide pt/family education and to maximize pt's level of independence in the home and community environment.     Pt's spiritual, cultural and educational needs considered and pt agreeable to plan of care and goals.     Anticipated barriers to physical therapy: emotional deficits, anxiety regarding balance training    Goals: Pt's spiritual, cultural and educational needs considered and pt agreeable to plan of care and goals as stated below:      GOALS:   Short term goals: 4 weeks, pt agrees to goals set.  1. Pt to be issued HEP and report compliance~ in progress; Goal MET, 4/4/19  2. Pt to improve 30 second chair rise to 10-11 trials to demonstrate improvement with this transition~progressing; Goal MET, 4/4/19; Goal not MET on 4/26/19; Goal met on 6/1819, 8/8/19; Not MET,  9/27/19~ remains appropriate  3. Pt to improve SSWS to 1.0m/sec for improved community ambulation with decreased fall risk~Goal MET, 6/18/19  4. Pt to improve MCTSIB score on condition # 3 to 6 seconds for improved balance on unstable surfaces~ Goal MET, 4/4/19  5. Pt to perform SLS on either limb x 2-3 seconds for improved balance and decreased fall risk~ Goal MET 4/26/19     Long term goals: 8 weeks, pt agrees to goals set  6. Pt (I) with HEP~progressing  7. Pt to improve 30 second chair rise to 12-13 trials to demonstrate improvement with this transition~progressing. Revise this goal to 11-12 trials, 4/26/19: goal remains appropriate, 6/18/19; goal remains appropriate, 8/8/19~ REVISE goal to 11-12 repetitions, 9/27/19  8. Pt to improve SSWS to 1.2 m/sec for improved community ambulation with decreased fall risk~ongoing  9. Pt to improve MCTSIB score on condition # 3 to 9 seconds for improved balance on unstable surfaces~ Goal MET, 4/4/19.     10. Pt to perform SLS on either limb x 4-5 seconds for improved balance and decreased fall risk~ ~Goal MET, 4/26/19.  Revise goal to perform SLS on either limb x 7-8 seconds, 4/26/19~ partially met for L LE, 6/18/19; Goal MET, 8/8/19   11. NEW GOAL( 4/26/19):  Pt to tolerate 15 seconds during condition # 4 of the MCTSIB( foam with eyes closed); Goal MET, 6/18/19; Not MET on 8/8/19~ Goal MET, 9/27/19~ REVISE this goal to 30 seconds  Plan         Cont to address balance deficits in parallel bars with use of compliant surfaces( encourage performance without UE support); continue with use of recumbent stepper for B UE/LE strengthening and CV/muscular endurance.Continue use of Pilates box to address SLS balance. Add basic ladder agility skills with gradual progressions. Add sit<> stand trials from elevated mat with feet resting on foam fitter/foam pads.          Abdias Solares, PT

## 2019-10-31 ENCOUNTER — CLINICAL SUPPORT (OUTPATIENT)
Dept: REHABILITATION | Facility: HOSPITAL | Age: 49
End: 2019-10-31
Attending: FAMILY MEDICINE
Payer: COMMERCIAL

## 2019-10-31 DIAGNOSIS — R26.89 IMPAIRMENT OF BALANCE: ICD-10-CM

## 2019-10-31 DIAGNOSIS — R29.898 BILATERAL LEG WEAKNESS: ICD-10-CM

## 2019-10-31 DIAGNOSIS — Z74.09 DECREASED MOBILITY AND ENDURANCE: ICD-10-CM

## 2019-10-31 PROCEDURE — 97112 NEUROMUSCULAR REEDUCATION: CPT | Mod: PO

## 2019-10-31 PROCEDURE — 97110 THERAPEUTIC EXERCISES: CPT | Mod: PO

## 2019-10-31 NOTE — PROGRESS NOTES
"  Physical Therapy Daily Treatment Note     Name: Jillian Osullivan  Clinic Number: [id number]    Therapy Diagnosis:   Encounter Diagnoses   Name Primary?    Bilateral leg weakness     Impairment of balance     Decreased mobility and endurance      Physician: Erik Collins MD    Visit Date: 10/31/2019  Physician Orders: PT Eval and Treat   Medical Diagnosis from Referral: multiple sclerosis, abnormality of gait, atypical depressive disorder  Evaluation Date: 3/1/2019  Authorization Period Expiration: 6/13/2019 to 12/31/2019  Plan of Care Expiration: 4/26/2019  Extended Plan of Care Expiration: 4/26/19 to 6/21/19  Updated Plan of Care Expiration: 6/18/19 to 8/13/19  Updated Plan of Care Expiration: 8/8/19 to 10/3/19  Updated Plan of Care Expiration: 09/27/19 to 11/22/19  Visit # / Visits authorized: 28/30; 47 total visits in 2019    Time In: 0858  Time Out: 0943  Total Billable Time: 45 minutes    Precautions: Standard, Fall and impaired vision, emotional deficits    Subjective       Pt reports:  " I had a flat tire when I left here the other day."   She states she did not do her HEP since the previous session.  Response to previous treatment: no adverse effects  Functional change: ongoing    Pain: 8/10   Location:  L flank    Objective     Pt ambulates with SC mod I from lobby to gym( ~ 150 feet) and vice-versa. Pt demonstrates ER of feet, slow gait pattern with improved knee extension during stance phase.  Pt also ambulates with wide RYAN.       Fransisca received therapeutic exercises to develop strength, endurance, ROM, flexibility and posture for 10 minutes including:      X 7 min on SCI-FIT recumbent stepper using  B LE @ level 1.0 for improved  B LE ROM, strength and CV endurance.       2 x 30" standing B gastroc stretches with use of incline and rail for support            Patient participated in neuromuscular re-education activities to improve: Balance, Coordination, Kinesthetic, Sense, Proprioception " "and Posture for 35 minutes. The following activities were included:         Standing balance activities in parallel bars:      X 4 laps tandem ambulation, no UE support, CGA~ mild trembling      On less stable rocker board:  2 x 30" working board in M/L directions, no UE support to light finger support, CGA  2 x 30" working board in A/P directions, no UE support to light finger support, CGA    On Bosu( soft side up):  2 x 30" static standing, no UE support, CGA to min A~ mild trembling    On Bosu( firm side up):  2 x 30" static standing, no UE support, CGA ~ mild trembling    On air ex foam balance beam:  1 x 30" B LE alternate tandem stance, no UE support, CGA~ mild trembling  1 x 30" B LE alternate tandem stance, no UE support, eyes closed, min A~ min trembling      1 x 10 B LE forward/backward step overs with blue foam roller, no UE support, CGA  1 x 10 B LE R<> L sidestepping over blue foam roller, no UE support, CGA    Outside parallel bars:     1 x 10 B LE step ups/downs on foam fitter, no UE support, CGA  1 x 10 alternate LE step overs on foam fitter, no UE support, CGA           Home Exercises Provided and Patient Education Provided     Education provided: Sitting therex including: B LE heel and toe raises, hip abduction against peach theraband, hip adduction squeezes with ball, LAQ, hip flex.  PT also educated pt regarding proper placement of SC in her hand when performing sit<> stand trials.    Written Home Exercises Provided: yes. PT provided pt with written copy of the above today( 3/14/19).  PT also provided pt with piece of peach theraband. No updates to HEP on this date( 10/29/19).  Exercises were reviewed and Fransisca was able to demonstrate them prior to the end of the session.  Fransisca demonstrated good  understanding of the education provided.     See EMR under Media for exercises provided 3/14/19.    Assessment     Fransisca tolerated her therapy session a bit better today but she is still limited by L flank " pain due to a UTI. PT did his best to have pt perform exercises and balance training which would not aggravate this site. However, some of the activities chosen had to be modified to some degree. Pt was able to progress a bit on the recumbent stepper to complete 7 minutes, though she is still performing on only level 1.0 resistance. Pt did perform her step ups/overs on foam fitter very well and also did well with working less stable rocker board in M/L and A/P directions. Pt also did slightly better with balancing on Bosu compared to prior sesion. PT is hopeful that pt will continue to feel better, now that she has been taking an antibiotic for a few days. Pt remains appropriate for additional OPPT visits to address deficits with balance, strength, endurance and postural control. Continue with current POC.          Fransisca is progressing well towards her goals.   Pt prognosis is Fair to Good.     Pt will continue to benefit from skilled outpatient physical therapy to address the deficits listed in the problem list box on initial evaluation, provide pt/family education and to maximize pt's level of independence in the home and community environment.     Pt's spiritual, cultural and educational needs considered and pt agreeable to plan of care and goals.     Anticipated barriers to physical therapy: emotional deficits, anxiety regarding balance training    Goals: Pt's spiritual, cultural and educational needs considered and pt agreeable to plan of care and goals as stated below:      GOALS:   Short term goals: 4 weeks, pt agrees to goals set.  1. Pt to be issued HEP and report compliance~ in progress; Goal MET, 4/4/19  2. Pt to improve 30 second chair rise to 10-11 trials to demonstrate improvement with this transition~progressing; Goal MET, 4/4/19; Goal not MET on 4/26/19; Goal met on 6/1819, 8/8/19; Not MET, 9/27/19~ remains appropriate  3. Pt to improve SSWS to 1.0m/sec for improved community ambulation with decreased  fall risk~Goal MET, 6/18/19  4. Pt to improve MCTSIB score on condition # 3 to 6 seconds for improved balance on unstable surfaces~ Goal MET, 4/4/19  5. Pt to perform SLS on either limb x 2-3 seconds for improved balance and decreased fall risk~ Goal MET 4/26/19     Long term goals: 8 weeks, pt agrees to goals set  6. Pt (I) with HEP~progressing  7. Pt to improve 30 second chair rise to 12-13 trials to demonstrate improvement with this transition~progressing. Revise this goal to 11-12 trials, 4/26/19: goal remains appropriate, 6/18/19; goal remains appropriate, 8/8/19~ REVISE goal to 11-12 repetitions, 9/27/19  8. Pt to improve SSWS to 1.2 m/sec for improved community ambulation with decreased fall risk~ongoing  9. Pt to improve MCTSIB score on condition # 3 to 9 seconds for improved balance on unstable surfaces~ Goal MET, 4/4/19.     10. Pt to perform SLS on either limb x 4-5 seconds for improved balance and decreased fall risk~ ~Goal MET, 4/26/19.  Revise goal to perform SLS on either limb x 7-8 seconds, 4/26/19~ partially met for L LE, 6/18/19; Goal MET, 8/8/19   11. NEW GOAL( 4/26/19):  Pt to tolerate 15 seconds during condition # 4 of the MCTSIB( foam with eyes closed); Goal MET, 6/18/19; Not MET on 8/8/19~ Goal MET, 9/27/19~ REVISE this goal to 30 seconds  Plan         Cont to address balance deficits in parallel bars with use of compliant surfaces( encourage performance without UE support); continue with use of recumbent stepper for B UE/LE strengthening and CV/muscular endurance.Continue use of Pilates box to address SLS balance. Add basic ladder agility skills with gradual progressions. Add sit<> stand trials from elevated mat with feet resting on foam fitter/foam pads.          Abdias Solares, PT

## 2019-11-04 ENCOUNTER — DOCUMENTATION ONLY (OUTPATIENT)
Dept: REHABILITATION | Facility: HOSPITAL | Age: 49
End: 2019-11-04

## 2019-11-04 NOTE — PROGRESS NOTES
PT/PTA met face to face to discuss pt's treatment plan and progress towards established goals. Continue with current PT POC, including: sit to stands on compliant surfaces, endurance training with stepper, and balance. Pt will be seen by physical therapist at least every 6th treatment day or every 30 days, whichever occurs first.      Martín Ojeda, PTA  11/04/19

## 2019-11-04 NOTE — PROGRESS NOTES
Face to face meeting completed with Abdias Solares PT regarding current status and progress of   Jillian Osullivan .  Omar Jade, PTA

## 2019-11-05 ENCOUNTER — DOCUMENTATION ONLY (OUTPATIENT)
Dept: REHABILITATION | Facility: HOSPITAL | Age: 49
End: 2019-11-05

## 2019-11-05 NOTE — PROGRESS NOTES
PT/PTA met face to face to discuss pt's treatment plan and progress towards established goals.  Continue with current PT POC with focus on higher level balance, strengthening and endurance.  Patient will be seen by physical therapist at least every sixth treatment or 30 days, whichever occurs first.     Face to face performed on 11/4/19    Mary Kate Merino, PTA  11/05/2019

## 2019-11-07 ENCOUNTER — CLINICAL SUPPORT (OUTPATIENT)
Dept: REHABILITATION | Facility: HOSPITAL | Age: 49
End: 2019-11-07
Attending: FAMILY MEDICINE
Payer: COMMERCIAL

## 2019-11-07 ENCOUNTER — OFFICE VISIT (OUTPATIENT)
Dept: SLEEP MEDICINE | Facility: CLINIC | Age: 49
End: 2019-11-07
Payer: COMMERCIAL

## 2019-11-07 VITALS
BODY MASS INDEX: 33.84 KG/M2 | HEIGHT: 62 IN | DIASTOLIC BLOOD PRESSURE: 79 MMHG | WEIGHT: 183.88 LBS | HEART RATE: 72 BPM | SYSTOLIC BLOOD PRESSURE: 130 MMHG

## 2019-11-07 DIAGNOSIS — G47.30 HYPERSOMNIA WITH SLEEP APNEA: ICD-10-CM

## 2019-11-07 DIAGNOSIS — R29.898 BILATERAL LEG WEAKNESS: ICD-10-CM

## 2019-11-07 DIAGNOSIS — G47.10 HYPERSOMNIA WITH SLEEP APNEA: ICD-10-CM

## 2019-11-07 DIAGNOSIS — Z74.09 DECREASED MOBILITY AND ENDURANCE: ICD-10-CM

## 2019-11-07 DIAGNOSIS — G25.81 RLS (RESTLESS LEGS SYNDROME): Primary | ICD-10-CM

## 2019-11-07 DIAGNOSIS — G35 MS (MULTIPLE SCLEROSIS): ICD-10-CM

## 2019-11-07 DIAGNOSIS — R26.89 IMPAIRMENT OF BALANCE: ICD-10-CM

## 2019-11-07 DIAGNOSIS — G47.33 OBSTRUCTIVE SLEEP APNEA: ICD-10-CM

## 2019-11-07 DIAGNOSIS — G47.33 OSA (OBSTRUCTIVE SLEEP APNEA): ICD-10-CM

## 2019-11-07 PROCEDURE — 3008F PR BODY MASS INDEX (BMI) DOCUMENTED: ICD-10-PCS | Mod: CPTII,S$GLB,, | Performed by: NURSE PRACTITIONER

## 2019-11-07 PROCEDURE — 3008F BODY MASS INDEX DOCD: CPT | Mod: CPTII,S$GLB,, | Performed by: NURSE PRACTITIONER

## 2019-11-07 PROCEDURE — 97110 THERAPEUTIC EXERCISES: CPT | Mod: PO

## 2019-11-07 PROCEDURE — 3078F PR MOST RECENT DIASTOLIC BLOOD PRESSURE < 80 MM HG: ICD-10-PCS | Mod: CPTII,S$GLB,, | Performed by: NURSE PRACTITIONER

## 2019-11-07 PROCEDURE — 99999 PR PBB SHADOW E&M-EST. PATIENT-LVL V: ICD-10-PCS | Mod: PBBFAC,,, | Performed by: NURSE PRACTITIONER

## 2019-11-07 PROCEDURE — 99214 PR OFFICE/OUTPT VISIT, EST, LEVL IV, 30-39 MIN: ICD-10-PCS | Mod: S$GLB,,, | Performed by: NURSE PRACTITIONER

## 2019-11-07 PROCEDURE — 99214 OFFICE O/P EST MOD 30 MIN: CPT | Mod: S$GLB,,, | Performed by: NURSE PRACTITIONER

## 2019-11-07 PROCEDURE — 99999 PR PBB SHADOW E&M-EST. PATIENT-LVL V: CPT | Mod: PBBFAC,,, | Performed by: NURSE PRACTITIONER

## 2019-11-07 PROCEDURE — 97112 NEUROMUSCULAR REEDUCATION: CPT | Mod: PO

## 2019-11-07 PROCEDURE — 3075F SYST BP GE 130 - 139MM HG: CPT | Mod: CPTII,S$GLB,, | Performed by: NURSE PRACTITIONER

## 2019-11-07 PROCEDURE — 3075F PR MOST RECENT SYSTOLIC BLOOD PRESS GE 130-139MM HG: ICD-10-PCS | Mod: CPTII,S$GLB,, | Performed by: NURSE PRACTITIONER

## 2019-11-07 PROCEDURE — 3078F DIAST BP <80 MM HG: CPT | Mod: CPTII,S$GLB,, | Performed by: NURSE PRACTITIONER

## 2019-11-07 RX ORDER — ROPINIROLE HYDROCHLORIDE 2 MG/1
2 TABLET, FILM COATED, EXTENDED RELEASE ORAL NIGHTLY
Qty: 30 TABLET | Refills: 3 | Status: SHIPPED | OUTPATIENT
Start: 2019-11-07 | End: 2021-05-21 | Stop reason: SDUPTHER

## 2019-11-07 RX ORDER — BACLOFEN 10 MG/1
TABLET ORAL
Refills: 3 | COMMUNITY
Start: 2019-10-24 | End: 2024-03-08

## 2019-11-07 RX ORDER — LANOLIN ALCOHOL/MO/W.PET/CERES
300 CREAM (GRAM) TOPICAL DAILY
COMMUNITY

## 2019-11-07 RX ORDER — CIPROFLOXACIN 500 MG/1
TABLET ORAL
Refills: 0 | COMMUNITY
Start: 2019-10-28 | End: 2021-04-03

## 2019-11-07 NOTE — PROGRESS NOTES
This 48 y.o. female patient returns for the management of obstructive sleep apnea, RLS and insomnia. Last seen 5/2019    Since seen she stopped daytime gabapentin and continues to take 600mg bedtime but still having sypmtoms affecting sleep onset and sleep maintenance. Having daytime sx also. She is less mobile. Despite PT feels she is getting weaker, had fall in interim. 7# gain. Using cpap 8cm still qhs, but removing mask without knowing. Trying very hard to improve her total mask on time. Was sick for appt to get a loaner apap machine (her machine has no ahi capability)  Hx jitteriness from sunshine   lets her sleep during daytime due to her poor sleep nighttime    .     PSG 7/2016 (168#) AHI 16.4  Titration study 2015, 7cm effective    HISTORY  1. CHRIS   She is attempting CPAP nightly, but sometimes difficulty due to her other ailments. Last night she managed to sleep with it for 7+ hours.  Still Feeling exhausted. No snoring    She is using a Resmed nasal pillows mask.  CPAP interrogation Dream Station not auto capable, set at 8 cm: 2874 hours used; 3.4 hours/n ave; 14/30 days >4 hours       DME = [employer]    2. She continues to have difficulties with anxiety and insomnia. This is her main complaint today.  She feels that this is somewhat better since being off of her injectible MS meds.  Also Buspar was increased by psych.  Sleep is worse and more fragmented. Increased worry and stress.    Some dozing and napping around 2 pm in the afternoon.  Bedtime 10:30 pm - MN  Once in bed, sleep onset ranges from 30 mins - 1 hour  WASO 5-6 awakenings; 30-90 mins of wake time  Awakens around 4 am, then very light and fragmented sleep, dozing in and out of sleep until wake time of 5:45 -6 am.    Takes Buspar, clonazepam (4 times a day for GI related issue) - stopped by Dr. Montiel (start on ativan), Valium 10 mg (for high anxiety), Effexor - managed by Dr. Montiel  She is currently taking melatonin 5 mg; Takes  "Gabapentin  She has tried Rozeram, Trazodone 100 mg (it worked and then stopped working)    3. RLS  - Gabapentin 300 mg was initially effective in controlling RLS and helping her sleep better. It "worked" for 3 nights, but then lost its effectiveness. She has been using it intermittently, because she is concerned about habituation. She is also concerned about inability to lose weight. Increasing gabapentin 900 mg may be helping somewhat, though she complains of morning sedation. Her  calls her a "zombie". Recently only taking 300 mg.    RLS feelings: Often feels need to move her legs at night; Urge; Moving constantly; Using adjustable bed;  Mostly in the evenings and around bed time.    SLEEP ROUTINE:   Time to bed: 11:30 pm   Sleep onset latency: a while   Disruptions or awakenings: 3-4   Wakeup time: 6 am   Perceived sleep quality: poor   Daytime naps: every few days    3/22/19:  Continues to apply CPAP mask nightly but typically removes mask during sleep. Keeping it on 3-4hr. Soooo sleepy/tired. Has NOT gotten new mask. Went to [facility] after last visit but they could n ot help her at the time and no appt scheduled. No chin strap. + anxiety, sees Dr. Montiel.Using golifeforwomen mask, often has strap marks despite pads. Sees MD at [facility] for MS. Continued fatigue, not sleeping well. Gabapentin 300mg 2-3 qhs helps RLS symptoms/feelings. ESS=17    5/31/19:   Continues to apply CPAP mask nightly but typically removes mask during sleep. Keeping it on longer though. Using butler fx mask now. Pain and mood can affect mask removal. ESS=12. Sometimes has to nap. Attending PT 2x/week. Nuvigil 150gm caused her to feel jittery. Getting B12 inj but not helping her daytime sleepiness/fatigue. Wants to continue using her PAP mask.  Gabapentin 300mg 2-3 qhs helps RLS symptoms/feelings  REVIEW OF SYSTEMS:  Sleep related symptoms as per HPI; gait impairment using cane.  Stable wgt. +anxiety and depression (sees psyche). Otherwise " "a balance review of 10-systems is negative.       PHYSICAL EXAM:  /79   Pulse 72   Ht 5' 2" (1.575 m)   Wt 83.4 kg (183 lb 14.4 oz)   BMI 33.64 kg/m²   GENERAL: Well groomed; Normally developed; obese      ASSESSMENT:    1. Obstructive Sleep Apnea, moderate severity. CHRIS appears to be moderately well controlled on CPAP. She is using an effective setting, determined by titration sleep study 2016 with similar wft. Recent adherence is down due to ongoing health issues. She is using an approved nasal pillows CPAP mask, which forms appropriate seal and provides PAP therapy when used. Mask removal and mask interface continuing to affect use. Not gotten new mask yet 5/31/19 and 11/7/19: Continued adherence with cpap, but mask removal affecting use at times. Doing better though and motivated to continue using it.     2. Sleep disturbances/Insomnia - underlying  appears to be anxiety/depression.  She feels like anxiety is getting worse, despite medical management. Cause for anxiety unclear - also multi-factorial, being managed by Dr. Montiel. She has implicated MS or Avonex as possible root cause, since she did not have any anxiety at all prior to her MS diagnosis and treatment.     3. Restless legs feelings at night / akithesia - in some cases PLMS can be potentiated by Effexor; No evidence of PLMS on sleep study; RLS can occur just as a feeling (without acutal limb kicks) that can contribute to night time anxiety and insomnia. RLS appears to have responded to higher doses of gabapentin. She is so focused on the effects of her underlying anxiety, it is difficult to ascertain whether she is getting any benefit from this.1/15/19 and 3/22/19 and 5/31/19: Improved with gabapentin 11/7/19: suboptimal control and having daytime symptoms    4. Fatigue/hypersomnia with sleep apnea- multi-factorial; certainly MS, medication, and depression can be playing a role; as well as suboptimal mask on time    PLAN:  IMPROVE " mask on time, continue CPAP 8cm BUT GET loaner apap machine 7-14cm so to assess AHI (appt next Monday 11/11/19 7d) RT to send me compliance data. Her machine has no capability. Trial sun osi 150mg (15d coupon and notify me status) 1/2-1 tab am to help alertness/wakefulness day to help consolidate night sleep. Discussed purpose and potential side effects (wary of worsening mood) . RTC 2-mos adherence    Begin XL Requip 2mg evening time, discussed potential s/e and purpose of 24h control of symptoms and if breakthrough sx walk

## 2019-11-07 NOTE — PROGRESS NOTES
"  Physical Therapy Daily Treatment Note     Name: Jillian Osullivan  Clinic Number: [id number]    Therapy Diagnosis:   Encounter Diagnoses   Name Primary?    Bilateral leg weakness     Impairment of balance     Decreased mobility and endurance      Physician: Erik Collins MD    Visit Date: 11/7/2019  Physician Orders: PT Eval and Treat   Medical Diagnosis from Referral: multiple sclerosis, abnormality of gait, atypical depressive disorder  Evaluation Date: 3/1/2019  Authorization Period Expiration: 6/13/2019 to 12/31/2019  Plan of Care Expiration: 4/26/2019  Extended Plan of Care Expiration: 4/26/19 to 6/21/19  Updated Plan of Care Expiration: 6/18/19 to 8/13/19  Updated Plan of Care Expiration: 8/8/19 to 10/3/19  Updated Plan of Care Expiration: 09/27/19 to 11/22/19  Visit # / Visits authorized: 29/30; 48 total visits in 2019    Time In: 0945  Time Out: 1030  Total Billable Time: 45 minutes    Precautions: Standard, Fall and impaired vision, emotional deficits    Subjective       Pt reports:  " I felt like I was getting sick on Tuesday."  Pt reports she had blood work this morning and then has an appointment with one of the sleep doctors. Pt also reports she is finished taking her Cipro for UTI.   She states partial compliance with her HEP since the previous session.  Response to previous treatment: no adverse effects  Functional change: ongoing    Pain: 5/10   Location:  L flank    Objective     Pt ambulates with SC mod I from lobby to gym( ~ 150 feet) and vice-versa. Pt demonstrates ER of feet, slow gait pattern with improved knee extension during stance phase.  Pt also ambulates with wide RYAN.       Fransisca received therapeutic exercises to develop strength, endurance, ROM, flexibility and posture for 15 minutes including:      X 10 min on SCI-FIT recumbent stepper using  B LE @ level 3.0 for improved  B LE ROM, strength and CV endurance.       2 x 30" standing B gastroc stretches with use of incline and " "rail for support    1 x 10 B LE step ups/downs with 8 inch block, light B UE support, CGA            Patient participated in neuromuscular re-education activities to improve: Balance, Coordination, Kinesthetic, Sense, Proprioception and Posture for 30 minutes. The following activities were included:         Standing balance activities in parallel bars:      X 4 laps tandem ambulation, no UE support, CGA~ mild trembling      On Bosu( soft side up):  2 x 30" static standing, no UE support, CGA to slight min A trial 1 and CGA/min A trial 2 due to one post loss of balance~ mild trembling  2 x 30" static standing, no UE support, eyes closed, min A trial 1 and min A to one episode of mod A due to post loss of balance trial 2~ occasional grasping of bars    On Bosu( firm side up):  2 x 30" static standing, no UE support, CGA to slight min A ~ mild trembling  2 x 30" static standing, no UE support, eyes closed, min A    On air ex foam balance beam:  X 6 laps of sideward stepping, no UE support, CGA        Outside parallel bars:     1 x 10 alternate LE step overs on foam fitter, no UE support, CGA       Standing at Pilates Box:  2 x 10 alternate B LE hip and knee flex/ext, working foot pad up and down with 2 white springs set on level 2 and 2 black springs set on level 1, no UE support, CGA        Home Exercises Provided and Patient Education Provided     Education provided: Sitting therex including: B LE heel and toe raises, hip abduction against peach theraband, hip adduction squeezes with ball, LAQ, hip flex.  PT also educated pt regarding proper placement of SC in her hand when performing sit<> stand trials.    Written Home Exercises Provided: yes. PT provided pt with written copy of the above today( 3/14/19).  PT also provided pt with piece of peach theraband. No updates to HEP on this date( 10/29/19).  Exercises were reviewed and Fransisca was able to demonstrate them prior to the end of the session.  Fransisca demonstrated good "  understanding of the education provided.     See EMR under Media for exercises provided 3/14/19.    Assessment     Fransisca tolerated her therapy session a bit better today but she is still mildly limited by L flank pain due to a recent UTI. However, PT was able to provide more aggressive challenges during today's session. Pt returned to performing on recumbent stepper x 10 minutes at level 3.0 resistance. Pt was also able to return to use of Pilates Box for modified SLS activity. Pt did perform her step overs on foam fitter very well and also did well with sidestepping activity on air ex foam balance beam. Pt also did slightly better with balancing on Bosu compared to prior session; she even tolerated 2 trials of eyes closed on while standing on each side. Step ups on 8 inch block were tolerated well for additional LE strengthening. PT is hopeful that pt will continue to feel better, now that her antibiotic medication is completed. Pt remains appropriate for additional OPPT visits to address deficits with balance, strength, endurance and postural control. Continue with current POC.          Fransisca is progressing well towards her goals.   Pt prognosis is Fair to Good.     Pt will continue to benefit from skilled outpatient physical therapy to address the deficits listed in the problem list box on initial evaluation, provide pt/family education and to maximize pt's level of independence in the home and community environment.     Pt's spiritual, cultural and educational needs considered and pt agreeable to plan of care and goals.     Anticipated barriers to physical therapy: emotional deficits, anxiety regarding balance training    Goals: Pt's spiritual, cultural and educational needs considered and pt agreeable to plan of care and goals as stated below:      GOALS:   Short term goals: 4 weeks, pt agrees to goals set.  1. Pt to be issued HEP and report compliance~ in progress; Goal MET, 4/4/19  2. Pt to improve 30 second  chair rise to 10-11 trials to demonstrate improvement with this transition~progressing; Goal MET, 4/4/19; Goal not MET on 4/26/19; Goal met on 6/1819, 8/8/19; Not MET, 9/27/19~ remains appropriate  3. Pt to improve SSWS to 1.0m/sec for improved community ambulation with decreased fall risk~Goal MET, 6/18/19  4. Pt to improve MCTSIB score on condition # 3 to 6 seconds for improved balance on unstable surfaces~ Goal MET, 4/4/19  5. Pt to perform SLS on either limb x 2-3 seconds for improved balance and decreased fall risk~ Goal MET 4/26/19     Long term goals: 8 weeks, pt agrees to goals set  6. Pt (I) with HEP~progressing  7. Pt to improve 30 second chair rise to 12-13 trials to demonstrate improvement with this transition~progressing. Revise this goal to 11-12 trials, 4/26/19: goal remains appropriate, 6/18/19; goal remains appropriate, 8/8/19~ REVISE goal to 11-12 repetitions, 9/27/19  8. Pt to improve SSWS to 1.2 m/sec for improved community ambulation with decreased fall risk~ongoing  9. Pt to improve MCTSIB score on condition # 3 to 9 seconds for improved balance on unstable surfaces~ Goal MET, 4/4/19.     10. Pt to perform SLS on either limb x 4-5 seconds for improved balance and decreased fall risk~ ~Goal MET, 4/26/19.  Revise goal to perform SLS on either limb x 7-8 seconds, 4/26/19~ partially met for L LE, 6/18/19; Goal MET, 8/8/19   11. NEW GOAL( 4/26/19):  Pt to tolerate 15 seconds during condition # 4 of the MCTSIB( foam with eyes closed); Goal MET, 6/18/19; Not MET on 8/8/19~ Goal MET, 9/27/19~ REVISE this goal to 30 seconds  Plan         Cont to address balance deficits in parallel bars with use of compliant surfaces( encourage performance without UE support); continue with use of recumbent stepper for B UE/LE strengthening and CV/muscular endurance.Continue use of Pilates box to address SLS balance. Add basic ladder agility skills with gradual progressions. Add sit<> stand trials from elevated mat with  feet resting on foam fitter/foam pads.          Abdias Solares, PT

## 2019-11-12 ENCOUNTER — TELEPHONE (OUTPATIENT)
Dept: SLEEP MEDICINE | Facility: CLINIC | Age: 49
End: 2019-11-12

## 2019-11-12 NOTE — TELEPHONE ENCOUNTER
----- Message from Sulema John sent at 11/12/2019  1:26 PM CST -----  Contact: ALBERTO BAUER [[id number]]  [phone]    Patient states Makenzie scheduled an appointment for her today. I explained there is not an appointment scheduled for her. She says she is supposed is to  equipment today.

## 2019-11-14 ENCOUNTER — CLINICAL SUPPORT (OUTPATIENT)
Dept: REHABILITATION | Facility: HOSPITAL | Age: 49
End: 2019-11-14
Attending: FAMILY MEDICINE
Payer: COMMERCIAL

## 2019-11-14 DIAGNOSIS — R26.89 IMPAIRMENT OF BALANCE: ICD-10-CM

## 2019-11-14 DIAGNOSIS — R29.898 BILATERAL LEG WEAKNESS: ICD-10-CM

## 2019-11-14 DIAGNOSIS — Z74.09 DECREASED MOBILITY AND ENDURANCE: ICD-10-CM

## 2019-11-14 PROCEDURE — 97112 NEUROMUSCULAR REEDUCATION: CPT | Mod: PO

## 2019-11-14 PROCEDURE — 97110 THERAPEUTIC EXERCISES: CPT | Mod: PO

## 2019-11-14 NOTE — PROGRESS NOTES
"  Physical Therapy Daily Treatment Note     Name: Jillian Osullivan  Clinic Number: [id number]    Therapy Diagnosis:   Encounter Diagnoses   Name Primary?    Bilateral leg weakness     Impairment of balance     Decreased mobility and endurance      Physician: Erik Collins MD    Visit Date: 11/14/2019  Physician Orders: PT Eval and Treat   Medical Diagnosis from Referral: multiple sclerosis, abnormality of gait, atypical depressive disorder  Evaluation Date: 3/1/2019  Authorization Period Expiration: 6/13/2019 to 12/31/2019  Plan of Care Expiration: 4/26/2019  Extended Plan of Care Expiration: 4/26/19 to 6/21/19  Updated Plan of Care Expiration: 6/18/19 to 8/13/19  Updated Plan of Care Expiration: 8/8/19 to 10/3/19  Updated Plan of Care Expiration: 09/27/19 to 11/22/19  Visit # / Visits authorized: 30/30; 49 total visits in 2019    Time In: 0900  Time Out: 0945  Total Billable Time: 45 minutes    Precautions: Standard, Fall and impaired vision, emotional deficits    Subjective       Pt reports:  " Saranya, great." Pt then states she is anxious and lethargic. Pt also reports she almost fell the other day.   She states partial compliance with her HEP since the previous session.  Response to previous treatment: no adverse effects  Functional change: ongoing    Pain: 0/10   Location:  N/A    Objective     Pt ambulates with SC mod I from lobby to gym( ~ 150 feet) and vice-versa. Pt demonstrates ER of feet, slow gait pattern with improved knee extension during stance phase.  Pt also ambulates with wide RYAN.       Fransisca received therapeutic exercises to develop strength, endurance, ROM, flexibility and posture for 24 minutes including:      X 10 min on SCI-FIT recumbent stepper using  B LE @ level 3.3 for improved  B LE ROM, strength and CV endurance.     Horizontal leg press:  3 x 10 with 85 # applied      2 x 30" standing B gastroc stretches with use of incline and rail for support    1 x 10 B LE step ups/downs " "with 8 inch block, light B UE finger support, CGA     X 3 laps sideward stepping against resistance of red power strap( to target hip abductors)         Patient participated in neuromuscular re-education activities to improve: Balance, Coordination, Kinesthetic, Sense, Proprioception and Posture for 21  minutes. The following activities were included:         Standing balance activities in parallel bars:    1 x 10 B LE forward/backward step overs with green bolster, no UE support, CGA  1 x 10 B LE R<>L sideward step overs with green bolster, no UE support, CGA      Outside parallel bars:     1 x 10 alternate LE step overs on foam fitter, no UE support, CGA       Standing at Pilates Box:  2 x 10 alternate B LE hip and knee flex/ext, working foot pad up and down with 2 white springs set on level 2 and 2 black springs set on level 2, no UE support, CGA     2 x 10 sit<> stand trials from mat set at 20 and 1/2 ", feet resting on foam fitter, CGA/SBA without use of UEs to assist    Home Exercises Provided and Patient Education Provided     Education provided: Sitting therex including: B LE heel and toe raises, hip abduction against peach theraband, hip adduction squeezes with ball, LAQ, hip flex.  PT also educated pt regarding proper placement of SC in her hand when performing sit<> stand trials.    Written Home Exercises Provided: yes. PT provided pt with written copy of the above today( 3/14/19).  PT also provided pt with piece of peach theraband. No updates to HEP on this date( 11/14/19).  Exercises were reviewed and Fransisca was able to demonstrate them prior to the end of the session.  Fransisca demonstrated good  understanding of the education provided.     See EMR under Media for exercises provided 3/14/19.    Assessment     Fransisca tolerated her therapy session extremely well today and reported no pain for the first time in several sessions. Pt improved in several areas today. First, she was able to increase weight on " "horizontal leg press machine to 85 #. Pt also tolerated increased spring resistance when working the Pilates Box pad up and down with each LE. Pt even allowed PT to increase resistance on the recumbent stepper to level 3.3 resistance. Forward/backward and sideward step overs using green bolster looked great today( without use of UEs for balance). Pt also performed really well with sit<> stand trials while feet rest on foam fitter( with mat height at 20 and 1/2 ").PT is hopeful that pt will continue to tolerate increased demands with regard to strengthening and balance challenges. Pt remains appropriate for additional OPPT visits to address deficits with balance, strength, endurance and postural control. Continue with current POC.          Fransisca is progressing well towards her goals.   Pt prognosis is Fair to Good.     Pt will continue to benefit from skilled outpatient physical therapy to address the deficits listed in the problem list box on initial evaluation, provide pt/family education and to maximize pt's level of independence in the home and community environment.     Pt's spiritual, cultural and educational needs considered and pt agreeable to plan of care and goals.     Anticipated barriers to physical therapy: emotional deficits, anxiety regarding balance training    Goals: Pt's spiritual, cultural and educational needs considered and pt agreeable to plan of care and goals as stated below:      GOALS:   Short term goals: 4 weeks, pt agrees to goals set.  1. Pt to be issued HEP and report compliance~ in progress; Goal MET, 4/4/19  2. Pt to improve 30 second chair rise to 10-11 trials to demonstrate improvement with this transition~progressing; Goal MET, 4/4/19; Goal not MET on 4/26/19; Goal met on 6/1819, 8/8/19; Not MET, 9/27/19~ remains appropriate  3. Pt to improve SSWS to 1.0m/sec for improved community ambulation with decreased fall risk~Goal MET, 6/18/19  4. Pt to improve MCTSIB score on condition # 3 " to 6 seconds for improved balance on unstable surfaces~ Goal MET, 4/4/19  5. Pt to perform SLS on either limb x 2-3 seconds for improved balance and decreased fall risk~ Goal MET 4/26/19     Long term goals: 8 weeks, pt agrees to goals set  6. Pt (I) with HEP~progressing  7. Pt to improve 30 second chair rise to 12-13 trials to demonstrate improvement with this transition~progressing. Revise this goal to 11-12 trials, 4/26/19: goal remains appropriate, 6/18/19; goal remains appropriate, 8/8/19~ REVISE goal to 11-12 repetitions, 9/27/19  8. Pt to improve SSWS to 1.2 m/sec for improved community ambulation with decreased fall risk~ongoing  9. Pt to improve MCTSIB score on condition # 3 to 9 seconds for improved balance on unstable surfaces~ Goal MET, 4/4/19.     10. Pt to perform SLS on either limb x 4-5 seconds for improved balance and decreased fall risk~ ~Goal MET, 4/26/19.  Revise goal to perform SLS on either limb x 7-8 seconds, 4/26/19~ partially met for L LE, 6/18/19; Goal MET, 8/8/19   11. NEW GOAL( 4/26/19):  Pt to tolerate 15 seconds during condition # 4 of the MCTSIB( foam with eyes closed); Goal MET, 6/18/19; Not MET on 8/8/19~ Goal MET, 9/27/19~ REVISE this goal to 30 seconds  Plan         Cont to address balance deficits in parallel bars with use of compliant surfaces( encourage performance without UE support); continue with use of recumbent stepper for B UE/LE strengthening and CV/muscular endurance.Continue use of Pilates box to address SLS balance. Add basic ladder agility skills with gradual progressions. Add sit<> stand trials from elevated mat with feet resting on foam fitter/foam pads.          Abdias Solares, PT

## 2019-11-22 ENCOUNTER — PATIENT MESSAGE (OUTPATIENT)
Dept: SLEEP MEDICINE | Facility: CLINIC | Age: 49
End: 2019-11-22

## 2019-11-22 DIAGNOSIS — G47.33 OBSTRUCTIVE SLEEP APNEA: Primary | ICD-10-CM

## 2019-11-26 ENCOUNTER — CLINICAL SUPPORT (OUTPATIENT)
Dept: REHABILITATION | Facility: HOSPITAL | Age: 49
End: 2019-11-26
Attending: FAMILY MEDICINE
Payer: COMMERCIAL

## 2019-11-26 DIAGNOSIS — R26.89 IMPAIRMENT OF BALANCE: ICD-10-CM

## 2019-11-26 DIAGNOSIS — R29.898 BILATERAL LEG WEAKNESS: ICD-10-CM

## 2019-11-26 DIAGNOSIS — Z74.09 DECREASED MOBILITY AND ENDURANCE: ICD-10-CM

## 2019-11-26 PROCEDURE — 97110 THERAPEUTIC EXERCISES: CPT | Mod: PO

## 2019-11-26 PROCEDURE — 97116 GAIT TRAINING THERAPY: CPT | Mod: PO

## 2019-11-26 PROCEDURE — 97112 NEUROMUSCULAR REEDUCATION: CPT | Mod: PO

## 2019-11-26 NOTE — PROGRESS NOTES
Physical Therapy Daily Treatment Note     Name: Jillian Osullivan  Clinic Number: [id number]    Therapy Diagnosis:   Encounter Diagnoses   Name Primary?    Bilateral leg weakness     Impairment of balance     Decreased mobility and endurance      Physician: Erik Collins MD    Visit Date: 11/26/2019  Physician Orders: PT Eval and Treat   Medical Diagnosis from Referral: multiple sclerosis, abnormality of gait, atypical depressive disorder  Evaluation Date: 3/1/2019  Authorization Period Expiration: 11/18/19 to 12/31/2019  Plan of Care Expiration: 4/26/2019  Extended Plan of Care Expiration: 4/26/19 to 6/21/19  Updated Plan of Care Expiration: 6/18/19 to 8/13/19  Updated Plan of Care Expiration: 8/8/19 to 10/3/19  Updated Plan of Care Expiration: 09/27/19 to 11/22/19  Updated Plan of Care Expiration: 11/26/19 to 1/21/20  Visit # / Visits authorized: 1/20; 50 total visits in 2019    Time In: 11:18  Time Out: 12:03  Total Billable Time: 45 minutes    Precautions: Standard, Fall and impaired vision, emotional deficits    Subjective       Pt reports:  She has been really tired lately and doesn't feel like doing much. Pt reports being ill this past week. Pt also reports some dizziness lately.   She states non- compliance with her HEP since the previous session( due to being ill and having decreased energy levels).  Response to previous treatment: no adverse effects  Functional change: ongoing    Pain: 8.5/10   Location:  Head    Objective     Pt ambulates with SC mod I from lobby to gym( ~ 150 feet) and vice-versa. Pt demonstrates ER of feet, slow gait pattern with improved knee extension during stance phase.  Pt also ambulates with wide RYAN.       Fransisca received therapeutic exercises to develop strength, endurance, ROM, flexibility and posture for 18 minutes including:            Lower Extremity Strength~ tested in sitting  Right LE  3/1/19 4/26/19 6/18/19 8/8/19 9/27/19 11/26/19 Left LE  3/1/19 4/26/19  "6/18/19 8/8/19 9/27/19 11/26/19   Hip Flexion: 3-/5 3/5 3/5 3+/5 3+/5 3+/5 Hip Flexion: 3/5 3/5 3/5 3+/5 3+/5 3+/5   Hip Extension:  4/5 4-/5 4/5 4-/5 4(-)/5 4(-)/5 Hip Extension: 4-/5 4/5 4(-)/5 4-/5 4(-)/5 4(-)/5   Hip Abduction: 4/5 4+/5 5/5 4+/5 4+/5 4+/5 Hip Abduction: 4/5 4+/5 5/5 4+/5 4+/5 4+/5   Hip Adduction: 4+/5 5/5 5/5 5/5 4+/5 4+/5 Hip Adduction 4+/5 5/5 5/5 5/5 4+/5 4+/5   Knee Extension: 3/5 4+/5 4+/5 4+/5 4/5 4+/5 Knee Extension: 3+/5 4/5 4+/5 5/5 4+/5 4+/5   Knee Flexion: 3+/5 4(-)/5 4/5 4/5 4/5 4+/5 Knee Flexion: 4/5 4+/5 4+/5 5/5 4+/5 4+/5   Ankle Dorsiflexion: 3/5 4+/5 4+/5 4+/5 4(-)/5 4+/5 Ankle Dorsiflexion: 4-/5 4/5 4+/5 4+/5 4/5 4+/5   Ankle Plantarflexion: 4/5 4+/5 5/5 5/5 4+/5 4+/5 Ankle Plantarflexion: 4/5 4+/5 5/5 5/5 4+/5 4+/5            Horizontal leg press:  3 x 10 with 80 # applied      2 x 30" standing B gastroc stretches with use of incline and rail for support    Inside parallel bars:  X 4 laps sideward stepping against resistance of green power strap( to target hip abductors), light to no UE support         Patient participated in neuromuscular re-education activities to improve: Balance, Coordination, Kinesthetic, Sense, Proprioception and Posture for 19   minutes. The following activities were included:               Evaluation 4/26/19 6/18/19 8/8/19 9/27/19 11/26/19   Single Limb Stance R LE Unable to perform, fear  (<10 sec = HIGH FALL RISK) 6 seconds  (<10 sec = HIGH FALL RISK) 6 seconds(<10 sec = HIGH FALL RISK) 10 seconds  (<10 sec = HIGH FALL RISK) 10 seconds  (<10 sec = HIGH FALL RISK) 9  (<10 sec = HIGH FALL RISK)   Single Limb Stance L LE Unable to perform, fear  (<10 sec = HIGH FALL RISK) 5 seconds  (<10 sec = HIGH FALL RISK) 9 seconds  (<10 sec = HIGH FALL RISK) 12 seconds  (<10 sec = HIGH FALL RISK) 10 seconds  (<10 sec = HIGH FALL RISK) 8 seconds  (<10 sec = HIGH FALL RISK)   30 second Chair Rise 9 completed with arms 9 completed with arms 10 completed with arms 10 " completed with arms 9.5 completed with arms 8 completed with arms           9/27/19  Postural control:  MCTSIB:  1. Eyes Open/feet together/Firm: 30 seconds, P, no sway  2. Eyes Closed/feet together/Firm: 30 seconds, P, with mild sway  3. Eyes Open/feet together/Foam: 30 seconds, P with mild sway and tremor  4. Eyes Closed/feet together/Foam: 26 seconds, F, min/mod sway and tremor       11/26/19  Postural control:  MCTSIB:  1. Eyes Open/feet together/Firm: 30 seconds, P, no sway  2. Eyes Closed/feet together/Firm: 18 seconds, F, min sway  3. Eyes Open/feet together/Foam: 30 seconds, P with mild ankle sway and tremor  4. Eyes Closed/feet together/Foam: 18 seconds, F, min sway and tremor           Standing at Pilates Box:  2 x 10 alternate B LE hip and knee flex/ext, working foot pad up and down with 2 white springs set on level 1 and 2 black springs set on level 2, no UE support, CGA           Patient participated in gait training activities to normalize gait pattern for 8 minutes. The following activities were included:   Gait belt used for safety. Assistive device: SC                Evaluation 4/26/19 6/18/19 8/8/19 9/27/19 11/26/19   Timed Up and Go 12 sec 12 sec 10 sec  10 sec without SC 9 sec with SC 9 sec with SC   Self Selected Walking Speed 0.86 m/sec (6m/7s) 0.75 m/sec(6m/8s) 1.0 m/sec(6m/6s) 1.0 m/sec(6m/6s) with SC    1.0 m/sec(6m/6s) with SC 1.0 m/sec(6m/6s) with SC   Fast Walking Speed 1.0 m/sec (6m/6s) 1.0m/sec(6m/6s) 1.2 m/sec(6m/5s) 1.0 m/sec(6m/6s) with SC 1.2 m/sec(6m/5s) with SC 1.2 m/sec(6m/5s) with SC                 Home Exercises Provided and Patient Education Provided     Education provided: Sitting therex including: B LE heel and toe raises, hip abduction against peach theraband, hip adduction squeezes with ball, LAQ, hip flex.  PT also educated pt regarding proper placement of SC in her hand when performing sit<> stand trials.    Written Home Exercises Provided: yes. PT provided pt with  written copy of the above today( 3/14/19).  PT also provided pt with piece of peach theraband. No updates to HEP on this date( 11/26/19).  Exercises were reviewed and Fransisca was able to demonstrate them prior to the end of the session.  Fransisca demonstrated good  understanding of the education provided.     See EMR under Media for exercises provided 3/14/19.    Assessment     Fransisca tolerated her therapy session for updated plan of care fairly today due to her not feeling completely well. Pt did have to cancel at least 1-2 prior sessions for this reason( pt has not attended therapy since 11/14/19). Consequently, it is not surprising that she did not test very well today. With regard to pt's results, she demonstrated some mild decline with respect to 30 second chair rise score, performance on conditions # 2 and 4 of the MCTSIB and SLS. PT feels this is partly due to the variable nature of MS and pt not feeling well recently. Pt's TUG, SSWS and FSWS all remained unchanged. PT has discussed with pt that she may be nearing a plateau at this time and is likely appropriate to begin 1 x/week therapy as a means of weaning from OPPT. PT will look to update HEP during the coming sessions and would like to extend pt's plan of care for another 8 weeks( until 1/21/20) at 1x/week frequency. Pt understands this and is in agreement with this course of action. Pt remains appropriate for additional OPPT visits to address deficits with balance, strength, endurance and postural control. See below for most recent comments regarding goal achievement and any updates or revisions.        Fransisca is progressing well towards her goals.   Pt prognosis is Fair to Good.     Pt will continue to benefit from skilled outpatient physical therapy to address the deficits listed in the problem list box on initial evaluation, provide pt/family education and to maximize pt's level of independence in the home and community environment.     Pt's spiritual, cultural  and educational needs considered and pt agreeable to plan of care and goals.     Anticipated barriers to physical therapy: emotional deficits, anxiety regarding balance training    Goals: Pt's spiritual, cultural and educational needs considered and pt agreeable to plan of care and goals as stated below:      GOALS:   Short term goals: 4 weeks, pt agrees to goals set.  1. Pt to be issued HEP and report compliance~ in progress; Goal MET, 4/4/19  2. Pt to improve 30 second chair rise to 10-11 trials to demonstrate improvement with this transition~progressing; Goal MET, 4/4/19; Goal not MET on 4/26/19; Goal met on 6/1819, 8/8/19; Not MET, 9/27/19~ remains appropriate; Not MET, 11/26/19, remains appropriate  3. Pt to improve SSWS to 1.0m/sec for improved community ambulation with decreased fall risk~Goal MET, 6/18/19  4. Pt to improve MCTSIB score on condition # 3 to 6 seconds for improved balance on unstable surfaces~ Goal MET, 4/4/19  5. Pt to perform SLS on either limb x 2-3 seconds for improved balance and decreased fall risk~ Goal MET 4/26/19     Long term goals: 8 weeks, pt agrees to goals set  6. Pt (I) with HEP~progressing  7. Pt to improve 30 second chair rise to 12-13 trials to demonstrate improvement with this transition~progressing. Revise this goal to 11-12 trials, 4/26/19: goal remains appropriate, 6/18/19; goal remains appropriate, 8/8/19~ remains appropriate, 9/27/19; goal remains appropriate, 11/26/19  8. Pt to improve SSWS to 1.2 m/sec for improved community ambulation with decreased fall risk~ongoing  9. Pt to improve MCTSIB score on condition # 3 to 9 seconds for improved balance on unstable surfaces~ Goal MET, 4/4/19.     10. Pt to perform SLS on either limb x 4-5 seconds for improved balance and decreased fall risk~ ~Goal MET, 4/26/19.  Revise goal to perform SLS on either limb x 7-8 seconds, 4/26/19~ partially met for L LE, 6/18/19; Goal MET, 8/8/19   11. NEW GOAL( 4/26/19):  Pt to tolerate 15  seconds during condition # 4 of the MCTSIB( foam with eyes closed); Goal MET, 6/18/19; Not MET on 8/8/19~ Goal MET, 9/27/19~ REVISE this goal to 30 seconds; remains appropriate, 11/26/19   12. NEW GOAL( 11/26/19): Pt to tolerate 30 seconds during condition # 2 of the MCTSIB( firm with eyes closed)  Plan     Continue outpatient physical therapy 1x weekly under updated Plan of Care, 11/26/19 to 1/21/20, with treatment to include: pt education, HEP, therapeutic exercises, neuromuscular re-education/balance exercises, therapeutic activities, joint mobilizations, and modalities PRN, to work towards established goals. Pt may be seen by PTA to carry out plan of care.                 Abdias Solares, PT

## 2019-11-29 ENCOUNTER — DOCUMENTATION ONLY (OUTPATIENT)
Dept: REHABILITATION | Facility: HOSPITAL | Age: 49
End: 2019-11-29

## 2019-11-29 NOTE — PROGRESS NOTES
PT/PTA met face to face to discuss pt's treatment plan and progress towards established goals. Continue with current PT POC, including: high level balance, strengthening and stretching. Pt will be seen by physical therapist at least every 6th treatment day or every 30 days, whichever occurs first.      Martín Ojeda, PTA  11/29/19

## 2019-12-02 ENCOUNTER — DOCUMENTATION ONLY (OUTPATIENT)
Dept: REHABILITATION | Facility: HOSPITAL | Age: 49
End: 2019-12-02

## 2019-12-02 NOTE — PROGRESS NOTES
Face to face meeting completed with Abdias Solares PT on 11/29/19 regarding current status and progress of   Jillian Osullivan .  Omar Jade, PTA

## 2019-12-03 ENCOUNTER — CLINICAL SUPPORT (OUTPATIENT)
Dept: REHABILITATION | Facility: HOSPITAL | Age: 49
End: 2019-12-03
Attending: FAMILY MEDICINE
Payer: COMMERCIAL

## 2019-12-03 DIAGNOSIS — R29.898 BILATERAL LEG WEAKNESS: ICD-10-CM

## 2019-12-03 DIAGNOSIS — R26.89 IMPAIRMENT OF BALANCE: ICD-10-CM

## 2019-12-03 DIAGNOSIS — Z74.09 DECREASED MOBILITY AND ENDURANCE: ICD-10-CM

## 2019-12-03 PROCEDURE — 97112 NEUROMUSCULAR REEDUCATION: CPT | Mod: PO

## 2019-12-03 PROCEDURE — 97110 THERAPEUTIC EXERCISES: CPT | Mod: PO

## 2019-12-03 NOTE — PROGRESS NOTES
"  Physical Therapy Daily Treatment Note     Name: Jillian Osullivan  Clinic Number: [id number]    Therapy Diagnosis:   Encounter Diagnoses   Name Primary?    Bilateral leg weakness     Impairment of balance     Decreased mobility and endurance      Physician: Erik Collins MD    Visit Date: 12/3/2019  Physician Orders: PT Eval and Treat   Medical Diagnosis from Referral: multiple sclerosis, abnormality of gait, atypical depressive disorder  Evaluation Date: 3/1/2019  Authorization Period Expiration: 11/18/19 to 12/31/2019  Plan of Care Expiration: 4/26/2019  Extended Plan of Care Expiration: 4/26/19 to 6/21/19  Updated Plan of Care Expiration: 6/18/19 to 8/13/19  Updated Plan of Care Expiration: 8/8/19 to 10/3/19  Updated Plan of Care Expiration: 09/27/19 to 11/22/19  Updated Plan of Care Expiration: 11/26/19 to 1/21/20  Visit # / Visits authorized: 2/20; 51 total visits in 2019    Time In: 0900  Time Out: 0945  Total Billable Time: 45 minutes    Precautions: Standard, Fall and impaired vision, emotional deficits    Subjective       Pt reports:  "It's about to get real stressful for me, we're moving." She still endorses intermittent dizziness.   She states compliance with her HEP since the previous session  Response to previous treatment: no adverse effects  Functional change: ongoing    Pain: 0/10   Location:  N/A    Objective     Pt ambulates with SC mod I from lobby to gym( ~ 150 feet) and vice-versa. Pt demonstrates ER of feet, slow gait pattern with improved knee extension during stance phase.  Pt also ambulates with wide RYAN.       Fransisca received therapeutic exercises to develop strength, endurance, ROM, flexibility and posture for 30 minutes including:      Pt instructed in and performs the following exercises as part of her advanced HEP:  Standing at counter and against wall:  1 x 15 B LE heel raises  1 x 12 B LE hip abduction  1 x 12 B LE hip flexion  1 x 12 B LE hamstring curls  1 x 10 B LE mini " "wall squats       Horizontal leg press:  3 x 10 B with 85 # applied      2 x 30" standing B gastroc stretches with use of incline and rail for support     1 x 10 B LE step ups/downs with 8 inch block, light B UE finger support, SBA             Patient participated in neuromuscular re-education activities to improve: Balance, Coordination, Kinesthetic, Sense, Proprioception and Posture for 15  minutes. The following activities were included:       Standing balance activities in parallel bars:      On Bosu( soft side up):  2 x 30" static standing, no UE support, CGA/min A    On Bosu( firm side up):  2 x 30" static standing, no UE support, CGA to occ slight min A    On air ex foam balance beam:  1 x 30" B alternate tandem standing, no UE support, CGA     1 x 10 B LE forward/backward step overs with green bolster, no UE support, CGA  1 x 10 B LE R<>L sideward step overs with green bolster, no UE support, CGA        Outside parallel bars:     1 x 10 B LE step ups/downs on foam fitter, no UE support, CGA                Home Exercises Provided and Patient Education Provided     Education provided: Sitting therex including: B LE heel and toe raises, hip abduction against peach theraband, hip adduction squeezes with ball, LAQ, hip flex.  PT also educated pt regarding proper placement of SC in her hand when performing sit<> stand trials.    Written Home Exercises Provided: yes. PT provided pt with written copy of the above today( 3/14/19).  PT also provided pt with piece of peach theraband.  PT added exercises in standing today( hip flexion, hip abduction, hamstring curls, heel raises and mini squats( 12/3/19).    Exercises were reviewed and Fransisca was able to demonstrate them prior to the end of the session.  Fransisca demonstrated good  understanding of the education provided.     See EMR under Media for exercises provided 3/14/19; Patient instructions, 12/3/19.    Assessment     Fransisca tolerated her therapy session extremely well " today and performed her new standing exercises without much difficulty. Now that pt is attending therapy 1x/week, PT felt it appropriate to upgrade pt's HEP. Pt continues to do well on the horizontal leg press machine with 85 # applied.  Forward/backward and sideward step overs using green bolster looked good again today( without use of UEs for balance). Pt also performed well when standing on both sides of Bosu. In future sessions, PT will incorporate additional practice with sit<> stand, ladder agility drills and use of Pilates Box for modified SLS. PT is hopeful that pt will continue to tolerate increased demands with regard to strengthening and balance challenges. Pt remains appropriate for additional OPPT visits to address deficits with balance, strength, endurance and postural control. Continue with current POC.         Fransisca is progressing well towards her goals.   Pt prognosis is Fair to Good.     Pt will continue to benefit from skilled outpatient physical therapy to address the deficits listed in the problem list box on initial evaluation, provide pt/family education and to maximize pt's level of independence in the home and community environment.     Pt's spiritual, cultural and educational needs considered and pt agreeable to plan of care and goals.     Anticipated barriers to physical therapy: emotional deficits, anxiety regarding balance training    Goals: Pt's spiritual, cultural and educational needs considered and pt agreeable to plan of care and goals as stated below:      GOALS:   Short term goals: 4 weeks, pt agrees to goals set.  1. Pt to be issued HEP and report compliance~ in progress; Goal MET, 4/4/19  2. Pt to improve 30 second chair rise to 10-11 trials to demonstrate improvement with this transition~progressing; Goal MET, 4/4/19; Goal not MET on 4/26/19; Goal met on 6/1819, 8/8/19; Not MET, 9/27/19~ remains appropriate; Not MET, 11/26/19, remains appropriate  3. Pt to improve SSWS to  1.0m/sec for improved community ambulation with decreased fall risk~Goal MET, 6/18/19  4. Pt to improve MCTSIB score on condition # 3 to 6 seconds for improved balance on unstable surfaces~ Goal MET, 4/4/19  5. Pt to perform SLS on either limb x 2-3 seconds for improved balance and decreased fall risk~ Goal MET 4/26/19     Long term goals: 8 weeks, pt agrees to goals set  6. Pt (I) with HEP~progressing  7. Pt to improve 30 second chair rise to 12-13 trials to demonstrate improvement with this transition~progressing. Revise this goal to 11-12 trials, 4/26/19: goal remains appropriate, 6/18/19; goal remains appropriate, 8/8/19~ remains appropriate, 9/27/19; goal remains appropriate, 11/26/19  8. Pt to improve SSWS to 1.2 m/sec for improved community ambulation with decreased fall risk~ongoing  9. Pt to improve MCTSIB score on condition # 3 to 9 seconds for improved balance on unstable surfaces~ Goal MET, 4/4/19.     10. Pt to perform SLS on either limb x 4-5 seconds for improved balance and decreased fall risk~ ~Goal MET, 4/26/19.  Revise goal to perform SLS on either limb x 7-8 seconds, 4/26/19~ partially met for L LE, 6/18/19; Goal MET, 8/8/19   11. NEW GOAL( 4/26/19):  Pt to tolerate 15 seconds during condition # 4 of the MCTSIB( foam with eyes closed); Goal MET, 6/18/19; Not MET on 8/8/19~ Goal MET, 9/27/19~ REVISE this goal to 30 seconds; remains appropriate, 11/26/19   12. NEW GOAL( 11/26/19): Pt to tolerate 30 seconds during condition # 2 of the MCTSIB( firm with eyes closed)  Plan     Cont to address balance deficits in parallel bars with use of compliant surfaces( encourage performance without UE support); continue with use of recumbent stepper for B UE/LE strengthening and CV/muscular endurance.Continue use of Pilates box to address SLS balance. Add basic ladder agility skills with gradual progressions. Add sit<> stand trials from elevated mat with feet resting on foam fitter/foam pads.              Abdias WISE  Beck, PT

## 2019-12-10 ENCOUNTER — CLINICAL SUPPORT (OUTPATIENT)
Dept: REHABILITATION | Facility: HOSPITAL | Age: 49
End: 2019-12-10
Attending: FAMILY MEDICINE
Payer: COMMERCIAL

## 2019-12-10 DIAGNOSIS — R26.89 IMPAIRMENT OF BALANCE: ICD-10-CM

## 2019-12-10 DIAGNOSIS — R29.898 BILATERAL LEG WEAKNESS: ICD-10-CM

## 2019-12-10 DIAGNOSIS — Z74.09 DECREASED MOBILITY AND ENDURANCE: ICD-10-CM

## 2019-12-10 PROCEDURE — 97110 THERAPEUTIC EXERCISES: CPT | Mod: PO

## 2019-12-10 PROCEDURE — 97112 NEUROMUSCULAR REEDUCATION: CPT | Mod: PO

## 2019-12-10 NOTE — PROGRESS NOTES
"  Physical Therapy Daily Treatment Note     Name: Jillian Osullivan  Clinic Number: [id number]    Therapy Diagnosis:   Encounter Diagnoses   Name Primary?    Bilateral leg weakness     Impairment of balance     Decreased mobility and endurance      Physician: Erik Collins MD    Visit Date: 12/10/2019  Physician Orders: PT Eval and Treat   Medical Diagnosis from Referral: multiple sclerosis, abnormality of gait, atypical depressive disorder  Evaluation Date: 3/1/2019  Authorization Period Expiration: 11/18/19 to 12/31/2019  Plan of Care Expiration: 4/26/2019  Extended Plan of Care Expiration: 4/26/19 to 6/21/19  Updated Plan of Care Expiration: 6/18/19 to 8/13/19  Updated Plan of Care Expiration: 8/8/19 to 10/3/19  Updated Plan of Care Expiration: 09/27/19 to 11/22/19  Updated Plan of Care Expiration: 11/26/19 to 1/21/20  Visit # / Visits authorized: 3/20; 52 total visits in 2019    Time In: 0855  Time Out: 0940  Total Billable Time: 45 minutes    Precautions: Standard, Fall and impaired vision, emotional deficits    Subjective       Pt reports: she feels stressed and depressed. " I've been crying."  Pt also reports she fell 4 times on Saturday.   She states semi- compliance with her HEP since the previous session.  Response to previous treatment: no adverse effects  Functional change: ongoing    Pain: 0/10   Location:  N/A    Objective     Pt ambulates with SC mod I from lobby to gym( ~ 150 feet) and vice-versa. Pt demonstrates ER of feet, slow gait pattern with improved knee extension during stance phase.  Pt also ambulates with wide RYAN.       Fransisca received therapeutic exercises to develop strength, endurance, ROM, flexibility and posture for 22 minutes including:    X 10 min on SCI-FIT recumbent stepper using  B LE @ level 3.0- 3.3 for improved  B LE ROM, strength and CV endurance.          Horizontal leg press:  1 x 10 B with 85 # applied  2 x 10 B with 90 # applied      2 x 30" standing B gastroc " "stretches with use of incline and rail for support     2 x 10 B LE step ups/downs with 8 inch block, each LE leading, light B UE finger support, SBA             Patient participated in neuromuscular re-education activities to improve: Balance, Coordination, Kinesthetic, Sense, Proprioception and Posture for 23  minutes. The following activities were included:     PT assists pt to perform 2 mat <> floor <> mat transfers, min A. Pt lowers onto foam mat placed on floor and rises up 1 trial on L side and 1 trial on R side.      1 x 10 sit<> stand trials from mat with feet on foam fitter, 22 1/2" mat height, CGA/SBA  1 x 10 sit<> stand trials from mat with feet on foam fitter, 22 and 1/2 " mat height and holding 4.4 # weighted ball in outstretched arms, CGA/SBA      Standing balance activities in parallel bars:     1 x 10 B LE forward/backward step overs with green bolster, no UE support, CGA  1 x 10 B LE R<>L sideward step overs with green bolster, no UE support, CGA        Ladder agility:  X 4 laps forward reciprocal stepping, CGA  X 4 laps sideward reciprocal stepping, CGA                Home Exercises Provided and Patient Education Provided     Education provided: Sitting therex including: B LE heel and toe raises, hip abduction against peach theraband, hip adduction squeezes with ball, LAQ, hip flex.  PT also educated pt regarding proper placement of SC in her hand when performing sit<> stand trials.    Written Home Exercises Provided: yes. PT provided pt with written copy of the above today( 3/14/19).  PT also provided pt with piece of peach theraband.  PT added exercises in standing today( hip flexion, hip abduction, hamstring curls, heel raises and mini squats( 12/3/19).    Exercises were reviewed and Fransisca was able to demonstrate them prior to the end of the session.  Fransisca demonstrated good  understanding of the education provided.     See EMR under Media for exercises provided 3/14/19; Patient instructions, " 12/3/19.    Assessment     Fransisca tolerated her therapy session very well today despite experiencing 4 falls this past Saturday. PT discovered that at least some of these occurred while pt was not utilizing her SC. Due to these falls, PT felt it best to instruct pt with proper procedure for rising from floor. Pt did well here, but needs min A for safety and balance. Pt continues to do well on the horizontal leg press machine and increased her weight to 90 #.  Forward/backward and sideward step overs using green bolster looked good again today( without use of UEs for balance). Pt also performed well when doing sit<> stand transitions from mat with feet resting on foam fitter. PT returned to use of floor ladder for simple agility drills and pt did well, needing CGA for safety. PT is hopeful that pt will continue to tolerate increased demands with regard to strengthening and balance challenges. Pt remains appropriate for additional OPPT visits to address deficits with balance, strength, endurance and postural control. Continue with current POC.         Fransisca is progressing well towards her goals.   Pt prognosis is Fair to Good.     Pt will continue to benefit from skilled outpatient physical therapy to address the deficits listed in the problem list box on initial evaluation, provide pt/family education and to maximize pt's level of independence in the home and community environment.     Pt's spiritual, cultural and educational needs considered and pt agreeable to plan of care and goals.     Anticipated barriers to physical therapy: emotional deficits, anxiety regarding balance training    Goals: Pt's spiritual, cultural and educational needs considered and pt agreeable to plan of care and goals as stated below:      GOALS:   Short term goals: 4 weeks, pt agrees to goals set.  1. Pt to be issued HEP and report compliance~ in progress; Goal MET, 4/4/19  2. Pt to improve 30 second chair rise to 10-11 trials to demonstrate  improvement with this transition~progressing; Goal MET, 4/4/19; Goal not MET on 4/26/19; Goal met on 6/1819, 8/8/19; Not MET, 9/27/19~ remains appropriate; Not MET, 11/26/19, remains appropriate  3. Pt to improve SSWS to 1.0m/sec for improved community ambulation with decreased fall risk~Goal MET, 6/18/19  4. Pt to improve MCTSIB score on condition # 3 to 6 seconds for improved balance on unstable surfaces~ Goal MET, 4/4/19  5. Pt to perform SLS on either limb x 2-3 seconds for improved balance and decreased fall risk~ Goal MET 4/26/19     Long term goals: 8 weeks, pt agrees to goals set  6. Pt (I) with HEP~progressing  7. Pt to improve 30 second chair rise to 12-13 trials to demonstrate improvement with this transition~progressing. Revise this goal to 11-12 trials, 4/26/19: goal remains appropriate, 6/18/19; goal remains appropriate, 8/8/19~ remains appropriate, 9/27/19; goal remains appropriate, 11/26/19  8. Pt to improve SSWS to 1.2 m/sec for improved community ambulation with decreased fall risk~ongoing  9. Pt to improve MCTSIB score on condition # 3 to 9 seconds for improved balance on unstable surfaces~ Goal MET, 4/4/19.     10. Pt to perform SLS on either limb x 4-5 seconds for improved balance and decreased fall risk~ ~Goal MET, 4/26/19.  Revise goal to perform SLS on either limb x 7-8 seconds, 4/26/19~ partially met for L LE, 6/18/19; Goal MET, 8/8/19   11. NEW GOAL( 4/26/19):  Pt to tolerate 15 seconds during condition # 4 of the MCTSIB( foam with eyes closed); Goal MET, 6/18/19; Not MET on 8/8/19~ Goal MET, 9/27/19~ REVISE this goal to 30 seconds; remains appropriate, 11/26/19   12. NEW GOAL( 11/26/19): Pt to tolerate 30 seconds during condition # 2 of the MCTSIB( firm with eyes closed)  Plan     Cont to address balance deficits in parallel bars with use of compliant surfaces( encourage performance without UE support); continue with use of recumbent stepper for B UE/LE strengthening and CV/muscular  endurance.Continue use of Pilates box to address SLS balance. Add basic ladder agility skills with gradual progressions. Add sit<> stand trials from elevated mat with feet resting on foam fitter/foam pads.              Abdias Solares, PT

## 2019-12-17 ENCOUNTER — CLINICAL SUPPORT (OUTPATIENT)
Dept: REHABILITATION | Facility: HOSPITAL | Age: 49
End: 2019-12-17
Attending: FAMILY MEDICINE
Payer: COMMERCIAL

## 2019-12-17 DIAGNOSIS — R26.89 IMPAIRMENT OF BALANCE: ICD-10-CM

## 2019-12-17 DIAGNOSIS — R29.898 BILATERAL LEG WEAKNESS: ICD-10-CM

## 2019-12-17 DIAGNOSIS — Z74.09 DECREASED MOBILITY AND ENDURANCE: ICD-10-CM

## 2019-12-17 PROCEDURE — 97112 NEUROMUSCULAR REEDUCATION: CPT | Mod: PO

## 2019-12-17 PROCEDURE — 97110 THERAPEUTIC EXERCISES: CPT | Mod: PO

## 2019-12-17 NOTE — PROGRESS NOTES
"  Physical Therapy Daily Treatment Note     Name: Jillian Osullivan  Clinic Number: [id number]    Therapy Diagnosis:   Encounter Diagnoses   Name Primary?    Bilateral leg weakness     Impairment of balance     Decreased mobility and endurance      Physician: Erik Collins MD    Visit Date: 12/17/2019  Physician Orders: PT Eval and Treat   Medical Diagnosis from Referral: multiple sclerosis, abnormality of gait, atypical depressive disorder  Evaluation Date: 3/1/2019  Authorization Period Expiration: 11/18/19 to 12/31/2019  Plan of Care Expiration: 4/26/2019  Extended Plan of Care Expiration: 4/26/19 to 6/21/19  Updated Plan of Care Expiration: 6/18/19 to 8/13/19  Updated Plan of Care Expiration: 8/8/19 to 10/3/19  Updated Plan of Care Expiration: 09/27/19 to 11/22/19  Updated Plan of Care Expiration: 11/26/19 to 1/21/20  Visit # / Visits authorized: 4/20; 53 total visits in 2019    Time In: 0855  Time Out: 0940  Total Billable Time: 45 minutes    Precautions: Standard, Fall and impaired vision, emotional deficits    Subjective       Pt reports: she feels a little under the weather. " I feel off today."   She states semi- compliance with her HEP since the previous session.  Response to previous treatment: no adverse effects  Functional change: ongoing    Pain: 8/10   Location:  B legs    Objective     Pt ambulates with SC mod I from lobby to gym( ~ 150 feet) and vice-versa. Pt demonstrates ER of feet, slow gait pattern with improved knee extension during stance phase.  Pt also ambulates with wide RYAN.       Fransisca received therapeutic exercises to develop strength, endurance, ROM, flexibility and posture for 18 minutes including:    X 5 min on SCI-FIT recumbent stepper using  B LE @ level 1.0 for improved  B LE ROM, strength and CV endurance.          Horizontal leg press:  1 x 10 B with 80 # applied  2 x 10 B with 60 # applied      2 x 30" standing B gastroc stretches with use of incline and rail for " "support         Patient participated in neuromuscular re-education activities to improve: Balance, Coordination, Kinesthetic, Sense, Proprioception and Posture for 27  minutes. The following activities were included:       Standing balance in parallel bars:    X 4 laps tandem ambulation, no UE support, CGA to slight min A, min trembling    1 x 10 B LE forward/backward step overs with green bolster, no UE support, CGA  1 x 10 B LE R<>L sideward step overs with green bolster, no UE support, CGA     X 4 laps stepping over 4 yoga blocks, no UE support to light UE support, CGA/SBA    On low profile Bosu( soft side up):  2 x 30" static standing, no UE support, CGA/min A, min trembling    On low profile Bosu( firm side up):  2 x 30" static standing, no UE support, CGA/min A, min trembling      Standing at Pilates Box:  2 x 10 B LE alternate hip and knee flex, working foot pad up and down, no UE support, all 4 springs set on level 1, CGA              Home Exercises Provided and Patient Education Provided     Education provided: Sitting therex including: B LE heel and toe raises, hip abduction against peach theraband, hip adduction squeezes with ball, LAQ, hip flex.  PT also educated pt regarding proper placement of SC in her hand when performing sit<> stand trials.    Written Home Exercises Provided: yes. PT provided pt with written copy of the above today( 3/14/19).  PT also provided pt with piece of peach theraband.  PT added exercises in standing today( hip flexion, hip abduction, hamstring curls, heel raises and mini squats( 12/3/19).    Exercises were reviewed and Fransisca was able to demonstrate them prior to the end of the session.  Fransisca demonstrated good  understanding of the education provided.     See EMR under Media for exercises provided 3/14/19; Patient instructions, 12/3/19.    Assessment     Fransisca tolerated her therapy session fairly today due to not feeling her best.  Consequently, PT had to dial back some of " pt's normal exercise routine. Pt was unable to tolerate any more than 1.0 level resistance on the recumbent stepper and only worked here for 5 minutes. PT also had to decrease weight used on the horizontal leg press. Pt did perform some of her typical balance challenges, though she demonstrated less stability and increased tremor activity.  PT is hopeful that pt will feel better at her next session so she can be pushed to achieve her best possible performance. Pt remains appropriate for additional OPPT visits to address deficits with balance, strength, endurance and postural control. Continue with current POC.         Fransisca is progressing well towards her goals.   Pt prognosis is Fair to Good.     Pt will continue to benefit from skilled outpatient physical therapy to address the deficits listed in the problem list box on initial evaluation, provide pt/family education and to maximize pt's level of independence in the home and community environment.     Pt's spiritual, cultural and educational needs considered and pt agreeable to plan of care and goals.     Anticipated barriers to physical therapy: emotional deficits, anxiety regarding balance training    Goals: Pt's spiritual, cultural and educational needs considered and pt agreeable to plan of care and goals as stated below:      GOALS:   Short term goals: 4 weeks, pt agrees to goals set.  1. Pt to be issued HEP and report compliance~ in progress; Goal MET, 4/4/19  2. Pt to improve 30 second chair rise to 10-11 trials to demonstrate improvement with this transition~progressing; Goal MET, 4/4/19; Goal not MET on 4/26/19; Goal met on 6/1819, 8/8/19; Not MET, 9/27/19~ remains appropriate; Not MET, 11/26/19, remains appropriate  3. Pt to improve SSWS to 1.0m/sec for improved community ambulation with decreased fall risk~Goal MET, 6/18/19  4. Pt to improve MCTSIB score on condition # 3 to 6 seconds for improved balance on unstable surfaces~ Goal MET, 4/4/19  5. Pt to  perform SLS on either limb x 2-3 seconds for improved balance and decreased fall risk~ Goal MET 4/26/19     Long term goals: 8 weeks, pt agrees to goals set  6. Pt (I) with HEP~progressing  7. Pt to improve 30 second chair rise to 12-13 trials to demonstrate improvement with this transition~progressing. Revise this goal to 11-12 trials, 4/26/19: goal remains appropriate, 6/18/19; goal remains appropriate, 8/8/19~ remains appropriate, 9/27/19; goal remains appropriate, 11/26/19  8. Pt to improve SSWS to 1.2 m/sec for improved community ambulation with decreased fall risk~ongoing  9. Pt to improve MCTSIB score on condition # 3 to 9 seconds for improved balance on unstable surfaces~ Goal MET, 4/4/19.     10. Pt to perform SLS on either limb x 4-5 seconds for improved balance and decreased fall risk~ ~Goal MET, 4/26/19.  Revise goal to perform SLS on either limb x 7-8 seconds, 4/26/19~ partially met for L LE, 6/18/19; Goal MET, 8/8/19   11. NEW GOAL( 4/26/19):  Pt to tolerate 15 seconds during condition # 4 of the MCTSIB( foam with eyes closed); Goal MET, 6/18/19; Not MET on 8/8/19~ Goal MET, 9/27/19~ REVISE this goal to 30 seconds; remains appropriate, 11/26/19   12. NEW GOAL( 11/26/19): Pt to tolerate 30 seconds during condition # 2 of the MCTSIB( firm with eyes closed)  Plan     Cont to address balance deficits in parallel bars with use of compliant surfaces( encourage performance without UE support); continue with use of recumbent stepper for B UE/LE strengthening and CV/muscular endurance.Continue use of Pilates box to address SLS balance. Add basic ladder agility skills with gradual progressions. Add sit<> stand trials from elevated mat with feet resting on foam fitter/foam pads.              Abdias Solares, PT

## 2019-12-18 ENCOUNTER — OFFICE VISIT (OUTPATIENT)
Dept: PSYCHIATRY | Facility: CLINIC | Age: 49
End: 2019-12-18
Payer: COMMERCIAL

## 2019-12-18 VITALS
WEIGHT: 186.5 LBS | DIASTOLIC BLOOD PRESSURE: 80 MMHG | HEART RATE: 72 BPM | BODY MASS INDEX: 34.11 KG/M2 | SYSTOLIC BLOOD PRESSURE: 131 MMHG

## 2019-12-18 DIAGNOSIS — F40.298 SPECIFIC PHOBIA: ICD-10-CM

## 2019-12-18 DIAGNOSIS — K58.8 OTHER IRRITABLE BOWEL SYNDROME: ICD-10-CM

## 2019-12-18 DIAGNOSIS — F42.9 OBSESSIVE-COMPULSIVE DISORDER, UNSPECIFIED TYPE: ICD-10-CM

## 2019-12-18 DIAGNOSIS — G89.4 CHRONIC PAIN SYNDROME: ICD-10-CM

## 2019-12-18 DIAGNOSIS — F40.01 PANIC DISORDER WITH AGORAPHOBIA: ICD-10-CM

## 2019-12-18 DIAGNOSIS — F32.1 MDD (MAJOR DEPRESSIVE DISORDER), SINGLE EPISODE, MODERATE: Primary | ICD-10-CM

## 2019-12-18 DIAGNOSIS — F41.1 GENERALIZED ANXIETY DISORDER: ICD-10-CM

## 2019-12-18 DIAGNOSIS — F41.1 GAD (GENERALIZED ANXIETY DISORDER): ICD-10-CM

## 2019-12-18 DIAGNOSIS — G47.33 OSA (OBSTRUCTIVE SLEEP APNEA): ICD-10-CM

## 2019-12-18 PROCEDURE — 90833 PSYTX W PT W E/M 30 MIN: CPT | Mod: S$GLB,,, | Performed by: PSYCHIATRY & NEUROLOGY

## 2019-12-18 PROCEDURE — 3075F SYST BP GE 130 - 139MM HG: CPT | Mod: CPTII,S$GLB,, | Performed by: PSYCHIATRY & NEUROLOGY

## 2019-12-18 PROCEDURE — 3079F DIAST BP 80-89 MM HG: CPT | Mod: CPTII,S$GLB,, | Performed by: PSYCHIATRY & NEUROLOGY

## 2019-12-18 PROCEDURE — 90833 PR PSYCHOTHERAPY W/PATIENT W/E&M, 30 MIN (ADD ON): ICD-10-PCS | Mod: S$GLB,,, | Performed by: PSYCHIATRY & NEUROLOGY

## 2019-12-18 PROCEDURE — 3079F PR MOST RECENT DIASTOLIC BLOOD PRESSURE 80-89 MM HG: ICD-10-PCS | Mod: CPTII,S$GLB,, | Performed by: PSYCHIATRY & NEUROLOGY

## 2019-12-18 PROCEDURE — 3075F PR MOST RECENT SYSTOLIC BLOOD PRESS GE 130-139MM HG: ICD-10-PCS | Mod: CPTII,S$GLB,, | Performed by: PSYCHIATRY & NEUROLOGY

## 2019-12-18 PROCEDURE — 99999 PR PBB SHADOW E&M-EST. PATIENT-LVL III: CPT | Mod: PBBFAC,,, | Performed by: PSYCHIATRY & NEUROLOGY

## 2019-12-18 PROCEDURE — 99214 OFFICE O/P EST MOD 30 MIN: CPT | Mod: S$GLB,,, | Performed by: PSYCHIATRY & NEUROLOGY

## 2019-12-18 PROCEDURE — 99214 PR OFFICE/OUTPT VISIT, EST, LEVL IV, 30-39 MIN: ICD-10-PCS | Mod: S$GLB,,, | Performed by: PSYCHIATRY & NEUROLOGY

## 2019-12-18 PROCEDURE — 3008F PR BODY MASS INDEX (BMI) DOCUMENTED: ICD-10-PCS | Mod: CPTII,S$GLB,, | Performed by: PSYCHIATRY & NEUROLOGY

## 2019-12-18 PROCEDURE — 99999 PR PBB SHADOW E&M-EST. PATIENT-LVL III: ICD-10-PCS | Mod: PBBFAC,,, | Performed by: PSYCHIATRY & NEUROLOGY

## 2019-12-18 PROCEDURE — 3008F BODY MASS INDEX DOCD: CPT | Mod: CPTII,S$GLB,, | Performed by: PSYCHIATRY & NEUROLOGY

## 2019-12-18 RX ORDER — CLONAZEPAM 0.5 MG/1
0.5 TABLET ORAL EVERY 6 HOURS
Qty: 120 TABLET | Refills: 5 | Status: SHIPPED | OUTPATIENT
Start: 2019-12-18 | End: 2020-03-20 | Stop reason: SDUPTHER

## 2019-12-18 RX ORDER — DULOXETIN HYDROCHLORIDE 30 MG/1
30 CAPSULE, DELAYED RELEASE ORAL DAILY
Qty: 30 CAPSULE | Refills: 6 | Status: SHIPPED | OUTPATIENT
Start: 2019-12-18 | End: 2020-03-20 | Stop reason: SDUPTHER

## 2019-12-18 RX ORDER — DULOXETIN HYDROCHLORIDE 60 MG/1
60 CAPSULE, DELAYED RELEASE ORAL DAILY
Qty: 30 CAPSULE | Refills: 6 | Status: SHIPPED | OUTPATIENT
Start: 2019-12-18 | End: 2020-03-20 | Stop reason: SDUPTHER

## 2019-12-18 NOTE — PATIENT INSTRUCTIONS
You need to exercise at least 3 times a week for 45 - 60 minutes -- you can go to physical therapy or to the gym/fitness center to obtain the exercise you need.      Decrease Effexor XR to 150 mg daily for one week, then decrease to 75 mg for one week, then discontinue.    Start Cymbalta 30 mg daily for one week, then increase to 60 mg daily after as tolerated.    Continue all other current medications with refills as noted.  Return to clinic on 3/20/20 at 4 pm for follow up appt.
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
## 2019-12-23 NOTE — PROGRESS NOTES
"  Physical Therapy Daily Treatment Note     Name: Jillian Osullivan  Clinic Number: [id number]    Therapy Diagnosis:   Encounter Diagnoses   Name Primary?    Bilateral leg weakness     Impairment of balance     Decreased mobility and endurance      Physician: Erik Collins MD    Visit Date: 12/24/2019  Physician Orders: PT Eval and Treat   Medical Diagnosis from Referral: multiple sclerosis, abnormality of gait, atypical depressive disorder  Evaluation Date: 3/1/2019  Authorization Period Expiration: 11/18/19 to 12/31/2019  Plan of Care Expiration: 4/26/2019  Extended Plan of Care Expiration: 4/26/19 to 6/21/19  Updated Plan of Care Expiration: 6/18/19 to 8/13/19  Updated Plan of Care Expiration: 8/8/19 to 10/3/19  Updated Plan of Care Expiration: 09/27/19 to 11/22/19  Updated Plan of Care Expiration: 11/26/19 to 1/21/20  Visit # / Visits authorized: 5/20; 54 total visits in 2019    Time In: 0945  Time Out: 1030  Total Billable Time: 45  minutes    Precautions: Standard, Fall and impaired vision, emotional deficits    Subjective       Pt reports: she feels like she is under a lot of stress.  Pt reports her aunt had a stroke and her dad is also in the hospital. Pt also just moved recently.   She states semi- compliance with her HEP since the previous session.  Response to previous treatment: no adverse effects  Functional change: ongoing    Pain: 7.5/10   Location:  B legs    Objective     Pt ambulates with SC mod I from lobby to gym( ~ 150 feet) and vice-versa. Pt demonstrates ER of feet, slow gait pattern with improved knee extension during stance phase.  Pt also ambulates with wide RYAN.       Fransisca received therapeutic exercises to develop strength, endurance, ROM, flexibility and posture for 20 minutes including:    X 10 min on SCI-FIT recumbent stepper using  B LE @ level 3.0 for improved  B LE ROM, strength and CV endurance.       Horizontal leg press:  3 x 10 B with 85 # applied      2 x 30" standing " "B gastroc stretches with use of incline and rail for support         Patient participated in neuromuscular re-education activities to improve: Balance, Coordination, Kinesthetic, Sense, Proprioception and Posture for 25  minutes. The following activities were included:       Standing balance in parallel bars:      1 x 10 B LE forward/backward step overs with green bolster, no UE support, CGA  1 x 10 B LE R<>L sideward step overs with green bolster, no UE support, CGA with 2 episodes of slight balance loss, requiring min A to correct       On low profile Bosu( soft side up):  2 x 30" static standing, no UE support, CGA to slight min A trial 1 and CGA/min A trial 2, min trembling    On low profile Bosu( firm side up):  2 x 30" static standing, no UE support, CGA to slight min A      Standing at Pilates Box:  2 x 10 B LE alternate hip and knee flex, working foot pad up and down, no UE support, 2 black springs on level 1 and 2 white springs on level 2, CGA      Transitions and standing balance outside parallel bars:  1 x 10 sit<> stand trials from mat set at 23.5 ", feet on foam fitter, holding soccer ball in outstretched arms, SBA  1 x 10 sit<> stand trials from mat set at 22 ", feet on foam fitter, holding soccer ball in outstretched arms, SBA    1 x 10 alternate LE step overs on foam fitter, no UE support, CGA      Home Exercises Provided and Patient Education Provided     Education provided: Sitting therex including: B LE heel and toe raises, hip abduction against peach theraband, hip adduction squeezes with ball, LAQ, hip flex.  PT also educated pt regarding proper placement of SC in her hand when performing sit<> stand trials.    Written Home Exercises Provided: yes. PT provided pt with written copy of the above today( 3/14/19).  PT also provided pt with piece of peach theraband.  PT added exercises in standing today( hip flexion, hip abduction, hamstring curls, heel raises and mini squats( 12/3/19).    Exercises " were reviewed and Fransisca was able to demonstrate them prior to the end of the session.  Fransisca demonstrated good  understanding of the education provided.     See EMR under Media for exercises provided 3/14/19; Patient instructions, 12/3/19.    Assessment     Fransisca tolerated her therapy session well today, especially considering the present degree of stress in her personal life. Pt was able to tolerate 3.0 level resistance on the recumbent stepper for 10 minutes and she resumed performance on the horizontal leg press at 85 #.  Pt did well with most balance tasks performed inside and outside parallel bars. Most impressive today was pt's performance with sit<> stand transitions with feet on foam fitter while holding soccer ball in outstretched arms. Pt remains appropriate for additional OPPT visits to address deficits with balance, strength, endurance and postural control. Continue with current POC.         Fransisca is progressing well towards her goals.   Pt prognosis is Fair to Good.     Pt will continue to benefit from skilled outpatient physical therapy to address the deficits listed in the problem list box on initial evaluation, provide pt/family education and to maximize pt's level of independence in the home and community environment.     Pt's spiritual, cultural and educational needs considered and pt agreeable to plan of care and goals.     Anticipated barriers to physical therapy: emotional deficits, anxiety regarding balance training    Goals: Pt's spiritual, cultural and educational needs considered and pt agreeable to plan of care and goals as stated below:      GOALS:   Short term goals: 4 weeks, pt agrees to goals set.  1. Pt to be issued HEP and report compliance~ in progress; Goal MET, 4/4/19  2. Pt to improve 30 second chair rise to 10-11 trials to demonstrate improvement with this transition~progressing; Goal MET, 4/4/19; Goal not MET on 4/26/19; Goal met on 6/1819, 8/8/19; Not MET, 9/27/19~ remains  appropriate; Not MET, 11/26/19, remains appropriate  3. Pt to improve SSWS to 1.0m/sec for improved community ambulation with decreased fall risk~Goal MET, 6/18/19  4. Pt to improve MCTSIB score on condition # 3 to 6 seconds for improved balance on unstable surfaces~ Goal MET, 4/4/19  5. Pt to perform SLS on either limb x 2-3 seconds for improved balance and decreased fall risk~ Goal MET 4/26/19     Long term goals: 8 weeks, pt agrees to goals set  6. Pt (I) with HEP~progressing  7. Pt to improve 30 second chair rise to 12-13 trials to demonstrate improvement with this transition~progressing. Revise this goal to 11-12 trials, 4/26/19: goal remains appropriate, 6/18/19; goal remains appropriate, 8/8/19~ remains appropriate, 9/27/19; goal remains appropriate, 11/26/19  8. Pt to improve SSWS to 1.2 m/sec for improved community ambulation with decreased fall risk~ongoing  9. Pt to improve MCTSIB score on condition # 3 to 9 seconds for improved balance on unstable surfaces~ Goal MET, 4/4/19.     10. Pt to perform SLS on either limb x 4-5 seconds for improved balance and decreased fall risk~ ~Goal MET, 4/26/19.  Revise goal to perform SLS on either limb x 7-8 seconds, 4/26/19~ partially met for L LE, 6/18/19; Goal MET, 8/8/19   11. NEW GOAL( 4/26/19):  Pt to tolerate 15 seconds during condition # 4 of the MCTSIB( foam with eyes closed); Goal MET, 6/18/19; Not MET on 8/8/19~ Goal MET, 9/27/19~ REVISE this goal to 30 seconds; remains appropriate, 11/26/19   12. NEW GOAL( 11/26/19): Pt to tolerate 30 seconds during condition # 2 of the MCTSIB( firm with eyes closed)  Plan     Cont to address balance deficits in parallel bars with use of compliant surfaces( encourage performance without UE support); continue with use of recumbent stepper for B UE/LE strengthening and CV/muscular endurance.Continue use of Pilates box to address SLS balance. Add basic ladder agility skills with gradual progressions. Add sit<> stand trials from  elevated mat with feet resting on foam fitter/foam pads.              Abdias Solares, PT

## 2019-12-24 ENCOUNTER — CLINICAL SUPPORT (OUTPATIENT)
Dept: REHABILITATION | Facility: HOSPITAL | Age: 49
End: 2019-12-24
Attending: FAMILY MEDICINE
Payer: COMMERCIAL

## 2019-12-24 DIAGNOSIS — R29.898 BILATERAL LEG WEAKNESS: ICD-10-CM

## 2019-12-24 DIAGNOSIS — R26.89 IMPAIRMENT OF BALANCE: ICD-10-CM

## 2019-12-24 DIAGNOSIS — Z74.09 DECREASED MOBILITY AND ENDURANCE: ICD-10-CM

## 2019-12-24 PROCEDURE — 97112 NEUROMUSCULAR REEDUCATION: CPT | Mod: PO

## 2019-12-24 PROCEDURE — 97110 THERAPEUTIC EXERCISES: CPT | Mod: PO

## 2019-12-26 DIAGNOSIS — F41.1 GAD (GENERALIZED ANXIETY DISORDER): ICD-10-CM

## 2019-12-26 RX ORDER — BUSPIRONE HYDROCHLORIDE 15 MG/1
TABLET ORAL
Qty: 90 TABLET | Refills: 2 | Status: SHIPPED | OUTPATIENT
Start: 2019-12-26 | End: 2020-03-20 | Stop reason: SDUPTHER

## 2019-12-30 ENCOUNTER — DOCUMENTATION ONLY (OUTPATIENT)
Dept: REHABILITATION | Facility: HOSPITAL | Age: 49
End: 2019-12-30

## 2019-12-30 NOTE — PROGRESS NOTES
PT/PTA met face to face to discuss pt's treatment plan and progress towards established goals. Continue with current PT POC, including: high level balance and strengthening. Pt will be seen by physical therapist at least every 6th treatment day or every 30 days, whichever occurs first.      Martín Ojeda, PTA  12/30/19

## 2019-12-31 ENCOUNTER — OFFICE VISIT (OUTPATIENT)
Dept: URGENT CARE | Facility: CLINIC | Age: 49
End: 2019-12-31
Payer: COMMERCIAL

## 2019-12-31 VITALS
OXYGEN SATURATION: 98 % | SYSTOLIC BLOOD PRESSURE: 134 MMHG | WEIGHT: 186 LBS | DIASTOLIC BLOOD PRESSURE: 95 MMHG | HEIGHT: 62 IN | HEART RATE: 108 BPM | BODY MASS INDEX: 34.23 KG/M2 | RESPIRATION RATE: 16 BRPM | TEMPERATURE: 98 F

## 2019-12-31 DIAGNOSIS — J32.9 SINUSITIS, UNSPECIFIED CHRONICITY, UNSPECIFIED LOCATION: Primary | ICD-10-CM

## 2019-12-31 LAB
CTP QC/QA: YES
FLUAV AG NPH QL: NEGATIVE
FLUBV AG NPH QL: NEGATIVE

## 2019-12-31 PROCEDURE — 99214 OFFICE O/P EST MOD 30 MIN: CPT | Mod: S$GLB,,, | Performed by: INTERNAL MEDICINE

## 2019-12-31 PROCEDURE — 87804 POCT INFLUENZA A/B: ICD-10-PCS | Mod: 59,QW,S$GLB, | Performed by: INTERNAL MEDICINE

## 2019-12-31 PROCEDURE — 99214 PR OFFICE/OUTPT VISIT, EST, LEVL IV, 30-39 MIN: ICD-10-PCS | Mod: S$GLB,,, | Performed by: INTERNAL MEDICINE

## 2019-12-31 PROCEDURE — 87804 INFLUENZA ASSAY W/OPTIC: CPT | Mod: QW,S$GLB,, | Performed by: INTERNAL MEDICINE

## 2019-12-31 RX ORDER — CLINDAMYCIN HYDROCHLORIDE 300 MG/1
300 CAPSULE ORAL EVERY 8 HOURS
Qty: 21 CAPSULE | Refills: 0 | Status: SHIPPED | OUTPATIENT
Start: 2019-12-31 | End: 2020-01-07

## 2019-12-31 NOTE — PROGRESS NOTES
"Subjective:       Patient ID: Jillian Osullivan is a 49 y.o. female.    Vitals:  height is 5' 2" (1.575 m) and weight is 84.4 kg (186 lb). Her oral temperature is 98.1 °F (36.7 °C). Her blood pressure is 134/95 (abnormal) and her pulse is 108. Her respiration is 16 and oxygen saturation is 98%.     Chief Complaint: Sinus Problem    Patient has been having chills, a cough, chest pain. Patient has taken tussin  But it has not helped.     Sinus Problem   This is a new problem. The current episode started yesterday. The problem is unchanged. Associated symptoms include chills, congestion and coughing. Pertinent negatives include no diaphoresis, ear pain, shortness of breath, sinus pressure or sore throat.       Constitution: Positive for chills. Negative for sweating, fatigue and fever.   HENT: Positive for congestion. Negative for ear pain, sinus pain, sinus pressure, sore throat and voice change.    Neck: Negative for painful lymph nodes.   Eyes: Negative for eye redness.   Respiratory: Positive for cough. Negative for chest tightness, sputum production, bloody sputum, COPD, shortness of breath, stridor, wheezing and asthma.    Gastrointestinal: Negative for nausea and vomiting.   Musculoskeletal: Negative for muscle ache.   Skin: Negative for rash.   Allergic/Immunologic: Negative for seasonal allergies and asthma.   Hematologic/Lymphatic: Negative for swollen lymph nodes.       Objective:      Physical Exam   Constitutional: She is oriented to person, place, and time. She appears well-developed and well-nourished. She is cooperative.  Non-toxic appearance. She does not appear ill. No distress.   HENT:   Head: Normocephalic and atraumatic.   Right Ear: Hearing, tympanic membrane, external ear and ear canal normal.   Left Ear: Hearing, tympanic membrane, external ear and ear canal normal.   Nose: Rhinorrhea present. No mucosal edema or nasal deformity. No epistaxis. Right sinus exhibits no maxillary sinus " tenderness and no frontal sinus tenderness. Left sinus exhibits no maxillary sinus tenderness and no frontal sinus tenderness.   Mouth/Throat: Uvula is midline and mucous membranes are normal. No trismus in the jaw. Normal dentition. No uvula swelling. Posterior oropharyngeal erythema present.   Eyes: Conjunctivae and lids are normal. Right eye exhibits no discharge. Left eye exhibits no discharge. No scleral icterus.   Neck: Trachea normal, normal range of motion, full passive range of motion without pain and phonation normal. Neck supple.   Cardiovascular: Normal rate, regular rhythm, normal heart sounds, intact distal pulses and normal pulses.   Pulmonary/Chest: Effort normal and breath sounds normal. No respiratory distress.   Abdominal: Soft. Normal appearance and bowel sounds are normal. She exhibits no distension, no pulsatile midline mass and no mass. There is no tenderness.   Musculoskeletal: Normal range of motion. She exhibits no edema or deformity.   Neurological: She is alert and oriented to person, place, and time. She exhibits normal muscle tone. Coordination normal.   Skin: Skin is warm, dry, intact, not diaphoretic and not pale.   Psychiatric: She has a normal mood and affect. Her speech is normal and behavior is normal. Judgment and thought content normal. Cognition and memory are normal.   Nursing note and vitals reviewed.        Assessment:       1. Sinusitis, unspecified chronicity, unspecified location        Plan:         Sinusitis, unspecified chronicity, unspecified location  -     clindamycin (CLEOCIN) 300 MG capsule; Take 1 capsule (300 mg total) by mouth every 8 (eight) hours. for 7 days  Dispense: 21 capsule; Refill: 0      Patient Instructions     Sinusitis (Antibiotic Treatment)    The sinuses are air-filled spaces within the bones of the face. They connect to the inside of the nose. Sinusitis is an inflammation of the tissue lining the sinus cavity. Sinus inflammation can occur during  a cold. It can also be due to allergies to pollens and other particles in the air. Sinusitis can cause symptoms of sinus congestion and fullness. A sinus infection causes fever, headache and facial pain. There is often green or yellow drainage from the nose or into the back of the throat (post-nasal drip). You have been given antibiotics to treat this condition.  Home care:  · Take the full course of antibiotics as instructed. Do not stop taking them, even if you feel better.  · Drink plenty of water, hot tea, and other liquids. This may help thin mucus. It also may promote sinus drainage.  · Heat may help soothe painful areas of the face. Use a towel soaked in hot water. Or,  the shower and direct the hot spray onto your face. Using a vaporizer along with a menthol rub at night may also help.   · An expectorant containing guaifenesin may help thin the mucus and promote drainage from the sinuses.  · Over-the-counter decongestants may be used unless a similar medicine was prescribed. Nasal sprays work the fastest. Use one that contains phenylephrine or oxymetazoline. First blow the nose gently. Then use the spray. Do not use these medicines more often than directed on the label or symptoms may get worse. You may also use tablets containing pseudoephedrine. Avoid products that combine ingredients, because side effects may be increased. Read labels. You can also ask the pharmacist for help. (NOTE: Persons with high blood pressure should not use decongestants. They can raise blood pressure.)  · Over-the-counter antihistamines may help if allergies contributed to your sinusitis.    · Do not use nasal rinses or irrigation during an acute sinus infection, unless told to by your health care provider. Rinsing may spread the infection to other sinuses.  · Use acetaminophen or ibuprofen to control pain, unless another pain medicine was prescribed. (If you have chronic liver or kidney disease or ever had a stomach  ulcer, talk with your doctor before using these medicines. Aspirin should never be used in anyone under 18 years of age who is ill with a fever. It may cause severe liver damage.)  · Don't smoke. This can worsen symptoms.  Follow-up care  Follow up with your healthcare provider or our staff if you are not improving within the next week.  When to seek medical advice  Call your healthcare provider if any of these occur:  · Facial pain or headache becoming more severe  · Stiff neck  · Unusual drowsiness or confusion  · Swelling of the forehead or eyelids  · Vision problems, including blurred or double vision  · Fever of 100.4ºF (38ºC) or higher, or as directed by your healthcare provider  · Seizure  · Breathing problems  · Symptoms not resolving within 10 days  Date Last Reviewed: 4/13/2015  © 0969-6020 [employer]. [address] [place] [address]. All rights reserved. This information is not intended as a substitute for professional medical care. Always follow your healthcare professional's instructions.           My notes were dictated with [employer]ency Software. Any misspellings or nonsensical grammar should be attributed to its use and allowances made for errors and typographic syntactical error(s).

## 2019-12-31 NOTE — PATIENT INSTRUCTIONS
Sinusitis (Antibiotic Treatment)    The sinuses are air-filled spaces within the bones of the face. They connect to the inside of the nose. Sinusitis is an inflammation of the tissue lining the sinus cavity. Sinus inflammation can occur during a cold. It can also be due to allergies to pollens and other particles in the air. Sinusitis can cause symptoms of sinus congestion and fullness. A sinus infection causes fever, headache and facial pain. There is often green or yellow drainage from the nose or into the back of the throat (post-nasal drip). You have been given antibiotics to treat this condition.  Home care:  · Take the full course of antibiotics as instructed. Do not stop taking them, even if you feel better.  · Drink plenty of water, hot tea, and other liquids. This may help thin mucus. It also may promote sinus drainage.  · Heat may help soothe painful areas of the face. Use a towel soaked in hot water. Or,  the shower and direct the hot spray onto your face. Using a vaporizer along with a menthol rub at night may also help.   · An expectorant containing guaifenesin may help thin the mucus and promote drainage from the sinuses.  · Over-the-counter decongestants may be used unless a similar medicine was prescribed. Nasal sprays work the fastest. Use one that contains phenylephrine or oxymetazoline. First blow the nose gently. Then use the spray. Do not use these medicines more often than directed on the label or symptoms may get worse. You may also use tablets containing pseudoephedrine. Avoid products that combine ingredients, because side effects may be increased. Read labels. You can also ask the pharmacist for help. (NOTE: Persons with high blood pressure should not use decongestants. They can raise blood pressure.)  · Over-the-counter antihistamines may help if allergies contributed to your sinusitis.    · Do not use nasal rinses or irrigation during an acute sinus infection, unless told to by  your health care provider. Rinsing may spread the infection to other sinuses.  · Use acetaminophen or ibuprofen to control pain, unless another pain medicine was prescribed. (If you have chronic liver or kidney disease or ever had a stomach ulcer, talk with your doctor before using these medicines. Aspirin should never be used in anyone under 18 years of age who is ill with a fever. It may cause severe liver damage.)  · Don't smoke. This can worsen symptoms.  Follow-up care  Follow up with your healthcare provider or our staff if you are not improving within the next week.  When to seek medical advice  Call your healthcare provider if any of these occur:  · Facial pain or headache becoming more severe  · Stiff neck  · Unusual drowsiness or confusion  · Swelling of the forehead or eyelids  · Vision problems, including blurred or double vision  · Fever of 100.4ºF (38ºC) or higher, or as directed by your healthcare provider  · Seizure  · Breathing problems  · Symptoms not resolving within 10 days  Date Last Reviewed: 4/13/2015  © 8694-0002 [employer]. [address] [place] [address], [place] [address]. All rights reserved. This information is not intended as a substitute for professional medical care. Always follow your healthcare professional's instructions.

## 2020-01-15 NOTE — PROGRESS NOTES
Physical Therapy Daily Treatment Note     Name: Jillian Osullivan  Clinic Number: [id number]    Therapy Diagnosis:   Encounter Diagnoses   Name Primary?    Bilateral leg weakness     Impairment of balance     Decreased mobility and endurance      Physician: Erik Collins MD    Visit Date: 1/16/2020  Physician Orders: PT Eval and Treat   Medical Diagnosis from Referral: multiple sclerosis, abnormality of gait, atypical depressive disorder  Evaluation Date: 3/1/2019  Authorization Period Expiration: 1/1/20 to 12/31/2020  Plan of Care Expiration: 4/26/2019  Extended Plan of Care Expiration: 4/26/19 to 6/21/19  Updated Plan of Care Expiration: 6/18/19 to 8/13/19  Updated Plan of Care Expiration: 8/8/19 to 10/3/19  Updated Plan of Care Expiration: 09/27/19 to 11/22/19  Updated Plan of Care Expiration: 11/26/19 to 1/21/20  Visit # / Visits authorized: 1/20     (54 total visits in 2019)    Time In: 1315   Time Out: 1400  Total Billable Time: 45  minutes    Precautions: Standard, Fall and impaired vision, emotional deficits    Subjective       Pt reports: she feels a little mad, depressed and sad. This is in reference to happenings in her personal life. Pt also admits to PT that she fell the other day in her kitchen.   She states semi- compliance with her HEP since the previous session.  Response to previous treatment: no adverse effects  Functional change: ongoing    Pain: 0/10   Location:  N/A    Objective     Pt ambulates with SC mod I from lobby to gym( ~ 150 feet) and vice-versa. Pt demonstrates less ER of feet and improved gait pattern with regard to step length/ knee extension during stance phase.  Pt also ambulates with more narrow RYAN when compared to previous session.       Fransisca received therapeutic exercises to develop strength, endurance, ROM, flexibility and posture for 20 minutes including:    X 8 min on SCI-FIT recumbent stepper using  B LE @ level 3.1 for improved  B LE ROM, strength and CV  "endurance.       Horizontal leg press:  3 x 10 B with 85 # applied      2 x 30" standing B gastroc stretches with use of incline and rail for support         Patient participated in neuromuscular re-education activities to improve: Balance, Coordination, Kinesthetic, Sense, Proprioception and Posture for 25  minutes. The following activities were included:       Standing balance in parallel bars:      On Bosu( soft side up):  2 x 30" static standing, no UE support, CGA to slight min A , min trembling    On Bosu( firm side up):  2 x 30" static standing, no UE support, CGA         Outside parallel bars:    1 x 10 B LE step ups/downs on foam fitter, no UE support, CGA  1 x 10 alternate LE single leg step overs on foam fitter while holding soccer ball in outstretched arms, no UE support, CGA      Standing at Pilates Box:  2 x 10 B LE alternate hip and knee flex, working foot pad up and down, no UE support, 2 black springs on level 2 and 2 white springs on level 2, CGA      X 2 trials of quadruped crawls across high/low mat with S    X 2 trials of sit<> floor<> sit transfers from high/low mat and use of red mat placed on floor~ pt performs 1 trial to each side and requires CGA for safety    Home Exercises Provided and Patient Education Provided     Education provided: Sitting therex including: B LE heel and toe raises, hip abduction against peach theraband, hip adduction squeezes with ball, LAQ, hip flex.  PT also educated pt regarding proper placement of SC in her hand when performing sit<> stand trials.    PT educated pt regarding the medical fitness program and that he feels she will be a good candidate for this; pt provided with a flyer. Pt also reminded that her next visit on 1/21/20 will be her discharge( 1/16/20).    Written Home Exercises Provided: yes. PT provided pt with written copy of the above today( 3/14/19).  PT also provided pt with piece of peach theraband.  PT added exercises in standing today( hip " flexion, hip abduction, hamstring curls, heel raises and mini squats( 12/3/19).    Exercises were reviewed and Fransisca was able to demonstrate them prior to the end of the session.  Fransisca demonstrated good  understanding of the education provided.     See EMR under Media for exercises provided 3/14/19; Patient instructions, 12/3/19.    Assessment     Fransisca tolerated her therapy session well today, especially considering the present degree of stress in her personal life and the fact that she is recovering from a recent sinus infection. Pt was able to tolerate 3.1 level resistance on the recumbent stepper for 8 minutes and she continued performance on the horizontal leg press at 85 #.  Pt did well with most balance tasks performed inside and outside parallel bars and she also progressed to perform 2 x 10 reps at Pilates Box with all 4 springs set at level 2. PT added quadruped crawls across high/low mat for targeting core stabilization and practiced floor transfers once again due to a recent fall. Pt remains appropriate for additional OPPT visits to address deficits with balance, strength, endurance and postural control. Continue with current POC with 1/21/20 set as discharge date from PT.         Fransisca is progressing well towards her goals.   Pt prognosis is Fair to Good.     Pt will continue to benefit from skilled outpatient physical therapy to address the deficits listed in the problem list box on initial evaluation, provide pt/family education and to maximize pt's level of independence in the home and community environment.     Pt's spiritual, cultural and educational needs considered and pt agreeable to plan of care and goals.     Anticipated barriers to physical therapy: emotional deficits, anxiety regarding balance training    Goals: Pt's spiritual, cultural and educational needs considered and pt agreeable to plan of care and goals as stated below:      GOALS:   Short term goals: 4 weeks, pt agrees to goals  set.  1. Pt to be issued HEP and report compliance~ in progress; Goal MET, 4/4/19  2. Pt to improve 30 second chair rise to 10-11 trials to demonstrate improvement with this transition~progressing; Goal MET, 4/4/19; Goal not MET on 4/26/19; Goal met on 6/1819, 8/8/19; Not MET, 9/27/19~ remains appropriate; Not MET, 11/26/19, remains appropriate  3. Pt to improve SSWS to 1.0m/sec for improved community ambulation with decreased fall risk~Goal MET, 6/18/19  4. Pt to improve MCTSIB score on condition # 3 to 6 seconds for improved balance on unstable surfaces~ Goal MET, 4/4/19  5. Pt to perform SLS on either limb x 2-3 seconds for improved balance and decreased fall risk~ Goal MET 4/26/19     Long term goals: 8 weeks, pt agrees to goals set  6. Pt (I) with HEP~progressing  7. Pt to improve 30 second chair rise to 12-13 trials to demonstrate improvement with this transition~progressing. Revise this goal to 11-12 trials, 4/26/19: goal remains appropriate, 6/18/19; goal remains appropriate, 8/8/19~ remains appropriate, 9/27/19; goal remains appropriate, 11/26/19  8. Pt to improve SSWS to 1.2 m/sec for improved community ambulation with decreased fall risk~ongoing  9. Pt to improve MCTSIB score on condition # 3 to 9 seconds for improved balance on unstable surfaces~ Goal MET, 4/4/19.     10. Pt to perform SLS on either limb x 4-5 seconds for improved balance and decreased fall risk~ ~Goal MET, 4/26/19.  Revise goal to perform SLS on either limb x 7-8 seconds, 4/26/19~ partially met for L LE, 6/18/19; Goal MET, 8/8/19   11. NEW GOAL( 4/26/19):  Pt to tolerate 15 seconds during condition # 4 of the MCTSIB( foam with eyes closed); Goal MET, 6/18/19; Not MET on 8/8/19~ Goal MET, 9/27/19~ REVISE this goal to 30 seconds; remains appropriate, 11/26/19   12. NEW GOAL( 11/26/19): Pt to tolerate 30 seconds during condition # 2 of the MCTSIB( firm with eyes closed)  Plan     Perform final tests and measures at next session.      Cont  to address balance deficits in parallel bars with use of compliant surfaces( encourage performance without UE support); continue with use of recumbent stepper for B UE/LE strengthening and CV/muscular endurance.Continue use of Pilates box to address SLS balance. Add basic ladder agility skills with gradual progressions. Add sit<> stand trials from elevated mat with feet resting on foam fitter/foam pads.              Abdias Solares, PT

## 2020-01-16 ENCOUNTER — CLINICAL SUPPORT (OUTPATIENT)
Dept: REHABILITATION | Facility: HOSPITAL | Age: 50
End: 2020-01-16
Attending: FAMILY MEDICINE
Payer: COMMERCIAL

## 2020-01-16 DIAGNOSIS — R29.898 BILATERAL LEG WEAKNESS: ICD-10-CM

## 2020-01-16 DIAGNOSIS — R26.89 IMPAIRMENT OF BALANCE: ICD-10-CM

## 2020-01-16 DIAGNOSIS — Z74.09 DECREASED MOBILITY AND ENDURANCE: ICD-10-CM

## 2020-01-16 PROCEDURE — 97112 NEUROMUSCULAR REEDUCATION: CPT | Mod: PO

## 2020-01-16 PROCEDURE — 97110 THERAPEUTIC EXERCISES: CPT | Mod: PO

## 2020-01-21 ENCOUNTER — CLINICAL SUPPORT (OUTPATIENT)
Dept: REHABILITATION | Facility: HOSPITAL | Age: 50
End: 2020-01-21
Attending: FAMILY MEDICINE
Payer: COMMERCIAL

## 2020-01-21 DIAGNOSIS — Z74.09 DECREASED MOBILITY AND ENDURANCE: ICD-10-CM

## 2020-01-21 DIAGNOSIS — R26.89 IMPAIRMENT OF BALANCE: ICD-10-CM

## 2020-01-21 DIAGNOSIS — R29.898 BILATERAL LEG WEAKNESS: ICD-10-CM

## 2020-01-21 PROCEDURE — 97112 NEUROMUSCULAR REEDUCATION: CPT | Mod: PO

## 2020-01-21 PROCEDURE — 97110 THERAPEUTIC EXERCISES: CPT | Mod: PO

## 2020-01-21 PROCEDURE — 97116 GAIT TRAINING THERAPY: CPT | Mod: PO

## 2020-01-21 NOTE — PROGRESS NOTES
Physical Therapy Daily Treatment Note     Name: Jillian Osullivan  Clinic Number: [id number]    Therapy Diagnosis:   Encounter Diagnoses   Name Primary?    Bilateral leg weakness     Impairment of balance     Decreased mobility and endurance      Physician: Erik Collins MD    Visit Date: 1/21/2020  Physician Orders: PT Eval and Treat   Medical Diagnosis from Referral: multiple sclerosis, abnormality of gait, atypical depressive disorder  Evaluation Date: 3/1/2019  Authorization Period Expiration: 1/16/20 to 12/31/2020  Plan of Care Expiration: 4/26/2019  Extended Plan of Care Expiration: 4/26/19 to 6/21/19  Updated Plan of Care Expiration: 6/18/19 to 8/13/19  Updated Plan of Care Expiration: 8/8/19 to 10/3/19  Updated Plan of Care Expiration: 09/27/19 to 11/22/19  Updated Plan of Care Expiration: 11/26/19 to 1/21/20  Visit # / Visits authorized: 2/20     (54 total visits in 2019)    Time In: 1148   Time Out: 1230  Total Billable Time: 42  minutes    Precautions: Standard, Fall and impaired vision, emotional deficits    Subjective       Pt reports: she feels OK today. Pt reports she just picked up some medicine on her way to the clinic.   She states semi- compliance with her HEP since the previous session.  Response to previous treatment: no adverse effects  Functional change: ongoing    Pain: 0/10   Location:  N/A    Objective       Fransisca received therapeutic exercises to develop strength, endurance, ROM, flexibility and posture for 10 minutes including:      Lower Extremity Strength~ tested in sitting  Right LE  3/1/19 4/26/19 6/18/19 8/8/19 9/27/19 11/26/19 1/21/20 Left LE  3/1/19 4/26/19 6/18/19 8/8/19 9/27/19 11/26/19 1/21/20   Hip Flexion: 3-/5 3/5 3/5 3+/5 3+/5 3+/5 4(-)/5 Hip Flexion: 3/5 3/5 3/5 3+/5 3+/5 3+/5 4(-)/5   Hip Extension:  4/5 4-/5 4/5 4-/5 4(-)/5 4(-)/5 4/5 Hip Extension: 4-/5 4/5 4(-)/5 4-/5 4(-)/5 4(-)/5 4/5   Hip Abduction: 4/5 4+/5 5/5 4+/5 4+/5 4+/5 5/5 Hip Abduction: 4/5 4+/5  5/5 4+/5 4+/5 4+/5 5/5   Hip Adduction: 4+/5 5/5 5/5 5/5 4+/5 4+/5 5/5 Hip Adduction 4+/5 5/5 5/5 5/5 4+/5 4+/5 5/5   Knee Extension: 3/5 4+/5 4+/5 4+/5 4/5 4+/5 4+/5 Knee Extension: 3+/5 4/5 4+/5 5/5 4+/5 4+/5 4+/5   Knee Flexion: 3+/5 4(-)/5 4/5 4/5 4/5 4+/5 4+/5 Knee Flexion: 4/5 4+/5 4+/5 5/5 4+/5 4+/5 4+/5   Ankle Dorsiflexion: 3/5 4+/5 4+/5 4+/5 4(-)/5 4+/5 4+/5 Ankle Dorsiflexion: 4-/5 4/5 4+/5 4+/5 4/5 4+/5 4+/5   Ankle Plantarflexion: 4/5 4+/5 5/5 5/5 4+/5 4+/5 4+/5 Ankle Plantarflexion: 4/5 4+/5 5/5 5/5 4+/5 4+/5 4+/5              Patient participated in neuromuscular re-education activities to improve: Balance, Coordination, Kinesthetic, Sense, Proprioception and Posture for 22  minutes. The following activities were included:        Standing balance in parallel bars:            Evaluation 4/26/19 6/18/19 8/8/19 9/27/19 11/26/19 1/21/20   Single Limb Stance R LE Unable to perform, fear  (<10 sec = HIGH FALL RISK) 6 seconds  (<10 sec = HIGH FALL RISK) 6 seconds(<10 sec = HIGH FALL RISK) 10 seconds  (<10 sec = HIGH FALL RISK) 10 seconds  (<10 sec = HIGH FALL RISK) 9 seconds  (<10 sec = HIGH FALL RISK) 12  seconds  (<10 sec = HIGH FALL RISK)   Single Limb Stance L LE Unable to perform, fear  (<10 sec = HIGH FALL RISK) 5 seconds  (<10 sec = HIGH FALL RISK) 9 seconds  (<10 sec = HIGH FALL RISK) 12 seconds  (<10 sec = HIGH FALL RISK) 10 seconds  (<10 sec = HIGH FALL RISK) 8 seconds  (<10 sec = HIGH FALL RISK) 10 seconds  (<10 sec = HIGH FALL RISK)   30 second Chair Rise 9 completed with arms 9 completed with arms 10 completed with arms 10 completed with arms 9.5 completed with arms 8 completed with arms 10.5 completed with arms             11/26/19  Postural control:  MCTSIB:  1. Eyes Open/feet together/Firm: 30 seconds, P, no sway  2. Eyes Closed/feet together/Firm: 18 seconds, F, min sway  3. Eyes Open/feet together/Foam: 30 seconds, P with mild ankle sway and tremor  4. Eyes Closed/feet  "together/Foam: 18 seconds, F, min sway and tremor     1/21/20  Postural control:  MCTSIB:  1. Eyes Open/feet together/Firm: 30 seconds, P, min sway  2. Eyes Closed/feet together/Firm: 22 seconds, F, min to mod sway  3. Eyes Open/feet together/Foam: 30 seconds, P with mild ankle sway and tremor  4. Eyes Closed/feet together/Foam: 24 seconds, F, min sway and tremor    Inside parallel bars:    X 4 laps tandem ambulation, no UE support, CGA with min trembling      On Bosu( soft side up):  2 x 30" static standing, no UE support, CGA to slight min A , min trembling    On Bosu( firm side up):  2 x 30" static standing, no UE support, CGA to slight min A, min trembling        Outside parallel bars:      Standing at Pilates Box:  2 x 10 B LE alternate hip and knee flex, working foot pad up and down, no UE support, 2 black springs on level 2 and 2 white springs on level 2, CGA        Patient participated in gait training activities to normalize gait pattern for 10 minutes. The following activities were included:   Gait belt used for safety. Assistive device: SC                Evaluation 4/26/19 6/18/19 8/8/19 9/27/19 11/26/19 1/21/20   Timed Up and Go 12 sec 12 sec 10 sec  10 sec without SC 9 sec with SC 9 sec with SC 7 sec with SC   Self Selected Walking Speed 0.86 m/sec (6m/7s) 0.75 m/sec(6m/8s) 1.0 m/sec(6m/6s) 1.0 m/sec(6m/6s) with SC    1.0 m/sec(6m/6s) with SC 1.0 m/sec(6m/6s) with SC 0.86 m/sec (6m/7s)   Fast Walking Speed 1.0 m/sec (6m/6s) 1.0m/sec(6m/6s) 1.2 m/sec(6m/5s) 1.0 m/sec(6m/6s) with SC 1.2 m/sec(6m/5s) with SC 1.2 m/sec(6m/5s) with SC 1.2 m/sec(6m/5s) with SC                 Home Exercises Provided and Patient Education Provided     Education provided: Sitting therex including: B LE heel and toe raises, hip abduction against peach theraband, hip adduction squeezes with ball, LAQ, hip flex.  PT also educated pt regarding proper placement of SC in her hand when performing sit<> stand trials.    PT educated pt " regarding the medical fitness program and that he feels she will be a good candidate for this; pt provided with a flyer. Pt also reminded that her next visit on 1/21/20 will be her discharge( 1/16/20).    Written Home Exercises Provided: yes. PT provided pt with written copy of the above today( 3/14/19).  PT also provided pt with piece of peach theraband.  PT added exercises in standing today( hip flexion, hip abduction, hamstring curls, heel raises and mini squats( 12/3/19).    Exercises were reviewed and Fransisca was able to demonstrate them prior to the end of the session.  Fransisca demonstrated good  understanding of the education provided.     See EMR under Media for exercises provided 3/14/19; Patient instructions, 12/3/19.    PHYSICAL THERAPY DISCHARGE SUMMARY     Status Towards Goals Met: Fransisca performed very well over the course of her therapy visits and has demonstrates significant progress. As indicated above, pt's LE strength grades continue to show gradual improvement. Pt achieved several original as well as revised goals, as indicated below. Pt was a regular attendee at her sessions and rarely cancelled her visits. Pt has been fairly consistent with performance of her HEP over the course of her time here, though lately some family stress has prevented her from fully complying here. Overall, PT feels pt is ready for discharge and is a good candidate for medical fitness program. As she did here, pt will need time to acclimate to new personalities at the fitness center( as pt is not one who easily trusts others).    GOALS:   Short term goals: 4 weeks, pt agrees to goals set.  1. Pt to be issued HEP and report compliance~ in progress; Goal MET, 4/4/19  2. Pt to improve 30 second chair rise to 10-11 trials to demonstrate improvement with this transition~progressing; Goal MET, 4/4/19; Goal not MET on 4/26/19; Goal met on 6/1819, 8/8/19; Not MET, 9/27/19~ remains appropriate; Not MET, 11/26/19, remains appropriate,  Goal MET, 1/21/20  3. Pt to improve SSWS to 1.0m/sec for improved community ambulation with decreased fall risk~Goal MET, 6/18/19, Not MET on 1/21/20  4. Pt to improve MCTSIB score on condition # 3 to 6 seconds for improved balance on unstable surfaces~ Goal MET, 4/4/19  5. Pt to perform SLS on either limb x 2-3 seconds for improved balance and decreased fall risk~ Goal MET 4/26/19     Long term goals: 8 weeks, pt agrees to goals set  6. Pt (I) with HEP~Goal MET, 1/21/20  7. Pt to improve 30 second chair rise to 12-13 trials to demonstrate improvement with this transition~progressing. Revise this goal to 11-12 trials, 4/26/19: goal remains appropriate, 6/18/19; goal remains appropriate, 8/8/19~ remains appropriate, 9/27/19; goal remains appropriate, 11/26/19; Goal Not MET, 1/21/20  8. Pt to improve SSWS to 1.2 m/sec for improved community ambulation with decreased fall risk~Goal Not MET, 1/21/20  9. Pt to improve MCTSIB score on condition # 3 to 9 seconds for improved balance on unstable surfaces~ Goal MET, 4/4/19.     10. Pt to perform SLS on either limb x 4-5 seconds for improved balance and decreased fall risk~ ~Goal MET, 4/26/19.  Revise goal to perform SLS on either limb x 7-8 seconds, 4/26/19~ partially met for L LE, 6/18/19; Goal MET, 8/8/19   11. NEW GOAL( 4/26/19):  Pt to tolerate 15 seconds during condition # 4 of the MCTSIB( foam with eyes closed); Goal MET, 6/18/19; Not MET on 8/8/19~ Goal MET, 9/27/19~ REVISE this goal to 30 seconds; remains appropriate, 11/26/19; Goal Not MET, 1/21/20   12. NEW GOAL( 11/26/19): Pt to tolerate 30 seconds during condition # 2 of the MCTSIB( firm with eyes closed)~ Goal Not Met, 1/21/20        Goals Not achieved and why:   Pt missed her revised long term 30 second chair rise goal by only 1/2 repetition! Other goals not met include STG and LTG for SSWS( due to inconsistency) and long term balance goals relating to conditions # 2 and 4 of the MCTSIB.        Discharge reason  : Patient has maximized benefit from PT at this time    PLAN   This patient is discharged from Outpatient Physical Therapy Services.     Abdias Solares, PT  01/21/2020

## 2020-02-27 ENCOUNTER — OFFICE VISIT (OUTPATIENT)
Dept: SLEEP MEDICINE | Facility: CLINIC | Age: 50
End: 2020-02-27
Payer: COMMERCIAL

## 2020-02-27 VITALS
DIASTOLIC BLOOD PRESSURE: 89 MMHG | WEIGHT: 186 LBS | BODY MASS INDEX: 34.23 KG/M2 | HEART RATE: 81 BPM | HEIGHT: 62 IN | SYSTOLIC BLOOD PRESSURE: 133 MMHG

## 2020-02-27 DIAGNOSIS — G47.30 HYPERSOMNIA WITH SLEEP APNEA: ICD-10-CM

## 2020-02-27 DIAGNOSIS — G47.10 HYPERSOMNIA WITH SLEEP APNEA: ICD-10-CM

## 2020-02-27 DIAGNOSIS — G35 MS (MULTIPLE SCLEROSIS): ICD-10-CM

## 2020-02-27 DIAGNOSIS — G47.33 OSA (OBSTRUCTIVE SLEEP APNEA): ICD-10-CM

## 2020-02-27 DIAGNOSIS — G25.81 RLS (RESTLESS LEGS SYNDROME): Primary | ICD-10-CM

## 2020-02-27 PROCEDURE — 3075F SYST BP GE 130 - 139MM HG: CPT | Mod: CPTII,S$GLB,, | Performed by: NURSE PRACTITIONER

## 2020-02-27 PROCEDURE — 99214 OFFICE O/P EST MOD 30 MIN: CPT | Mod: S$GLB,,, | Performed by: NURSE PRACTITIONER

## 2020-02-27 PROCEDURE — 3075F PR MOST RECENT SYSTOLIC BLOOD PRESS GE 130-139MM HG: ICD-10-PCS | Mod: CPTII,S$GLB,, | Performed by: NURSE PRACTITIONER

## 2020-02-27 PROCEDURE — 99214 PR OFFICE/OUTPT VISIT, EST, LEVL IV, 30-39 MIN: ICD-10-PCS | Mod: S$GLB,,, | Performed by: NURSE PRACTITIONER

## 2020-02-27 PROCEDURE — 3008F PR BODY MASS INDEX (BMI) DOCUMENTED: ICD-10-PCS | Mod: CPTII,S$GLB,, | Performed by: NURSE PRACTITIONER

## 2020-02-27 PROCEDURE — 3079F DIAST BP 80-89 MM HG: CPT | Mod: CPTII,S$GLB,, | Performed by: NURSE PRACTITIONER

## 2020-02-27 PROCEDURE — 3079F PR MOST RECENT DIASTOLIC BLOOD PRESSURE 80-89 MM HG: ICD-10-PCS | Mod: CPTII,S$GLB,, | Performed by: NURSE PRACTITIONER

## 2020-02-27 PROCEDURE — 99999 PR PBB SHADOW E&M-EST. PATIENT-LVL V: CPT | Mod: PBBFAC,,, | Performed by: NURSE PRACTITIONER

## 2020-02-27 PROCEDURE — 3008F BODY MASS INDEX DOCD: CPT | Mod: CPTII,S$GLB,, | Performed by: NURSE PRACTITIONER

## 2020-02-27 PROCEDURE — 99999 PR PBB SHADOW E&M-EST. PATIENT-LVL V: ICD-10-PCS | Mod: PBBFAC,,, | Performed by: NURSE PRACTITIONER

## 2020-02-27 RX ORDER — METHYLPHENIDATE HYDROCHLORIDE 20 MG/1
10-20 TABLET ORAL 2 TIMES DAILY PRN
Qty: 60 TABLET | Refills: 0 | Status: SHIPPED | OUTPATIENT
Start: 2020-02-27 | End: 2021-05-21 | Stop reason: SINTOL

## 2020-02-27 NOTE — PROGRESS NOTES
"This 49 y.o. female patient returns for the management of obstructive sleep apnea, RLS and insomnia. Last seen 11/7/19    Since seen she used apap 7-14cm for 7d and we discerned that AHI 2.2 and 90% tile 9.6cm. Her pressure has yet to be adjusted, still using cpap 8cm. Had bad infusion MS last week--> pain. Remains very weak. Trying to use her therapy qhs, "trying". Using butler FX mask. Sunosi trial as ineffective. Hx jitteriness from sunshine   lets her sleep during daytime due to her poor sleep nighttime    Based on use the other night 90% time 9.6cm pressure delivered to you with ahi of 2.2      PSG 7/2016 (168#) AHI 16.4  Titration study 2015, 7cm effective    HISTORY  1. CHRIS   She is attempting CPAP nightly, but sometimes difficulty due to her other ailments. Last night she managed to sleep with it for 7+ hours.  Still Feeling exhausted. No snoring    She is using a Resmed nasal pillows mask.  CPAP interrogation Dream Station not auto capable, set at 8 cm: 2874 hours used; 3.4 hours/n ave; 14/30 days >4 hours       DME = [employer]    2. She continues to have difficulties with anxiety and insomnia. This is her main complaint today.  She feels that this is somewhat better since being off of her injectible MS meds.  Also Buspar was increased by psych.  Sleep is worse and more fragmented. Increased worry and stress.    Some dozing and napping around 2 pm in the afternoon.  Bedtime 10:30 pm - MN  Once in bed, sleep onset ranges from 30 mins - 1 hour  WASO 5-6 awakenings; 30-90 mins of wake time  Awakens around 4 am, then very light and fragmented sleep, dozing in and out of sleep until wake time of 5:45 -6 am.    Takes Buspar, clonazepam (4 times a day for GI related issue) - stopped by Dr. Montiel (start on ativan), Valium 10 mg (for high anxiety), Effexor - managed by Dr. Montiel  She is currently taking melatonin 5 mg; Takes Gabapentin  She has tried Rozeram, Trazodone 100 mg (it worked and then stopped " "working)    3. RLS  - Gabapentin 300 mg was initially effective in controlling RLS and helping her sleep better. It "worked" for 3 nights, but then lost its effectiveness. She has been using it intermittently, because she is concerned about habituation. She is also concerned about inability to lose weight. Increasing gabapentin 900 mg may be helping somewhat, though she complains of morning sedation. Her  calls her a "zombie". Recently only taking 300 mg.    RLS feelings: Often feels need to move her legs at night; Urge; Moving constantly; Using adjustable bed;  Mostly in the evenings and around bed time.    SLEEP ROUTINE:   Time to bed: 11:30 pm   Sleep onset latency: a while   Disruptions or awakenings: 3-4   Wakeup time: 6 am   Perceived sleep quality: poor   Daytime naps: every few days    3/22/19:  Continues to apply CPAP mask nightly but typically removes mask during sleep. Keeping it on 3-4hr. Soooo sleepy/tired. Has NOT gotten new mask. Went to [facility] after last visit but they could n ot help her at the time and no appt scheduled. No chin strap. + anxiety, sees Dr. Montiel.Using golifeforwomen mask, often has strap marks despite pads. Sees MD at [facility] for MS. Continued fatigue, not sleeping well. Gabapentin 300mg 2-3 qhs helps RLS symptoms/feelings. ESS=17    5/31/19:   Continues to apply CPAP mask nightly but typically removes mask during sleep. Keeping it on longer though. Using butler fx mask now. Pain and mood can affect mask removal. ESS=12. Sometimes has to nap. Attending PT 2x/week. Nuvigil 150gm caused her to feel jittery. Getting B12 inj but not helping her daytime sleepiness/fatigue. Wants to continue using her PAP mask.  Gabapentin 300mg 2-3 qhs helps RLS symptoms/feelings  REVIEW OF SYSTEMS:  Sleep related symptoms as per HPI; gait impairment using cane.  Stable wgt. +anxiety and depression (sees psyche). Otherwise a balance review of 10-systems is negative.       PHYSICAL EXAM:  /89   " "Pulse 81   Ht 5' 2" (1.575 m)   Wt 84.4 kg (186 lb)   BMI 34.02 kg/m²   GENERAL: Well groomed; Normally developed; obese      ASSESSMENT:    1. Obstructive Sleep Apnea, moderate severity. CHRIS appears to be moderately well controlled on CPAP. She is using an effective setting, determined by titration sleep study 2016 with similar wft. Recent adherence is down due to ongoing health issues. She is using an approved nasal pillows CPAP mask, which forms appropriate seal and provides PAP therapy when used. Mask removal and mask interface continuing to affect use. Not gotten new mask yet 5/31/19 and 11/7/19: Continued adherence with cpap, but mask removal affecting use at times. Doing better though and motivated to continue using it. 2/27/20: using cpap 8cm more often, still very tired/sleepy. Not at correct 9.6cm    2. Sleep disturbances/Insomnia - underlying  appears to be anxiety/depression.  She feels like anxiety is getting worse, despite medical management. Cause for anxiety unclear - also multi-factorial, being managed by Dr. Montiel. She has implicated MS or Avonex as possible root cause, since she did not have any anxiety at all prior to her MS diagnosis and treatment.     3. Restless legs feelings at night / akithesia - in some cases PLMS can be potentiated by Effexor; No evidence of PLMS on sleep study; RLS can occur just as a feeling (without acutal limb kicks) that can contribute to night time anxiety and insomnia. RLS appears to have responded to higher doses of gabapentin. She is so focused on the effects of her underlying anxiety, it is difficult to ascertain whether she is getting any benefit from this.1/15/19 and 3/22/19 and 5/31/19: Improved with gabapentin 11/7/19: suboptimal control and having daytime symptoms 2//27/20: requip XL not helping "pain" and restless sleep    4. Fatigue/hypersomnia with sleep apnea- multi-factorial; certainly MS, medication, and depression can be playing a role; as " well as suboptimal mask on time 2/27/20: sunosi ineffective    PLAN:  Increase to cpap 9.5cm. Continue qhs use. [facility] DME supplies.     Stop requip since ineffective, taper off  Ritalin trial 10-20mg bid prn,   See MS provider as advised  (consider fucntional restoration program) and see psychiatrist as advised.

## 2020-03-18 ENCOUNTER — PATIENT MESSAGE (OUTPATIENT)
Dept: PSYCHIATRY | Facility: CLINIC | Age: 50
End: 2020-03-18

## 2020-03-20 ENCOUNTER — TELEPHONE (OUTPATIENT)
Dept: PSYCHIATRY | Facility: CLINIC | Age: 50
End: 2020-03-20

## 2020-03-20 DIAGNOSIS — F41.1 GAD (GENERALIZED ANXIETY DISORDER): ICD-10-CM

## 2020-03-20 DIAGNOSIS — K58.8 OTHER IRRITABLE BOWEL SYNDROME: ICD-10-CM

## 2020-03-20 DIAGNOSIS — G89.4 CHRONIC PAIN SYNDROME: ICD-10-CM

## 2020-03-20 DIAGNOSIS — F32.1 MDD (MAJOR DEPRESSIVE DISORDER), SINGLE EPISODE, MODERATE: ICD-10-CM

## 2020-03-20 RX ORDER — CLONAZEPAM 0.5 MG/1
0.5 TABLET ORAL EVERY 6 HOURS
Qty: 120 TABLET | Refills: 5 | Status: SHIPPED | OUTPATIENT
Start: 2020-03-20 | End: 2020-07-30 | Stop reason: SDUPTHER

## 2020-03-20 RX ORDER — DULOXETIN HYDROCHLORIDE 30 MG/1
30 CAPSULE, DELAYED RELEASE ORAL DAILY
Qty: 30 CAPSULE | Refills: 6 | Status: SHIPPED | OUTPATIENT
Start: 2020-03-20 | End: 2020-07-30 | Stop reason: SDUPTHER

## 2020-03-20 RX ORDER — BUSPIRONE HYDROCHLORIDE 15 MG/1
22.5 TABLET ORAL 2 TIMES DAILY
Qty: 90 TABLET | Refills: 6 | Status: SHIPPED | OUTPATIENT
Start: 2020-03-20 | End: 2020-07-30 | Stop reason: SDUPTHER

## 2020-03-20 RX ORDER — DULOXETIN HYDROCHLORIDE 60 MG/1
60 CAPSULE, DELAYED RELEASE ORAL DAILY
Qty: 30 CAPSULE | Refills: 6 | Status: SHIPPED | OUTPATIENT
Start: 2020-03-20 | End: 2020-07-30 | Stop reason: SDUPTHER

## 2020-03-20 NOTE — TELEPHONE ENCOUNTER
"STAFF NOTE:   Phone Session -- 30 minutes spent with pt ([id number])    I called pt at 4 pm, 3/20/2020, at her request.        Med plan from previous appt:  Decrease Effexor XR to 150 mg daily for one week, then decrease to 75 mg for one week, then discontinue.    Start Cymbalta 30 mg daily for one week, then increase to 60 mg daily after as tolerated.    Continue all other current medications with refills as noted.  Return to clinic on 3/20/20 at 4 pm for follow up appt.     Pt is home currently -- she has not left her residence in the past 2 weeks because of COVID-19.        Her  also has been at home for much of the time, but he did go to work yesterday.  He has been running all of the errands (groceries, etc).     She is very worried about mike COVID-19.    She has been washing her hands "countless" numbers of times per day despite the fact she has not even been outside in 2 weeks.  She states she is concerned her  will bring the virus in because he doesn't wash his hand as he should after being out and "touching the handle of the grocery cart".  She has told him he needs to wash his hands & use  more often.    Current SIGECAPS:    Sleep -- decreased    Interests -- decreased  Guilt -- denies  Energy -- decreased  Concentration -- decreased   Appetite -- somewhat decreased (still eats some; must take meds)   Psychomotor -- Decreased.    Suicidal ideation -- Denies.   Denies hopelessness.       Current Outpatient Medications:     baclofen (LIORESAL) 10 MG tablet, TK 1 T PO BID WF OR MILK, Disp: , Rfl: 3    busPIRone (BUSPAR) 15 MG tablet, Take 1.5 tablets (22.5 mg total) by mouth 2 (two) times daily., Disp: 90 tablet, Rfl: 6    CALCIUM-MAGNESIUM-ZINC ORAL, Take by mouth once daily. Calcium-1,000 mg Magnesium-500 mg Zinc-25mg, Disp: , Rfl:     ROCKY SEED/ALA/LINOLEIC/OLEIC (ROCKY SEED OIL-OMEGA 3-6-9 ORAL), Take by mouth., Disp: , Rfl:     cholecalciferol, vitamin D3, 5,000 unit " capsule, Take 5,000 Units by mouth once daily. , Disp: , Rfl:     ciprofloxacin HCl (CIPRO) 500 MG tablet, TK 1 T PO Q 12 H FOR 7 DAYS, Disp: , Rfl: 0    clindamycin phosphate 1% (CLINDAGEL) 1 % gel, MARLENA TO THE AFFECTED AREA ON AREAS OF BODY BID, Disp: , Rfl: 1    clonazePAM (KLONOPIN) 0.5 MG tablet, Take 1 tablet (0.5 mg total) by mouth every 6 (six) hours., Disp: 120 tablet, Rfl: 5    coenzyme Q10 (CO Q-10) 300 mg Cap, Take 1 capsule by mouth every evening. 400mg, Disp: , Rfl:     cyanocobalamin 1,000 mcg/mL injection, Inject 1 mL into the muscle once a week., Disp: , Rfl:     DULoxetine (CYMBALTA) 30 MG capsule, Take 1 capsule (30 mg total) by mouth once daily., Disp: 30 capsule, Rfl: 6    DULoxetine (CYMBALTA) 60 MG capsule, Take 1 capsule (60 mg total) by mouth once daily., Disp: 30 capsule, Rfl: 6    estradiol (VIVELLE-DOT) 0.0375 mg/24 hr, 1 patch twice a week., Disp: , Rfl: 5    FLAXSEED OIL ORAL, Take by mouth once daily. Omega 3 2800MG, Disp: , Rfl:     INV sodium chloride 0.9 % SolP with INV ocrelizumab 30 mg/mL Inj, Inject 300 mg into the vein. FOR INVESTIGATIONAL USE ONLY, Disp: , Rfl:     L. RHAMNOSUS GG/INULIN (CULTURELLE PROBIOTICS ORAL), Take by mouth every evening. Ultimate Jo-Ann 15 Billion Probiotic, Disp: , Rfl:     LACTOBAC NO.41/BIFIDOBACT NO.7 (PROBIOTIC-10 ORAL), Take by mouth., Disp: , Rfl:     levothyroxine (SYNTHROID) 75 MCG tablet, Take 88 mcg by mouth. , Disp: , Rfl:     magnesium oxide (MAG-OX) 400 mg (241.3 mg magnesium) tablet, Take 300 mg by mouth., Disp: , Rfl:     magnesium oxide-Mg AA chelate (MAGNESIUM, OXIDE/AA CHELATE,) 300 mg Cap, Take 325 mg by mouth., Disp: , Rfl:     melatonin 5 mg Cap, Take by mouth every evening. , Disp: , Rfl:     metFORMIN (GLUCOPHAGE-XR) 750 MG 24 hr tablet, , Disp: , Rfl:     methylphenidate HCl (RITALIN) 20 MG tablet, Take 0.5-1 tablets (10-20 mg total) by mouth 2 (two) times daily as needed., Disp: 60 tablet, Rfl: 0    MINERALS  ORAL, Take 1 tablet by mouth once daily. [employer] Essential Minerals, Disp: , Rfl:     omega 3-dha-epa-fish oil 1,000 (120-180) mg Cap, Take 1 capsule by mouth once daily., Disp: , Rfl:     ONETOUCH DELICA LANCETS 33 gauge Misc, , Disp: , Rfl: 5    ONETOUCH ULTRA TEST Strp, , Disp: , Rfl: 5    ONETOUCH ULTRA2 kit, , Disp: , Rfl: 0    psyllium (METAMUCIL) powder, Take 1 packet by mouth once daily., Disp: , Rfl:     rOPINIRole (REQUIP XL) 2 mg 24 hr tablet, Take 1 tablet (2 mg total) by mouth every evening., Disp: 30 tablet, Rfl: 3    sucralfate (CARAFATE) 100 mg/mL suspension, Take 10 mLs (1 g total) by mouth 2 (two) times daily., Disp: 420 mL, Rfl: 1    triamcinolone acetonide 0.1% (KENALOG) 0.1 % cream, Apply 1 application topically 2 (two) times daily., Disp: , Rfl: 2    turmeric root extract 500 mg Cap, Take by mouth., Disp: , Rfl:     UNABLE TO FIND, Take by mouth 2 (two) times daily. Multigenics without Iron, Disp: , Rfl:     UNABLE TO FIND, Take 100 g by mouth once daily. medication name: D-Ribose, Disp: , Rfl:     whey protein isolate 21 gram-100 kcal/27 gram Powd, by NOT APPLICABLE route., Disp: , Rfl:    Since last appt, she was able to make the transition from Effexor XR to Cymbalta.  She currently takes 60 mg of Cymbalta daily.  She has not seen much benefit from this med yet.  She has been taking Buspar and Klonopin as directed.      She gets Ocrevus infusions every 6 months.  She just had one in Jan 2020.  She states this infusion made her feel very tired, could not get up from bed.  She states the severe fatigue did eventually lift, but she still feels tired most days.      Psychiatric  Speech:  Slowed, some latency, impoverished   Mood & Affect:  anxious, sad  congruent and appropriate   Thought Process:  goal-directed, logical   Associations:  intact   Thought Content:  normal, no suicidality, no homicidality, delusions, or paranoia   Insight:  has awareness of illness   Judgement:  behavior is adequate to circumstances   Orientation:  grossly intact   Memory: intact for content of interview   Language: grossly intact   Attention Span & Concentration:  able to focus   Fund of Knowledge:  intact and appropriate to age and level of education       Impression:   Major Depressive Disorder, single episode, moderate  Generalized Anxiety Disorder   Panic Disorder with Agoraphobia    Specific Phobia -- claustrophobia  Obsessive-Compulsive Disorder    Restless Legs Syndrome (NOT CODED)   Obstructive Sleep Apnea (NOT CODED)  Partner Relational Problem (NOT CODED)  Multiple sclerosis (NOT CODED)  Obstructive Sleep Apnea (NOT CODED)    PLAN:     Medication Management:  Increase Cymbalta to 90 mg daily.    Continue Klonopin and Buspar at current doses.    RTC in ~ 3 months for f/u.      Bennett Montiel MD  Psychiatry Staff   [facility]

## 2020-10-16 ENCOUNTER — OFFICE VISIT (OUTPATIENT)
Dept: PSYCHIATRY | Facility: CLINIC | Age: 50
End: 2020-10-16
Payer: COMMERCIAL

## 2020-10-16 VITALS
DIASTOLIC BLOOD PRESSURE: 77 MMHG | BODY MASS INDEX: 34.52 KG/M2 | HEART RATE: 89 BPM | SYSTOLIC BLOOD PRESSURE: 152 MMHG | WEIGHT: 188.69 LBS

## 2020-10-16 DIAGNOSIS — F32.1 MDD (MAJOR DEPRESSIVE DISORDER), SINGLE EPISODE, MODERATE: Primary | ICD-10-CM

## 2020-10-16 DIAGNOSIS — F41.1 GENERALIZED ANXIETY DISORDER: ICD-10-CM

## 2020-10-16 DIAGNOSIS — G25.81 RLS (RESTLESS LEGS SYNDROME): ICD-10-CM

## 2020-10-16 DIAGNOSIS — F40.01 PANIC DISORDER WITH AGORAPHOBIA: ICD-10-CM

## 2020-10-16 DIAGNOSIS — G89.4 CHRONIC PAIN SYNDROME: ICD-10-CM

## 2020-10-16 DIAGNOSIS — F40.298 SPECIFIC PHOBIA: ICD-10-CM

## 2020-10-16 DIAGNOSIS — F42.9 OBSESSIVE-COMPULSIVE DISORDER, UNSPECIFIED TYPE: ICD-10-CM

## 2020-10-16 DIAGNOSIS — F41.1 GAD (GENERALIZED ANXIETY DISORDER): ICD-10-CM

## 2020-10-16 DIAGNOSIS — K58.8 OTHER IRRITABLE BOWEL SYNDROME: ICD-10-CM

## 2020-10-16 PROCEDURE — 3008F PR BODY MASS INDEX (BMI) DOCUMENTED: ICD-10-PCS | Mod: CPTII,S$GLB,, | Performed by: PSYCHIATRY & NEUROLOGY

## 2020-10-16 PROCEDURE — 90833 PSYTX W PT W E/M 30 MIN: CPT | Mod: S$GLB,,, | Performed by: PSYCHIATRY & NEUROLOGY

## 2020-10-16 PROCEDURE — 3077F SYST BP >= 140 MM HG: CPT | Mod: CPTII,S$GLB,, | Performed by: PSYCHIATRY & NEUROLOGY

## 2020-10-16 PROCEDURE — 3008F BODY MASS INDEX DOCD: CPT | Mod: CPTII,S$GLB,, | Performed by: PSYCHIATRY & NEUROLOGY

## 2020-10-16 PROCEDURE — 99213 OFFICE O/P EST LOW 20 MIN: CPT | Mod: S$GLB,,, | Performed by: PSYCHIATRY & NEUROLOGY

## 2020-10-16 PROCEDURE — 99213 PR OFFICE/OUTPT VISIT, EST, LEVL III, 20-29 MIN: ICD-10-PCS | Mod: S$GLB,,, | Performed by: PSYCHIATRY & NEUROLOGY

## 2020-10-16 PROCEDURE — 3077F PR MOST RECENT SYSTOLIC BLOOD PRESSURE >= 140 MM HG: ICD-10-PCS | Mod: CPTII,S$GLB,, | Performed by: PSYCHIATRY & NEUROLOGY

## 2020-10-16 PROCEDURE — 3078F PR MOST RECENT DIASTOLIC BLOOD PRESSURE < 80 MM HG: ICD-10-PCS | Mod: CPTII,S$GLB,, | Performed by: PSYCHIATRY & NEUROLOGY

## 2020-10-16 PROCEDURE — 99999 PR PBB SHADOW E&M-EST. PATIENT-LVL III: CPT | Mod: PBBFAC,,, | Performed by: PSYCHIATRY & NEUROLOGY

## 2020-10-16 PROCEDURE — 99999 PR PBB SHADOW E&M-EST. PATIENT-LVL III: ICD-10-PCS | Mod: PBBFAC,,, | Performed by: PSYCHIATRY & NEUROLOGY

## 2020-10-16 PROCEDURE — 3078F DIAST BP <80 MM HG: CPT | Mod: CPTII,S$GLB,, | Performed by: PSYCHIATRY & NEUROLOGY

## 2020-10-16 PROCEDURE — 90833 PR PSYCHOTHERAPY W/PATIENT W/E&M, 30 MIN (ADD ON): ICD-10-PCS | Mod: S$GLB,,, | Performed by: PSYCHIATRY & NEUROLOGY

## 2020-10-16 RX ORDER — CLONAZEPAM 0.5 MG/1
0.5 TABLET ORAL EVERY 6 HOURS
Qty: 120 TABLET | Refills: 5 | Status: SHIPPED | OUTPATIENT
Start: 2020-10-16 | End: 2021-03-11 | Stop reason: SDUPTHER

## 2020-10-16 RX ORDER — BUSPIRONE HYDROCHLORIDE 15 MG/1
22.5 TABLET ORAL 2 TIMES DAILY
Qty: 90 TABLET | Refills: 1 | Status: SHIPPED | OUTPATIENT
Start: 2020-10-16 | End: 2020-10-16 | Stop reason: SDUPTHER

## 2020-10-16 RX ORDER — DULOXETIN HYDROCHLORIDE 60 MG/1
60 CAPSULE, DELAYED RELEASE ORAL DAILY
Qty: 30 CAPSULE | Refills: 6 | Status: SHIPPED | OUTPATIENT
Start: 2020-10-16 | End: 2021-03-11 | Stop reason: SDUPTHER

## 2020-10-16 RX ORDER — DULOXETIN HYDROCHLORIDE 30 MG/1
30 CAPSULE, DELAYED RELEASE ORAL DAILY
Qty: 30 CAPSULE | Refills: 6 | Status: SHIPPED | OUTPATIENT
Start: 2020-10-16 | End: 2021-03-11 | Stop reason: SDUPTHER

## 2020-10-16 RX ORDER — BUSPIRONE HYDROCHLORIDE 15 MG/1
22.5 TABLET ORAL 2 TIMES DAILY
Qty: 90 TABLET | Refills: 6 | Status: SHIPPED | OUTPATIENT
Start: 2020-10-16 | End: 2021-03-11 | Stop reason: SDUPTHER
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
## 2020-11-14 ENCOUNTER — HOSPITAL ENCOUNTER (EMERGENCY)
Facility: HOSPITAL | Age: 50
Discharge: HOME | End: 2020-11-14
Attending: EMERGENCY MEDICINE
Payer: COMMERCIAL

## 2020-11-14 VITALS
BODY MASS INDEX: 33.49 KG/M2 | DIASTOLIC BLOOD PRESSURE: 84 MMHG | TEMPERATURE: 98 F | OXYGEN SATURATION: 100 % | WEIGHT: 182 LBS | RESPIRATION RATE: 16 BRPM | HEIGHT: 62 IN | HEART RATE: 75 BPM | SYSTOLIC BLOOD PRESSURE: 164 MMHG

## 2020-11-14 DIAGNOSIS — L02.91 ABSCESS: Primary | ICD-10-CM

## 2020-11-14 DIAGNOSIS — L73.2 HYDRADENITIS: ICD-10-CM

## 2020-11-14 PROCEDURE — 99284 EMERGENCY DEPT VISIT MOD MDM: CPT | Mod: 25

## 2020-11-14 PROCEDURE — 10060 I&D ABSCESS SIMPLE/SINGLE: CPT

## 2020-11-14 RX ORDER — LIDOCAINE HYDROCHLORIDE 10 MG/ML
10 INJECTION INFILTRATION; PERINEURAL
Status: DISCONTINUED | OUTPATIENT
Start: 2020-11-14 | End: 2020-11-14 | Stop reason: HOSPADM

## 2020-11-14 RX ORDER — CLINDAMYCIN HYDROCHLORIDE 150 MG/1
300 CAPSULE ORAL EVERY 8 HOURS
Qty: 42 CAPSULE | Refills: 0 | Status: SHIPPED | OUTPATIENT
Start: 2020-11-14 | End: 2020-11-21

## 2020-11-14 RX ORDER — NAPROXEN 500 MG/1
500 TABLET ORAL 2 TIMES DAILY WITH MEALS
Qty: 60 TABLET | Refills: 0 | Status: SHIPPED | OUTPATIENT
Start: 2020-11-14 | End: 2024-03-08

## 2020-11-14 NOTE — ED PROVIDER NOTES
Encounter Date: 2020       History     Chief Complaint   Patient presents with    Rash     pt. reports having an exacerbation of hidradenitis to her lt. groin area with an abscess that appeared yesterday.      49-year-old female, PMHx hidradenitis, presents to ED with hidradenitis flare-up an abscess formation to left groin area.  Area was first noticed yesterday, and has continued to grow in size per patient.  No current drainage.  She did begin home  clindamycin 300 mg yesterday with no improvement.  Pain is sharp, continuous, worse with touch or while walking.  She denies fever, chills, nausea, vomiting, chest or abdominal pain, urinary complications. No other acute complaints at this time.       The history is provided by the patient.     Review of patient's allergies indicates:   Allergen Reactions    Glatiramer (copolymer 1) Dermatitis    Vicodin [hydrocodone-acetaminophen] Rash    Lorazepam Other (See Comments)     Increased anxiety, agitation    Aspirin      Other reaction(s): Rash    Aspirin     Penicillins      Other reaction(s): Rash  Other reaction(s): Rash    Sulfa (sulfonamide antibiotics)     Sulfamethoxazole-trimethoprim      Other reaction(s): Rash    Teriflunomide     Vancomycin Nausea And Vomiting    Oxycodone-acetaminophen Rash     Past Medical History:   Diagnosis Date    Endometriosis, site unspecified     H/O total hysterectomy     Hidradenitis     History of laparoscopy     History of psychiatric care     Multiple sclerosis     Panic anxiety syndrome     Therapy      Past Surgical History:   Procedure Laterality Date    APPENDECTOMY      breast reduction      HYSTERECTOMY      [place] EXPLORATORY OF ABDOMEN          sweat gland removal  10/2013    rt groin     Family History   Problem Relation Age of Onset    Anxiety disorder Mother     Cancer Mother         cervix    Cataracts Mother     Breast cancer Neg Hx     Colon cancer Neg Hx     Ovarian  cancer Neg Hx     Amblyopia Neg Hx     Blindness Neg Hx     Glaucoma Neg Hx     Macular degeneration Neg Hx     Retinal detachment Neg Hx     Strabismus Neg Hx      Social History     Tobacco Use    Smoking status: Never Smoker    Smokeless tobacco: Never Used   Substance Use Topics    Alcohol use: No     Alcohol/week: 0.0 standard drinks    Drug use: No     Review of Systems   Constitutional: Negative for chills and fever.   Cardiovascular: Negative for chest pain.   Gastrointestinal: Negative for abdominal pain, nausea and vomiting.   Genitourinary: Negative for dysuria.   Skin: Positive for wound.   Neurological: Negative for weakness and numbness.       Physical Exam     Initial Vitals [11/14/20 1257]   BP Pulse Resp Temp SpO2   (!) 182/102 70 20 98.6 °F (37 °C) 96 %      MAP       --         Physical Exam    Nursing note and vitals reviewed.  Constitutional: She appears well-developed and well-nourished. She is cooperative.  Non-toxic appearance. She does not have a sickly appearance. She does not appear ill. No distress.   HENT:   Head: Normocephalic and atraumatic.   Eyes: EOM are normal.   Neck: Normal range of motion. Neck supple.   Cardiovascular: Normal rate, regular rhythm and normal heart sounds.   Pulmonary/Chest: Effort normal and breath sounds normal.   Abdominal: Soft. Normal appearance.   Musculoskeletal: No tenderness.   Neurological: She is alert and oriented to person, place, and time. She is not disoriented. GCS eye subscore is 4. GCS verbal subscore is 5. GCS motor subscore is 6.   Skin: Skin is warm and dry. Abscess noted. There is erythema.        2 x 2 cm abscess with induration and raised to point, noted to left anterior thigh with mild surrounding erythema. Tender to touch.  No other areas of fluctuation are abscess formation   Psychiatric: She has a normal mood and affect. Her speech is normal and behavior is normal. Thought content normal.         ED Course   I & D - Incision  and Drainage    Date/Time: 11/14/2020 3:34 PM  Location procedure was performed: [facility] EMERGENCY DEPARTMENT  Performed by: Khari Guaman PA-C  Authorized by: Cipriano Jacobs MD   Consent Done: Yes  Consent: Verbal consent obtained.  Consent given by: patient  Type: abscess  Body area: lower extremity  Location details: left leg  Anesthesia: local infiltration    Anesthesia:  Local Anesthetic: lidocaine 1% without epinephrine  Anesthetic total: 3 mL  Patient sedated: no  Description of findings: purulent drainage   Scalpel size: 11  Incision type: single straight  Complexity: simple  Drainage: pus  Drainage amount: moderate  Wound treatment: drainage and  wound packed  Complications: No  Patient tolerance: Patient tolerated the procedure well with no immediate complications        Labs Reviewed - No data to display       Imaging Results    None          Medical Decision Making:   Differential Diagnosis:   Hidradenitis suppurativa, abscess, cellulitis  ED Management:  I&D    Patient presents with hidradenitis flare up, with abscess formation to left inner thigh that was 1st noticed yesterday and has grown in size today.  On exam 2 x 2 cm abscess formation noted to left anterior thigh with surrounding induration and erythema.  Very tender to touch.  I&D performed with purulent drainage.  Packing was placed.  She will be prescribed clindamycin and naproxen.  Patient stated she currently does not see a dermatologist, ambulatory referral sent.  Encouraged to monitor symptoms closely, follow with PCP/ED for packing removal and wound recheck in 2 days.  ED return precautions discussed.  Patient understands instructions and agrees with plan.                             Clinical Impression:     ICD-10-CM ICD-9-CM   1. Abscess  L02.91 682.9   2. Hydradenitis  L73.2 705.83                          ED Disposition Condition    Discharge Stable        ED Prescriptions     Medication Sig Dispense Start Date End Date Auth.  Provider    clindamycin (CLEOCIN) 150 MG capsule Take 2 capsules (300 mg total) by mouth every 8 (eight) hours. for 7 days 42 capsule 11/14/2020 11/21/2020 Khari Guaman PA-C    naproxen (NAPROSYN) 500 MG tablet Take 1 tablet (500 mg total) by mouth 2 (two) times daily with meals. 60 tablet 11/14/2020  Khari Guaman PA-C        Follow-up Information     Follow up With Specialties Details Why Contact Info    Your Doctor  Call                                          Khari Guaman PA-C  11/14/20 [id number]

## 2020-11-14 NOTE — ED NOTES
Pt. Has a rash area to lt. Upper inner thigh/groin area with a raised abscess that appeared yesterday that she states is an exacerbation of her hidradenitis condition.

## 2020-11-14 NOTE — DISCHARGE INSTRUCTIONS
Take antibiotic as instructed, follow-up with dermatology    Return to closest emergency department if symptoms do not improve, change, worsen, or for any new concerns.

## 2021-02-02 ENCOUNTER — OFFICE VISIT (OUTPATIENT)
Dept: OPTOMETRY | Facility: CLINIC | Age: 51
End: 2021-02-02
Payer: COMMERCIAL

## 2021-02-02 DIAGNOSIS — H52.13 MYOPIA OF BOTH EYES WITH ASTIGMATISM AND PRESBYOPIA: ICD-10-CM

## 2021-02-02 DIAGNOSIS — G35 MULTIPLE SCLEROSIS: ICD-10-CM

## 2021-02-02 DIAGNOSIS — H52.4 MYOPIA OF BOTH EYES WITH ASTIGMATISM AND PRESBYOPIA: ICD-10-CM

## 2021-02-02 DIAGNOSIS — H47.291 PARTIAL OPTIC ATROPHY OF RIGHT EYE: Primary | ICD-10-CM

## 2021-02-02 DIAGNOSIS — H52.203 MYOPIA OF BOTH EYES WITH ASTIGMATISM AND PRESBYOPIA: ICD-10-CM

## 2021-02-02 PROCEDURE — 99999 PR PBB SHADOW E&M-EST. PATIENT-LVL III: ICD-10-PCS | Mod: PBBFAC,,, | Performed by: OPTOMETRIST

## 2021-02-02 PROCEDURE — 92012 PR EYE EXAM, EST PATIENT,INTERMED: ICD-10-PCS | Mod: S$GLB,,, | Performed by: OPTOMETRIST

## 2021-02-02 PROCEDURE — 92012 INTRM OPH EXAM EST PATIENT: CPT | Mod: S$GLB,,, | Performed by: OPTOMETRIST

## 2021-02-02 PROCEDURE — 1126F PR PAIN SEVERITY QUANTIFIED, NO PAIN PRESENT: ICD-10-PCS | Mod: S$GLB,,, | Performed by: OPTOMETRIST

## 2021-02-02 PROCEDURE — 1126F AMNT PAIN NOTED NONE PRSNT: CPT | Mod: S$GLB,,, | Performed by: OPTOMETRIST

## 2021-02-02 PROCEDURE — 92015 PR REFRACTION: ICD-10-PCS | Mod: S$GLB,,, | Performed by: OPTOMETRIST

## 2021-02-02 PROCEDURE — 92015 DETERMINE REFRACTIVE STATE: CPT | Mod: S$GLB,,, | Performed by: OPTOMETRIST

## 2021-02-02 PROCEDURE — 99999 PR PBB SHADOW E&M-EST. PATIENT-LVL III: CPT | Mod: PBBFAC,,, | Performed by: OPTOMETRIST

## 2021-02-02 RX ORDER — NEOMYCIN/POLYMYXIN B/PRAMOXINE 3.5-10K-1
CREAM (GRAM) TOPICAL
COMMUNITY
End: 2023-07-26 | Stop reason: SDUPTHER

## 2021-02-02 RX ORDER — LEVOTHYROXINE SODIUM 88 UG/1
88 TABLET ORAL DAILY
COMMUNITY
Start: 2021-01-14 | End: 2024-03-08

## 2021-02-02 RX ORDER — LEVOCARNITINE 330 MG/1
500 TABLET ORAL
COMMUNITY
End: 2024-03-08

## 2021-02-02 RX ORDER — CLINDAMYCIN PHOSPHATE 10 UG/ML
LOTION TOPICAL
COMMUNITY
Start: 2020-11-21

## 2021-02-02 RX ORDER — GABAPENTIN 600 MG/1
600 TABLET ORAL
COMMUNITY
End: 2021-08-24

## 2021-02-02 RX ORDER — VENLAFAXINE 100 MG/1
75 TABLET ORAL
COMMUNITY
End: 2021-03-11 | Stop reason: ALTCHOICE

## 2021-02-05 ENCOUNTER — PATIENT MESSAGE (OUTPATIENT)
Dept: NEUROLOGY | Facility: CLINIC | Age: 51
End: 2021-02-05

## 2021-03-11 ENCOUNTER — OFFICE VISIT (OUTPATIENT)
Dept: PSYCHIATRY | Facility: CLINIC | Age: 51
End: 2021-03-11
Payer: COMMERCIAL

## 2021-03-11 VITALS
SYSTOLIC BLOOD PRESSURE: 119 MMHG | DIASTOLIC BLOOD PRESSURE: 73 MMHG | HEART RATE: 63 BPM | BODY MASS INDEX: 32.52 KG/M2 | WEIGHT: 177.81 LBS

## 2021-03-11 DIAGNOSIS — F40.01 PANIC DISORDER WITH AGORAPHOBIA: ICD-10-CM

## 2021-03-11 DIAGNOSIS — K58.8 OTHER IRRITABLE BOWEL SYNDROME: ICD-10-CM

## 2021-03-11 DIAGNOSIS — G25.81 RLS (RESTLESS LEGS SYNDROME): ICD-10-CM

## 2021-03-11 DIAGNOSIS — G89.4 CHRONIC PAIN SYNDROME: ICD-10-CM

## 2021-03-11 DIAGNOSIS — F42.9 OBSESSIVE-COMPULSIVE DISORDER, UNSPECIFIED TYPE: ICD-10-CM

## 2021-03-11 DIAGNOSIS — F32.1 MDD (MAJOR DEPRESSIVE DISORDER), SINGLE EPISODE, MODERATE: Primary | ICD-10-CM

## 2021-03-11 DIAGNOSIS — F40.298 SPECIFIC PHOBIA: ICD-10-CM

## 2021-03-11 DIAGNOSIS — F41.1 GAD (GENERALIZED ANXIETY DISORDER): ICD-10-CM

## 2021-03-11 DIAGNOSIS — F41.1 GENERALIZED ANXIETY DISORDER: ICD-10-CM

## 2021-03-11 PROCEDURE — 3078F DIAST BP <80 MM HG: CPT | Mod: CPTII,S$GLB,, | Performed by: PSYCHIATRY & NEUROLOGY

## 2021-03-11 PROCEDURE — 99999 PR PBB SHADOW E&M-EST. PATIENT-LVL II: ICD-10-PCS | Mod: PBBFAC,,, | Performed by: PSYCHIATRY & NEUROLOGY

## 2021-03-11 PROCEDURE — 3074F SYST BP LT 130 MM HG: CPT | Mod: CPTII,S$GLB,, | Performed by: PSYCHIATRY & NEUROLOGY

## 2021-03-11 PROCEDURE — 99213 PR OFFICE/OUTPT VISIT, EST, LEVL III, 20-29 MIN: ICD-10-PCS | Mod: S$GLB,,, | Performed by: PSYCHIATRY & NEUROLOGY

## 2021-03-11 PROCEDURE — 90833 PSYTX W PT W E/M 30 MIN: CPT | Mod: S$GLB,,, | Performed by: PSYCHIATRY & NEUROLOGY

## 2021-03-11 PROCEDURE — 3074F PR MOST RECENT SYSTOLIC BLOOD PRESSURE < 130 MM HG: ICD-10-PCS | Mod: CPTII,S$GLB,, | Performed by: PSYCHIATRY & NEUROLOGY

## 2021-03-11 PROCEDURE — 99213 OFFICE O/P EST LOW 20 MIN: CPT | Mod: S$GLB,,, | Performed by: PSYCHIATRY & NEUROLOGY

## 2021-03-11 PROCEDURE — 90833 PR PSYCHOTHERAPY W/PATIENT W/E&M, 30 MIN (ADD ON): ICD-10-PCS | Mod: S$GLB,,, | Performed by: PSYCHIATRY & NEUROLOGY

## 2021-03-11 PROCEDURE — 3008F PR BODY MASS INDEX (BMI) DOCUMENTED: ICD-10-PCS | Mod: CPTII,S$GLB,, | Performed by: PSYCHIATRY & NEUROLOGY

## 2021-03-11 PROCEDURE — 99999 PR PBB SHADOW E&M-EST. PATIENT-LVL II: CPT | Mod: PBBFAC,,, | Performed by: PSYCHIATRY & NEUROLOGY

## 2021-03-11 PROCEDURE — 3078F PR MOST RECENT DIASTOLIC BLOOD PRESSURE < 80 MM HG: ICD-10-PCS | Mod: CPTII,S$GLB,, | Performed by: PSYCHIATRY & NEUROLOGY

## 2021-03-11 PROCEDURE — 3008F BODY MASS INDEX DOCD: CPT | Mod: CPTII,S$GLB,, | Performed by: PSYCHIATRY & NEUROLOGY

## 2021-03-11 RX ORDER — CLONAZEPAM 0.5 MG/1
0.5 TABLET ORAL EVERY 6 HOURS
Qty: 120 TABLET | Refills: 5 | Status: SHIPPED | OUTPATIENT
Start: 2021-03-11 | End: 2021-08-24 | Stop reason: SDUPTHER

## 2021-03-11 RX ORDER — DULOXETIN HYDROCHLORIDE 30 MG/1
30 CAPSULE, DELAYED RELEASE ORAL DAILY
Qty: 30 CAPSULE | Refills: 6 | Status: SHIPPED | OUTPATIENT
Start: 2021-03-11 | End: 2021-08-24 | Stop reason: SDUPTHER

## 2021-03-11 RX ORDER — BUSPIRONE HYDROCHLORIDE 15 MG/1
22.5 TABLET ORAL 2 TIMES DAILY
Qty: 90 TABLET | Refills: 6 | Status: SHIPPED | OUTPATIENT
Start: 2021-03-11 | End: 2021-08-24 | Stop reason: SDUPTHER

## 2021-03-11 RX ORDER — DULOXETIN HYDROCHLORIDE 60 MG/1
60 CAPSULE, DELAYED RELEASE ORAL DAILY
Qty: 30 CAPSULE | Refills: 6 | Status: SHIPPED | OUTPATIENT
Start: 2021-03-11 | End: 2021-08-24 | Stop reason: SDUPTHER

## 2021-04-03 ENCOUNTER — OFFICE VISIT (OUTPATIENT)
Dept: URGENT CARE | Facility: CLINIC | Age: 51
End: 2021-04-03
Payer: COMMERCIAL

## 2021-04-03 VITALS
DIASTOLIC BLOOD PRESSURE: 80 MMHG | SYSTOLIC BLOOD PRESSURE: 122 MMHG | RESPIRATION RATE: 18 BRPM | BODY MASS INDEX: 32.57 KG/M2 | HEART RATE: 77 BPM | WEIGHT: 177 LBS | HEIGHT: 62 IN | OXYGEN SATURATION: 98 % | TEMPERATURE: 98 F

## 2021-04-03 DIAGNOSIS — R30.0 DYSURIA: ICD-10-CM

## 2021-04-03 DIAGNOSIS — N30.00 ACUTE CYSTITIS WITHOUT HEMATURIA: Primary | ICD-10-CM

## 2021-04-03 LAB
BILIRUB UR QL STRIP: NEGATIVE
GLUCOSE UR QL STRIP: NEGATIVE
KETONES UR QL STRIP: NEGATIVE
LEUKOCYTE ESTERASE UR QL STRIP: POSITIVE
PH, POC UA: 6.5 (ref 5–8)
POC BLOOD, URINE: NEGATIVE
POC NITRATES, URINE: NEGATIVE
PROT UR QL STRIP: NEGATIVE
SP GR UR STRIP: 1.01 (ref 1–1.03)
UROBILINOGEN UR STRIP-ACNC: ABNORMAL (ref 0.1–1.1)

## 2021-04-03 PROCEDURE — 3008F BODY MASS INDEX DOCD: CPT | Mod: CPTII,S$GLB,, | Performed by: PHYSICIAN ASSISTANT

## 2021-04-03 PROCEDURE — 3008F PR BODY MASS INDEX (BMI) DOCUMENTED: ICD-10-PCS | Mod: CPTII,S$GLB,, | Performed by: PHYSICIAN ASSISTANT

## 2021-04-03 PROCEDURE — 99213 PR OFFICE/OUTPT VISIT, EST, LEVL III, 20-29 MIN: ICD-10-PCS | Mod: 25,S$GLB,, | Performed by: PHYSICIAN ASSISTANT

## 2021-04-03 PROCEDURE — 81003 POCT URINALYSIS, DIPSTICK, AUTOMATED, W/O SCOPE: ICD-10-PCS | Mod: QW,S$GLB,, | Performed by: PHYSICIAN ASSISTANT

## 2021-04-03 PROCEDURE — 1125F AMNT PAIN NOTED PAIN PRSNT: CPT | Mod: S$GLB,,, | Performed by: PHYSICIAN ASSISTANT

## 2021-04-03 PROCEDURE — 87086 URINE CULTURE/COLONY COUNT: CPT | Performed by: PHYSICIAN ASSISTANT

## 2021-04-03 PROCEDURE — 81003 URINALYSIS AUTO W/O SCOPE: CPT | Mod: QW,S$GLB,, | Performed by: PHYSICIAN ASSISTANT

## 2021-04-03 PROCEDURE — 99213 OFFICE O/P EST LOW 20 MIN: CPT | Mod: 25,S$GLB,, | Performed by: PHYSICIAN ASSISTANT

## 2021-04-03 PROCEDURE — 1125F PR PAIN SEVERITY QUANTIFIED, PAIN PRESENT: ICD-10-PCS | Mod: S$GLB,,, | Performed by: PHYSICIAN ASSISTANT

## 2021-04-03 RX ORDER — NITROFURANTOIN 25; 75 MG/1; MG/1
100 CAPSULE ORAL 2 TIMES DAILY
Qty: 14 CAPSULE | Refills: 0 | Status: SHIPPED | OUTPATIENT
Start: 2021-04-03 | End: 2021-04-10

## 2021-04-04 LAB — BACTERIA UR CULT: NO GROWTH

## 2021-04-05 ENCOUNTER — TELEPHONE (OUTPATIENT)
Dept: URGENT CARE | Facility: CLINIC | Age: 51
End: 2021-04-05

## 2021-05-20 ENCOUNTER — TELEPHONE (OUTPATIENT)
Dept: SLEEP MEDICINE | Facility: CLINIC | Age: 51
End: 2021-05-20

## 2021-05-21 ENCOUNTER — OFFICE VISIT (OUTPATIENT)
Dept: SLEEP MEDICINE | Facility: CLINIC | Age: 51
End: 2021-05-21
Payer: COMMERCIAL

## 2021-05-21 VITALS
HEART RATE: 63 BPM | BODY MASS INDEX: 27.05 KG/M2 | SYSTOLIC BLOOD PRESSURE: 113 MMHG | DIASTOLIC BLOOD PRESSURE: 73 MMHG | HEIGHT: 62 IN | WEIGHT: 147 LBS

## 2021-05-21 DIAGNOSIS — G25.81 RLS (RESTLESS LEGS SYNDROME): ICD-10-CM

## 2021-05-21 DIAGNOSIS — G47.10 HYPERSOMNIA WITH SLEEP APNEA: ICD-10-CM

## 2021-05-21 DIAGNOSIS — F41.1 GENERALIZED ANXIETY DISORDER: Primary | ICD-10-CM

## 2021-05-21 DIAGNOSIS — G47.30 HYPERSOMNIA WITH SLEEP APNEA: ICD-10-CM

## 2021-05-21 PROCEDURE — 3008F PR BODY MASS INDEX (BMI) DOCUMENTED: ICD-10-PCS | Mod: CPTII,S$GLB,, | Performed by: NURSE PRACTITIONER

## 2021-05-21 PROCEDURE — 99999 PR PBB SHADOW E&M-EST. PATIENT-LVL IV: CPT | Mod: PBBFAC,,, | Performed by: NURSE PRACTITIONER

## 2021-05-21 PROCEDURE — 99999 PR PBB SHADOW E&M-EST. PATIENT-LVL IV: ICD-10-PCS | Mod: PBBFAC,,, | Performed by: NURSE PRACTITIONER

## 2021-05-21 PROCEDURE — 99214 PR OFFICE/OUTPT VISIT, EST, LEVL IV, 30-39 MIN: ICD-10-PCS | Mod: S$GLB,,, | Performed by: NURSE PRACTITIONER

## 2021-05-21 PROCEDURE — 3008F BODY MASS INDEX DOCD: CPT | Mod: CPTII,S$GLB,, | Performed by: NURSE PRACTITIONER

## 2021-05-21 PROCEDURE — 1126F PR PAIN SEVERITY QUANTIFIED, NO PAIN PRESENT: ICD-10-PCS | Mod: S$GLB,,, | Performed by: NURSE PRACTITIONER

## 2021-05-21 PROCEDURE — 1126F AMNT PAIN NOTED NONE PRSNT: CPT | Mod: S$GLB,,, | Performed by: NURSE PRACTITIONER

## 2021-05-21 PROCEDURE — 99214 OFFICE O/P EST MOD 30 MIN: CPT | Mod: S$GLB,,, | Performed by: NURSE PRACTITIONER

## 2021-05-21 RX ORDER — IVERMECTIN 10 MG/G
CREAM TOPICAL
COMMUNITY
Start: 2021-05-10

## 2021-05-21 RX ORDER — DOXYCYCLINE 40 MG/1
40 CAPSULE ORAL DAILY
COMMUNITY
Start: 2021-05-11 | End: 2024-03-08

## 2021-05-21 RX ORDER — ROPINIROLE HYDROCHLORIDE 2 MG/1
2 TABLET, FILM COATED, EXTENDED RELEASE ORAL NIGHTLY
Qty: 30 TABLET | Refills: 3 | Status: SHIPPED | OUTPATIENT
Start: 2021-05-21 | End: 2022-03-24 | Stop reason: SINTOL

## 2021-07-06 ENCOUNTER — TELEPHONE (OUTPATIENT)
Dept: SLEEP MEDICINE | Facility: CLINIC | Age: 51
End: 2021-07-06

## 2021-08-24 ENCOUNTER — OFFICE VISIT (OUTPATIENT)
Dept: PSYCHIATRY | Facility: CLINIC | Age: 51
End: 2021-08-24
Payer: COMMERCIAL

## 2021-08-24 VITALS
BODY MASS INDEX: 29.35 KG/M2 | SYSTOLIC BLOOD PRESSURE: 124 MMHG | DIASTOLIC BLOOD PRESSURE: 81 MMHG | WEIGHT: 160.5 LBS | HEART RATE: 62 BPM

## 2021-08-24 DIAGNOSIS — F42.9 OBSESSIVE-COMPULSIVE DISORDER, UNSPECIFIED TYPE: ICD-10-CM

## 2021-08-24 DIAGNOSIS — F32.1 MDD (MAJOR DEPRESSIVE DISORDER), SINGLE EPISODE, MODERATE: ICD-10-CM

## 2021-08-24 DIAGNOSIS — F40.298 SPECIFIC PHOBIA: ICD-10-CM

## 2021-08-24 DIAGNOSIS — G89.4 CHRONIC PAIN SYNDROME: ICD-10-CM

## 2021-08-24 DIAGNOSIS — F41.1 GAD (GENERALIZED ANXIETY DISORDER): ICD-10-CM

## 2021-08-24 DIAGNOSIS — F40.01 PANIC DISORDER WITH AGORAPHOBIA: ICD-10-CM

## 2021-08-24 DIAGNOSIS — K58.8 OTHER IRRITABLE BOWEL SYNDROME: ICD-10-CM

## 2021-08-24 DIAGNOSIS — F41.1 GENERALIZED ANXIETY DISORDER: Primary | ICD-10-CM

## 2021-08-24 PROCEDURE — 1159F MED LIST DOCD IN RCRD: CPT | Mod: CPTII,S$GLB,, | Performed by: PSYCHIATRY & NEUROLOGY

## 2021-08-24 PROCEDURE — 1160F PR REVIEW ALL MEDS BY PRESCRIBER/CLIN PHARMACIST DOCUMENTED: ICD-10-PCS | Mod: CPTII,S$GLB,, | Performed by: PSYCHIATRY & NEUROLOGY

## 2021-08-24 PROCEDURE — 3008F BODY MASS INDEX DOCD: CPT | Mod: CPTII,S$GLB,, | Performed by: PSYCHIATRY & NEUROLOGY

## 2021-08-24 PROCEDURE — 3008F PR BODY MASS INDEX (BMI) DOCUMENTED: ICD-10-PCS | Mod: CPTII,S$GLB,, | Performed by: PSYCHIATRY & NEUROLOGY

## 2021-08-24 PROCEDURE — 3079F DIAST BP 80-89 MM HG: CPT | Mod: CPTII,S$GLB,, | Performed by: PSYCHIATRY & NEUROLOGY

## 2021-08-24 PROCEDURE — 1160F RVW MEDS BY RX/DR IN RCRD: CPT | Mod: CPTII,S$GLB,, | Performed by: PSYCHIATRY & NEUROLOGY

## 2021-08-24 PROCEDURE — 90833 PR PSYCHOTHERAPY W/PATIENT W/E&M, 30 MIN (ADD ON): ICD-10-PCS | Mod: S$GLB,,, | Performed by: PSYCHIATRY & NEUROLOGY

## 2021-08-24 PROCEDURE — 99213 OFFICE O/P EST LOW 20 MIN: CPT | Mod: S$GLB,,, | Performed by: PSYCHIATRY & NEUROLOGY

## 2021-08-24 PROCEDURE — 99213 PR OFFICE/OUTPT VISIT, EST, LEVL III, 20-29 MIN: ICD-10-PCS | Mod: S$GLB,,, | Performed by: PSYCHIATRY & NEUROLOGY

## 2021-08-24 PROCEDURE — 99999 PR PBB SHADOW E&M-EST. PATIENT-LVL II: CPT | Mod: PBBFAC,,, | Performed by: PSYCHIATRY & NEUROLOGY

## 2021-08-24 PROCEDURE — 3074F SYST BP LT 130 MM HG: CPT | Mod: CPTII,S$GLB,, | Performed by: PSYCHIATRY & NEUROLOGY

## 2021-08-24 PROCEDURE — 1159F PR MEDICATION LIST DOCUMENTED IN MEDICAL RECORD: ICD-10-PCS | Mod: CPTII,S$GLB,, | Performed by: PSYCHIATRY & NEUROLOGY

## 2021-08-24 PROCEDURE — 90833 PSYTX W PT W E/M 30 MIN: CPT | Mod: S$GLB,,, | Performed by: PSYCHIATRY & NEUROLOGY

## 2021-08-24 PROCEDURE — 3079F PR MOST RECENT DIASTOLIC BLOOD PRESSURE 80-89 MM HG: ICD-10-PCS | Mod: CPTII,S$GLB,, | Performed by: PSYCHIATRY & NEUROLOGY

## 2021-08-24 PROCEDURE — 99999 PR PBB SHADOW E&M-EST. PATIENT-LVL II: ICD-10-PCS | Mod: PBBFAC,,, | Performed by: PSYCHIATRY & NEUROLOGY

## 2021-08-24 PROCEDURE — 3074F PR MOST RECENT SYSTOLIC BLOOD PRESSURE < 130 MM HG: ICD-10-PCS | Mod: CPTII,S$GLB,, | Performed by: PSYCHIATRY & NEUROLOGY

## 2021-08-24 RX ORDER — BUSPIRONE HYDROCHLORIDE 15 MG/1
22.5 TABLET ORAL 2 TIMES DAILY
Qty: 90 TABLET | Refills: 6 | Status: SHIPPED | OUTPATIENT
Start: 2021-08-24 | End: 2022-03-24 | Stop reason: SDUPTHER

## 2021-08-24 RX ORDER — DULOXETIN HYDROCHLORIDE 30 MG/1
30 CAPSULE, DELAYED RELEASE ORAL DAILY
Qty: 30 CAPSULE | Refills: 6 | Status: SHIPPED | OUTPATIENT
Start: 2021-08-24 | End: 2022-03-24 | Stop reason: SDUPTHER

## 2021-08-24 RX ORDER — DULOXETIN HYDROCHLORIDE 60 MG/1
60 CAPSULE, DELAYED RELEASE ORAL DAILY
Qty: 30 CAPSULE | Refills: 6 | Status: SHIPPED | OUTPATIENT
Start: 2021-08-24 | End: 2022-03-24 | Stop reason: SDUPTHER

## 2021-08-24 RX ORDER — CLONAZEPAM 0.5 MG/1
0.5 TABLET ORAL EVERY 6 HOURS
Qty: 120 TABLET | Refills: 5 | Status: SHIPPED | OUTPATIENT
Start: 2021-08-24 | End: 2022-02-23 | Stop reason: SDUPTHER

## 2021-12-21 ENCOUNTER — PATIENT MESSAGE (OUTPATIENT)
Dept: NEUROLOGY | Facility: CLINIC | Age: 51
End: 2021-12-21
Payer: COMMERCIAL

## 2022-02-09 ENCOUNTER — TELEPHONE (OUTPATIENT)
Dept: OPHTHALMOLOGY | Facility: CLINIC | Age: 52
End: 2022-02-09
Payer: COMMERCIAL

## 2022-02-09 NOTE — TELEPHONE ENCOUNTER
Appt has been schedule for 4/6/2022 for testing and appt with Dr. Rosenthal will follow. Pt is aware her 2nd appointment is after lunch.

## 2022-02-22 ENCOUNTER — PATIENT MESSAGE (OUTPATIENT)
Dept: PSYCHIATRY | Facility: CLINIC | Age: 52
End: 2022-02-22
Payer: COMMERCIAL

## 2022-02-28 ENCOUNTER — PATIENT MESSAGE (OUTPATIENT)
Dept: PSYCHIATRY | Facility: CLINIC | Age: 52
End: 2022-02-28
Payer: COMMERCIAL

## 2022-03-24 ENCOUNTER — OFFICE VISIT (OUTPATIENT)
Dept: PSYCHIATRY | Facility: CLINIC | Age: 52
End: 2022-03-24
Payer: COMMERCIAL

## 2022-03-24 VITALS
SYSTOLIC BLOOD PRESSURE: 130 MMHG | DIASTOLIC BLOOD PRESSURE: 81 MMHG | HEART RATE: 62 BPM | BODY MASS INDEX: 27.41 KG/M2 | WEIGHT: 148.94 LBS | HEIGHT: 62 IN

## 2022-03-24 DIAGNOSIS — G89.4 CHRONIC PAIN SYNDROME: ICD-10-CM

## 2022-03-24 DIAGNOSIS — F41.1 GAD (GENERALIZED ANXIETY DISORDER): ICD-10-CM

## 2022-03-24 DIAGNOSIS — F40.298 SPECIFIC PHOBIA: ICD-10-CM

## 2022-03-24 DIAGNOSIS — F40.01 PANIC DISORDER WITH AGORAPHOBIA: ICD-10-CM

## 2022-03-24 DIAGNOSIS — F41.1 GENERALIZED ANXIETY DISORDER: ICD-10-CM

## 2022-03-24 DIAGNOSIS — F32.1 MDD (MAJOR DEPRESSIVE DISORDER), SINGLE EPISODE, MODERATE: Primary | ICD-10-CM

## 2022-03-24 DIAGNOSIS — F42.9 OBSESSIVE-COMPULSIVE DISORDER, UNSPECIFIED TYPE: ICD-10-CM

## 2022-03-24 PROCEDURE — 99213 OFFICE O/P EST LOW 20 MIN: CPT | Mod: S$GLB,,, | Performed by: PSYCHIATRY & NEUROLOGY

## 2022-03-24 PROCEDURE — 3079F DIAST BP 80-89 MM HG: CPT | Mod: CPTII,S$GLB,, | Performed by: PSYCHIATRY & NEUROLOGY

## 2022-03-24 PROCEDURE — 3075F SYST BP GE 130 - 139MM HG: CPT | Mod: CPTII,S$GLB,, | Performed by: PSYCHIATRY & NEUROLOGY

## 2022-03-24 PROCEDURE — 99213 PR OFFICE/OUTPT VISIT, EST, LEVL III, 20-29 MIN: ICD-10-PCS | Mod: S$GLB,,, | Performed by: PSYCHIATRY & NEUROLOGY

## 2022-03-24 PROCEDURE — 90833 PSYTX W PT W E/M 30 MIN: CPT | Mod: S$GLB,,, | Performed by: PSYCHIATRY & NEUROLOGY

## 2022-03-24 PROCEDURE — 3075F PR MOST RECENT SYSTOLIC BLOOD PRESS GE 130-139MM HG: ICD-10-PCS | Mod: CPTII,S$GLB,, | Performed by: PSYCHIATRY & NEUROLOGY

## 2022-03-24 PROCEDURE — 3079F PR MOST RECENT DIASTOLIC BLOOD PRESSURE 80-89 MM HG: ICD-10-PCS | Mod: CPTII,S$GLB,, | Performed by: PSYCHIATRY & NEUROLOGY

## 2022-03-24 PROCEDURE — 3008F BODY MASS INDEX DOCD: CPT | Mod: CPTII,S$GLB,, | Performed by: PSYCHIATRY & NEUROLOGY

## 2022-03-24 PROCEDURE — 3008F PR BODY MASS INDEX (BMI) DOCUMENTED: ICD-10-PCS | Mod: CPTII,S$GLB,, | Performed by: PSYCHIATRY & NEUROLOGY

## 2022-03-24 PROCEDURE — 90833 PR PSYCHOTHERAPY W/PATIENT W/E&M, 30 MIN (ADD ON): ICD-10-PCS | Mod: S$GLB,,, | Performed by: PSYCHIATRY & NEUROLOGY

## 2022-03-24 PROCEDURE — 99999 PR PBB SHADOW E&M-EST. PATIENT-LVL II: ICD-10-PCS | Mod: PBBFAC,,, | Performed by: PSYCHIATRY & NEUROLOGY

## 2022-03-24 PROCEDURE — 99999 PR PBB SHADOW E&M-EST. PATIENT-LVL II: CPT | Mod: PBBFAC,,, | Performed by: PSYCHIATRY & NEUROLOGY

## 2022-03-24 RX ORDER — DULOXETIN HYDROCHLORIDE 30 MG/1
30 CAPSULE, DELAYED RELEASE ORAL DAILY
Qty: 90 CAPSULE | Refills: 3 | Status: SHIPPED | OUTPATIENT
Start: 2022-03-24 | End: 2022-11-30 | Stop reason: SDUPTHER

## 2022-03-24 RX ORDER — BUSPIRONE HYDROCHLORIDE 15 MG/1
22.5 TABLET ORAL 2 TIMES DAILY
Qty: 270 TABLET | Refills: 3 | Status: SHIPPED | OUTPATIENT
Start: 2022-03-24 | End: 2022-11-30 | Stop reason: SDUPTHER

## 2022-03-24 RX ORDER — DULOXETIN HYDROCHLORIDE 60 MG/1
60 CAPSULE, DELAYED RELEASE ORAL DAILY
Qty: 90 CAPSULE | Refills: 3 | Status: SHIPPED | OUTPATIENT
Start: 2022-03-24 | End: 2022-11-30 | Stop reason: SDUPTHER
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
## 2022-04-05 NOTE — PROGRESS NOTES
"  Date:  4/6/2022    ?  Referring Provider:   Angela Corrla MD    Copies of Letters to the Following:   MD Gurpreet Gonzales (neuro)    Chief Complaint:  I saw Jillian Osullivan at the [facility] for neuro-ophthalmic evaluation.   She is a 51 y.o. female with a history of hypothyroidism, ERM OS, cataract OD, myopia and astigmatism with presbyopia, MS on Ocrevus and history of right optic neuritis who presents for evaluation of blurred vision.     History:       Patient here today with c/o decreased VA OD, halos OD, occasional pain OD   and flashes and floaters on yesterday OD. No previous sx or injury ou.   Diagnosis with MS in 2007. No fmhx of eye disease.    No gtts     OD optic neuritis in 2007 right eye blur, FBS, then pain, worsened over time and then had some improvement.     MRI 2/15/2022 without any abnormal enhancement of the optic nerves, per report    She reports chronic blurred vision and intermittent eye pain, received course(s) of steroids in early 2022 with no improvement.     Due for follow up with Dr. Ardon      2/2021 Duglas:  "Partial optic atrophy of right eye     Multiple sclerosis     Myopia of both eyes with astigmatism and presbyopia        1. Hx reduced VA OD 2 to optic neuritis from MS--see previous notes.  Pt followed by outside retina, Dr hunter, every 6 mos,  and just here today for spex.    2. Small cat OD"    Nino Vázquez (neuro) 3/8/2022  "50 y/o female with a medical history significant for MS, acquired hypothyroidism, and anxiety returns to the clinic today for a follow up. On last visit, patient presented to establish care for MS. Patient was diagnosed with MS several years ago on the basis of a Brain MRI after a bout of optic neuritis. Last visit, she complained of increased vision loss in her R eye and difficulty distinguishing colors. Neurological examination was consistent with an acute optic neuritis. Patient was to continue on Ocrevus " "infusions. She was sent for Solu-Medrol 1 g IV daily for 3 days. Patient was sent for Brain and Orbits MRIs for further evaluation given lack of imaging for the past several years. Brain MRI performed 2/15/22 revealed no significant interval change and no enhancing lesions in comparison to previous study. Orbits MRI performed 2/15/22 was normal. Today, patient reports R eye vision loss remains stable since last visit. She continues to have difficulties seeing from a distance out of her R eye. She complains of myalgia, significant fatigue and general malaise. Patient admits to balance issues and ambulates with a cane typically. Patient completed 3 day course of Solu-Medrol 1 g IV daily without complications. Patient continues on Ocrevus without complications. Her last Ocrevus infusion was about 6 months ago. She has been on Avonex, Copaxone, Tecfidera, Aubagio in the past. She had to discontinue these medications due to either side effects or allergies. Patient has tried Provigil in the past but discontinued Provigil because she experienced side-effects. Patient continues on Baclofen nightly. "  ?  Current Outpatient Medications   Medication Sig Dispense Refill    baclofen (LIORESAL) 10 MG tablet TK 1 T PO BID WF OR MILK  3    busPIRone (BUSPAR) 15 MG tablet Take 1.5 tablets (22.5 mg total) by mouth 2 (two) times daily. 270 tablet 3    CALCIUM-MAGNESIUM-ZINC ORAL Take by mouth once daily. Calcium-1,000 mg  Magnesium-500 mg  Zinc-25mg      ROCKY SEED/ALA/LINOLEIC/OLEIC (ROCKY SEED OIL-OMEGA 3-6-9 ORAL) Take by mouth.      cholecalciferol, vitamin D3, 5,000 unit capsule Take 5,000 Units by mouth once daily.       clonazePAM (KLONOPIN) 0.5 MG tablet Take 1 tablet (0.5 mg total) by mouth every 6 (six) hours as needed for Anxiety. 120 tablet 5    coenzyme Q10 300 mg Cap Take 1 capsule by mouth every evening. 400mg      cyanocobalamin 1,000 mcg/mL injection Inject 1 mL into the muscle once a week.      doxycycline " (ORACEA) 40 mg capsule Take 40 mg by mouth once daily.      doxycycline (PERIOSTAT) 20 MG tablet Take 20 mg by mouth 2 (two) times daily.      DULoxetine (CYMBALTA) 30 MG capsule Take 1 capsule (30 mg total) by mouth once daily. 90 capsule 3    DULoxetine (CYMBALTA) 60 MG capsule Take 1 capsule (60 mg total) by mouth once daily. 90 capsule 3    FLAXSEED OIL ORAL Take by mouth once daily. Omega 3 2800MG      INV sodium chloride 0.9 % SolP with INV ocrelizumab 30 mg/mL Inj Inject 300 mg into the vein. FOR INVESTIGATIONAL USE ONLY      L. RHAMNOSUS GG/INULIN (CULTURELLE PROBIOTICS ORAL) Take by mouth every evening. Ultimate Jo-Ann 15 Billion Probiotic      LACTOBAC NO.41/BIFIDOBACT NO.7 (PROBIOTIC-10 ORAL) Take by mouth.      levOCARNitine (CARNITOR) 330 mg Tab Take 500 mg by mouth.      levothyroxine (SYNTHROID) 88 MCG tablet Take 88 mcg by mouth once daily.      magnesium oxide (MAG-OX) 400 mg (241.3 mg magnesium) tablet Take 300 mg by mouth.      MINERALS ORAL Take 1 tablet by mouth once daily. New Vision Essential Minerals      naproxen (NAPROSYN) 500 MG tablet Take 1 tablet (500 mg total) by mouth 2 (two) times daily with meals. 60 tablet 0    ocrelizumab 30 mg/mL Soln Inject 600 mg into the vein.      ocrelizumab 30 mg/mL Soln Inject into the vein.      omega 3-dha-epa-fish oil 1,000 (120-180) mg Cap Take 1 capsule by mouth once daily.      omega-3 fatty acids-fish oil (FISH OIL PEARLS) 150-400 mg Cap Take by mouth.      ONETOUCH DELICA LANCETS 33 gauge Misc   5    ONETOUCH ULTRA TEST Strp   5    ONETOUCH ULTRA2 kit   0    psyllium (METAMUCIL) powder Take 1 packet by mouth once daily.      SOOLANTRA 1 % Crea       triamcinolone acetonide 0.1% (KENALOG) 0.1 % cream Apply 1 application topically 2 (two) times daily.  2    turmeric root extract 500 mg Cap Take by mouth.      UNABLE TO FIND Take by mouth 2 (two) times daily. Multigenics without Iron      UNABLE TO FIND Take 100 g by mouth once  daily. medication name: D-Ribose      vitamins  A,C,E-zinc-copper 14,320-226-200 unit-mg-unit Cap Take by mouth.      whey protein isolate 21 gram-100 kcal/27 gram Powd by NOT APPLICABLE route.      clindamycin (CLEOCIN T) 1 % lotion MARLENA EXT AA BID      clindamycin phosphate 1% (CLINDAGEL) 1 % gel MARLENA TO THE AFFECTED AREA ON AREAS OF BODY BID  1    estradiol (VIVELLE-DOT) 0.0375 mg/24 hr 1 patch twice a week.  5    melatonin 5 mg Cap Take by mouth every evening.       metFORMIN (GLUCOPHAGE-XR) 750 MG 24 hr tablet        No current facility-administered medications for this visit.     Review of patient's allergies indicates:   Allergen Reactions    Glatiramer (copolymer 1) Dermatitis    Vicodin [hydrocodone-acetaminophen] Rash    Lorazepam Other (See Comments)     Increased anxiety, agitation    Aspirin      Other reaction(s): Rash    Aspirin     Codeine Other (See Comments)    Dimethyl fumarate Other (See Comments)    Glatiramer Hives    Penicillins      Other reaction(s): Rash  Other reaction(s): Rash    Sulfa (sulfonamide antibiotics)     Sulfamethoxazole-trimethoprim      Other reaction(s): Rash    Teriflunomide     Vancomycin Nausea And Vomiting    Oxycodone-acetaminophen Rash     Past Medical History:   Diagnosis Date    Endometriosis, site unspecified     H/O total hysterectomy     Hidradenitis     History of laparoscopy     History of psychiatric care     Multiple sclerosis     Panic anxiety syndrome     Therapy      Past Surgical History:   Procedure Laterality Date    APPENDECTOMY      breast reduction      HYSTERECTOMY      AK EXPLORATORY OF ABDOMEN      2002    sweat gland removal  10/2013    rt groin     Family History   Problem Relation Age of Onset    Anxiety disorder Mother     Cancer Mother         cervix    Cataracts Mother     Breast cancer Neg Hx     Colon cancer Neg Hx     Ovarian cancer Neg Hx     Amblyopia Neg Hx     Blindness Neg Hx     Glaucoma Neg  Hx     Macular degeneration Neg Hx     Retinal detachment Neg Hx     Strabismus Neg Hx      Social History     Socioeconomic History    Marital status:    Tobacco Use    Smoking status: Never Smoker    Smokeless tobacco: Never Used   Substance and Sexual Activity    Alcohol use: No     Alcohol/week: 0.0 standard drinks    Drug use: No    Sexual activity: Not Currently     Birth control/protection: Abstinence       ?  Review of Systems:  Detailed review of systems was negative except as noted below.  Endocrine (hormone): Negative  Cardiovascular ( heart/blood vessels): Negative  Fevers/weight loss (constitutional):Negative  Ear, nose, or throat : Negative  Respiratory (lung): Negative  Gastrointestinal (stomach): Negative  Genitourinary (bladder/kidneys): Negative  Musculoskeletal (muscle/bones): Negative  Skin: Negative  Psychiatric: Negative  Hematologic (blood): Negative    Examination:  She was well-appearing. She was alert and oriented. Attention span and concentration were normal. Speech, language, memory, and general knowledge were intact.     Her distance visual acuity with correction was CF 2'  (PH 20/80-1) in the right eye and 20/40  (PH 20/25-1)in the left eye.      She perceived 8/8 OD and 8/8 OS Ishihara color plates correctly. Red desaturation 10% OD versus 100% OS. Pupils were brisk to light with a 0.6 log unit R APD. Ocular ductions were full. Unable to test alignment by cross cover as she cannot focus on small target OD without pinhole There was no nystagmus. Saccades and pursuits were normal. Lids were symmetric.     Optic discs appeared normal OS, very blurry view OD, suspect cataract. Pupillary dilation was not necessary for visualization of the optic disc today.     Laboratories Reviewed:     n/a  ?  Neuroimaging Reviewed:     1/2018 MRI brain w/wo contrast  IMPRESSION:   No significant change from prior.     Allowing for differences in technique no new lesion with continued  scattered T2 flair hyperintensities and supratentorial parenchyma remain concerning for mild degree of prior demyelinating plaque. No evidence for new lesion or enhancing lesion to suggest interval or active demyelination.     Clinical correlation and followup advised.     2/15/2022 MRI orbits w/wo contrast  FINDINGS:   The globes are symmetric in signal intensity and appearance. There is no abnormal intraconal or extraconal enhancement. No evidence of mass. There is no abnormal enhancement along the optic nerves. The extraocular muscles are symmetric in size and appearance. No evidence of a venous varix. There is no retrobulbar are stranding of fat. The optic chiasm is normal. There is no suprasellar mass.    IMPRESSION:   Normal MRI orbits with and without contrast.    I personally reviewed these reports but images were not available for my review  ?  Ocular Imaging, Photos, Records Reviewed:     OCT RNFL Today 4/6/2022:   Right Eye - Average RNFL 64 temporal and inferior thinning, borderline nasal thinning   Left Eye - Average RNFL 94 all segments normal     Normal macular architecture OU    Visual Field Test 24-2 OU Today 4/6/2022: Right Eye - fixation losses 2/11, false positives 0%, false negatives 11%, MD -7.17dB, Impression OD: moderate generalized depression. Left Eye - fixation losses 2/10, false positives 9%, false negatives 0%, MD -0.07dB, Impression OS: full.  ?  Impression:  Jillian Osullivan has history of hypothyroidism, ERM OS, cataract OD, myopia and astigmatism with presbyopia, MS on Ocrevus and history of right optic neuritis who presents for evaluation of blurred vision OD and intermittent blurred vision and pain OD. She started having these symptoms several months ago and had MRI orbits in 2/2022 with no reported evidence of optic neuritis. She received high dose steroids in early 2022 as well. She has worsened visual acuity OD than prior but it improves dramatically with pinhole suggestive  of primary ocular pathology. She is due for updated refraction and routine exam in optometry. The view of her disc with direct fundoscopy today is quite blurred and I suspect she may have had some worsening of her cataract OD in response to steroids.  will assess. My suspicion for recurrent optic neuritis is very low. I will continue to monitor her vision from an MS standpoint.   ?  Plan:  1. Follow up with Shade Vázquez and Ellis as planned  2. Follow up with Dr. Ardon soon     Follow-up:  I will see her in follow-up in 6 months or sooner with any change.  OCT and HVF  ?  Visit Checklist (as applicable):  1. Status of new and prior symptoms discussed? yes  2. Neuroimaging reviewed/ ordered as appropriate? yes  3. Ocular imaging and photos reviewed/ ordered as appropriate? yes  4. Plan for work-up and treatment discussed with patient? yes  5. Potential medication side-effects and monitoring plan discussed? yes  6. Review of outside medical records was performed and pertinent details are summarized in the HPI above? yes    Time spent on this encounter: 60 minutes. This includes face to face time and non-face to face time preparing to see the patient (eg, review of tests), obtaining and/or reviewing separately obtained history, documenting clinical information in the electronic or other health record, independently interpreting results and communicating results to the patient/family/caregiver, or care coordinator.      MARISOL Waite  Neuro-Ophthalmology Consultant

## 2022-04-06 ENCOUNTER — OFFICE VISIT (OUTPATIENT)
Dept: OPHTHALMOLOGY | Facility: CLINIC | Age: 52
End: 2022-04-06
Payer: COMMERCIAL

## 2022-04-06 ENCOUNTER — CLINICAL SUPPORT (OUTPATIENT)
Dept: OPHTHALMOLOGY | Facility: CLINIC | Age: 52
End: 2022-04-06
Payer: COMMERCIAL

## 2022-04-06 DIAGNOSIS — Z86.69 H/O OPTIC NEURITIS: ICD-10-CM

## 2022-04-06 DIAGNOSIS — H26.9 CATARACT, UNSPECIFIED CATARACT TYPE, UNSPECIFIED LATERALITY: ICD-10-CM

## 2022-04-06 DIAGNOSIS — G35 MS (MULTIPLE SCLEROSIS): ICD-10-CM

## 2022-04-06 DIAGNOSIS — H53.15 VISUAL DISTORTIONS OF SHAPE AND SIZE: Primary | ICD-10-CM

## 2022-04-06 DIAGNOSIS — H47.291 PARTIAL OPTIC ATROPHY OF RIGHT EYE: ICD-10-CM

## 2022-04-06 PROCEDURE — 99999 PR PBB SHADOW E&M-EST. PATIENT-LVL IV: CPT | Mod: PBBFAC,,, | Performed by: STUDENT IN AN ORGANIZED HEALTH CARE EDUCATION/TRAINING PROGRAM

## 2022-04-06 PROCEDURE — 1159F PR MEDICATION LIST DOCUMENTED IN MEDICAL RECORD: ICD-10-PCS | Mod: CPTII,S$GLB,, | Performed by: STUDENT IN AN ORGANIZED HEALTH CARE EDUCATION/TRAINING PROGRAM

## 2022-04-06 PROCEDURE — 99205 PR OFFICE/OUTPT VISIT, NEW, LEVL V, 60-74 MIN: ICD-10-PCS | Mod: S$GLB,,, | Performed by: STUDENT IN AN ORGANIZED HEALTH CARE EDUCATION/TRAINING PROGRAM

## 2022-04-06 PROCEDURE — 92083 EXTENDED VISUAL FIELD XM: CPT | Mod: S$GLB,,, | Performed by: STUDENT IN AN ORGANIZED HEALTH CARE EDUCATION/TRAINING PROGRAM

## 2022-04-06 PROCEDURE — 99999 PR PBB SHADOW E&M-EST. PATIENT-LVL IV: ICD-10-PCS | Mod: PBBFAC,,, | Performed by: STUDENT IN AN ORGANIZED HEALTH CARE EDUCATION/TRAINING PROGRAM

## 2022-04-06 PROCEDURE — 92083 HUMPHREY VISUAL FIELD - OU - BOTH EYES: ICD-10-PCS | Mod: S$GLB,,, | Performed by: STUDENT IN AN ORGANIZED HEALTH CARE EDUCATION/TRAINING PROGRAM

## 2022-04-06 PROCEDURE — 1160F PR REVIEW ALL MEDS BY PRESCRIBER/CLIN PHARMACIST DOCUMENTED: ICD-10-PCS | Mod: CPTII,S$GLB,, | Performed by: STUDENT IN AN ORGANIZED HEALTH CARE EDUCATION/TRAINING PROGRAM

## 2022-04-06 PROCEDURE — 92133 POSTERIOR SEGMENT OCT OPTIC NERVE(OCULAR COHERENCE TOMOGRAPHY) - OU - BOTH EYES: ICD-10-PCS | Mod: S$GLB,,, | Performed by: STUDENT IN AN ORGANIZED HEALTH CARE EDUCATION/TRAINING PROGRAM

## 2022-04-06 PROCEDURE — 1159F MED LIST DOCD IN RCRD: CPT | Mod: CPTII,S$GLB,, | Performed by: STUDENT IN AN ORGANIZED HEALTH CARE EDUCATION/TRAINING PROGRAM

## 2022-04-06 PROCEDURE — 99205 OFFICE O/P NEW HI 60 MIN: CPT | Mod: S$GLB,,, | Performed by: STUDENT IN AN ORGANIZED HEALTH CARE EDUCATION/TRAINING PROGRAM

## 2022-04-06 PROCEDURE — 1160F RVW MEDS BY RX/DR IN RCRD: CPT | Mod: CPTII,S$GLB,, | Performed by: STUDENT IN AN ORGANIZED HEALTH CARE EDUCATION/TRAINING PROGRAM

## 2022-04-06 PROCEDURE — 92133 CPTRZD OPH DX IMG PST SGM ON: CPT | Mod: S$GLB,,, | Performed by: STUDENT IN AN ORGANIZED HEALTH CARE EDUCATION/TRAINING PROGRAM

## 2022-04-06 RX ORDER — DOXYCYCLINE HYCLATE 20 MG
20 TABLET ORAL 2 TIMES DAILY
COMMUNITY
Start: 2022-03-17 | End: 2024-03-08

## 2022-04-06 NOTE — LETTER
[facility] includes the [facility]  [address]  [facility] [address]  Phone: [phone]  Fax: [phone]   April 6, 2022    Angela Corral MD  [address]  [facility] [address]  [place] [address]    Patient: Jillian Osullivan   MR Number: [id number]   YOB: 1970   Date of Visit: 4/6/2022       Dear Dr. Corral :    Thank you for referring Jillian Osullivan to me for evaluation. Here is my assessment and plan of care:    Impression:  Jillian Osullivan has history of hypothyroidism, ERM OS, cataract OD, myopia and astigmatism with presbyopia, MS on Ocrevus and history of right optic neuritis who presents for evaluation of blurred vision OD and intermittent blurred vision and pain OD. She started having these symptoms several months ago and had MRI orbits in 2/2022 with no reported evidence of optic neuritis. She received high dose steroids in early 2022 as well. She has worsened visual acuity OD than prior but it improves dramatically with pinhole suggestive of primary ocular pathology. She is due for updated refraction and routine exam in optometry. The view of her disc with direct fundoscopy today is quite blurred and I suspect she may have had some worsening of her cataract OD in response to steroids. Dr. Ardon will assess. My suspicion for recurrent optic neuritis is very low. I will continue to monitor her vision from an MS standpoint.   ?  Plan:  1. Follow up with Shade Vázquez and Ellis as planned  2. Follow up with Dr. Ardon soon     Follow-up:  I will see her in follow-up in 6 months or sooner with any change.    If you have questions, please do not hesitate to call me. I look forward to following Ms. Jillian Osullivan along with you.    Sincerely,        Molly Rosenthal MD       CC  Nino Vázquez MD

## 2022-04-06 NOTE — PROGRESS NOTES
HVF done ou./rel/fix/coop. good ou./chart checked for latex allergy./per patient used pirate patch for testing./-3.00 + .75 x 175/od -.50/os-smh

## 2022-04-13 ENCOUNTER — OFFICE VISIT (OUTPATIENT)
Dept: OPTOMETRY | Facility: CLINIC | Age: 52
End: 2022-04-13
Payer: COMMERCIAL

## 2022-04-13 DIAGNOSIS — H52.13 MYOPIA OF BOTH EYES WITH ASTIGMATISM AND PRESBYOPIA: ICD-10-CM

## 2022-04-13 DIAGNOSIS — H52.4 MYOPIA OF BOTH EYES WITH ASTIGMATISM AND PRESBYOPIA: ICD-10-CM

## 2022-04-13 DIAGNOSIS — H52.203 MYOPIA OF BOTH EYES WITH ASTIGMATISM AND PRESBYOPIA: ICD-10-CM

## 2022-04-13 DIAGNOSIS — H47.291 PARTIAL OPTIC ATROPHY OF RIGHT EYE: Primary | ICD-10-CM

## 2022-04-13 DIAGNOSIS — H25.13 NUCLEAR SCLEROSIS, BILATERAL: ICD-10-CM

## 2022-04-13 DIAGNOSIS — G35 MULTIPLE SCLEROSIS: ICD-10-CM

## 2022-04-13 PROCEDURE — 99999 PR PBB SHADOW E&M-EST. PATIENT-LVL IV: CPT | Mod: PBBFAC,,, | Performed by: OPTOMETRIST

## 2022-04-13 PROCEDURE — 1160F RVW MEDS BY RX/DR IN RCRD: CPT | Mod: CPTII,S$GLB,, | Performed by: OPTOMETRIST

## 2022-04-13 PROCEDURE — 99999 PR PBB SHADOW E&M-EST. PATIENT-LVL IV: ICD-10-PCS | Mod: PBBFAC,,, | Performed by: OPTOMETRIST

## 2022-04-13 PROCEDURE — 92014 COMPRE OPH EXAM EST PT 1/>: CPT | Mod: S$GLB,,, | Performed by: OPTOMETRIST

## 2022-04-13 PROCEDURE — 92015 DETERMINE REFRACTIVE STATE: CPT | Mod: S$GLB,,, | Performed by: OPTOMETRIST

## 2022-04-13 PROCEDURE — 1160F PR REVIEW ALL MEDS BY PRESCRIBER/CLIN PHARMACIST DOCUMENTED: ICD-10-PCS | Mod: CPTII,S$GLB,, | Performed by: OPTOMETRIST

## 2022-04-13 PROCEDURE — 1159F MED LIST DOCD IN RCRD: CPT | Mod: CPTII,S$GLB,, | Performed by: OPTOMETRIST

## 2022-04-13 PROCEDURE — 92014 PR EYE EXAM, EST PATIENT,COMPREHESV: ICD-10-PCS | Mod: S$GLB,,, | Performed by: OPTOMETRIST

## 2022-04-13 PROCEDURE — 92015 PR REFRACTION: ICD-10-PCS | Mod: S$GLB,,, | Performed by: OPTOMETRIST

## 2022-04-13 PROCEDURE — 1159F PR MEDICATION LIST DOCUMENTED IN MEDICAL RECORD: ICD-10-PCS | Mod: CPTII,S$GLB,, | Performed by: OPTOMETRIST

## 2022-04-13 NOTE — PROGRESS NOTES
HPI     Routine exam Pt sts vision in OD is not good. Haloes in OD. No flashes no   floaters    Last edited by Serafin Ardon, OD on 4/13/2022  9:23 AM. (History)        ROS     Positive for: Eyes (MS w ON Hx OD)    Negative for: Constitutional, Gastrointestinal, Neurological, Skin,   Genitourinary, Musculoskeletal, HENT, Endocrine, Cardiovascular,   Respiratory, Psychiatric, Allergic/Imm, Heme/Lymph    Last edited by Serafin Ardon, OD on 4/13/2022 10:22 AM. (History)        Assessment /Plan     For exam results, see Encounter Report.    Partial optic atrophy of right eye    Multiple sclerosis    Nuclear sclerosis, bilateral    Myopia of both eyes with astigmatism and presbyopia        1. Hx reduced VA OD 2 to optic neuritis from MS--see previous notes.  Pt followed by outside retina, Dr hunter, every 6 mos (for ERM OS?)-- saw him 2 mos ago due to reducing VA OD, so he sent her to Dr Rosenthal for eval last week to ro optic neuritis (see notes).  Dr Rosenthal thought no ON, so sent her to me to check.  From my exam she appears to have much inc NS OD from last year (also some NS OS now, too).  Discussed w pt even w cat surgery OD she would probably suppress that eye due to existing vision loss from optic neuritis.  Offered cat eval, but pt wishes to wait    PLAN:    1. Wrote new spex Rx  2. rtc as sched w Dr hunter/Dr Rosenthal in 6 mos, or will call sooner if wishes cat eval

## 2022-06-07 NOTE — PROGRESS NOTES
Pt ambulatory to room and placed to MRI Table / pt AAO w/ NAD but very anxious    Xeljennifferz Pregnancy And Lactation Text: This medication is Pregnancy Category D and is not considered safe during pregnancy.  The risk during breast feeding is also uncertain.

## 2022-06-24 ENCOUNTER — PATIENT MESSAGE (OUTPATIENT)
Dept: PSYCHIATRY | Facility: CLINIC | Age: 52
End: 2022-06-24
Payer: COMMERCIAL

## 2022-07-21 ENCOUNTER — OFFICE VISIT (OUTPATIENT)
Dept: PSYCHIATRY | Facility: CLINIC | Age: 52
End: 2022-07-21
Payer: COMMERCIAL

## 2022-07-21 VITALS
SYSTOLIC BLOOD PRESSURE: 142 MMHG | BODY MASS INDEX: 26.49 KG/M2 | HEART RATE: 63 BPM | HEIGHT: 62 IN | DIASTOLIC BLOOD PRESSURE: 93 MMHG | WEIGHT: 143.94 LBS

## 2022-07-21 DIAGNOSIS — F41.1 GAD (GENERALIZED ANXIETY DISORDER): ICD-10-CM

## 2022-07-21 DIAGNOSIS — F40.01 PANIC DISORDER WITH AGORAPHOBIA: ICD-10-CM

## 2022-07-21 DIAGNOSIS — F41.1 GENERALIZED ANXIETY DISORDER: ICD-10-CM

## 2022-07-21 DIAGNOSIS — F32.1 MDD (MAJOR DEPRESSIVE DISORDER), SINGLE EPISODE, MODERATE: Primary | ICD-10-CM

## 2022-07-21 DIAGNOSIS — K58.8 OTHER IRRITABLE BOWEL SYNDROME: ICD-10-CM

## 2022-07-21 DIAGNOSIS — G89.4 CHRONIC PAIN SYNDROME: ICD-10-CM

## 2022-07-21 PROCEDURE — 3077F SYST BP >= 140 MM HG: CPT | Mod: CPTII,S$GLB,, | Performed by: PSYCHIATRY & NEUROLOGY

## 2022-07-21 PROCEDURE — 3080F PR MOST RECENT DIASTOLIC BLOOD PRESSURE >= 90 MM HG: ICD-10-PCS | Mod: CPTII,S$GLB,, | Performed by: PSYCHIATRY & NEUROLOGY

## 2022-07-21 PROCEDURE — 99999 PR PBB SHADOW E&M-EST. PATIENT-LVL III: CPT | Mod: PBBFAC,,, | Performed by: PSYCHIATRY & NEUROLOGY

## 2022-07-21 PROCEDURE — 3008F PR BODY MASS INDEX (BMI) DOCUMENTED: ICD-10-PCS | Mod: CPTII,S$GLB,, | Performed by: PSYCHIATRY & NEUROLOGY

## 2022-07-21 PROCEDURE — 3077F PR MOST RECENT SYSTOLIC BLOOD PRESSURE >= 140 MM HG: ICD-10-PCS | Mod: CPTII,S$GLB,, | Performed by: PSYCHIATRY & NEUROLOGY

## 2022-07-21 PROCEDURE — 90833 PSYTX W PT W E/M 30 MIN: CPT | Mod: S$GLB,,, | Performed by: PSYCHIATRY & NEUROLOGY

## 2022-07-21 PROCEDURE — 90833 PR PSYCHOTHERAPY W/PATIENT W/E&M, 30 MIN (ADD ON): ICD-10-PCS | Mod: S$GLB,,, | Performed by: PSYCHIATRY & NEUROLOGY

## 2022-07-21 PROCEDURE — 99213 PR OFFICE/OUTPT VISIT, EST, LEVL III, 20-29 MIN: ICD-10-PCS | Mod: S$GLB,,, | Performed by: PSYCHIATRY & NEUROLOGY

## 2022-07-21 PROCEDURE — 3008F BODY MASS INDEX DOCD: CPT | Mod: CPTII,S$GLB,, | Performed by: PSYCHIATRY & NEUROLOGY

## 2022-07-21 PROCEDURE — 3080F DIAST BP >= 90 MM HG: CPT | Mod: CPTII,S$GLB,, | Performed by: PSYCHIATRY & NEUROLOGY

## 2022-07-21 PROCEDURE — 99213 OFFICE O/P EST LOW 20 MIN: CPT | Mod: S$GLB,,, | Performed by: PSYCHIATRY & NEUROLOGY

## 2022-07-21 PROCEDURE — 99999 PR PBB SHADOW E&M-EST. PATIENT-LVL III: ICD-10-PCS | Mod: PBBFAC,,, | Performed by: PSYCHIATRY & NEUROLOGY

## 2022-07-21 RX ORDER — CLONAZEPAM 0.5 MG/1
0.5 TABLET ORAL EVERY 6 HOURS PRN
Qty: 120 TABLET | Refills: 5 | Status: SHIPPED | OUTPATIENT
Start: 2022-07-21 | End: 2022-11-30 | Stop reason: DRUGHIGH
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
## 2022-09-01 ENCOUNTER — OFFICE VISIT (OUTPATIENT)
Dept: OPHTHALMOLOGY | Facility: CLINIC | Age: 52
End: 2022-09-01
Payer: COMMERCIAL

## 2022-09-01 ENCOUNTER — CLINICAL SUPPORT (OUTPATIENT)
Dept: OPHTHALMOLOGY | Facility: CLINIC | Age: 52
End: 2022-09-01
Payer: COMMERCIAL

## 2022-09-01 DIAGNOSIS — G35 MS (MULTIPLE SCLEROSIS): ICD-10-CM

## 2022-09-01 DIAGNOSIS — H26.9 CATARACT OF RIGHT EYE, UNSPECIFIED CATARACT TYPE: ICD-10-CM

## 2022-09-01 DIAGNOSIS — H53.15 VISUAL DISTORTIONS OF SHAPE AND SIZE: Primary | ICD-10-CM

## 2022-09-01 PROCEDURE — 92083 HUMPHREY VISUAL FIELD - OU - BOTH EYES: ICD-10-PCS | Mod: S$GLB,,, | Performed by: STUDENT IN AN ORGANIZED HEALTH CARE EDUCATION/TRAINING PROGRAM

## 2022-09-01 PROCEDURE — 92133 CPTRZD OPH DX IMG PST SGM ON: CPT | Mod: S$GLB,,, | Performed by: STUDENT IN AN ORGANIZED HEALTH CARE EDUCATION/TRAINING PROGRAM

## 2022-09-01 PROCEDURE — 92133 POSTERIOR SEGMENT OCT OPTIC NERVE(OCULAR COHERENCE TOMOGRAPHY) - OU - BOTH EYES: ICD-10-PCS | Mod: S$GLB,,, | Performed by: STUDENT IN AN ORGANIZED HEALTH CARE EDUCATION/TRAINING PROGRAM

## 2022-09-01 PROCEDURE — 99215 PR OFFICE/OUTPT VISIT, EST, LEVL V, 40-54 MIN: ICD-10-PCS | Mod: S$GLB,,, | Performed by: STUDENT IN AN ORGANIZED HEALTH CARE EDUCATION/TRAINING PROGRAM

## 2022-09-01 PROCEDURE — 1160F PR REVIEW ALL MEDS BY PRESCRIBER/CLIN PHARMACIST DOCUMENTED: ICD-10-PCS | Mod: CPTII,S$GLB,, | Performed by: STUDENT IN AN ORGANIZED HEALTH CARE EDUCATION/TRAINING PROGRAM

## 2022-09-01 PROCEDURE — 1159F MED LIST DOCD IN RCRD: CPT | Mod: CPTII,S$GLB,, | Performed by: STUDENT IN AN ORGANIZED HEALTH CARE EDUCATION/TRAINING PROGRAM

## 2022-09-01 PROCEDURE — 99999 PR PBB SHADOW E&M-EST. PATIENT-LVL IV: ICD-10-PCS | Mod: PBBFAC,,, | Performed by: STUDENT IN AN ORGANIZED HEALTH CARE EDUCATION/TRAINING PROGRAM

## 2022-09-01 PROCEDURE — 99215 OFFICE O/P EST HI 40 MIN: CPT | Mod: S$GLB,,, | Performed by: STUDENT IN AN ORGANIZED HEALTH CARE EDUCATION/TRAINING PROGRAM

## 2022-09-01 PROCEDURE — 92083 EXTENDED VISUAL FIELD XM: CPT | Mod: S$GLB,,, | Performed by: STUDENT IN AN ORGANIZED HEALTH CARE EDUCATION/TRAINING PROGRAM

## 2022-09-01 PROCEDURE — 1159F PR MEDICATION LIST DOCUMENTED IN MEDICAL RECORD: ICD-10-PCS | Mod: CPTII,S$GLB,, | Performed by: STUDENT IN AN ORGANIZED HEALTH CARE EDUCATION/TRAINING PROGRAM

## 2022-09-01 PROCEDURE — 99999 PR PBB SHADOW E&M-EST. PATIENT-LVL IV: CPT | Mod: PBBFAC,,, | Performed by: STUDENT IN AN ORGANIZED HEALTH CARE EDUCATION/TRAINING PROGRAM

## 2022-09-01 PROCEDURE — 1160F RVW MEDS BY RX/DR IN RCRD: CPT | Mod: CPTII,S$GLB,, | Performed by: STUDENT IN AN ORGANIZED HEALTH CARE EDUCATION/TRAINING PROGRAM

## 2022-09-01 NOTE — PROGRESS NOTES
"    Date:  9/1/2022    ?  Referring Provider:   Angela Corral MD    Copies of Letters to the Following:   MD Gurpreet Gonzales (neuro)    Chief Complaint:  I saw Jillian Osullivan at the [facility] for neuro-ophthalmic evaluation.   She is a 51 y.o. female with a history of hypothyroidism, ERM OS, cataract OD, myopia and astigmatism with presbyopia, MS on Ocrevus and history of right optic neuritis who presents for follow up of blurred vision.     History:       Patient here today with c/o decreased VA OD, halos OD, occasional pain OD   and flashes and floaters on yesterday OD. No previous sx or injury ou.   Diagnosis with MS in 2007. No fmhx of eye disease.    No gtts     OD optic neuritis in 2007 right eye blur, FBS, then pain, worsened over time and then had some improvement.     MRI 2/15/2022 without any abnormal enhancement of the optic nerves, per report    She reports chronic blurred vision and intermittent eye pain, received course(s) of steroids in early 2022 with no improvement.     Due for follow up with Dr. Ardon      2/2021 Duglas:  "Partial optic atrophy of right eye     Multiple sclerosis     Myopia of both eyes with astigmatism and presbyopia        1. Hx reduced VA OD 2 to optic neuritis from MS--see previous notes.  Pt followed by outside retina, Dr hunter, every 6 mos,  and just here today for spex.    2. Small cat OD"    Nino Vázquez (neuro) 3/8/2022  "52 y/o female with a medical history significant for MS, acquired hypothyroidism, and anxiety returns to the clinic today for a follow up. On last visit, patient presented to establish care for MS. Patient was diagnosed with MS several years ago on the basis of a Brain MRI after a bout of optic neuritis. Last visit, she complained of increased vision loss in her R eye and difficulty distinguishing colors. Neurological examination was consistent with an acute optic neuritis. Patient was to continue on Ocrevus " "infusions. She was sent for Solu-Medrol 1 g IV daily for 3 days. Patient was sent for Brain and Orbits MRIs for further evaluation given lack of imaging for the past several years. Brain MRI performed 2/15/22 revealed no significant interval change and no enhancing lesions in comparison to previous study. Orbits MRI performed 2/15/22 was normal. Today, patient reports R eye vision loss remains stable since last visit. She continues to have difficulties seeing from a distance out of her R eye. She complains of myalgia, significant fatigue and general malaise. Patient admits to balance issues and ambulates with a cane typically. Patient completed 3 day course of Solu-Medrol 1 g IV daily without complications. Patient continues on Ocrevus without complications. Her last Ocrevus infusion was about 6 months ago. She has been on Avonex, Copaxone, Tecfidera, Aubagio in the past. She had to discontinue these medications due to either side effects or allergies. Patient has tried Provigil in the past but discontinued Provigil because she experienced side-effects. Patient continues on Baclofen nightly. "  ?  Interval History: 9/1/2022      50 y/o female is here for multiple sclerosis f/u with HVF and OCT review.   Pt states not much improvement with new glasses. Occasional floaters. No   diplopia, headaches, flashes of light, or eye allergies reported today.           4/13/2022 Duglas: new prescription, offered cataract eval.     Current Outpatient Medications   Medication Sig Dispense Refill    baclofen (LIORESAL) 10 MG tablet TK 1 T PO BID WF OR MILK  3    busPIRone (BUSPAR) 15 MG tablet Take 1.5 tablets (22.5 mg total) by mouth 2 (two) times daily. 270 tablet 3    CALCIUM-MAGNESIUM-ZINC ORAL Take by mouth once daily. Calcium-1,000 mg  Magnesium-500 mg  Zinc-25mg      ROCKY SEED/ALA/LINOLEIC/OLEIC (ROCKY SEED OIL-OMEGA 3-6-9 ORAL) Take by mouth.      cholecalciferol, vitamin D3, 5,000 unit capsule Take 5,000 Units by mouth " once daily.       clindamycin (CLEOCIN T) 1 % lotion MARLENA EXT AA BID      clindamycin phosphate 1% (CLINDAGEL) 1 % gel MARLENA TO THE AFFECTED AREA ON AREAS OF BODY BID  1    clonazePAM (KLONOPIN) 0.5 MG tablet Take 1 tablet (0.5 mg total) by mouth every 6 (six) hours as needed for Anxiety. 120 tablet 5    coenzyme Q10 300 mg Cap Take 1 capsule by mouth every evening. 400mg      cyanocobalamin 1,000 mcg/mL injection Inject 1 mL into the muscle once a week.      doxycycline (ORACEA) 40 mg capsule Take 40 mg by mouth once daily.      doxycycline (PERIOSTAT) 20 MG tablet Take 20 mg by mouth 2 (two) times daily.      DULoxetine (CYMBALTA) 30 MG capsule Take 1 capsule (30 mg total) by mouth once daily. 90 capsule 3    DULoxetine (CYMBALTA) 60 MG capsule Take 1 capsule (60 mg total) by mouth once daily. 90 capsule 3    FLAXSEED OIL ORAL Take by mouth once daily. Omega 3 2800MG      INV sodium chloride 0.9 % SolP with INV ocrelizumab 30 mg/mL Inj Inject 300 mg into the vein. FOR INVESTIGATIONAL USE ONLY      L. RHAMNOSUS GG/INULIN (CULTURELLE PROBIOTICS ORAL) Take by mouth every evening. Ultimate Jo-Ann 15 Billion Probiotic      LACTOBAC NO.41/BIFIDOBACT NO.7 (PROBIOTIC-10 ORAL) Take by mouth.      levOCARNitine (CARNITOR) 330 mg Tab Take 500 mg by mouth.      levothyroxine (SYNTHROID) 88 MCG tablet Take 88 mcg by mouth once daily.      magnesium oxide (MAG-OX) 400 mg (241.3 mg magnesium) tablet Take 300 mg by mouth.      MINERALS ORAL Take 1 tablet by mouth once daily. New Vision Essential Minerals      naproxen (NAPROSYN) 500 MG tablet Take 1 tablet (500 mg total) by mouth 2 (two) times daily with meals. 60 tablet 0    ocrelizumab 30 mg/mL Soln Inject 600 mg into the vein.      ocrelizumab 30 mg/mL Soln Inject into the vein.      omega 3-dha-epa-fish oil 1,000 (120-180) mg Cap Take 1 capsule by mouth once daily.      omega-3 fatty acids-fish oil (FISH OIL PEARLS) 150-400 mg Cap Take by mouth.      ONETOUCH DELICA LANCETS 33  gauge Misc   5    ONETOUCH ULTRA TEST Strp   5    ONETOUCH ULTRA2 kit   0    psyllium (METAMUCIL) powder Take 1 packet by mouth once daily.      SOOLANTRA 1 % Crea       triamcinolone acetonide 0.1% (KENALOG) 0.1 % cream Apply 1 application topically 2 (two) times daily.  2    turmeric root extract 500 mg Cap Take by mouth.      UNABLE TO FIND Take by mouth 2 (two) times daily. Multigenics without Iron      UNABLE TO FIND Take 100 g by mouth once daily. medication name: D-Ribose      vitamins  A,C,E-zinc-copper 14,320-226-200 unit-mg-unit Cap Take by mouth.      whey protein isolate 21 gram-100 kcal/27 gram Powd by NOT APPLICABLE route.       No current facility-administered medications for this visit.     Review of patient's allergies indicates:   Allergen Reactions    Glatiramer (copolymer 1) Dermatitis    Vicodin [hydrocodone-acetaminophen] Rash    Lorazepam Other (See Comments)     Increased anxiety, agitation    Aspirin      Other reaction(s): Rash    Aspirin     Codeine Other (See Comments)    Dimethyl fumarate Other (See Comments)    Glatiramer Hives    Penicillins      Other reaction(s): Rash  Other reaction(s): Rash    Sulfa (sulfonamide antibiotics)     Sulfamethoxazole-trimethoprim      Other reaction(s): Rash    Teriflunomide     Vancomycin Nausea And Vomiting    Oxycodone-acetaminophen Rash     Past Medical History:   Diagnosis Date    Endometriosis, site unspecified     H/O total hysterectomy     Hidradenitis     History of laparoscopy     History of psychiatric care     Multiple sclerosis     Panic anxiety syndrome     Therapy      Past Surgical History:   Procedure Laterality Date    APPENDECTOMY      breast reduction      HYSTERECTOMY      IL EXPLORATORY OF ABDOMEN      2002    sweat gland removal  10/2013    rt groin     Family History   Problem Relation Age of Onset    Anxiety disorder Mother     Cancer Mother         cervix    Cataracts Mother     Breast cancer Neg Hx     Colon cancer Neg Hx      Ovarian cancer Neg Hx     Amblyopia Neg Hx     Blindness Neg Hx     Glaucoma Neg Hx     Macular degeneration Neg Hx     Retinal detachment Neg Hx     Strabismus Neg Hx      Social History     Socioeconomic History    Marital status:    Tobacco Use    Smoking status: Never    Smokeless tobacco: Never   Substance and Sexual Activity    Alcohol use: No     Alcohol/week: 0.0 standard drinks    Drug use: No    Sexual activity: Not Currently     Birth control/protection: Abstinence       ?  Review of Systems:  Detailed review of systems was negative except as noted below.  Endocrine (hormone): Negative  Cardiovascular ( heart/blood vessels): Negative  Fevers/weight loss (constitutional):Negative  Ear, nose, or throat : Negative  Respiratory (lung): Negative  Gastrointestinal (stomach): Negative  Genitourinary (bladder/kidneys): Negative  Musculoskeletal (muscle/bones): Negative  Skin: Negative  Psychiatric: Negative  Hematologic (blood): Negative    Examination:  She was well-appearing. She was alert and oriented. Attention span and concentration were normal. Speech, language, memory, and general knowledge were intact.     Her distance visual acuity with correction was 20/200  (PH 20/80-1) in the right eye and 20/40-2 (PH 20/20-2)in the left eye.      She perceived 7/8 OD and 8/8 OS Ishihara color plates correctly. Red desaturation 10% OD versus 100% OS. Pupils were brisk to light with a 0.6 log unit R APD. Ocular ductions were full. Unable to test alignment by cross cover as she cannot focus on small target OD without pinhole There was no nystagmus. Saccades and pursuits were normal. Lids were symmetric.     Optic discs appeared normal OS, very blurry view OD, cataract related Pupillary dilation was not necessary for visualization of the optic disc today.     Laboratories Reviewed:     n/a  ?  Neuroimaging Reviewed:     1/2018 MRI brain w/wo contrast  IMPRESSION:   No significant change from prior.     Allowing  for differences in technique no new lesion with continued scattered T2 flair hyperintensities and supratentorial parenchyma remain concerning for mild degree of prior demyelinating plaque. No evidence for new lesion or enhancing lesion to suggest interval or active demyelination.     Clinical correlation and followup advised.     2/15/2022 MRI orbits w/wo contrast  FINDINGS:   The globes are symmetric in signal intensity and appearance. There is no abnormal intraconal or extraconal enhancement. No evidence of mass. There is no abnormal enhancement along the optic nerves. The extraocular muscles are symmetric in size and appearance. No evidence of a venous varix. There is no retrobulbar are stranding of fat. The optic chiasm is normal. There is no suprasellar mass.    IMPRESSION:   Normal MRI orbits with and without contrast.    I personally reviewed these reports but images were not available for my review  ?  Ocular Imaging, Photos, Records Reviewed:     OCT RNFL 4/6/2022:   Right Eye - Average RNFL 64 temporal and inferior thinning, borderline nasal thinning   Left Eye - Average RNFL 94 all segments normal     Normal macular architecture OU    OCT RNFL Today 9/1/2022:   Right Eye - Average RNFL 64 temporal and inferior thinning, borderline nasal thinning   Left Eye - Average RNFL 94 all segments normal     Macular architecture normal OU    Visual Field Test 24-2 OU  4/6/2022: Right Eye - fixation losses 2/11, false positives 0%, false negatives 11%, MD -7.17dB, Impression OD: moderate generalized depression. Left Eye - fixation losses 2/10, false positives 9%, false negatives 0%, MD -0.07dB, Impression OS: full.    Visual Field Test 24-2 OU Today 9/1/2022: Right Eye - fixation losses 1/10, false positives 0%, false negatives 0%, MD -5.16dB, Impression OD: moderate generalized depression. Left Eye - fixation losses 2/11, false positives 0%, false negatives 0%, MD 0.22dB, Impression OS:  full.  ?  Impression:  Jillian Osullivan has history of hypothyroidism, ERM OS, cataract OD, myopia and astigmatism with presbyopia, MS on Ocrevus and history of right optic neuritis who presents for evaluation of blurred vision OD and intermittent blurred vision and pain OD. She started having these symptoms several months ago and had MRI orbits in 2/2022 with no reported evidence of optic neuritis. She received high dose steroids in early 2022 as well. She has worsened visual acuity OD than prior but it improves dramatically with pinhole suggestive of primary ocular pathology. She is due for updated refraction and routine exam in optometry. The view of her disc with direct fundoscopy today is quite blurred and I suspect she may have had some worsening of her cataract OD in response to steroids. Dr. Ardon will assess. My suspicion for recurrent optic neuritis is very low. I will continue to monitor her vision from an MS standpoint.     9/1/2022: saw Dr. Ardon who gave new glasses prescription. OCT and HVF stable. Consider cataract extraction for 3+ OD.  ?  Plan:  1. Follow up with neuroimmunologist as planned  2. Follow up with Dr. Ardon as planned    Follow-up:  I will see her in follow-up in 1 year or sooner with any change.  OCT and HVF  ?  Visit Checklist (as applicable):  1. Status of new and prior symptoms discussed? yes  2. Neuroimaging reviewed/ ordered as appropriate? yes  3. Ocular imaging and photos reviewed/ ordered as appropriate? yes  4. Plan for work-up and treatment discussed with patient? yes  5. Potential medication side-effects and monitoring plan discussed? yes  6. Review of outside medical records was performed and pertinent details are summarized in the HPI above? yes    Time spent on this encounter: 45 minutes. This includes face to face time and non-face to face time preparing to see the patient (eg, review of tests), obtaining and/or reviewing separately obtained history, documenting  clinical information in the electronic or other health record, independently interpreting results and communicating results to the patient/family/caregiver, or care coordinator.      MARISOL Waite  Neuro-Ophthalmology Consultant

## 2022-10-17 ENCOUNTER — OFFICE VISIT (OUTPATIENT)
Dept: OPHTHALMOLOGY | Facility: CLINIC | Age: 52
End: 2022-10-17
Payer: COMMERCIAL

## 2022-10-17 DIAGNOSIS — H25.12 NUCLEAR SCLEROTIC CATARACT OF LEFT EYE: ICD-10-CM

## 2022-10-17 DIAGNOSIS — H53.15 VISUAL DISTORTIONS OF SHAPE AND SIZE: ICD-10-CM

## 2022-10-17 DIAGNOSIS — H25.11 NUCLEAR SCLEROTIC CATARACT OF RIGHT EYE: Primary | ICD-10-CM

## 2022-10-17 DIAGNOSIS — G35 MS (MULTIPLE SCLEROSIS): ICD-10-CM

## 2022-10-17 DIAGNOSIS — H47.291 PARTIAL OPTIC ATROPHY OF RIGHT EYE: ICD-10-CM

## 2022-10-17 PROCEDURE — 99214 OFFICE O/P EST MOD 30 MIN: CPT | Mod: S$GLB,,, | Performed by: OPHTHALMOLOGY

## 2022-10-17 PROCEDURE — 1159F PR MEDICATION LIST DOCUMENTED IN MEDICAL RECORD: ICD-10-PCS | Mod: CPTII,S$GLB,, | Performed by: OPHTHALMOLOGY

## 2022-10-17 PROCEDURE — 92136 IOL MASTER - OD - RIGHT EYE: ICD-10-PCS | Mod: RT,S$GLB,, | Performed by: OPHTHALMOLOGY

## 2022-10-17 PROCEDURE — 1159F MED LIST DOCD IN RCRD: CPT | Mod: CPTII,S$GLB,, | Performed by: OPHTHALMOLOGY

## 2022-10-17 PROCEDURE — 99999 PR PBB SHADOW E&M-EST. PATIENT-LVL IV: ICD-10-PCS | Mod: PBBFAC,,, | Performed by: OPHTHALMOLOGY

## 2022-10-17 PROCEDURE — 99214 PR OFFICE/OUTPT VISIT, EST, LEVL IV, 30-39 MIN: ICD-10-PCS | Mod: S$GLB,,, | Performed by: OPHTHALMOLOGY

## 2022-10-17 PROCEDURE — 92136 OPHTHALMIC BIOMETRY: CPT | Mod: RT,S$GLB,, | Performed by: OPHTHALMOLOGY

## 2022-10-17 PROCEDURE — 99999 PR PBB SHADOW E&M-EST. PATIENT-LVL IV: CPT | Mod: PBBFAC,,, | Performed by: OPHTHALMOLOGY

## 2022-10-17 NOTE — PROGRESS NOTES
HPI    Referred by Dr Rosenthal    [facility] [place] [facility]  MS  H/o Optic Neuritis    No gtts    Patient here for a cataract evaluation. Patient states her vision keeps   changing. Pt states she is bothered by oncoming headlights. She says the   glare at night is very bothersome. (+)halos and star bursts around lights.   (+)intermittent floaters.        Last edited by Domi Dunlap on 10/17/2022  1:12 PM.            Assessment /Plan     For exam results, see Encounter Report.    Nuclear sclerotic cataract of right eye  -     IOL Master - OD - Right Eye    Nuclear sclerotic cataract of left eye    MS (multiple sclerosis)    Partial optic atrophy of right eye    Visual distortions of shape and size                 Visually significant nuclear sclerotic cataract   - Interfering with activities of daily living.  Pt desires cataract surgery for Va rehabilitation.   - R/B/A discussed and pt agrees to proceed with surgery.   - IOL options discussed according to patient's goals and concomitant ocular pathology; and pt content with monofocal lens.    - Target: plano.    Diboo 20.0 OD    (Diboo 20.5 OS when ready)    Pt understands possible limited BCVA OD 2/2 h/o Optic neuritis OD 2/2 MS

## 2022-11-07 ENCOUNTER — PATIENT MESSAGE (OUTPATIENT)
Dept: PSYCHIATRY | Facility: CLINIC | Age: 52
End: 2022-11-07
Payer: COMMERCIAL

## 2022-11-09 ENCOUNTER — PATIENT MESSAGE (OUTPATIENT)
Dept: PSYCHIATRY | Facility: CLINIC | Age: 52
End: 2022-11-09
Payer: COMMERCIAL

## 2022-11-15 ENCOUNTER — PATIENT MESSAGE (OUTPATIENT)
Dept: PSYCHIATRY | Facility: CLINIC | Age: 52
End: 2022-11-15
Payer: COMMERCIAL

## 2022-11-22 ENCOUNTER — PATIENT MESSAGE (OUTPATIENT)
Dept: PSYCHIATRY | Facility: CLINIC | Age: 52
End: 2022-11-22
Payer: COMMERCIAL

## 2022-11-30 ENCOUNTER — OFFICE VISIT (OUTPATIENT)
Dept: PSYCHIATRY | Facility: CLINIC | Age: 52
End: 2022-11-30
Payer: COMMERCIAL

## 2022-11-30 VITALS
BODY MASS INDEX: 26.13 KG/M2 | DIASTOLIC BLOOD PRESSURE: 79 MMHG | WEIGHT: 142.88 LBS | SYSTOLIC BLOOD PRESSURE: 125 MMHG | HEART RATE: 62 BPM

## 2022-11-30 DIAGNOSIS — G89.4 CHRONIC PAIN SYNDROME: ICD-10-CM

## 2022-11-30 DIAGNOSIS — F32.1 MDD (MAJOR DEPRESSIVE DISORDER), SINGLE EPISODE, MODERATE: Primary | ICD-10-CM

## 2022-11-30 DIAGNOSIS — F41.1 GENERALIZED ANXIETY DISORDER: ICD-10-CM

## 2022-11-30 DIAGNOSIS — F40.01 PANIC DISORDER WITH AGORAPHOBIA: ICD-10-CM

## 2022-11-30 DIAGNOSIS — F41.1 GAD (GENERALIZED ANXIETY DISORDER): ICD-10-CM

## 2022-11-30 PROCEDURE — 3008F BODY MASS INDEX DOCD: CPT | Mod: CPTII,S$GLB,, | Performed by: PSYCHIATRY & NEUROLOGY

## 2022-11-30 PROCEDURE — 3044F PR MOST RECENT HEMOGLOBIN A1C LEVEL <7.0%: ICD-10-PCS | Mod: CPTII,S$GLB,, | Performed by: PSYCHIATRY & NEUROLOGY

## 2022-11-30 PROCEDURE — 99999 PR PBB SHADOW E&M-EST. PATIENT-LVL II: ICD-10-PCS | Mod: PBBFAC,,, | Performed by: PSYCHIATRY & NEUROLOGY

## 2022-11-30 PROCEDURE — 3074F PR MOST RECENT SYSTOLIC BLOOD PRESSURE < 130 MM HG: ICD-10-PCS | Mod: CPTII,S$GLB,, | Performed by: PSYCHIATRY & NEUROLOGY

## 2022-11-30 PROCEDURE — 3008F PR BODY MASS INDEX (BMI) DOCUMENTED: ICD-10-PCS | Mod: CPTII,S$GLB,, | Performed by: PSYCHIATRY & NEUROLOGY

## 2022-11-30 PROCEDURE — 3078F DIAST BP <80 MM HG: CPT | Mod: CPTII,S$GLB,, | Performed by: PSYCHIATRY & NEUROLOGY

## 2022-11-30 PROCEDURE — 99213 PR OFFICE/OUTPT VISIT, EST, LEVL III, 20-29 MIN: ICD-10-PCS | Mod: S$GLB,,, | Performed by: PSYCHIATRY & NEUROLOGY

## 2022-11-30 PROCEDURE — 3044F HG A1C LEVEL LT 7.0%: CPT | Mod: CPTII,S$GLB,, | Performed by: PSYCHIATRY & NEUROLOGY

## 2022-11-30 PROCEDURE — 3078F PR MOST RECENT DIASTOLIC BLOOD PRESSURE < 80 MM HG: ICD-10-PCS | Mod: CPTII,S$GLB,, | Performed by: PSYCHIATRY & NEUROLOGY

## 2022-11-30 PROCEDURE — 99213 OFFICE O/P EST LOW 20 MIN: CPT | Mod: S$GLB,,, | Performed by: PSYCHIATRY & NEUROLOGY

## 2022-11-30 PROCEDURE — 99999 PR PBB SHADOW E&M-EST. PATIENT-LVL II: CPT | Mod: PBBFAC,,, | Performed by: PSYCHIATRY & NEUROLOGY

## 2022-11-30 PROCEDURE — 3074F SYST BP LT 130 MM HG: CPT | Mod: CPTII,S$GLB,, | Performed by: PSYCHIATRY & NEUROLOGY

## 2022-11-30 RX ORDER — DULOXETIN HYDROCHLORIDE 60 MG/1
60 CAPSULE, DELAYED RELEASE ORAL DAILY
Qty: 90 CAPSULE | Refills: 3 | Status: SHIPPED | OUTPATIENT
Start: 2022-11-30 | End: 2023-07-26 | Stop reason: DRUGHIGH

## 2022-11-30 RX ORDER — DIAZEPAM 5 MG/1
TABLET ORAL
Qty: 90 TABLET | Refills: 5 | Status: SHIPPED | OUTPATIENT
Start: 2022-11-30 | End: 2022-11-30 | Stop reason: SDUPTHER

## 2022-11-30 RX ORDER — DULOXETIN HYDROCHLORIDE 30 MG/1
30 CAPSULE, DELAYED RELEASE ORAL DAILY
Qty: 90 CAPSULE | Refills: 3 | Status: SHIPPED | OUTPATIENT
Start: 2022-11-30 | End: 2023-07-26 | Stop reason: DRUGHIGH

## 2022-11-30 RX ORDER — DIAZEPAM 5 MG/1
TABLET ORAL
Qty: 90 TABLET | Refills: 5 | Status: SHIPPED | OUTPATIENT
Start: 2022-11-30 | End: 2024-03-28

## 2022-11-30 RX ORDER — BUSPIRONE HYDROCHLORIDE 15 MG/1
22.5 TABLET ORAL 2 TIMES DAILY
Qty: 270 TABLET | Refills: 3 | Status: SHIPPED | OUTPATIENT
Start: 2022-11-30 | End: 2023-05-09 | Stop reason: SDUPTHER

## 2022-11-30 RX ORDER — CLONAZEPAM 1 MG/1
TABLET ORAL
Qty: 90 TABLET | Refills: 5 | Status: SHIPPED | OUTPATIENT
Start: 2022-11-30 | End: 2023-05-04 | Stop reason: SDUPTHER
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
## 2022-12-01 ENCOUNTER — PATIENT MESSAGE (OUTPATIENT)
Dept: PSYCHIATRY | Facility: CLINIC | Age: 52
End: 2022-12-01
Payer: COMMERCIAL

## 2022-12-05 DIAGNOSIS — H25.11 NUCLEAR SCLEROTIC CATARACT OF RIGHT EYE: Primary | ICD-10-CM

## 2022-12-05 RX ORDER — PREDNISOLONE ACETATE-GATIFLOXACIN-BROMFENAC .75; 5; 1 MG/ML; MG/ML; MG/ML
1 SUSPENSION/ DROPS OPHTHALMIC 3 TIMES DAILY
Qty: 5 ML | Refills: 3 | Status: SHIPPED | OUTPATIENT
Start: 2022-12-05

## 2022-12-09 ENCOUNTER — TELEPHONE (OUTPATIENT)
Dept: OPHTHALMOLOGY | Facility: CLINIC | Age: 52
End: 2022-12-09
Payer: COMMERCIAL

## 2022-12-09 NOTE — TELEPHONE ENCOUNTER
----- Message from Monica Hoffmann sent at 12/8/2022 10:01 AM CST -----  Contact: [phone]  [facility] pat allergies to Asprin because she was prescribed prednisolon/gatiflox/bromfenac (PREDNISOL ACE-GATIFLOX-BROMFEN) 1-0.5-0.075 % DrpS.    Ref number [id number]

## 2022-12-09 NOTE — TELEPHONE ENCOUNTER
Spoke to [facility] and advised that Dr. Miguel approved filling eye drop regardless of Asprin allergy

## 2022-12-14 NOTE — H&P
History    Chief complaint:  Painless progressive vision loss    Present Ilness/Diagnosis: Nuclear sclerotic Cataract    Past Medical History:  has a past medical history of Endometriosis, site unspecified, H/O total hysterectomy, Hidradenitis, History of laparoscopy, History of psychiatric care, Multiple sclerosis, Panic anxiety syndrome, and Therapy.    Family History/Social History: refer to chart    Allergies:   Review of patient's allergies indicates:   Allergen Reactions    Glatiramer (copolymer 1) Dermatitis    Vicodin [hydrocodone-acetaminophen] Rash    Lorazepam Other (See Comments)     Increased anxiety, agitation    Aspirin      Other reaction(s): Rash    Aspirin     Codeine Other (See Comments)    Dimethyl fumarate Other (See Comments)    Glatiramer Hives    Penicillins      Other reaction(s): Rash  Other reaction(s): Rash    Sulfa (sulfonamide antibiotics)     Sulfamethoxazole-trimethoprim      Other reaction(s): Rash    Teriflunomide     Vancomycin Nausea And Vomiting    Oxycodone-acetaminophen Rash       Current Medications: No current facility-administered medications for this encounter.    Current Outpatient Medications:     baclofen (LIORESAL) 10 MG tablet, TK 1 T PO BID WF OR MILK, Disp: , Rfl: 3    busPIRone (BUSPAR) 15 MG tablet, Take 1.5 tablets (22.5 mg total) by mouth 2 (two) times daily., Disp: 270 tablet, Rfl: 3    CALCIUM-MAGNESIUM-ZINC ORAL, Take by mouth once daily. Calcium-1,000 mg Magnesium-500 mg Zinc-25mg, Disp: , Rfl:     ROCKY SEED/ALA/LINOLEIC/OLEIC (ROCKY SEED OIL-OMEGA 3-6-9 ORAL), Take by mouth., Disp: , Rfl:     cholecalciferol, vitamin D3, 5,000 unit capsule, Take 5,000 Units by mouth once daily. , Disp: , Rfl:     clindamycin (CLEOCIN T) 1 % lotion, MARLENA EXT AA BID, Disp: , Rfl:     clindamycin phosphate 1% (CLINDAGEL) 1 % gel, MARLENA TO THE AFFECTED AREA ON AREAS OF BODY BID, Disp: , Rfl: 1    clonazePAM (KLONOPIN) 1 MG tablet, Take oral 1/2 - 1 tablet q 6 hours prn anxiety or  insomnia, Disp: 90 tablet, Rfl: 5    coenzyme Q10 300 mg Cap, Take 1 capsule by mouth every evening. 400mg, Disp: , Rfl:     cyanocobalamin 1,000 mcg/mL injection, Inject 1 mL into the muscle once a week., Disp: , Rfl:     diazePAM (VALIUM) 5 MG tablet, Take oral 1/2 to 1 tablet q 8 hours prn anxiety or insomnia, Disp: 90 tablet, Rfl: 5    doxycycline (ORACEA) 40 mg capsule, Take 40 mg by mouth once daily., Disp: , Rfl:     doxycycline (PERIOSTAT) 20 MG tablet, Take 20 mg by mouth 2 (two) times daily., Disp: , Rfl:     DULoxetine (CYMBALTA) 30 MG capsule, Take 1 capsule (30 mg total) by mouth once daily., Disp: 90 capsule, Rfl: 3    DULoxetine (CYMBALTA) 60 MG capsule, Take 1 capsule (60 mg total) by mouth once daily., Disp: 90 capsule, Rfl: 3    FLAXSEED OIL ORAL, Take by mouth once daily. Omega 3 2800MG, Disp: , Rfl:     INV sodium chloride 0.9 % SolP with INV ocrelizumab 30 mg/mL Inj, Inject 300 mg into the vein. FOR INVESTIGATIONAL USE ONLY, Disp: , Rfl:     L. RHAMNOSUS GG/INULIN (CULTURELLE PROBIOTICS ORAL), Take by mouth every evening. Ultimate Jo-Ann 15 Billion Probiotic, Disp: , Rfl:     LACTOBAC NO.41/BIFIDOBACT NO.7 (PROBIOTIC-10 ORAL), Take by mouth., Disp: , Rfl:     levOCARNitine (CARNITOR) 330 mg Tab, Take 500 mg by mouth., Disp: , Rfl:     levothyroxine (SYNTHROID) 88 MCG tablet, Take 88 mcg by mouth once daily., Disp: , Rfl:     magnesium oxide (MAG-OX) 400 mg (241.3 mg magnesium) tablet, Take 300 mg by mouth., Disp: , Rfl:     MINERALS ORAL, Take 1 tablet by mouth once daily. [employer] Essential Minerals, Disp: , Rfl:     naproxen (NAPROSYN) 500 MG tablet, Take 1 tablet (500 mg total) by mouth 2 (two) times daily with meals., Disp: 60 tablet, Rfl: 0    ocrelizumab 30 mg/mL Soln, Inject 600 mg into the vein., Disp: , Rfl:     ocrelizumab 30 mg/mL Soln, Inject into the vein., Disp: , Rfl:     omega 3-dha-epa-fish oil 1,000 (120-180) mg Cap, Take 1 capsule by mouth once daily., Disp: , Rfl:      omega-3 fatty acids-fish oil (FISH OIL PEARLS) 150-400 mg Cap, Take by mouth., Disp: , Rfl:     ONETOUCH DELICA LANCETS 33 gauge Misc, , Disp: , Rfl: 5    ONETOUCH ULTRA TEST Strp, , Disp: , Rfl: 5    ONETOUCH ULTRA2 kit, , Disp: , Rfl: 0    prednisolon/gatiflox/bromfenac (PREDNISOL ACE-GATIFLOX-BROMFEN) 1-0.5-0.075 % DrpS, Apply 1 drop to eye 3 (three) times daily. One drop 3 times a day in surgical eye, Disp: 5 mL, Rfl: 3    psyllium (METAMUCIL) powder, Take 1 packet by mouth once daily., Disp: , Rfl:     SOOLANTRA 1 % Crea, , Disp: , Rfl:     triamcinolone acetonide 0.1% (KENALOG) 0.1 % cream, Apply 1 application topically 2 (two) times daily., Disp: , Rfl: 2    turmeric root extract 500 mg Cap, Take by mouth., Disp: , Rfl:     UNABLE TO FIND, Take by mouth 2 (two) times daily. Multigenics without Iron, Disp: , Rfl:     UNABLE TO FIND, Take 100 g by mouth once daily. medication name: D-Ribose, Disp: , Rfl:     vitamins  A,C,E-zinc-copper 14,320-226-200 unit-mg-unit Cap, Take by mouth., Disp: , Rfl:     whey protein isolate 21 gram-100 kcal/27 gram Powd, by NOT APPLICABLE route., Disp: , Rfl:     Physical Exam    BP: Vital signs stable  General: No apparent distress  HEENT: nuclear sclerotic cataract  Lungs: adequate respirations  Heart: + pulses  Abdomen: soft  Rectal/pelvic: deferred    Impression: Visually significant Cataract.    See previous clinic notes for surgical indications.    Plan: Phacoemulsification with implantation of Intraocular lens

## 2022-12-20 ENCOUNTER — TELEPHONE (OUTPATIENT)
Dept: OPHTHALMOLOGY | Facility: CLINIC | Age: 52
End: 2022-12-20
Payer: COMMERCIAL

## 2022-12-22 ENCOUNTER — HOSPITAL ENCOUNTER (OUTPATIENT)
Facility: OTHER | Age: 52
Discharge: HOME | End: 2022-12-22
Attending: OPHTHALMOLOGY | Admitting: OPHTHALMOLOGY
Payer: COMMERCIAL

## 2022-12-22 ENCOUNTER — ANESTHESIA EVENT (OUTPATIENT)
Dept: SURGERY | Facility: OTHER | Age: 52
End: 2022-12-22
Payer: COMMERCIAL

## 2022-12-22 ENCOUNTER — ANESTHESIA (OUTPATIENT)
Dept: SURGERY | Facility: OTHER | Age: 52
End: 2022-12-22
Payer: COMMERCIAL

## 2022-12-22 VITALS
BODY MASS INDEX: 26.13 KG/M2 | HEART RATE: 59 BPM | DIASTOLIC BLOOD PRESSURE: 78 MMHG | HEIGHT: 62 IN | SYSTOLIC BLOOD PRESSURE: 119 MMHG | OXYGEN SATURATION: 99 % | WEIGHT: 142 LBS | TEMPERATURE: 98 F | RESPIRATION RATE: 18 BRPM

## 2022-12-22 DIAGNOSIS — H25.011 CORTICAL AGE-RELATED CATARACT OF RIGHT EYE: ICD-10-CM

## 2022-12-22 DIAGNOSIS — H25.11 NUCLEAR SCLEROTIC CATARACT OF RIGHT EYE: Primary | ICD-10-CM

## 2022-12-22 DIAGNOSIS — H25.10 AGE-RELATED NUCLEAR CATARACT: ICD-10-CM

## 2022-12-22 LAB — POCT GLUCOSE: 90 MG/DL (ref 70–110)

## 2022-12-22 PROCEDURE — 71000015 HC POSTOP RECOV 1ST HR: Performed by: OPHTHALMOLOGY

## 2022-12-22 PROCEDURE — 37000008 HC ANESTHESIA 1ST 15 MINUTES: Performed by: OPHTHALMOLOGY

## 2022-12-22 PROCEDURE — 36000707: Performed by: OPHTHALMOLOGY

## 2022-12-22 PROCEDURE — 37000009 HC ANESTHESIA EA ADD 15 MINS: Performed by: OPHTHALMOLOGY

## 2022-12-22 PROCEDURE — 66984 PR REMOVAL, CATARACT, W/INSRT INTRAOC LENS, W/O ENDO CYCLO: ICD-10-PCS | Mod: RT,,, | Performed by: OPHTHALMOLOGY

## 2022-12-22 PROCEDURE — 36000706: Performed by: OPHTHALMOLOGY

## 2022-12-22 PROCEDURE — 66984 XCAPSL CTRC RMVL W/O ECP: CPT | Mod: RT,,, | Performed by: OPHTHALMOLOGY

## 2022-12-22 PROCEDURE — 63600175 PHARM REV CODE 636 W HCPCS: Performed by: NURSE ANESTHETIST, CERTIFIED REGISTERED

## 2022-12-22 PROCEDURE — V2632 POST CHMBR INTRAOCULAR LENS: HCPCS | Performed by: OPHTHALMOLOGY

## 2022-12-22 PROCEDURE — 25000003 PHARM REV CODE 250: Performed by: OPHTHALMOLOGY

## 2022-12-22 DEVICE — LENS EYHANCE +20.0D: Type: IMPLANTABLE DEVICE | Site: EYE | Status: FUNCTIONAL

## 2022-12-22 RX ORDER — MOXIFLOXACIN 5 MG/ML
SOLUTION/ DROPS OPHTHALMIC
Status: DISCONTINUED | OUTPATIENT
Start: 2022-12-22 | End: 2022-12-22 | Stop reason: HOSPADM

## 2022-12-22 RX ORDER — MOXIFLOXACIN 5 MG/ML
1 SOLUTION/ DROPS OPHTHALMIC
Status: COMPLETED | OUTPATIENT
Start: 2022-12-22 | End: 2022-12-22

## 2022-12-22 RX ORDER — PHENYLEPHRINE HYDROCHLORIDE 25 MG/ML
1 SOLUTION/ DROPS OPHTHALMIC
Status: COMPLETED | OUTPATIENT
Start: 2022-12-22 | End: 2022-12-22

## 2022-12-22 RX ORDER — MIDAZOLAM HYDROCHLORIDE 1 MG/ML
INJECTION INTRAMUSCULAR; INTRAVENOUS
Status: DISCONTINUED | OUTPATIENT
Start: 2022-12-22 | End: 2022-12-22

## 2022-12-22 RX ORDER — PHENYLEPHRINE HYDROCHLORIDE 100 MG/ML
1 SOLUTION/ DROPS OPHTHALMIC
Status: ACTIVE | OUTPATIENT
Start: 2022-12-22

## 2022-12-22 RX ORDER — PREDNISOLONE ACETATE 10 MG/ML
SUSPENSION/ DROPS OPHTHALMIC
Status: DISCONTINUED | OUTPATIENT
Start: 2022-12-22 | End: 2022-12-22 | Stop reason: HOSPADM

## 2022-12-22 RX ORDER — LIDOCAINE HYDROCHLORIDE 10 MG/ML
INJECTION, SOLUTION EPIDURAL; INFILTRATION; INTRACAUDAL; PERINEURAL
Status: DISCONTINUED | OUTPATIENT
Start: 2022-12-22 | End: 2022-12-22 | Stop reason: HOSPADM

## 2022-12-22 RX ORDER — SODIUM CHLORIDE 0.9 % (FLUSH) 0.9 %
2 SYRINGE (ML) INJECTION
Status: ACTIVE | OUTPATIENT
Start: 2022-12-22

## 2022-12-22 RX ORDER — ACETAMINOPHEN 325 MG/1
650 TABLET ORAL EVERY 4 HOURS PRN
Status: DISCONTINUED | OUTPATIENT
Start: 2022-12-22 | End: 2022-12-22 | Stop reason: HOSPADM

## 2022-12-22 RX ORDER — TETRACAINE HYDROCHLORIDE 5 MG/ML
SOLUTION OPHTHALMIC
Status: DISCONTINUED | OUTPATIENT
Start: 2022-12-22 | End: 2022-12-22 | Stop reason: HOSPADM

## 2022-12-22 RX ORDER — PROPARACAINE HYDROCHLORIDE 5 MG/ML
1 SOLUTION/ DROPS OPHTHALMIC
Status: DISCONTINUED | OUTPATIENT
Start: 2022-12-22 | End: 2022-12-22 | Stop reason: HOSPADM

## 2022-12-22 RX ORDER — TROPICAMIDE 10 MG/ML
1 SOLUTION/ DROPS OPHTHALMIC
Status: COMPLETED | OUTPATIENT
Start: 2022-12-22 | End: 2022-12-22

## 2022-12-22 RX ORDER — LIDOCAINE HYDROCHLORIDE 40 MG/ML
INJECTION, SOLUTION RETROBULBAR
Status: DISCONTINUED | OUTPATIENT
Start: 2022-12-22 | End: 2022-12-22 | Stop reason: HOSPADM

## 2022-12-22 RX ORDER — TETRACAINE HYDROCHLORIDE 5 MG/ML
1 SOLUTION OPHTHALMIC
Status: COMPLETED | OUTPATIENT
Start: 2022-12-22 | End: 2022-12-22

## 2022-12-22 RX ADMIN — PHENYLEPHRINE HYDROCHLORIDE 1 DROP: 25 SOLUTION/ DROPS OPHTHALMIC at 09:12

## 2022-12-22 RX ADMIN — TETRACAINE HYDROCHLORIDE 1 DROP: 5 SOLUTION OPHTHALMIC at 09:12

## 2022-12-22 RX ADMIN — MOXIFLOXACIN 1 DROP: 5 SOLUTION/ DROPS OPHTHALMIC at 09:12

## 2022-12-22 RX ADMIN — MOXIFLOXACIN 1 DROP: 5 SOLUTION/ DROPS OPHTHALMIC at 10:12

## 2022-12-22 RX ADMIN — MIDAZOLAM HYDROCHLORIDE 2 MG: 1 INJECTION, SOLUTION INTRAMUSCULAR; INTRAVENOUS at 10:12

## 2022-12-22 RX ADMIN — MIDAZOLAM HYDROCHLORIDE 5 MG: 1 INJECTION, SOLUTION INTRAMUSCULAR; INTRAVENOUS at 10:12

## 2022-12-22 RX ADMIN — TROPICAMIDE 1 DROP: 10 SOLUTION/ DROPS OPHTHALMIC at 09:12

## 2022-12-22 NOTE — BRIEF OP NOTE
BRIEF DISCHARGE NOTE:    Date of discharge: 12/22/2022    Reason for hospitalization -  Cataract surgery     Final Diagnosis - Visually significant Cataract    Procedures and treatment provided - Status post phacoemulsification with placement of intraocular lens     Diet - Advance to regular as tolerated    Activity - as tolerated    Disposition at the end of the case - Good.    Discharge: to home    The patient tolerated the procedure well and knows to follow up with me tomorrow morning in the eye clinic, sooner if needed.    Patient and family instructions (as appropriate) - Given to patient on discharge    Oanh Miguel MD

## 2022-12-22 NOTE — PLAN OF CARE
Jillian Osullivan has met all discharge criteria from Phase II. Vital Signs are stable, ambulating  without difficulty. Discharge instructions given, patient verbalized understanding. Discharged from facility via wheelchair in stable condition.

## 2022-12-22 NOTE — OP NOTE
DATE OF PROCEDURE: 12/22/2022    SURGEON: NICOLE TRIANA MD    PREOPERATIVE DIAGNOSIS:  Senile nuclear sclerotic cataract right eye.     POSTOPERATIVE DIAGNOSIS: Senile nuclear sclerotic cataract right eye.     PROCEDURE PERFORMED:  Phacoemulsification with placement of intraocular lens, right eye.    IMPLANT:  DIBOO  20.0    ANESTHESIA:  Topical and MAC    COMPLICATIONS: none    ESTIMATED BLOOD LOSS: <1cc    SPECIMENS: none    INDICATIONS FOR PROCEDURE:   The patient has a history of painless progressive vision loss.  The patient has described difficulties with activities of daily living, which specifically include driving, which is secondary to cataract formation and progression. After we had a thorough discussion about risks, benefits, and alternatives to cataract surgery, the patient agreed to proceed with phacoemulsification and implantation of a lens in the right eye.  These risks include, but are not limited to, hemorrhage, pain, infection, need for additional surgery, need for glasses or contacts, loss of vision, or even loss of the eye.    PROCEDURE IN DETAIL:  The patient was met in the preop holding area.  Consent was confirmed to be signed.  The operative site was marked.  The patient was brought into the operating room by the anesthesia team and placed under monitored anesthesia care.  The right eye was prepped and draped in a sterile ophthalmic fashion.  A Eloy speculum was placed into the right eye.   A paracentesis site was made and 1% preservative-free lidocaine was injected into the anterior chamber.  Viscoelastic  material was injected into the anterior chamber.  A keratome blade was used to make a clear corneal incision.  A cystotome was used to initiate the continuous curvilinear capsulorrhexis which was completed with Utrata forceps.  BSS on a schwarz cannula was used to perform hydrodissection.  The phacoemulsification tip was introduced into the eye and the nucleus was removed in a  standard divide-and-conquer fashion.  Remaining cortical material was removed from the eye using irrigation-aspiration.  The capsular bag was filled with viscoelastic material and the intraocular lens was injected and positioned into place. Remaining viscoelastic material was removed from the eye using irrigation and aspiration.  The corneal wounds were hydrated.  The eye was filled to physiologic pressure. The wounds were found to be watertight. Drops of Vigamox and prednisilone were placed into the eye.  The eye was washed, dried, and shielded.  The patient tolerated the procedure well and knows to follow up with me tomorrow morning, sooner if needed.

## 2022-12-22 NOTE — ANESTHESIA PREPROCEDURE EVALUATION
12/22/2022  Jillian Osullivan is a 52 y.o., female.      Pre-op Assessment    I have reviewed the Patient Summary Reports.     I have reviewed the Nursing Notes. I have reviewed the NPO Status.   I have reviewed the Medications.     Review of Systems  Anesthesia Hx:  Denies Family Hx of Anesthesia complications.  Personal Hx of Anesthesia complications Slow To Awaken/Delayed Emergence   Social:  Non-Smoker    Hematology/Oncology:  Hematology Normal   Oncology Normal     EENT/Dental:EENT/Dental Normal   Cardiovascular:  Cardiovascular Normal     Pulmonary:   Sleep Apnea (formally on CPAP, recalled)    Renal/:  Renal/ Normal     Hepatic/GI:  Hepatic/GI Normal    Musculoskeletal:  Musculoskeletal Normal    Neurological:   Neuromuscular Disease, (MS, fatigue, muscle weakness, walks with cane) Restless leg syndrome   Endocrine:   Hypothyroidism    Dermatological:  Skin Normal    Psych:   anxiety (daily klonopin)          Physical Exam  General: Cooperative, Alert and Oriented        Anesthesia Plan  Type of Anesthesia, risks & benefits discussed:    Anesthesia Type: MAC  Intra-op Monitoring Plan: Standard ASA Monitors  Post Op Pain Control Plan: multimodal analgesia  Informed Consent: Informed consent signed with the Patient and all parties understand the risks and agree with anesthesia plan.  All questions answered.   ASA Score: 3    Ready For Surgery From Anesthesia Perspective.     .

## 2022-12-23 ENCOUNTER — OFFICE VISIT (OUTPATIENT)
Dept: OPHTHALMOLOGY | Facility: CLINIC | Age: 52
End: 2022-12-23
Payer: COMMERCIAL

## 2022-12-23 DIAGNOSIS — Z98.41 STATUS POST CATARACT EXTRACTION AND INSERTION OF INTRAOCULAR LENS, RIGHT: Primary | ICD-10-CM

## 2022-12-23 DIAGNOSIS — Z96.1 STATUS POST CATARACT EXTRACTION AND INSERTION OF INTRAOCULAR LENS, RIGHT: Primary | ICD-10-CM

## 2022-12-23 PROCEDURE — 99999 PR PBB SHADOW E&M-EST. PATIENT-LVL III: CPT | Mod: PBBFAC,,, | Performed by: OPHTHALMOLOGY

## 2022-12-23 PROCEDURE — 99024 POSTOP FOLLOW-UP VISIT: CPT | Mod: S$GLB,,, | Performed by: OPHTHALMOLOGY

## 2022-12-23 PROCEDURE — 1159F PR MEDICATION LIST DOCUMENTED IN MEDICAL RECORD: ICD-10-PCS | Mod: CPTII,S$GLB,, | Performed by: OPHTHALMOLOGY

## 2022-12-23 PROCEDURE — 1159F MED LIST DOCD IN RCRD: CPT | Mod: CPTII,S$GLB,, | Performed by: OPHTHALMOLOGY

## 2022-12-23 PROCEDURE — 99024 PR POST-OP FOLLOW-UP VISIT: ICD-10-PCS | Mod: S$GLB,,, | Performed by: OPHTHALMOLOGY

## 2022-12-23 PROCEDURE — 99999 PR PBB SHADOW E&M-EST. PATIENT-LVL III: ICD-10-PCS | Mod: PBBFAC,,, | Performed by: OPHTHALMOLOGY

## 2022-12-23 NOTE — PROGRESS NOTES
HPI    Referred by Dr Rosenthal     S/p phaco/IOL OD 12/22/22  MS   H/o Optic Neuritis     Meds:  PGB TID OD    1 day post phaco w/IOL OD. Pt states no eye pain but having some   discomfort light sensitive and once in a while she will see a flash.    Last edited by Oanh Miguel MD on 12/23/2022  4:58 PM.            Assessment /Plan     For exam results, see Encounter Report.    Status post cataract extraction and insertion of intraocular lens, right      Slit Lamp Exam  L/L - normal  C/s - quiet  Cornea - clear  A/C - 1+ cell  Lens - PCIOL    POD #1 s/p phaco/IOL  - doing well  - continue the following drops:    vigamox or ocuflox TID x 1 wk then stop  Pred forte or durezol or dexamethasone TID x  4 wks  Ketorolac TID until runs out    Versus:    Combination drop - 1 drop TID x total of 1 month    Appropriate precautions and post op medications reviewed.  Patient instructed to call or come in if symptoms of redness, decreased vision, or pain are experienced.    -f/up 1-2wks, sooner PRN.     Cat OS - can sign consent next if ready to proceed.?

## 2023-01-03 ENCOUNTER — OFFICE VISIT (OUTPATIENT)
Dept: OPHTHALMOLOGY | Facility: CLINIC | Age: 53
End: 2023-01-03
Payer: COMMERCIAL

## 2023-01-03 DIAGNOSIS — G35 MS (MULTIPLE SCLEROSIS): ICD-10-CM

## 2023-01-03 DIAGNOSIS — Z86.69 H/O OPTIC NEURITIS: ICD-10-CM

## 2023-01-03 DIAGNOSIS — Z96.1 STATUS POST CATARACT EXTRACTION AND INSERTION OF INTRAOCULAR LENS, RIGHT: Primary | ICD-10-CM

## 2023-01-03 DIAGNOSIS — Z98.41 STATUS POST CATARACT EXTRACTION AND INSERTION OF INTRAOCULAR LENS, RIGHT: Primary | ICD-10-CM

## 2023-01-03 DIAGNOSIS — H25.12 NUCLEAR SCLEROTIC CATARACT OF LEFT EYE: ICD-10-CM

## 2023-01-03 PROCEDURE — 1159F PR MEDICATION LIST DOCUMENTED IN MEDICAL RECORD: ICD-10-PCS | Mod: CPTII,S$GLB,, | Performed by: OPHTHALMOLOGY

## 2023-01-03 PROCEDURE — 99999 PR PBB SHADOW E&M-EST. PATIENT-LVL III: ICD-10-PCS | Mod: PBBFAC,,, | Performed by: OPHTHALMOLOGY

## 2023-01-03 PROCEDURE — 1159F MED LIST DOCD IN RCRD: CPT | Mod: CPTII,S$GLB,, | Performed by: OPHTHALMOLOGY

## 2023-01-03 PROCEDURE — 99024 PR POST-OP FOLLOW-UP VISIT: ICD-10-PCS | Mod: S$GLB,,, | Performed by: OPHTHALMOLOGY

## 2023-01-03 PROCEDURE — 99999 PR PBB SHADOW E&M-EST. PATIENT-LVL III: CPT | Mod: PBBFAC,,, | Performed by: OPHTHALMOLOGY

## 2023-01-03 PROCEDURE — 99024 POSTOP FOLLOW-UP VISIT: CPT | Mod: S$GLB,,, | Performed by: OPHTHALMOLOGY

## 2023-01-03 NOTE — PROGRESS NOTES
HPI    Referred by Dr Rosenthal     S/p phaco/IOL OD 12/22/22  MS   H/o Optic Neuritis     Meds:  PGB TID OD    2 week post phaco w/IOL OD. Pt states no eye pain but having some   discomfort light sensitive and states she gets a headache.Pt states she   has been wearing denny glasses that she thinks has been helping her when   on bright lighting.  Last edited by Kaitlin Wyatt on 1/3/2023  8:21 AM.            Assessment /Plan     For exam results, see Encounter Report.    Status post cataract extraction and insertion of intraocular lens, right    Nuclear sclerotic cataract of left eye    H/O optic neuritis    MS (multiple sclerosis)      PO week #1 s/p phaco/IOL -    - doing well, no issues    Continue combo drops for a total of 1 month versus: d/c abx gtt, continue PF/ketorolocTID for total of 1 month    - f/up 3-4 wks for MRx, DFE with dr. LOCKETT    CAT OS _ hold off until VS.

## 2023-02-09 ENCOUNTER — OFFICE VISIT (OUTPATIENT)
Dept: PSYCHIATRY | Facility: CLINIC | Age: 53
End: 2023-02-09
Payer: COMMERCIAL

## 2023-02-09 VITALS
HEART RATE: 75 BPM | WEIGHT: 147.19 LBS | SYSTOLIC BLOOD PRESSURE: 134 MMHG | BODY MASS INDEX: 26.92 KG/M2 | DIASTOLIC BLOOD PRESSURE: 81 MMHG

## 2023-02-09 DIAGNOSIS — F32.1 MDD (MAJOR DEPRESSIVE DISORDER), SINGLE EPISODE, MODERATE: Primary | ICD-10-CM

## 2023-02-09 DIAGNOSIS — F51.04 CHRONIC INSOMNIA: ICD-10-CM

## 2023-02-09 DIAGNOSIS — F40.01 PANIC DISORDER WITH AGORAPHOBIA: ICD-10-CM

## 2023-02-09 DIAGNOSIS — G89.4 CHRONIC PAIN SYNDROME: ICD-10-CM

## 2023-02-09 DIAGNOSIS — F41.1 GENERALIZED ANXIETY DISORDER: ICD-10-CM

## 2023-02-09 PROCEDURE — 99999 PR PBB SHADOW E&M-EST. PATIENT-LVL II: ICD-10-PCS | Mod: PBBFAC,,, | Performed by: PSYCHIATRY & NEUROLOGY

## 2023-02-09 PROCEDURE — 3075F SYST BP GE 130 - 139MM HG: CPT | Mod: CPTII,S$GLB,, | Performed by: PSYCHIATRY & NEUROLOGY

## 2023-02-09 PROCEDURE — 3075F PR MOST RECENT SYSTOLIC BLOOD PRESS GE 130-139MM HG: ICD-10-PCS | Mod: CPTII,S$GLB,, | Performed by: PSYCHIATRY & NEUROLOGY

## 2023-02-09 PROCEDURE — 3079F DIAST BP 80-89 MM HG: CPT | Mod: CPTII,S$GLB,, | Performed by: PSYCHIATRY & NEUROLOGY

## 2023-02-09 PROCEDURE — 3044F PR MOST RECENT HEMOGLOBIN A1C LEVEL <7.0%: ICD-10-PCS | Mod: CPTII,S$GLB,, | Performed by: PSYCHIATRY & NEUROLOGY

## 2023-02-09 PROCEDURE — 99213 OFFICE O/P EST LOW 20 MIN: CPT | Mod: S$GLB,,, | Performed by: PSYCHIATRY & NEUROLOGY

## 2023-02-09 PROCEDURE — 3008F BODY MASS INDEX DOCD: CPT | Mod: CPTII,S$GLB,, | Performed by: PSYCHIATRY & NEUROLOGY

## 2023-02-09 PROCEDURE — 99999 PR PBB SHADOW E&M-EST. PATIENT-LVL II: CPT | Mod: PBBFAC,,, | Performed by: PSYCHIATRY & NEUROLOGY

## 2023-02-09 PROCEDURE — 99213 PR OFFICE/OUTPT VISIT, EST, LEVL III, 20-29 MIN: ICD-10-PCS | Mod: S$GLB,,, | Performed by: PSYCHIATRY & NEUROLOGY

## 2023-02-09 PROCEDURE — 3008F PR BODY MASS INDEX (BMI) DOCUMENTED: ICD-10-PCS | Mod: CPTII,S$GLB,, | Performed by: PSYCHIATRY & NEUROLOGY

## 2023-02-09 PROCEDURE — 3044F HG A1C LEVEL LT 7.0%: CPT | Mod: CPTII,S$GLB,, | Performed by: PSYCHIATRY & NEUROLOGY

## 2023-02-09 PROCEDURE — 3079F PR MOST RECENT DIASTOLIC BLOOD PRESSURE 80-89 MM HG: ICD-10-PCS | Mod: CPTII,S$GLB,, | Performed by: PSYCHIATRY & NEUROLOGY

## 2023-02-09 RX ORDER — ZALEPLON 10 MG/1
10 CAPSULE ORAL NIGHTLY
Qty: 30 CAPSULE | Refills: 3 | Status: SHIPPED | OUTPATIENT
Start: 2023-02-09 | End: 2023-05-09
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
## 2023-02-16 ENCOUNTER — OFFICE VISIT (OUTPATIENT)
Dept: OPTOMETRY | Facility: CLINIC | Age: 53
End: 2023-02-16
Payer: COMMERCIAL

## 2023-02-16 DIAGNOSIS — Z96.1 STATUS POST CATARACT EXTRACTION AND INSERTION OF INTRAOCULAR LENS OF RIGHT EYE: Primary | ICD-10-CM

## 2023-02-16 DIAGNOSIS — Z98.41 STATUS POST CATARACT EXTRACTION AND INSERTION OF INTRAOCULAR LENS OF RIGHT EYE: Primary | ICD-10-CM

## 2023-02-16 DIAGNOSIS — H16.142 PUNCTATE KERATITIS, LEFT EYE: ICD-10-CM

## 2023-02-16 PROCEDURE — 99024 POSTOP FOLLOW-UP VISIT: CPT | Mod: S$GLB,,, | Performed by: OPTOMETRIST

## 2023-02-16 PROCEDURE — 99999 PR PBB SHADOW E&M-EST. PATIENT-LVL III: CPT | Mod: PBBFAC,,, | Performed by: OPTOMETRIST

## 2023-02-16 PROCEDURE — 99999 PR PBB SHADOW E&M-EST. PATIENT-LVL III: ICD-10-PCS | Mod: PBBFAC,,, | Performed by: OPTOMETRIST

## 2023-02-16 PROCEDURE — 1159F PR MEDICATION LIST DOCUMENTED IN MEDICAL RECORD: ICD-10-PCS | Mod: CPTII,S$GLB,, | Performed by: OPTOMETRIST

## 2023-02-16 PROCEDURE — 99024 PR POST-OP FOLLOW-UP VISIT: ICD-10-PCS | Mod: S$GLB,,, | Performed by: OPTOMETRIST

## 2023-02-16 PROCEDURE — 1159F MED LIST DOCD IN RCRD: CPT | Mod: CPTII,S$GLB,, | Performed by: OPTOMETRIST

## 2023-02-16 NOTE — PROGRESS NOTES
LAURIE Osullivan is here today for post-op evaluation. Patient reports   OD VA has improved significantly. Patient reports no significant changes   or visual decline OS. States that she does experience a sort of halo   effect in OS.  DLS: 1/3/2023 Dr. Miguel  (-)Flashes (-)Floaters (-)Diplopia (-)Headaches   (-)Itching (-)Tearing (-) Burning (-)Dryness   (+) Photophobia while watching TV or on phone, patient uses denny glasses   for protection  (+)Glare OS seems as if there is a halo in VA   Past Eye Sx: Cataract Removal OD 12/22/2022  Eye Meds: No gtts  Last edited by Katherine Hall, OD on 2/16/2023 12:21 PM.            Assessment /Plan     For exam results, see Encounter Report.    Status post cataract extraction and insertion of intraocular lens of right eye    Punctate keratitis, left eye      1. Educated on today's WNL findings OD. Also, MAC OCT WNL OU today. Eyes well healed from cataract surgery OD. Pt OK to use OTC readers.     2. Educated pt on today's finding of diffuse SPK OS affecting entering VA and MR (NI). Pt to begin Refresh PF ATs bid OS until follow up with VA check and possible re-refraction     RTC x 2 weeks for recheck

## 2023-02-20 ENCOUNTER — PATIENT MESSAGE (OUTPATIENT)
Dept: PSYCHIATRY | Facility: CLINIC | Age: 53
End: 2023-02-20
Payer: COMMERCIAL

## 2023-03-06 ENCOUNTER — OFFICE VISIT (OUTPATIENT)
Dept: OPTOMETRY | Facility: CLINIC | Age: 53
End: 2023-03-06
Payer: COMMERCIAL

## 2023-03-06 DIAGNOSIS — Z96.1 STATUS POST CATARACT EXTRACTION AND INSERTION OF INTRAOCULAR LENS OF RIGHT EYE: Primary | ICD-10-CM

## 2023-03-06 DIAGNOSIS — Z98.41 STATUS POST CATARACT EXTRACTION AND INSERTION OF INTRAOCULAR LENS OF RIGHT EYE: Primary | ICD-10-CM

## 2023-03-06 DIAGNOSIS — H04.123 DRY EYE SYNDROME OF BILATERAL LACRIMAL GLANDS: ICD-10-CM

## 2023-03-06 PROCEDURE — 99999 PR PBB SHADOW E&M-EST. PATIENT-LVL I: ICD-10-PCS | Mod: PBBFAC,,, | Performed by: OPTOMETRIST

## 2023-03-06 PROCEDURE — 99999 PR PBB SHADOW E&M-EST. PATIENT-LVL I: CPT | Mod: PBBFAC,,, | Performed by: OPTOMETRIST

## 2023-03-06 PROCEDURE — 99024 PR POST-OP FOLLOW-UP VISIT: ICD-10-PCS | Mod: S$GLB,,, | Performed by: OPTOMETRIST

## 2023-03-06 PROCEDURE — 99024 POSTOP FOLLOW-UP VISIT: CPT | Mod: S$GLB,,, | Performed by: OPTOMETRIST

## 2023-04-21 ENCOUNTER — TELEPHONE (OUTPATIENT)
Dept: SLEEP MEDICINE | Facility: CLINIC | Age: 53
End: 2023-04-21
Payer: COMMERCIAL

## 2023-04-25 ENCOUNTER — PATIENT MESSAGE (OUTPATIENT)
Dept: SLEEP MEDICINE | Facility: CLINIC | Age: 53
End: 2023-04-25
Payer: COMMERCIAL

## 2023-05-05 ENCOUNTER — TELEPHONE (OUTPATIENT)
Dept: SLEEP MEDICINE | Facility: CLINIC | Age: 53
End: 2023-05-05
Payer: COMMERCIAL

## 2023-05-05 NOTE — TELEPHONE ENCOUNTER
----- Message from Carolyn Hammond sent at 5/5/2023 10:50 AM CDT -----  Regarding: NPI #  Name of Who is Calling: Rojelio Heard           What is the request in detail: [facility] is requesting a call back to get NPI number.            Can the clinic reply by [facility]: No           What Number to Call Back if not in [facility]: [phone]

## 2023-05-09 ENCOUNTER — OFFICE VISIT (OUTPATIENT)
Dept: PSYCHIATRY | Facility: CLINIC | Age: 53
End: 2023-05-09
Payer: COMMERCIAL

## 2023-05-09 VITALS
HEART RATE: 61 BPM | DIASTOLIC BLOOD PRESSURE: 80 MMHG | SYSTOLIC BLOOD PRESSURE: 129 MMHG | BODY MASS INDEX: 25.2 KG/M2 | WEIGHT: 137.81 LBS

## 2023-05-09 DIAGNOSIS — G89.4 CHRONIC PAIN SYNDROME: ICD-10-CM

## 2023-05-09 DIAGNOSIS — F51.04 CHRONIC INSOMNIA: ICD-10-CM

## 2023-05-09 DIAGNOSIS — F41.1 GAD (GENERALIZED ANXIETY DISORDER): ICD-10-CM

## 2023-05-09 DIAGNOSIS — F40.01 PANIC DISORDER WITH AGORAPHOBIA: ICD-10-CM

## 2023-05-09 DIAGNOSIS — F41.1 GENERALIZED ANXIETY DISORDER: ICD-10-CM

## 2023-05-09 DIAGNOSIS — F32.1 MDD (MAJOR DEPRESSIVE DISORDER), SINGLE EPISODE, MODERATE: Primary | ICD-10-CM

## 2023-05-09 DIAGNOSIS — F40.298 SPECIFIC PHOBIA: ICD-10-CM

## 2023-05-09 PROCEDURE — 90833 PR PSYCHOTHERAPY W/PATIENT W/E&M, 30 MIN (ADD ON): ICD-10-PCS | Mod: S$GLB,,, | Performed by: PSYCHIATRY & NEUROLOGY

## 2023-05-09 PROCEDURE — 99213 OFFICE O/P EST LOW 20 MIN: CPT | Mod: S$GLB,,, | Performed by: PSYCHIATRY & NEUROLOGY

## 2023-05-09 PROCEDURE — 90833 PSYTX W PT W E/M 30 MIN: CPT | Mod: S$GLB,,, | Performed by: PSYCHIATRY & NEUROLOGY

## 2023-05-09 PROCEDURE — 99999 PR PBB SHADOW E&M-EST. PATIENT-LVL II: ICD-10-PCS | Mod: PBBFAC,,, | Performed by: PSYCHIATRY & NEUROLOGY

## 2023-05-09 PROCEDURE — 3044F HG A1C LEVEL LT 7.0%: CPT | Mod: CPTII,S$GLB,, | Performed by: PSYCHIATRY & NEUROLOGY

## 2023-05-09 PROCEDURE — 3079F PR MOST RECENT DIASTOLIC BLOOD PRESSURE 80-89 MM HG: ICD-10-PCS | Mod: CPTII,S$GLB,, | Performed by: PSYCHIATRY & NEUROLOGY

## 2023-05-09 PROCEDURE — 99999 PR PBB SHADOW E&M-EST. PATIENT-LVL II: CPT | Mod: PBBFAC,,, | Performed by: PSYCHIATRY & NEUROLOGY

## 2023-05-09 PROCEDURE — 99213 PR OFFICE/OUTPT VISIT, EST, LEVL III, 20-29 MIN: ICD-10-PCS | Mod: S$GLB,,, | Performed by: PSYCHIATRY & NEUROLOGY

## 2023-05-09 PROCEDURE — 3008F PR BODY MASS INDEX (BMI) DOCUMENTED: ICD-10-PCS | Mod: CPTII,S$GLB,, | Performed by: PSYCHIATRY & NEUROLOGY

## 2023-05-09 PROCEDURE — 3044F PR MOST RECENT HEMOGLOBIN A1C LEVEL <7.0%: ICD-10-PCS | Mod: CPTII,S$GLB,, | Performed by: PSYCHIATRY & NEUROLOGY

## 2023-05-09 PROCEDURE — 3074F PR MOST RECENT SYSTOLIC BLOOD PRESSURE < 130 MM HG: ICD-10-PCS | Mod: CPTII,S$GLB,, | Performed by: PSYCHIATRY & NEUROLOGY

## 2023-05-09 PROCEDURE — 3074F SYST BP LT 130 MM HG: CPT | Mod: CPTII,S$GLB,, | Performed by: PSYCHIATRY & NEUROLOGY

## 2023-05-09 PROCEDURE — 3079F DIAST BP 80-89 MM HG: CPT | Mod: CPTII,S$GLB,, | Performed by: PSYCHIATRY & NEUROLOGY

## 2023-05-09 PROCEDURE — 3008F BODY MASS INDEX DOCD: CPT | Mod: CPTII,S$GLB,, | Performed by: PSYCHIATRY & NEUROLOGY

## 2023-05-09 RX ORDER — DULOXETIN HYDROCHLORIDE 60 MG/1
120 CAPSULE, DELAYED RELEASE ORAL DAILY
Qty: 60 CAPSULE | Refills: 3 | Status: SHIPPED | OUTPATIENT
Start: 2023-05-09 | End: 2023-07-26 | Stop reason: SDUPTHER

## 2023-05-09 RX ORDER — BUSPIRONE HYDROCHLORIDE 15 MG/1
22.5 TABLET ORAL 2 TIMES DAILY
Qty: 270 TABLET | Refills: 3 | Status: SHIPPED | OUTPATIENT
Start: 2023-05-09 | End: 2024-03-28 | Stop reason: SDUPTHER

## 2023-05-09 RX ORDER — ESZOPICLONE 3 MG/1
3 TABLET, FILM COATED ORAL NIGHTLY
Qty: 30 TABLET | Refills: 5 | Status: SHIPPED | OUTPATIENT
Start: 2023-05-09 | End: 2023-11-14
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
## 2023-06-20 ENCOUNTER — PATIENT MESSAGE (OUTPATIENT)
Dept: SLEEP MEDICINE | Facility: CLINIC | Age: 53
End: 2023-06-20
Payer: COMMERCIAL

## 2023-06-29 DIAGNOSIS — G47.33 OSA (OBSTRUCTIVE SLEEP APNEA): Primary | ICD-10-CM

## 2023-07-05 ENCOUNTER — TELEPHONE (OUTPATIENT)
Dept: OPHTHALMOLOGY | Facility: CLINIC | Age: 53
End: 2023-07-05
Payer: COMMERCIAL

## 2023-07-21 ENCOUNTER — PATIENT MESSAGE (OUTPATIENT)
Dept: PSYCHIATRY | Facility: CLINIC | Age: 53
End: 2023-07-21
Payer: COMMERCIAL

## 2023-07-26 ENCOUNTER — OFFICE VISIT (OUTPATIENT)
Dept: PSYCHIATRY | Facility: CLINIC | Age: 53
End: 2023-07-26
Payer: COMMERCIAL

## 2023-07-26 VITALS
SYSTOLIC BLOOD PRESSURE: 134 MMHG | BODY MASS INDEX: 26.19 KG/M2 | DIASTOLIC BLOOD PRESSURE: 80 MMHG | HEART RATE: 61 BPM | WEIGHT: 143.19 LBS

## 2023-07-26 DIAGNOSIS — F41.1 GAD (GENERALIZED ANXIETY DISORDER): ICD-10-CM

## 2023-07-26 DIAGNOSIS — F41.1 GENERALIZED ANXIETY DISORDER: ICD-10-CM

## 2023-07-26 DIAGNOSIS — L29.9 PRURITUS: ICD-10-CM

## 2023-07-26 DIAGNOSIS — F40.01 PANIC DISORDER WITH AGORAPHOBIA: ICD-10-CM

## 2023-07-26 DIAGNOSIS — G89.4 CHRONIC PAIN SYNDROME: ICD-10-CM

## 2023-07-26 DIAGNOSIS — F32.1 MDD (MAJOR DEPRESSIVE DISORDER), SINGLE EPISODE, MODERATE: Primary | ICD-10-CM

## 2023-07-26 DIAGNOSIS — F51.04 CHRONIC INSOMNIA: ICD-10-CM

## 2023-07-26 PROCEDURE — 90833 PSYTX W PT W E/M 30 MIN: CPT | Mod: S$GLB,,, | Performed by: PSYCHIATRY & NEUROLOGY

## 2023-07-26 PROCEDURE — 1159F PR MEDICATION LIST DOCUMENTED IN MEDICAL RECORD: ICD-10-PCS | Mod: CPTII,S$GLB,, | Performed by: PSYCHIATRY & NEUROLOGY

## 2023-07-26 PROCEDURE — 3079F PR MOST RECENT DIASTOLIC BLOOD PRESSURE 80-89 MM HG: ICD-10-PCS | Mod: CPTII,S$GLB,, | Performed by: PSYCHIATRY & NEUROLOGY

## 2023-07-26 PROCEDURE — 1160F PR REVIEW ALL MEDS BY PRESCRIBER/CLIN PHARMACIST DOCUMENTED: ICD-10-PCS | Mod: CPTII,S$GLB,, | Performed by: PSYCHIATRY & NEUROLOGY

## 2023-07-26 PROCEDURE — 3079F DIAST BP 80-89 MM HG: CPT | Mod: CPTII,S$GLB,, | Performed by: PSYCHIATRY & NEUROLOGY

## 2023-07-26 PROCEDURE — 99999 PR PBB SHADOW E&M-EST. PATIENT-LVL II: CPT | Mod: PBBFAC,,, | Performed by: PSYCHIATRY & NEUROLOGY

## 2023-07-26 PROCEDURE — 1159F MED LIST DOCD IN RCRD: CPT | Mod: CPTII,S$GLB,, | Performed by: PSYCHIATRY & NEUROLOGY

## 2023-07-26 PROCEDURE — 90833 PR PSYCHOTHERAPY W/PATIENT W/E&M, 30 MIN (ADD ON): ICD-10-PCS | Mod: S$GLB,,, | Performed by: PSYCHIATRY & NEUROLOGY

## 2023-07-26 PROCEDURE — 3075F PR MOST RECENT SYSTOLIC BLOOD PRESS GE 130-139MM HG: ICD-10-PCS | Mod: CPTII,S$GLB,, | Performed by: PSYCHIATRY & NEUROLOGY

## 2023-07-26 PROCEDURE — 99214 OFFICE O/P EST MOD 30 MIN: CPT | Mod: S$GLB,,, | Performed by: PSYCHIATRY & NEUROLOGY

## 2023-07-26 PROCEDURE — 1160F RVW MEDS BY RX/DR IN RCRD: CPT | Mod: CPTII,S$GLB,, | Performed by: PSYCHIATRY & NEUROLOGY

## 2023-07-26 PROCEDURE — 3008F PR BODY MASS INDEX (BMI) DOCUMENTED: ICD-10-PCS | Mod: CPTII,S$GLB,, | Performed by: PSYCHIATRY & NEUROLOGY

## 2023-07-26 PROCEDURE — 99214 PR OFFICE/OUTPT VISIT, EST, LEVL IV, 30-39 MIN: ICD-10-PCS | Mod: S$GLB,,, | Performed by: PSYCHIATRY & NEUROLOGY

## 2023-07-26 PROCEDURE — 3008F BODY MASS INDEX DOCD: CPT | Mod: CPTII,S$GLB,, | Performed by: PSYCHIATRY & NEUROLOGY

## 2023-07-26 PROCEDURE — 99999 PR PBB SHADOW E&M-EST. PATIENT-LVL II: ICD-10-PCS | Mod: PBBFAC,,, | Performed by: PSYCHIATRY & NEUROLOGY

## 2023-07-26 PROCEDURE — 3075F SYST BP GE 130 - 139MM HG: CPT | Mod: CPTII,S$GLB,, | Performed by: PSYCHIATRY & NEUROLOGY

## 2023-07-26 RX ORDER — DULOXETIN HYDROCHLORIDE 60 MG/1
120 CAPSULE, DELAYED RELEASE ORAL DAILY
Qty: 60 CAPSULE | Refills: 6 | Status: SHIPPED | OUTPATIENT
Start: 2023-07-26 | End: 2023-11-14 | Stop reason: SDUPTHER

## 2023-07-26 RX ORDER — CLONAZEPAM 1 MG/1
TABLET ORAL
Qty: 90 TABLET | Refills: 5 | Status: SHIPPED | OUTPATIENT
Start: 2023-07-26 | End: 2023-11-14 | Stop reason: SDUPTHER

## 2023-07-26 RX ORDER — HYDROXYZINE HYDROCHLORIDE 10 MG/1
10 TABLET, FILM COATED ORAL NIGHTLY
Qty: 30 TABLET | Refills: 3 | Status: SHIPPED | OUTPATIENT
Start: 2023-07-26 | End: 2023-11-14 | Stop reason: DRUGHIGH
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
## 2023-08-10 NOTE — PROGRESS NOTES
Cc: CHRIS/RLS    Since seen she resumed using apap 4-20cm with replacement DS1 machine. Mask straps cutting into hear/sore. Stillhas RLS sx, no longer on requip. Hard to fall and stay asleep.     Interrogation 30dag 7.3-9h/n AHI 1.6, 90% ti le 5.5-5.8cm, 100% mask fit .     PSG 7/2016 (168#) AHI 16.4  Titration study 2015, 7cm effective    ASSESSMENT:    1. Obstructive Sleep Apnea, moderate. She is adherent with pap, benefiting but given mask soreness and low 90 %tile plan re-assess AHI/PSG   2. RLS  3. MS    PLAN:  apap adjust today 4-10cm. Get different mask style  OBTAIN requal PSG, reasses ahi  Resume requip 1h before bedtime   See MS provider as advised /continue meds

## 2023-08-11 ENCOUNTER — OFFICE VISIT (OUTPATIENT)
Dept: SLEEP MEDICINE | Facility: CLINIC | Age: 53
End: 2023-08-11
Payer: COMMERCIAL

## 2023-08-11 VITALS
DIASTOLIC BLOOD PRESSURE: 83 MMHG | BODY MASS INDEX: 25.99 KG/M2 | WEIGHT: 142.13 LBS | SYSTOLIC BLOOD PRESSURE: 128 MMHG | HEART RATE: 64 BPM

## 2023-08-11 DIAGNOSIS — G35 MS (MULTIPLE SCLEROSIS): Primary | ICD-10-CM

## 2023-08-11 DIAGNOSIS — G25.81 RLS (RESTLESS LEGS SYNDROME): ICD-10-CM

## 2023-08-11 DIAGNOSIS — G47.33 OSA (OBSTRUCTIVE SLEEP APNEA): ICD-10-CM

## 2023-08-11 PROCEDURE — 99999 PR PBB SHADOW E&M-EST. PATIENT-LVL IV: ICD-10-PCS | Mod: PBBFAC,,, | Performed by: NURSE PRACTITIONER

## 2023-08-11 PROCEDURE — 3079F DIAST BP 80-89 MM HG: CPT | Mod: CPTII,S$GLB,, | Performed by: NURSE PRACTITIONER

## 2023-08-11 PROCEDURE — 3008F BODY MASS INDEX DOCD: CPT | Mod: CPTII,S$GLB,, | Performed by: NURSE PRACTITIONER

## 2023-08-11 PROCEDURE — 3008F PR BODY MASS INDEX (BMI) DOCUMENTED: ICD-10-PCS | Mod: CPTII,S$GLB,, | Performed by: NURSE PRACTITIONER

## 2023-08-11 PROCEDURE — 3074F PR MOST RECENT SYSTOLIC BLOOD PRESSURE < 130 MM HG: ICD-10-PCS | Mod: CPTII,S$GLB,, | Performed by: NURSE PRACTITIONER

## 2023-08-11 PROCEDURE — 1159F MED LIST DOCD IN RCRD: CPT | Mod: CPTII,S$GLB,, | Performed by: NURSE PRACTITIONER

## 2023-08-11 PROCEDURE — 3074F SYST BP LT 130 MM HG: CPT | Mod: CPTII,S$GLB,, | Performed by: NURSE PRACTITIONER

## 2023-08-11 PROCEDURE — 3044F HG A1C LEVEL LT 7.0%: CPT | Mod: CPTII,S$GLB,, | Performed by: NURSE PRACTITIONER

## 2023-08-11 PROCEDURE — 99999 PR PBB SHADOW E&M-EST. PATIENT-LVL IV: CPT | Mod: PBBFAC,,, | Performed by: NURSE PRACTITIONER

## 2023-08-11 PROCEDURE — 3044F PR MOST RECENT HEMOGLOBIN A1C LEVEL <7.0%: ICD-10-PCS | Mod: CPTII,S$GLB,, | Performed by: NURSE PRACTITIONER

## 2023-08-11 PROCEDURE — 1159F PR MEDICATION LIST DOCUMENTED IN MEDICAL RECORD: ICD-10-PCS | Mod: CPTII,S$GLB,, | Performed by: NURSE PRACTITIONER

## 2023-08-11 PROCEDURE — 3079F PR MOST RECENT DIASTOLIC BLOOD PRESSURE 80-89 MM HG: ICD-10-PCS | Mod: CPTII,S$GLB,, | Performed by: NURSE PRACTITIONER

## 2023-08-11 PROCEDURE — 99214 OFFICE O/P EST MOD 30 MIN: CPT | Mod: S$GLB,,, | Performed by: NURSE PRACTITIONER

## 2023-08-11 PROCEDURE — 99214 PR OFFICE/OUTPT VISIT, EST, LEVL IV, 30-39 MIN: ICD-10-PCS | Mod: S$GLB,,, | Performed by: NURSE PRACTITIONER

## 2023-08-11 RX ORDER — ROPINIROLE 0.5 MG/1
0.5 TABLET, FILM COATED ORAL SEE ADMIN INSTRUCTIONS
Qty: 60 TABLET | Refills: 3 | Status: SHIPPED | OUTPATIENT
Start: 2023-08-11 | End: 2023-08-29 | Stop reason: SINTOL

## 2023-08-11 RX ORDER — AZELAIC ACID 0.15 G/G
GEL TOPICAL EVERY MORNING
COMMUNITY
Start: 2023-04-28

## 2023-08-16 ENCOUNTER — OFFICE VISIT (OUTPATIENT)
Dept: OPHTHALMOLOGY | Facility: CLINIC | Age: 53
End: 2023-08-16
Payer: COMMERCIAL

## 2023-08-16 ENCOUNTER — CLINICAL SUPPORT (OUTPATIENT)
Dept: OPHTHALMOLOGY | Facility: CLINIC | Age: 53
End: 2023-08-16
Payer: COMMERCIAL

## 2023-08-16 DIAGNOSIS — Z86.69 H/O OPTIC NEURITIS: ICD-10-CM

## 2023-08-16 DIAGNOSIS — G35 MS (MULTIPLE SCLEROSIS): Primary | ICD-10-CM

## 2023-08-16 DIAGNOSIS — H47.291 PARTIAL OPTIC ATROPHY OF RIGHT EYE: ICD-10-CM

## 2023-08-16 PROCEDURE — 92083 HUMPHREY VISUAL FIELD - OU - BOTH EYES: ICD-10-PCS | Mod: S$GLB,,, | Performed by: STUDENT IN AN ORGANIZED HEALTH CARE EDUCATION/TRAINING PROGRAM

## 2023-08-16 PROCEDURE — 1159F MED LIST DOCD IN RCRD: CPT | Mod: CPTII,S$GLB,, | Performed by: STUDENT IN AN ORGANIZED HEALTH CARE EDUCATION/TRAINING PROGRAM

## 2023-08-16 PROCEDURE — 99215 PR OFFICE/OUTPT VISIT, EST, LEVL V, 40-54 MIN: ICD-10-PCS | Mod: S$GLB,,, | Performed by: STUDENT IN AN ORGANIZED HEALTH CARE EDUCATION/TRAINING PROGRAM

## 2023-08-16 PROCEDURE — 99999 PR PBB SHADOW E&M-EST. PATIENT-LVL IV: ICD-10-PCS | Mod: PBBFAC,,, | Performed by: STUDENT IN AN ORGANIZED HEALTH CARE EDUCATION/TRAINING PROGRAM

## 2023-08-16 PROCEDURE — 99999 PR PBB SHADOW E&M-EST. PATIENT-LVL IV: CPT | Mod: PBBFAC,,, | Performed by: STUDENT IN AN ORGANIZED HEALTH CARE EDUCATION/TRAINING PROGRAM

## 2023-08-16 PROCEDURE — 99215 OFFICE O/P EST HI 40 MIN: CPT | Mod: S$GLB,,, | Performed by: STUDENT IN AN ORGANIZED HEALTH CARE EDUCATION/TRAINING PROGRAM

## 2023-08-16 PROCEDURE — 1159F PR MEDICATION LIST DOCUMENTED IN MEDICAL RECORD: ICD-10-PCS | Mod: CPTII,S$GLB,, | Performed by: STUDENT IN AN ORGANIZED HEALTH CARE EDUCATION/TRAINING PROGRAM

## 2023-08-16 PROCEDURE — 92133 POSTERIOR SEGMENT OCT OPTIC NERVE(OCULAR COHERENCE TOMOGRAPHY) - OU - BOTH EYES: ICD-10-PCS | Mod: S$GLB,,, | Performed by: STUDENT IN AN ORGANIZED HEALTH CARE EDUCATION/TRAINING PROGRAM

## 2023-08-16 PROCEDURE — 3044F PR MOST RECENT HEMOGLOBIN A1C LEVEL <7.0%: ICD-10-PCS | Mod: CPTII,S$GLB,, | Performed by: STUDENT IN AN ORGANIZED HEALTH CARE EDUCATION/TRAINING PROGRAM

## 2023-08-16 PROCEDURE — 92133 CPTRZD OPH DX IMG PST SGM ON: CPT | Mod: S$GLB,,, | Performed by: STUDENT IN AN ORGANIZED HEALTH CARE EDUCATION/TRAINING PROGRAM

## 2023-08-16 PROCEDURE — 3044F HG A1C LEVEL LT 7.0%: CPT | Mod: CPTII,S$GLB,, | Performed by: STUDENT IN AN ORGANIZED HEALTH CARE EDUCATION/TRAINING PROGRAM

## 2023-08-16 PROCEDURE — 92083 EXTENDED VISUAL FIELD XM: CPT | Mod: S$GLB,,, | Performed by: STUDENT IN AN ORGANIZED HEALTH CARE EDUCATION/TRAINING PROGRAM

## 2023-08-16 NOTE — PROGRESS NOTES
"      Date:  8/16/2023    ?  Referring Provider:   Angela Corral MD    Copies of Letters to the Following:   MD Gurpreet Gonzales (neuro)    Chief Complaint:  I saw Jillian Osullivan at the [facility] for neuro-ophthalmic evaluation.   She is a 52 y.o. female with a history of hypothyroidism, ERM OS, cataract OD s/p PCIOL, myopia and astigmatism with presbyopia, MS on Ocrevus and history of right optic neuritis who presents for follow up of blurred vision.     History:       Patient here today with c/o decreased VA OD, halos OD, occasional pain OD   and flashes and floaters on yesterday OD. No previous sx or injury ou.   Diagnosis with MS in 2007. No fmhx of eye disease.    No gtts     OD optic neuritis in 2007 right eye blur, FBS, then pain, worsened over time and then had some improvement.     MRI 2/15/2022 without any abnormal enhancement of the optic nerves, per report    She reports chronic blurred vision and intermittent eye pain, received course(s) of steroids in early 2022 with no improvement.     Due for follow up with Dr. Ardon      2/2021 [facility]:  "Partial optic atrophy of right eye     Multiple sclerosis     Myopia of both eyes with astigmatism and presbyopia        1. Hx reduced VA OD 2 to optic neuritis from MS--see previous notes.  Pt followed by outside retina, Dr hunter, every 6 mos,  and just here today for spex.    2. Small cat OD"    Nino Vázquez (neuro) 3/8/2022  "52 y/o female with a medical history significant for MS, acquired hypothyroidism, and anxiety returns to the clinic today for a follow up. On last visit, patient presented to establish care for MS. Patient was diagnosed with MS several years ago on the basis of a Brain MRI after a bout of optic neuritis. Last visit, she complained of increased vision loss in her R eye and difficulty distinguishing colors. Neurological examination was consistent with an acute optic neuritis. Patient was to continue " "on Ocrevus infusions. She was sent for Solu-Medrol 1 g IV daily for 3 days. Patient was sent for Brain and Orbits MRIs for further evaluation given lack of imaging for the past several years. Brain MRI performed 2/15/22 revealed no significant interval change and no enhancing lesions in comparison to previous study. Orbits MRI performed 2/15/22 was normal. Today, patient reports R eye vision loss remains stable since last visit. She continues to have difficulties seeing from a distance out of her R eye. She complains of myalgia, significant fatigue and general malaise. Patient admits to balance issues and ambulates with a cane typically. Patient completed 3 day course of Solu-Medrol 1 g IV daily without complications. Patient continues on Ocrevus without complications. Her last Ocrevus infusion was about 6 months ago. She has been on Avonex, Copaxone, Tecfidera, Aubagio in the past. She had to discontinue these medications due to either side effects or allergies. Patient has tried Provigil in the past but discontinued Provigil because she experienced side-effects. Patient continues on Baclofen nightly. "  ?  9/1/2022      52 y/o female is here for multiple sclerosis f/u with HVF and OCT review.   Pt states not much improvement with new glasses. Occasional floaters. No   diplopia, headaches, flashes of light, or eye allergies reported today.      4/13/2022 Duglas: new prescription, offered cataract eval.     Interval History:  8/16/2023    She reports frustration with left eye blurred vision while watching TV as well as halos and light sensitivity with lights at night. She does not like to wear glasses and has been functioning with monovision (OD for distance and OS for near).     3/2023 neuro:  "Today she reports she was doing well with Ocrevus infusions initially but now is having some problems. She has been having some anxiety and tenses her body up. She is okay with continuing these infusions despite them causing " "her a UTI almost every time. She has also been struggling with her sleep apnea. Sometimes she cannot fall asleep or stay asleep. She does use a CPAP but notes that she does not really sleep any better with it. She was not able to get the armodafinil for her fatigue. "    Current Outpatient Medications   Medication Sig Dispense Refill    azelaic acid (AZELEX) 15 % gel Apply topically every morning.      baclofen (LIORESAL) 10 MG tablet TK 1 T PO BID WF OR MILK  3    busPIRone (BUSPAR) 15 MG tablet Take 1.5 tablets (22.5 mg total) by mouth 2 (two) times daily. 270 tablet 3    CALCIUM-MAGNESIUM-ZINC ORAL Take by mouth once daily. Calcium-1,000 mg  Magnesium-500 mg  Zinc-25mg      ROCKY SEED/ALA/LINOLEIC/OLEIC (ROCKY SEED OIL-OMEGA 3-6-9 ORAL) Take by mouth.      cholecalciferol, vitamin D3, 5,000 unit capsule Take 5,000 Units by mouth once daily.       clindamycin (CLEOCIN T) 1 % lotion MARLENA EXT AA BID      clindamycin phosphate 1% (CLINDAGEL) 1 % gel MARLENA TO THE AFFECTED AREA ON AREAS OF BODY BID  1    clonazePAM (KLONOPIN) 1 MG tablet Take oral 1/2 - 1 tablet q 6 hours prn anxiety or insomnia 90 tablet 5    coenzyme Q10 300 mg Cap Take 1 capsule by mouth every evening. 400mg      cyanocobalamin 1,000 mcg/mL injection Inject 1 mL into the muscle once a week.      doxycycline (ORACEA) 40 mg capsule Take 40 mg by mouth once daily.      doxycycline (PERIOSTAT) 20 MG tablet Take 20 mg by mouth 2 (two) times daily.      DULoxetine (CYMBALTA) 60 MG capsule Take 2 capsules (120 mg total) by mouth once daily. 60 capsule 6    FLAXSEED OIL ORAL Take by mouth once daily. Omega 3 2800MG      INV sodium chloride 0.9 % SolP with INV ocrelizumab 30 mg/mL Inj Inject 300 mg into the vein. FOR INVESTIGATIONAL USE ONLY      L. RHAMNOSUS GG/INULIN (CULTURELLE PROBIOTICS ORAL) Take by mouth every evening. Ultimate Jo-Ann 15 Billion Probiotic      LACTOBAC NO.41/BIFIDOBACT NO.7 (PROBIOTIC-10 ORAL) Take by mouth.      levOCARNitine (CARNITOR) " 330 mg Tab Take 500 mg by mouth.      levothyroxine (SYNTHROID) 88 MCG tablet Take 88 mcg by mouth once daily.      magnesium oxide (MAG-OX) 400 mg (241.3 mg magnesium) tablet Take 300 mg by mouth once daily.      MINERALS ORAL Take 1 tablet by mouth once daily. New Vision Essential Minerals      naproxen (NAPROSYN) 500 MG tablet Take 1 tablet (500 mg total) by mouth 2 (two) times daily with meals. 60 tablet 0    ocrelizumab 30 mg/mL Soln Inject 600 mg into the vein every 6 (six) months.      omega 3-dha-epa-fish oil 1,000 (120-180) mg Cap Take 1 capsule by mouth once daily.      psyllium (METAMUCIL) powder Take 1 packet by mouth once daily.      rOPINIRole (REQUIP) 0.5 MG tablet Take 1 tablet (0.5 mg total) by mouth As instructed. 1 tab x 3-5 nights, may increase to 2 tabs qhs if needed for improvement of symptoms thereafter 60 tablet 3    SOOLANTRA 1 % Crea       triamcinolone acetonide 0.1% (KENALOG) 0.1 % cream Apply 1 application topically 2 (two) times daily.  2    turmeric root extract 500 mg Cap Take by mouth Daily.      UNABLE TO FIND Take by mouth 2 (two) times daily. Multigenics without Iron      UNABLE TO FIND Take 100 g by mouth once daily. medication name: D-Ribose      vitamins  A,C,E-zinc-copper 14,320-226-200 unit-mg-unit Cap Take by mouth.      whey protein isolate 21 gram-100 kcal/27 gram Powd by NOT APPLICABLE route.      diazePAM (VALIUM) 5 MG tablet Take oral 1/2 to 1 tablet q 8 hours prn anxiety or insomnia (Patient not taking: Reported on 8/11/2023) 90 tablet 5    eszopiclone (LUNESTA) 3 mg Tab Take 1 tablet (3 mg total) by mouth every evening. (Patient not taking: Reported on 8/11/2023) 30 tablet 5    hydrOXYzine HCL (ATARAX) 10 MG Tab Take 1 tablet (10 mg total) by mouth every evening. (Patient not taking: Reported on 8/11/2023) 30 tablet 3    prednisolon/gatiflox/bromfenac (PREDNISOL ACE-GATIFLOX-BROMFEN) 1-0.5-0.075 % DrpS Apply 1 drop to eye 3 (three) times daily. One drop 3 times a day  in surgical eye (Patient not taking: Reported on 8/11/2023) 5 mL 3     No current facility-administered medications for this visit.     Facility-Administered Medications Ordered in Other Visits   Medication Dose Route Frequency Provider Last Rate Last Admin    balanced salt irrigation intra-ocular solution 1 drop  1 drop Right Eye On Call Procedure Oanh Miguel MD        phenylephrine HCL 10% ophthalmic solution 1 drop  1 drop Right Eye PRN Oanh Miguel MD        sodium chloride 0.9% flush 2 mL  2 mL Intravenous PRN Oanh Miguel MD         Review of patient's allergies indicates:   Allergen Reactions    Glatiramer (copolymer 1) Dermatitis    Vicodin [hydrocodone-acetaminophen] Rash    Dextromethorphan Other (See Comments)     Anxiety, jittery, agitation    Gabapentin Other (See Comments)     Feet/ankle swelling, joint pain    Lorazepam Other (See Comments)     Increased anxiety, agitation    Aspirin      Other reaction(s): Rash    Aspirin     Codeine Other (See Comments)    Dimethyl fumarate Other (See Comments)    Glatiramer Hives    Penicillins      Other reaction(s): Rash  Other reaction(s): Rash    Sulfa (sulfonamide antibiotics)     Sulfamethoxazole-trimethoprim      Other reaction(s): Rash    Teriflunomide     Vancomycin Nausea And Vomiting    Oxycodone-acetaminophen Rash     Past Medical History:   Diagnosis Date    Endometriosis, site unspecified     H/O total hysterectomy     Hidradenitis     History of laparoscopy     History of psychiatric care     Multiple sclerosis     Panic anxiety syndrome     Therapy      Past Surgical History:   Procedure Laterality Date    APPENDECTOMY      breast reduction      CATARACT EXTRACTION W/  INTRAOCULAR LENS IMPLANT Right 12/22/2022    Procedure: EXTRACTION, CATARACT, WITH IOL INSERTION;  Surgeon: Oanh Miguel MD;  Location: [facility];  Service: Ophthalmology;  Laterality: Right;    HYSTERECTOMY      OK EXPLORATORY OF ABDOMEN      2002    sweat gland  removal  10/2013    rt groin     Family History   Problem Relation Age of Onset    Anxiety disorder Mother     Cancer Mother         cervix    Cataracts Mother     Breast cancer Neg Hx     Colon cancer Neg Hx     Ovarian cancer Neg Hx     Amblyopia Neg Hx     Blindness Neg Hx     Glaucoma Neg Hx     Macular degeneration Neg Hx     Retinal detachment Neg Hx     Strabismus Neg Hx      Social History     Socioeconomic History    Marital status:    Tobacco Use    Smoking status: Never    Smokeless tobacco: Never   Substance and Sexual Activity    Alcohol use: No     Alcohol/week: 0.0 standard drinks of alcohol    Drug use: No    Sexual activity: Not Currently     Birth control/protection: Abstinence         Examination:  She was well-appearing. She was alert and oriented. Attention span and concentration were normal. Speech, language, memory, and general knowledge were intact.      Her distance visual acuity without correction was 20/25-1 in the right eye and 20/400  (PH 20/40) in the left eye. OS 20/40-2 with -2.00 at distance, OS J1 at near sans correction.     She perceived 7/8 OD and 8/8 OS Ishihara color plates correctly. Red desaturation 10% OD versus 100% OS. Pupils were brisk to light with a 0.6 log unit R APD. Ocular ductions were full. Unable to test alignment by cross cover as she cannot focus on small target OD without pinhole There was no nystagmus. Saccades and pursuits were normal. Lids were symmetric.     Optic discs appeared normal OS, mild pallor OD. Pupillary dilation was not necessary for visualization of the optic disc today.     Laboratories Reviewed:     n/a  ?  Neuroimaging Reviewed:     1/2018 MRI brain w/wo contrast  IMPRESSION:   No significant change from prior.     Allowing for differences in technique no new lesion with continued scattered T2 flair hyperintensities and supratentorial parenchyma remain concerning for mild degree of prior demyelinating plaque. No evidence for new  lesion or enhancing lesion to suggest interval or active demyelination.     Clinical correlation and followup advised.     2/15/2022 MRI orbits w/wo contrast  FINDINGS:   The globes are symmetric in signal intensity and appearance. There is no abnormal intraconal or extraconal enhancement. No evidence of mass. There is no abnormal enhancement along the optic nerves. The extraocular muscles are symmetric in size and appearance. No evidence of a venous varix. There is no retrobulbar are stranding of fat. The optic chiasm is normal. There is no suprasellar mass.    IMPRESSION:   Normal MRI orbits with and without contrast.    I personally reviewed these reports but images were not available for my review  ?  Ocular Imaging, Photos, Records Reviewed:     OCT RNFL 4/6/2022:   Right Eye - Average RNFL 64 temporal and inferior thinning, borderline nasal thinning   Left Eye - Average RNFL 94 all segments normal     Normal macular architecture OU    OCT RNFL 9/1/2022:   Right Eye - Average RNFL 64 temporal and inferior thinning, borderline nasal thinning   Left Eye - Average RNFL 94 all segments normal     Macular architecture normal OU    OCT RNFL Today 8/16/2023:   Right Eye - Average RNFL 66 temporal and inferior thinning, borderline nasal thinning   Left Eye - Average RNFL 94 all segments normal     Macular architecture normal OU    Visual Field Test 24-2 OU  4/6/2022: Right Eye - fixation losses 2/11, false positives 0%, false negatives 11%, MD -7.17dB, Impression OD: moderate generalized depression. Left Eye - fixation losses 2/10, false positives 9%, false negatives 0%, MD -0.07dB, Impression OS: full.    Visual Field Test 24-2 OU 9/1/2022: Right Eye - fixation losses 1/10, false positives 0%, false negatives 0%, MD -5.16dB, Impression OD: moderate generalized depression. Left Eye - fixation losses 2/11, false positives 0%, false negatives 0%, MD 0.22dB, Impression OS: full.  ?  Visual Field Test 24-2 OU Today  8/16/2023: Right Eye - fixation losses 5/11, false positives 0%, false negatives 0%, MD -2.31dB, Impression OD: few mild superior points. Left Eye - fixation losses 0/11, false positives 0%, false negatives 0%, MD -0.50dB, Impression OS: some rim artifact, full.    Impression:  Jillian Osullivan has history of hypothyroidism, ERM OS, cataract OD, myopia and astigmatism with presbyopia, MS on Ocrevus and history of right optic neuritis who presents for evaluation of blurred vision OD and intermittent blurred vision and pain OD. She started having these symptoms several months ago and had MRI orbits in 2/2022 with no reported evidence of optic neuritis. She received high dose steroids in early 2022 as well. She has worsened visual acuity OD than prior but it improves dramatically with pinhole suggestive of primary ocular pathology. She is due for updated refraction and routine exam in optometry. The view of her disc with direct fundoscopy today is quite blurred and I suspect she may have had some worsening of her cataract OD in response to steroids. Dr. Ardon will assess. My suspicion for recurrent optic neuritis is very low. I will continue to monitor her vision from an MS standpoint.     9/1/2022: saw Dr. Ardon who gave new glasses prescription. OCT and HVF stable. Consider cataract extraction for 3+ OD.    8/16/2023: she underwent OD cataract extraction in 12/2022 with Dr. Miguel, very happy with right eye vision, sometimes sees a little shimmer of movement with eye movements. Being treated for dry eye by Dr. Hall. OCT stable. HVF improved OD s/p CE and stable OS. She reports frustration with left eye blurred vision while watching TV as well as halos and light sensitivity with lights at night. She does not like to wear glasses and has been functioning with monovision (OD for distance and OS for near). OS 20/400 at distance and J1 at near today. OS 20/40-2 with -2.00 at distance. She would like to discuss  cataract extraction OS versus glasses with Dr. Miguel.   ?  Plan:  1. Follow up with neuroimmunologist as planned  2. Follow up with Dr. Hall as planned  3. [place]ont for OS cataract re-eval given halos and blur at distance OS    Follow-up:  I will see her in follow-up in 1 year or sooner with any change.  OCT and HVF  ?  Visit Checklist (as applicable):  1. Status of new and prior symptoms discussed? yes  2. Neuroimaging reviewed/ ordered as appropriate? yes  3. Ocular imaging and photos reviewed/ ordered as appropriate? yes  4. Plan for work-up and treatment discussed with patient? yes  5. Potential medication side-effects and monitoring plan discussed? yes  6. Review of outside medical records was performed and pertinent details are summarized in the HPI above? yes    Time spent on this encounter: 45 minutes. This includes face to face time and non-face to face time preparing to see the patient (eg, review of tests), obtaining and/or reviewing separately obtained history, documenting clinical information in the electronic or other health record, independently interpreting results and communicating results to the patient/family/caregiver, or care coordinator.      MARISOL Waite  Neuro-Ophthalmology Consultant

## 2023-08-21 ENCOUNTER — TELEPHONE (OUTPATIENT)
Dept: SLEEP MEDICINE | Facility: OTHER | Age: 53
End: 2023-08-21
Payer: COMMERCIAL

## 2023-08-28 ENCOUNTER — PATIENT MESSAGE (OUTPATIENT)
Dept: SLEEP MEDICINE | Facility: CLINIC | Age: 53
End: 2023-08-28
Payer: COMMERCIAL

## 2023-08-28 ENCOUNTER — TELEPHONE (OUTPATIENT)
Dept: SLEEP MEDICINE | Facility: OTHER | Age: 53
End: 2023-08-28
Payer: COMMERCIAL

## 2023-08-29 ENCOUNTER — TELEPHONE (OUTPATIENT)
Dept: SLEEP MEDICINE | Facility: OTHER | Age: 53
End: 2023-08-29
Payer: COMMERCIAL

## 2023-08-29 ENCOUNTER — HOSPITAL ENCOUNTER (OUTPATIENT)
Dept: SLEEP MEDICINE | Facility: HOSPITAL | Age: 53
Discharge: HOME | End: 2023-08-29
Attending: PSYCHIATRY & NEUROLOGY | Admitting: PSYCHIATRY & NEUROLOGY
Payer: COMMERCIAL

## 2023-08-29 DIAGNOSIS — G47.20 SLEEP STAGE DYSFUNCTION: Primary | ICD-10-CM

## 2023-08-29 DIAGNOSIS — G47.33 OSA (OBSTRUCTIVE SLEEP APNEA): ICD-10-CM

## 2023-08-29 DIAGNOSIS — G35 MS (MULTIPLE SCLEROSIS): ICD-10-CM

## 2023-08-29 DIAGNOSIS — G25.81 RLS (RESTLESS LEGS SYNDROME): Primary | ICD-10-CM

## 2023-08-29 PROCEDURE — 95810 POLYSOM 6/> YRS 4/> PARAM: CPT

## 2023-08-29 RX ORDER — PRAMIPEXOLE DIHYDROCHLORIDE 0.12 MG/1
0.12 TABLET ORAL SEE ADMIN INSTRUCTIONS
Qty: 60 TABLET | Refills: 2 | Status: SHIPPED | OUTPATIENT
Start: 2023-08-29 | End: 2024-03-08

## 2023-08-30 NOTE — PROGRESS NOTES
Education was done via explanation of sleep study process and procedure. All questions were answered.    Pt. did not meet criteria for CPAP. Very few respiratory events were observed. Supine REM sleep was obtained.    Low sat of 93% was observed in study. EKG revealed rare PAC. Soft snoring was heard. Thank you letter was given in a.m

## 2023-09-08 ENCOUNTER — OFFICE VISIT (OUTPATIENT)
Dept: OPHTHALMOLOGY | Facility: CLINIC | Age: 53
End: 2023-09-08
Payer: COMMERCIAL

## 2023-09-08 DIAGNOSIS — Z98.41 STATUS POST CATARACT EXTRACTION AND INSERTION OF INTRAOCULAR LENS, RIGHT: ICD-10-CM

## 2023-09-08 DIAGNOSIS — G35 MS (MULTIPLE SCLEROSIS): Primary | ICD-10-CM

## 2023-09-08 DIAGNOSIS — Z96.1 STATUS POST CATARACT EXTRACTION AND INSERTION OF INTRAOCULAR LENS, RIGHT: ICD-10-CM

## 2023-09-08 DIAGNOSIS — H25.12 NUCLEAR SCLEROTIC CATARACT OF LEFT EYE: ICD-10-CM

## 2023-09-08 PROCEDURE — 99214 PR OFFICE/OUTPT VISIT, EST, LEVL IV, 30-39 MIN: ICD-10-PCS | Mod: S$GLB,,, | Performed by: OPHTHALMOLOGY

## 2023-09-08 PROCEDURE — 99999 PR PBB SHADOW E&M-EST. PATIENT-LVL III: ICD-10-PCS | Mod: PBBFAC,,, | Performed by: OPHTHALMOLOGY

## 2023-09-08 PROCEDURE — 99214 OFFICE O/P EST MOD 30 MIN: CPT | Mod: S$GLB,,, | Performed by: OPHTHALMOLOGY

## 2023-09-08 PROCEDURE — 99999 PR PBB SHADOW E&M-EST. PATIENT-LVL III: CPT | Mod: PBBFAC,,, | Performed by: OPHTHALMOLOGY

## 2023-09-08 PROCEDURE — 3044F PR MOST RECENT HEMOGLOBIN A1C LEVEL <7.0%: ICD-10-PCS | Mod: CPTII,S$GLB,, | Performed by: OPHTHALMOLOGY

## 2023-09-08 PROCEDURE — 3044F HG A1C LEVEL LT 7.0%: CPT | Mod: CPTII,S$GLB,, | Performed by: OPHTHALMOLOGY

## 2023-09-08 PROCEDURE — 1159F MED LIST DOCD IN RCRD: CPT | Mod: CPTII,S$GLB,, | Performed by: OPHTHALMOLOGY

## 2023-09-08 PROCEDURE — 1159F PR MEDICATION LIST DOCUMENTED IN MEDICAL RECORD: ICD-10-PCS | Mod: CPTII,S$GLB,, | Performed by: OPHTHALMOLOGY

## 2023-09-08 NOTE — PROGRESS NOTES
HPI    Referred by Dr Rosenthal /ascencion pt    S/p phaco/IOL OD 12/22/22   MS   H/o Optic Neuritis   OS near/ using monoVA    Presents no pain or irritation   No flashers or floaters but experiences halos around lights       - sensitivity to night lights and car glare   - pt tends not to drive at night   - vision in OD stable    Last edited by Oanh Miguel MD on 9/8/2023 11:08 AM.            Assessment /Plan     For exam results, see Encounter Report.    MS (multiple sclerosis)    Nuclear sclerotic cataract of left eye    Status post cataract extraction and insertion of intraocular lens, right      S/p phaco/IOL OD 12/22/22   MS   H/o Optic Neuritis   OS near/ using monoVA    Doing well, cat OS early    Pt does note some night time halos while being passenger of car - try saul colored tinted glasses    ATs     F/up optom 1 yr

## 2023-09-10 PROBLEM — G47.20 SLEEP STAGE DYSFUNCTION: Status: ACTIVE | Noted: 2023-09-10

## 2023-09-10 PROCEDURE — 95810 PR POLYSOMNOGRAPHY, 4 OR MORE: ICD-10-PCS | Mod: 26,,, | Performed by: PSYCHIATRY & NEUROLOGY

## 2023-09-10 PROCEDURE — 95810 POLYSOM 6/> YRS 4/> PARAM: CPT | Mod: 26,,, | Performed by: PSYCHIATRY & NEUROLOGY

## 2023-09-11 NOTE — PROCEDURES
"      Diagnostic Report  [facility] - [address] [place] [address]  Phone: [phone]  Fax: [phone]           Patient Name: ALBERTO BAUER Study Date: 8/29/2023   YOB: 1970 Patient MRN: [id number]   Age:  52 year     Sex: Female Referring Physician: LILIANE JOSE M.D   Height: 5' 2" Recording Tech: Seda Griggs RRT RPSGT   Weight: 142.0 lbs Scoring Tech: Hank Sepulveda RPSGT   BMI:  26.1 AASM:  1B   AHI: 2.9 Interpreting Physician: Madalyn Valverde MD   RERA index: - Low oxygen sat: 92.0%   RDI: 2.9        Polysomnogram Data: A full night polysomnogram recorded the standard physiologic parameters including EEG, EOG, EMG, EKG, nasal and oral airflow.  Respiratory parameters of chest and abdominal movements were recorded with [place]-Crystal motion transducers.  Oxygen saturation was recorded by pulse oximetry.    Sleep architecture: This is a baseline polysomnogram. At light's out, the patient fell asleep in 56.1 minutes and slept for 45.3% of the time. Total sleep time (TST) was 206.5 minutes. 27.6% of TST was in Stage N1 sleep, 21.3% TST in slow wave sleep, and 5.1% TST in REM sleep. The REM latency was 298.5 minutes.      Respiratory: Mild snoring was present. The polysomnogram revealed a presence of - obstructive, - central, and - mixed apneas resulting in a Total Apnea index of - events per hour.  There were 10 hypopneas resulting in a Total Hypopnea index of 2.9 events per hour.  The combined Apnea/Hypopnea index was 2.9 events per hour.  There were a total of - RERA events resulting in a Respiratory Disturbance Index (RDI) of 2.9 events per hour.  Mean oxygen saturation was 94.7%.  The lowest oxygen saturation during sleep was 93.0%.  Time spent ?88% oxygen saturation was - minutes (-).The patient did not qualify for a split night study due to an insufficient number of events in the first half of the study.    Motor movement / Parasomnia: There were no " "significant  limb movements of sleep noted. The total limb movement index was - (- with arousal).     Cardiac: Cardiac rhythm monitoring revealed a sinus rhythm.  with occasional PAC's. The average pulse rate was 67.4 bpm.  The minimum pulse rate was 50.0 bpm while the maximum pulse rate was 95.0 bpm.      Patient perception: On a post-sleep study questionnaire, the patient indicated that sleep was the same in the lab than compared to home.    IMPRESSION:  Mild primary snoring.  No evidence of CHRIS based on AHI criteria.Sleep Disordered breathing could be underestimated due to poor sleep efficiency.     RECOMMENDATION:    Consider repeating PSG in several months if the patient remains symptomatic for sleep disordered breathing.  Consider clinical evaluation for sleep disorders other than CHRIS as a potential cause of daytime fatigue/sleepiness.        I have reviewed the attached data report and the raw data tracings of this study epoch-by-epoch and have determined that the recording quality and scoring of events are sufficient to allow for interpretation and electronically signed by:      Madalyn Valverde MD 8/29/2023                              [facility]    Diagnostic PSG Report    Patient Name: ALBERTO BAUER Study Date: 8/29/2023   YOB: 1970 MRN #: [id number]   Age: 52 year NOLBERTO #: [id number]   Sex: Female Referring Provider: LILIANE JOSE M.D   Height: 5' 2" Recording Tech: Seda Griggs RRT RPSGT   Weight: 142.0 lbs Scoring Tech: Hank Sepulveda RRT RPSGT   BMI: 26.1 Interpreting Physician: Madalyn Valverde MD   ESS: - Neck Circumference: -     Study Overview    Lights Off: 09:38:14 PM  Count Index   Lights On: 05:14:24 AM Awakenings: 57 16.6   Time in Bed: 456.2 min. Arousals: 103 29.9   Total Sleep Time: 206.5 min. Apneas & Hypopneas: 10 2.9    Sleep Efficiency: 45.3% Limb Movements: - -   Sleep Latency: 56.1 min. Snores: - -   Wake After Sleep Onset: 193.5 min. " Desaturations: 1 0.3    REM Latency from Sleep Onset: 298.5 min. Minimum SpO2 TST: 93.0%        Sleep Architecture   % of Time in Bed    Stages Time (mins) % Sleep Time   Wake 250.5    Stage N1 57.0 27.6%   Stage N2 95.0 46.0%   Stage N3 44.0 21.3%   REM 10.5 5.1%         Arousal Summary     NREM REM Sleep Index   Respiratory Arousals 4 1 5 1.5   PLM Arousals - - - -   Isolated Limb Movement Arousals - - - -   Spontaneous Arousals 98 - 98 28.5   Total 102 1 103 29.9       Limb Movement Summary     Count Index   Isolated Limb Movements - -   Periodic Limb Movements (PLMs) - -   Total Limb Movements - -         Respiratory Summary     By Sleep Stage By Body Position Total    NREM REM Supine Non-Supine    Time (min) 196.0 10.5 206.5 - 206.5           Obstructive Apnea - - - - -   Mixed Apnea - - - - -   Central Apnea - - - - -   Total Apneas - - - - -   Total Apnea Index - - - - -           Hypopnea 9 1 10 - 10   Hypopnea Index 2.8 5.7 2.9 - 2.9           Apnea & Hypopnea 9 1 10 - 10   Apnea & Hypopnea Index 2.8 5.7 2.9 - 2.9           RERAs - - - - -   RERA Index - - - - -           RDI 2.8 5.7 2.9 - 2.9      Scoring Criteria: Hypopneas scored at Choose an item.% desaturation criteria.    Respiratory Event Durations     Apnea Hypopnea    NREM REM NREM REM   Average (seconds) - - 17.1 17.2   Maximum (seconds) - - 22.9 17.2       Oxygen Saturation Summary     Wake NREM REM TST TIB   Average SpO2 95.1% 94.3% 94.8% 94.3% 94.7%   Minimum SpO2 92.0% 93.0% 93.0% 93.0% 92.0%   Maximum SpO2 98.0% 97.0% 97.0% 97.0% 98.0%       Oxygen Saturation Distribution    Range (%) Time in range (min) Time in range (%)   90.0 - 100.0 456.6 100.0%   80.0 - 90.0 - -   70.0 - 80.0 - -   60.0 - 70.0 - -   50.0 - 60.0 - -   0.0 - 50.0 - -   Time Spent ?88% SpO2    Range (%) Time in range (min) Time in range (%)   0.0 - 88.0 - -          Count Index   Desaturations 1 0.3      Cardiac Summary     Wake NREM REM Sleep Total   Average Pulse Rate  (BPM) 69.1 65.3 65.8 65.3 67.4   Minimum Pulse Rate (BPM) 50.0 54.0 58.0 54.0 50.0   Maximum Pulse Rate (BPM) 95.0 92.0 80.0 92.0 95.0     Pulse Rate Distribution    Range (bpm) Time in range (min) Time in range (%)   0.0 - 40.0 - -   40.0 - 60.0 98.1 21.5%   60.0 - 80.0 312.8 68.5%   80.0 - 100.0 45.7 10.0%   100.0 - 120.0 - -   120.0 - 140.0 - -   140.0 - 200.0 - -     EtCO2 Summary    Stage Min (mmHg) Average (mmHg) Max (mmHg)   Wake - - -   NREM(1+2+3) - - -   REM - - -     Range (mmHg) Time in range (min) Time in range (%)   - - -   - - -   - - -   - - -   - - -     TcCO2 Summary    Stage Min (mmHg) Average (mmHg) Max (mmHg)   Wake - - -   NREM(1+2+3) - - -   REM - - -     Range (mmHg) Time in range (min) Time in range (%)   - - -   - - -   - - -   - - -   - - -     Comments    -

## 2023-09-12 ENCOUNTER — PATIENT MESSAGE (OUTPATIENT)
Dept: SLEEP MEDICINE | Facility: CLINIC | Age: 53
End: 2023-09-12
Payer: COMMERCIAL

## 2023-11-14 ENCOUNTER — OFFICE VISIT (OUTPATIENT)
Dept: PSYCHIATRY | Facility: CLINIC | Age: 53
End: 2023-11-14
Payer: COMMERCIAL

## 2023-11-14 VITALS
BODY MASS INDEX: 26.13 KG/M2 | WEIGHT: 142.88 LBS | SYSTOLIC BLOOD PRESSURE: 129 MMHG | DIASTOLIC BLOOD PRESSURE: 79 MMHG | HEART RATE: 76 BPM

## 2023-11-14 DIAGNOSIS — F40.01 PANIC DISORDER WITH AGORAPHOBIA: ICD-10-CM

## 2023-11-14 DIAGNOSIS — F32.1 MDD (MAJOR DEPRESSIVE DISORDER), SINGLE EPISODE, MODERATE: Primary | ICD-10-CM

## 2023-11-14 DIAGNOSIS — G89.4 CHRONIC PAIN SYNDROME: ICD-10-CM

## 2023-11-14 DIAGNOSIS — F41.1 GENERALIZED ANXIETY DISORDER: ICD-10-CM

## 2023-11-14 DIAGNOSIS — F51.04 CHRONIC INSOMNIA: ICD-10-CM

## 2023-11-14 PROCEDURE — 3008F BODY MASS INDEX DOCD: CPT | Mod: CPTII,S$GLB,, | Performed by: PSYCHIATRY & NEUROLOGY

## 2023-11-14 PROCEDURE — 3074F SYST BP LT 130 MM HG: CPT | Mod: CPTII,S$GLB,, | Performed by: PSYCHIATRY & NEUROLOGY

## 2023-11-14 PROCEDURE — 3078F DIAST BP <80 MM HG: CPT | Mod: CPTII,S$GLB,, | Performed by: PSYCHIATRY & NEUROLOGY

## 2023-11-14 PROCEDURE — 99999 PR PBB SHADOW E&M-EST. PATIENT-LVL II: CPT | Mod: PBBFAC,,, | Performed by: PSYCHIATRY & NEUROLOGY

## 2023-11-14 PROCEDURE — 99214 OFFICE O/P EST MOD 30 MIN: CPT | Mod: S$GLB,,, | Performed by: PSYCHIATRY & NEUROLOGY

## 2023-11-14 RX ORDER — HYDROXYZINE HYDROCHLORIDE 25 MG/1
25 TABLET, FILM COATED ORAL NIGHTLY
Qty: 30 TABLET | Refills: 3 | Status: SHIPPED | OUTPATIENT
Start: 2023-11-14 | End: 2024-03-28 | Stop reason: SDUPTHER

## 2023-11-14 RX ORDER — CLONAZEPAM 1 MG/1
TABLET ORAL
Qty: 90 TABLET | Refills: 5 | Status: SHIPPED | OUTPATIENT
Start: 2023-11-14 | End: 2024-03-28 | Stop reason: SDUPTHER

## 2023-11-14 RX ORDER — DULOXETIN HYDROCHLORIDE 60 MG/1
120 CAPSULE, DELAYED RELEASE ORAL DAILY
Qty: 60 CAPSULE | Refills: 6 | Status: SHIPPED | OUTPATIENT
Start: 2023-11-14 | End: 2024-03-28 | Stop reason: SDUPTHER
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
## 2024-01-22 ENCOUNTER — PATIENT MESSAGE (OUTPATIENT)
Dept: PSYCHIATRY | Facility: CLINIC | Age: 54
End: 2024-01-22
Payer: COMMERCIAL

## 2024-03-08 PROBLEM — J32.9 SINUSITIS: Status: RESOLVED | Noted: 2019-12-31 | Resolved: 2024-03-08

## 2024-03-28 ENCOUNTER — OFFICE VISIT (OUTPATIENT)
Dept: PSYCHIATRY | Facility: CLINIC | Age: 54
End: 2024-03-28
Payer: COMMERCIAL

## 2024-03-28 VITALS
SYSTOLIC BLOOD PRESSURE: 132 MMHG | DIASTOLIC BLOOD PRESSURE: 72 MMHG | HEART RATE: 68 BPM | WEIGHT: 143.88 LBS | BODY MASS INDEX: 26.31 KG/M2

## 2024-03-28 DIAGNOSIS — F41.1 GENERALIZED ANXIETY DISORDER: ICD-10-CM

## 2024-03-28 DIAGNOSIS — F41.1 GAD (GENERALIZED ANXIETY DISORDER): ICD-10-CM

## 2024-03-28 DIAGNOSIS — G89.4 CHRONIC PAIN SYNDROME: ICD-10-CM

## 2024-03-28 DIAGNOSIS — F51.04 CHRONIC INSOMNIA: ICD-10-CM

## 2024-03-28 DIAGNOSIS — F40.01 PANIC DISORDER WITH AGORAPHOBIA: ICD-10-CM

## 2024-03-28 DIAGNOSIS — F32.1 MDD (MAJOR DEPRESSIVE DISORDER), SINGLE EPISODE, MODERATE: Primary | ICD-10-CM

## 2024-03-28 PROCEDURE — 99214 OFFICE O/P EST MOD 30 MIN: CPT | Mod: S$GLB,,, | Performed by: PSYCHIATRY & NEUROLOGY

## 2024-03-28 PROCEDURE — 99999 PR PBB SHADOW E&M-EST. PATIENT-LVL II: CPT | Mod: PBBFAC,,, | Performed by: PSYCHIATRY & NEUROLOGY

## 2024-03-28 PROCEDURE — 3008F BODY MASS INDEX DOCD: CPT | Mod: CPTII,S$GLB,, | Performed by: PSYCHIATRY & NEUROLOGY

## 2024-03-28 PROCEDURE — 3078F DIAST BP <80 MM HG: CPT | Mod: CPTII,S$GLB,, | Performed by: PSYCHIATRY & NEUROLOGY

## 2024-03-28 PROCEDURE — 90833 PSYTX W PT W E/M 30 MIN: CPT | Mod: S$GLB,,, | Performed by: PSYCHIATRY & NEUROLOGY

## 2024-03-28 PROCEDURE — 3075F SYST BP GE 130 - 139MM HG: CPT | Mod: CPTII,S$GLB,, | Performed by: PSYCHIATRY & NEUROLOGY

## 2024-03-28 RX ORDER — HYDROXYZINE HYDROCHLORIDE 50 MG/1
50 TABLET, FILM COATED ORAL NIGHTLY
Qty: 30 TABLET | Refills: 6 | Status: SHIPPED | OUTPATIENT
Start: 2024-03-28 | End: 2024-04-16

## 2024-03-28 RX ORDER — BUSPIRONE HYDROCHLORIDE 30 MG/1
30 TABLET ORAL 2 TIMES DAILY
Qty: 180 TABLET | Refills: 3 | Status: SHIPPED | OUTPATIENT
Start: 2024-03-28

## 2024-03-28 RX ORDER — CLONAZEPAM 1 MG/1
TABLET ORAL
Qty: 90 TABLET | Refills: 5 | Status: SHIPPED | OUTPATIENT
Start: 2024-03-28

## 2024-03-28 RX ORDER — DULOXETIN HYDROCHLORIDE 60 MG/1
120 CAPSULE, DELAYED RELEASE ORAL DAILY
Qty: 60 CAPSULE | Refills: 6 | Status: SHIPPED | OUTPATIENT
Start: 2024-03-28

## 2024-03-28 RX ORDER — CLONAZEPAM 1 MG/1
TABLET ORAL
Qty: 90 TABLET | Refills: 5 | Status: CANCELLED | OUTPATIENT
Start: 2024-03-28
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
## 2024-04-05 PROBLEM — H53.451 ALTITUDINAL SCOTOMA OF RIGHT EYE: Status: RESOLVED | Noted: 2018-01-05 | Resolved: 2024-04-05

## 2024-04-05 PROBLEM — E78.5 HYPERLIPIDEMIA: Status: ACTIVE | Noted: 2023-08-01

## 2024-04-05 PROBLEM — F41.9 ANXIETY: Status: ACTIVE | Noted: 2022-02-08

## 2024-04-05 PROBLEM — F40.01 PANIC DISORDER WITH AGORAPHOBIA: Status: RESOLVED | Noted: 2017-09-27 | Resolved: 2024-04-05

## 2024-04-05 PROBLEM — Z86.69 H/O OPTIC NEURITIS: Status: RESOLVED | Noted: 2022-04-06 | Resolved: 2024-04-05

## 2024-04-05 PROBLEM — G47.20 SLEEP STAGE DYSFUNCTION: Status: RESOLVED | Noted: 2023-09-10 | Resolved: 2024-04-05

## 2024-04-05 PROBLEM — H26.9 CATARACT: Status: RESOLVED | Noted: 2022-04-06 | Resolved: 2024-04-05

## 2024-04-05 PROBLEM — R74.8 ELEVATED LIVER ENZYMES: Status: ACTIVE | Noted: 2023-08-05

## 2024-04-05 PROBLEM — L71.9 ROSACEA: Status: ACTIVE | Noted: 2023-08-01

## 2024-04-05 PROBLEM — F41.9 ANXIETY: Status: RESOLVED | Noted: 2022-02-08 | Resolved: 2024-04-05

## 2024-04-05 PROBLEM — L29.9 PRURITUS: Status: RESOLVED | Noted: 2023-07-26 | Resolved: 2024-04-05

## 2024-04-05 PROBLEM — F32.1 MDD (MAJOR DEPRESSIVE DISORDER), SINGLE EPISODE, MODERATE: Status: RESOLVED | Noted: 2017-09-27 | Resolved: 2024-04-05

## 2024-04-05 PROBLEM — R29.898 BILATERAL LEG WEAKNESS: Status: RESOLVED | Noted: 2019-03-01 | Resolved: 2024-04-05

## 2024-04-05 PROBLEM — G89.4 CHRONIC PAIN SYNDROME: Status: RESOLVED | Noted: 2019-12-18 | Resolved: 2024-04-05

## 2024-04-05 PROBLEM — R26.89 IMPAIRMENT OF BALANCE: Status: RESOLVED | Noted: 2019-03-01 | Resolved: 2024-04-05

## 2024-04-05 PROBLEM — A38.9 SCARLET FEVER: Status: ACTIVE | Noted: 2023-08-01

## 2024-04-05 PROBLEM — Z74.09 DECREASED MOBILITY AND ENDURANCE: Status: RESOLVED | Noted: 2019-03-01 | Resolved: 2024-04-05

## 2024-04-05 PROBLEM — H47.291 PARTIAL OPTIC ATROPHY OF RIGHT EYE: Status: RESOLVED | Noted: 2018-01-05 | Resolved: 2024-04-05

## 2024-04-10 PROBLEM — Z00.00 WELLNESS EXAMINATION: Status: ACTIVE | Noted: 2024-04-10

## 2024-05-02 ENCOUNTER — PATIENT MESSAGE (OUTPATIENT)
Dept: PSYCHIATRY | Facility: CLINIC | Age: 54
End: 2024-05-02
Payer: COMMERCIAL

## 2024-05-21 PROBLEM — G89.29 CHRONIC PAIN: Status: ACTIVE | Noted: 2024-05-21

## 2024-05-21 PROBLEM — F32.1 MDD (MAJOR DEPRESSIVE DISORDER), SINGLE EPISODE, MODERATE: Status: ACTIVE | Noted: 2024-05-21

## 2024-06-04 ENCOUNTER — OFFICE VISIT (OUTPATIENT)
Dept: OPTOMETRY | Facility: CLINIC | Age: 54
End: 2024-06-04
Payer: COMMERCIAL

## 2024-06-04 DIAGNOSIS — H25.12 NUCLEAR SCLEROSIS OF LEFT EYE: Primary | ICD-10-CM

## 2024-06-04 PROCEDURE — 99214 OFFICE O/P EST MOD 30 MIN: CPT | Mod: S$GLB,,, | Performed by: OPTOMETRIST

## 2024-06-04 PROCEDURE — 99999 PR PBB SHADOW E&M-EST. PATIENT-LVL III: CPT | Mod: PBBFAC,,, | Performed by: OPTOMETRIST

## 2024-06-04 PROCEDURE — 1159F MED LIST DOCD IN RCRD: CPT | Mod: CPTII,S$GLB,, | Performed by: OPTOMETRIST

## 2024-06-04 RX ORDER — VALACYCLOVIR HYDROCHLORIDE 1 G/1
1000 TABLET, FILM COATED ORAL 3 TIMES DAILY
COMMUNITY
Start: 2024-05-13

## 2024-06-05 ENCOUNTER — TELEPHONE (OUTPATIENT)
Dept: OPHTHALMOLOGY | Facility: CLINIC | Age: 54
End: 2024-06-05
Payer: COMMERCIAL

## 2024-06-05 NOTE — TELEPHONE ENCOUNTER
----- Message from Katherine Hall, OD sent at 6/5/2024  3:19 PM CDT -----  Regarding: [place] pt  Pt would like to be seen for cataract eval OS (Dr. Miguel performed OD CE on pt in 2022)

## 2024-06-07 NOTE — PROGRESS NOTES
HPI    Pt is here today for routine eye exam. Patient reports that eyes feel   irritated. Patient asked that we skip dilation for today.   DLS: 9/2023 Dr. Miguel   (-)Flashes   (-)Floaters   (+)Diplopia: at night   (-)Headaches   (+)Itching   (-)Tearing  (-)Burning  (+)Dryness   (+)Photophobia  (+)Glare: at night; haloes  (+)Blurred VA  Past Eye Sx: Cataract with IOL OD  Eye Meds:  Refresh PF OU PRN   Last edited by Katherine Hall, OD on 6/7/2024  4:13 PM.            Assessment /Plan     For exam results, see Encounter Report.    Nuclear sclerosis of left eye      Educated pt on today's findings: A/C narrowing, denser NS, large myopic shift OS. Also, given pt's increased s/s of cataract OS, refer back to Dr. Miguel for eval OS. Pt has been monovision corrected since CE OD. Pt would like to discuss correcting OS for near in PCIOL.    RTC to me for cataract post operative care

## 2024-06-28 ENCOUNTER — OFFICE VISIT (OUTPATIENT)
Dept: PSYCHIATRY | Facility: CLINIC | Age: 54
End: 2024-06-28
Payer: COMMERCIAL

## 2024-06-28 VITALS
BODY MASS INDEX: 25.24 KG/M2 | HEART RATE: 72 BPM | SYSTOLIC BLOOD PRESSURE: 128 MMHG | DIASTOLIC BLOOD PRESSURE: 75 MMHG | WEIGHT: 138 LBS

## 2024-06-28 DIAGNOSIS — F41.1 GENERALIZED ANXIETY DISORDER: ICD-10-CM

## 2024-06-28 DIAGNOSIS — F32.1 MDD (MAJOR DEPRESSIVE DISORDER), SINGLE EPISODE, MODERATE: Primary | ICD-10-CM

## 2024-06-28 DIAGNOSIS — G89.4 CHRONIC PAIN SYNDROME: ICD-10-CM

## 2024-06-28 DIAGNOSIS — F40.01 PANIC DISORDER WITH AGORAPHOBIA: ICD-10-CM

## 2024-06-28 DIAGNOSIS — F51.04 CHRONIC INSOMNIA: ICD-10-CM

## 2024-06-28 PROCEDURE — 99999 PR PBB SHADOW E&M-EST. PATIENT-LVL II: CPT | Mod: PBBFAC,,, | Performed by: PSYCHIATRY & NEUROLOGY

## 2024-06-28 RX ORDER — ZALEPLON 10 MG/1
10 CAPSULE ORAL NIGHTLY
Qty: 30 CAPSULE | Refills: 5 | Status: SHIPPED | OUTPATIENT
Start: 2024-06-28
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
## 2024-07-15 PROBLEM — Z00.00 WELLNESS EXAMINATION: Status: RESOLVED | Noted: 2024-04-10 | Resolved: 2024-07-15

## 2024-07-25 ENCOUNTER — PATIENT MESSAGE (OUTPATIENT)
Dept: PSYCHIATRY | Facility: CLINIC | Age: 54
End: 2024-07-25
Payer: COMMERCIAL

## 2024-07-25 ENCOUNTER — OFFICE VISIT (OUTPATIENT)
Dept: OPHTHALMOLOGY | Facility: CLINIC | Age: 54
End: 2024-07-25
Payer: COMMERCIAL

## 2024-07-25 DIAGNOSIS — G35 MS (MULTIPLE SCLEROSIS): Primary | ICD-10-CM

## 2024-07-25 DIAGNOSIS — H25.12 NUCLEAR SCLEROTIC CATARACT OF LEFT EYE: ICD-10-CM

## 2024-07-25 DIAGNOSIS — H25.12 NUCLEAR SCLEROTIC CATARACT OF LEFT EYE: Primary | ICD-10-CM

## 2024-07-25 DIAGNOSIS — Z86.69 H/O OPTIC NEURITIS: ICD-10-CM

## 2024-07-25 DIAGNOSIS — H47.291 PARTIAL OPTIC ATROPHY OF RIGHT EYE: ICD-10-CM

## 2024-07-25 PROCEDURE — 99999 PR PBB SHADOW E&M-EST. PATIENT-LVL III: CPT | Mod: PBBFAC,,, | Performed by: OPHTHALMOLOGY

## 2024-07-25 RX ORDER — PREDNISOLONE/MOXIFLOX/BROMFEN 1 %-0.5 %
1 SUSPENSION, DROPS(FINAL DOSAGE FORM)(ML) OPHTHALMIC (EYE) 3 TIMES DAILY
Qty: 5 ML | Refills: 3 | Status: SHIPPED | OUTPATIENT
Start: 2024-07-25

## 2024-07-25 NOTE — PROGRESS NOTES
HPI    Dr. Hall/Dr. Miguel    S/p phaco/IOL OD 12/22/22   MS   H/o Optic Neuritis   OS near/ using monoVA     Patient here for OS cataract evaluation. Patient states OS vision has   gotten blurrier. Would like to remain near sighted OS after surgery so she   won't be dependent on reading glasses after surgery.   Last edited by Pema Lee MA on 7/25/2024  2:11 PM.            Assessment /Plan     For exam results, see Encounter Report.    MS (multiple sclerosis)    Nuclear sclerotic cataract of left eye  -     IOL Master - OU - Both Eyes    H/O optic neuritis  -     Posterior Segment OCT Optic Nerve- Both eyes    Partial optic atrophy of right eye  -     Posterior Segment OCT Optic Nerve- Both eyes          S/p phaco/IOL OD 12/22/22   MS   H/o Optic Neuritis   OS near/ using monoVA     Visually significant nuclear sclerotic cataract    - Cataracts are interfering with activities of daily living, including night time driving.  - Pt desires cataract surgery for visual rehabilitation.   - Risks / benefits/ alternatives were discussed and patient agrees to proceed with surgery.   - IOL options discussed as well, according to patient's goals and concomitant ocular pathology.  - Target: near.     Myopic shift OS    DIBOO 23.5 OS                  none

## 2024-07-26 ENCOUNTER — TELEPHONE (OUTPATIENT)
Dept: OPHTHALMOLOGY | Facility: CLINIC | Age: 54
End: 2024-07-26
Payer: COMMERCIAL

## 2024-08-01 DIAGNOSIS — G35 MS (MULTIPLE SCLEROSIS): Primary | ICD-10-CM

## 2024-08-05 ENCOUNTER — PATIENT MESSAGE (OUTPATIENT)
Dept: PSYCHIATRY | Facility: CLINIC | Age: 54
End: 2024-08-05
Payer: COMMERCIAL

## 2024-08-05 ENCOUNTER — TELEPHONE (OUTPATIENT)
Dept: OPHTHALMOLOGY | Facility: CLINIC | Age: 54
End: 2024-08-05
Payer: COMMERCIAL

## 2024-08-05 ENCOUNTER — TELEPHONE (OUTPATIENT)
Dept: NEUROLOGY | Facility: CLINIC | Age: 54
End: 2024-08-05

## 2024-08-05 PROBLEM — Z00.01 ABNORMAL WELLNESS EXAM: Status: ACTIVE | Noted: 2024-08-05

## 2024-08-05 NOTE — TELEPHONE ENCOUNTER
"[facility] Staff  Name of Who is Calling: Jillian Osullivan "Fransisca"      What is the request in detail: Would like to speak with staff in regards to an appt for G35 (ICD-10-CM) - MS (multiple sclerosis). Patient refused another provider at this time.      Can the clinic reply by [facility]: no      What Number to Call Back if not in [facility]: [phone]  "

## 2024-08-06 ENCOUNTER — TELEPHONE (OUTPATIENT)
Dept: NEUROLOGY | Facility: CLINIC | Age: 54
End: 2024-08-06
Payer: COMMERCIAL

## 2024-08-20 PROBLEM — M25.512 ACUTE PAIN OF BOTH SHOULDERS: Status: ACTIVE | Noted: 2024-08-20

## 2024-08-20 PROBLEM — M25.511 ACUTE PAIN OF BOTH SHOULDERS: Status: ACTIVE | Noted: 2024-08-20

## 2024-08-28 ENCOUNTER — TELEPHONE (OUTPATIENT)
Dept: NEUROLOGY | Facility: CLINIC | Age: 54
End: 2024-08-28
Payer: COMMERCIAL

## 2024-08-28 NOTE — TELEPHONE ENCOUNTER
Spoke with pt to schedule sooner appointment with Dr. Pandya. Pt is scheduled on 10/2 could not take 8/29 appointment offered.

## 2024-09-16 ENCOUNTER — TELEPHONE (OUTPATIENT)
Dept: OPHTHALMOLOGY | Facility: CLINIC | Age: 54
End: 2024-09-16
Payer: COMMERCIAL

## 2024-09-16 NOTE — TELEPHONE ENCOUNTER
----- Message from Samanta Sandoval sent at 9/16/2024  8:42 AM CDT -----  Pt is having problems with her eye. Please call pt  ----- Message -----  From: Pema Lee MA  Sent: 9/16/2024   8:14 AM CDT  To: Samanta Sandoval    I dont see that you called her with her arrival time.  ----- Message -----  From: Seda Saunders  Sent: 9/16/2024   8:10 AM CDT  To: Lamont CASAREZ Staff    Pt calling in regards to time of arrival , also pt having floaters in right eye , can Dr     check lens on that eye before she starts procedure , please call     Confirmed patient's contact info below:  Contact Name: Jillian Osullivan  Phone Number: [phone]

## 2024-09-16 NOTE — TELEPHONE ENCOUNTER
"Patient concerned about seeing a few floaters OD since Thursday prior to MS infusion. No symptoms of RD noted (flashes, "dark curtain", etc.) Advised patient since floaters are getting better, no need to be seen prior to OS cataract surgery. Patient verbalized understanding.   "

## 2024-09-16 NOTE — H&P
History    Chief complaint:  Painless progressive vision loss    Present Ilness/Diagnosis: Nuclear sclerotic Cataract    Past Medical History:  has a past medical history of Cataract, Endometriosis, site unspecified, H/O total hysterectomy, Hidradenitis, History of laparoscopy, History of psychiatric care, Multiple sclerosis, Panic anxiety syndrome, and Therapy.    Family History/Social History: refer to chart    Allergies:   Review of patient's allergies indicates:   Allergen Reactions    Glatiramer (copolymer 1) Dermatitis    Vicodin [hydrocodone-acetaminophen] Rash    Dextromethorphan Other (See Comments)     Anxiety, jittery, agitation    Gabapentin Other (See Comments)     Feet/ankle swelling, joint pain    Lorazepam Other (See Comments)     Increased anxiety, agitation    Aspirin      Other reaction(s): Rash    Aspirin     Codeine Other (See Comments)    Dimethyl fumarate Other (See Comments)    Glatiramer Hives    Penicillins      Other reaction(s): Rash  Other reaction(s): Rash    Ramelteon     Sulfa (sulfonamide antibiotics)     Sulfamethoxazole-trimethoprim      Other reaction(s): Rash    Teriflunomide     Trimethoprim Other (See Comments)    Vancomycin Nausea And Vomiting    Oxycodone-acetaminophen Rash       Current Medications: No current facility-administered medications for this encounter.    Current Outpatient Medications:     azelaic acid (AZELEX) 15 % gel, Apply topically every morning., Disp: , Rfl:     busPIRone (BUSPAR) 30 MG Tab, Take 1 tablet (30 mg total) by mouth 2 (two) times daily., Disp: 180 tablet, Rfl: 3    CALCIUM-MAGNESIUM-ZINC ORAL, Take by mouth once daily. Calcium-1,000 mg Magnesium-500 mg Zinc-25mg, Disp: , Rfl:     ROCKY SEED/ALA/LINOLEIC/OLEIC (ROCKY SEED OIL-OMEGA 3-6-9 ORAL), Take by mouth., Disp: , Rfl:     cholecalciferol, vitamin D3, 5,000 unit capsule, Take 5,000 Units by mouth once daily. , Disp: , Rfl:     clindamycin (CLEOCIN T) 1 % lotion, MARLENA EXT AA BID, Disp: , Rfl:      clindamycin phosphate 1% (CLINDAGEL) 1 % gel, MARLENA TO THE AFFECTED AREA ON AREAS OF BODY BID, Disp: , Rfl: 1    clonazePAM (KLONOPIN) 1 MG tablet, Take oral 1/2 - 1 tablet q 6 hours prn anxiety or insomnia, Disp: 90 tablet, Rfl: 5    coenzyme Q10 300 mg Cap, Take 1 capsule by mouth every evening. 400mg, Disp: , Rfl:     doxycycline 50 MG capsule, Take 50 mg by mouth., Disp: , Rfl:     DULoxetine (CYMBALTA) 60 MG capsule, Take 2 capsules (120 mg total) by mouth once daily., Disp: 60 capsule, Rfl: 6    evolocumab (REPATHA SURECLICK) 140 mg/mL PnIj, Inject 1 mL (140 mg total) into the skin every 14 (fourteen) days., Disp: 2 mL, Rfl: 2    fluocinonide (LIDEX) 0.05 % external solution, Apply 0.05 % topically once daily., Disp: , Rfl:     levothyroxine (SYNTHROID) 75 MCG tablet, Take 75 mcg by mouth., Disp: , Rfl:     magnesium oxide (MAG-OX) 400 mg (241.3 mg magnesium) tablet, Take 300 mg by mouth once daily., Disp: , Rfl:     ocrelizumab 30 mg/mL Soln, Inject 600 mg into the vein every 6 (six) months., Disp: , Rfl:     omega 3-dha-epa-fish oil 1,000 (120-180) mg Cap, Take 1 capsule by mouth once daily., Disp: , Rfl:     prednisoLONE-moxiflox-bromfen 1-0.5-0.075 % DrpS, Apply 1 drop to eye 3 (three) times daily., Disp: 5 mL, Rfl: 3    psyllium (METAMUCIL) powder, Take 1 packet by mouth once daily., Disp: , Rfl:     SOOLANTRA 1 % Crea, , Disp: , Rfl:     tacrolimus (PROTOPIC) 0.1 % ointment, Apply topically 2 (two) times daily., Disp: , Rfl:     turmeric root extract 500 mg Cap, Take by mouth Daily., Disp: , Rfl:     UNABLE TO FIND, Take by mouth 2 (two) times daily. Multigenics without Iron, Disp: , Rfl:     UNABLE TO FIND, Take 100 g by mouth once daily. medication name: D-Ribose, Disp: , Rfl:     valACYclovir (VALTREX) 1000 MG tablet, Take 1,000 mg by mouth 3 (three) times daily., Disp: , Rfl:     vitamins  A,C,E-zinc-copper 14,320-226-200 unit-mg-unit Cap, Take by mouth., Disp: , Rfl:     whey protein isolate 21  gram-100 kcal/27 gram Powd, by NOT APPLICABLE route., Disp: , Rfl:     zaleplon (SONATA) 10 MG capsule, Take 1 capsule (10 mg total) by mouth every evening., Disp: 30 capsule, Rfl: 5    Facility-Administered Medications Ordered in Other Encounters:     balanced salt irrigation intra-ocular solution 1 drop, 1 drop, Right Eye, On Call Procedure, Oanh Miguel MD    phenylephrine HCL 10% ophthalmic solution 1 drop, 1 drop, Right Eye, PRN, Oanh Miguel MD    sodium chloride 0.9% flush 2 mL, 2 mL, Intravenous, PRN, Oanh Miguel MD    ASA Score: II     Mallampati Score: II     Plan for Sedation: Moderate Sedation     Patient or Family History of Anesthesia problems : No    Physical Exam    BP: Vital signs stable  General: No apparent distress  HEENT: nuclear sclerotic cataract  Lungs: adequate respirations  Heart: + pulses  Abdomen: soft  Rectal/pelvic: deferred    Impression: Visually significant Cataract.    See previous clinic notes for surgical indications.    Plan: Phacoemulsification with implantation of Intraocular lens

## 2024-09-16 NOTE — TELEPHONE ENCOUNTER
Spoke with pt provided arrival time of 9:00 for sx on 9/18/24 with Dr. Miguel @ [facility]. Pt has eyedrops and packet.

## 2024-09-17 ENCOUNTER — TELEPHONE (OUTPATIENT)
Dept: OPHTHALMOLOGY | Facility: CLINIC | Age: 54
End: 2024-09-17
Payer: COMMERCIAL

## 2024-09-17 NOTE — PRE-PROCEDURE INSTRUCTIONS
Patient reviewed on 9/13/2024.  Okay to proceed at [facility]. The following pre-procedure instructions and arrival time have been reviewed with patient via phone and sent to patient portal for review.  Patient verbalized an understanding.  Pt to be accompanied by her  day of procedure as responsible .      Dear Fransisca     Below you will find basic pre-procedure instructions in preparation for your procedure on 9/18/24 with Dr. Miguel        You should have received your arrival time already from Dr's office.     - Nothing to eat or drink after midnight the night before your procedure until after your procedure, except AM meds with small sips of water.     - HOLD all oral Diabetic medications night before and morning of procedure  - HOLD all Insulin morning of procedure  - HOLD all Fluid pills morning of procedure  - HOLD all non-insulin shots until after surgery (Ozempic, Mounjaro, Trulicity, Victoza, Byetta, Wegovy and Adlyxin) (7 days prior)  - HOLD all vitamins, minerals and herbal supplements morning of procedure   - TAKE all B/P meds, EXCEPT those that contain a fluid pill (ex. Lasix, Hydroclorothiazide/HCTZ, Spirnolactone)  - USE inhalers as needed and bring AM of surgery  - USE EYE DROPS as directed  -TAKE blood thinner meds AM of surgery unless otherwise instructed by your provider to not take     - Shower and wash face with antibacterial soap (ex. Dial) for 3 mins PM prior and AM of surgery  - No powder, lotions, creams, oils, gels, ointments, makeup,  or jewelry    - Wear comfortable clothing (button up shirt)     (Patient is required to have a responsible ride to transport home, ride may not leave while patient is in surgery)     -- [facility], 2nd floor Surgery Center, located   @ [address] [place] [id number]  2nd Floor Registration        If you have any questions or concerns please feel free to contact your surgeon's office.           Please reply to this  message as receipt of delivery.     [place], LPN [facility]  Pre-Admit - Anesthesia Dept

## 2024-09-18 ENCOUNTER — HOSPITAL ENCOUNTER (OUTPATIENT)
Facility: HOSPITAL | Age: 54
Discharge: HOME | End: 2024-09-18
Attending: OPHTHALMOLOGY | Admitting: OPHTHALMOLOGY
Payer: COMMERCIAL

## 2024-09-18 VITALS
TEMPERATURE: 98 F | OXYGEN SATURATION: 100 % | HEIGHT: 62 IN | BODY MASS INDEX: 25.21 KG/M2 | WEIGHT: 137 LBS | DIASTOLIC BLOOD PRESSURE: 77 MMHG | RESPIRATION RATE: 19 BRPM | SYSTOLIC BLOOD PRESSURE: 122 MMHG | HEART RATE: 55 BPM

## 2024-09-18 DIAGNOSIS — H25.12 NUCLEAR SCLEROTIC CATARACT OF LEFT EYE: Primary | ICD-10-CM

## 2024-09-18 DIAGNOSIS — H25.12 AGE-RELATED NUCLEAR CATARACT, LEFT: ICD-10-CM

## 2024-09-18 PROCEDURE — V2632 POST CHMBR INTRAOCULAR LENS: HCPCS | Performed by: OPHTHALMOLOGY

## 2024-09-18 PROCEDURE — 25000003 PHARM REV CODE 250: Performed by: OPHTHALMOLOGY

## 2024-09-18 PROCEDURE — 63600175 PHARM REV CODE 636 W HCPCS: Performed by: OPHTHALMOLOGY

## 2024-09-18 PROCEDURE — 27201423 OPTIME MED/SURG SUP & DEVICES STERILE SUPPLY: Performed by: OPHTHALMOLOGY

## 2024-09-18 PROCEDURE — 99900035 HC TECH TIME PER 15 MIN (STAT)

## 2024-09-18 PROCEDURE — 94761 N-INVAS EAR/PLS OXIMETRY MLT: CPT

## 2024-09-18 PROCEDURE — 99152 MOD SED SAME PHYS/QHP 5/>YRS: CPT | Performed by: OPHTHALMOLOGY

## 2024-09-18 PROCEDURE — 36000706: Performed by: OPHTHALMOLOGY

## 2024-09-18 PROCEDURE — 71000015 HC POSTOP RECOV 1ST HR: Performed by: OPHTHALMOLOGY

## 2024-09-18 PROCEDURE — 36000707: Performed by: OPHTHALMOLOGY

## 2024-09-18 DEVICE — LENS EYHANCE +23.5D: Type: IMPLANTABLE DEVICE | Site: EYE | Status: FUNCTIONAL

## 2024-09-18 RX ORDER — PREDNISOLONE ACETATE 10 MG/ML
SUSPENSION/ DROPS OPHTHALMIC
Status: DISCONTINUED | OUTPATIENT
Start: 2024-09-18 | End: 2024-09-18 | Stop reason: HOSPADM

## 2024-09-18 RX ORDER — LIDOCAINE HYDROCHLORIDE 10 MG/ML
INJECTION, SOLUTION EPIDURAL; INFILTRATION; INTRACAUDAL; PERINEURAL
Status: DISCONTINUED | OUTPATIENT
Start: 2024-09-18 | End: 2024-09-18 | Stop reason: HOSPADM

## 2024-09-18 RX ORDER — PROPARACAINE HYDROCHLORIDE 5 MG/ML
1 SOLUTION/ DROPS OPHTHALMIC
Status: DISCONTINUED | OUTPATIENT
Start: 2024-09-18 | End: 2024-09-18 | Stop reason: HOSPADM

## 2024-09-18 RX ORDER — MOXIFLOXACIN 5 MG/ML
1 SOLUTION/ DROPS OPHTHALMIC
Status: COMPLETED | OUTPATIENT
Start: 2024-09-18 | End: 2024-09-18

## 2024-09-18 RX ORDER — TROPICAMIDE 10 MG/ML
1 SOLUTION/ DROPS OPHTHALMIC
Status: DISCONTINUED | OUTPATIENT
Start: 2024-09-18 | End: 2024-09-18

## 2024-09-18 RX ORDER — MIDAZOLAM HYDROCHLORIDE 1 MG/ML
2 INJECTION, SOLUTION INTRAMUSCULAR; INTRAVENOUS
Status: COMPLETED | OUTPATIENT
Start: 2024-09-18 | End: 2024-09-18

## 2024-09-18 RX ORDER — CYCLOP/TROP/PROPA/PHEN/KET/WAT 1-1-0.1%
1 DROPS (EA) OPHTHALMIC (EYE) EVERY 5 MIN PRN
Status: COMPLETED | OUTPATIENT
Start: 2024-09-18 | End: 2024-09-18

## 2024-09-18 RX ORDER — LIDOCAINE HYDROCHLORIDE 40 MG/ML
INJECTION, SOLUTION RETROBULBAR
Status: DISCONTINUED | OUTPATIENT
Start: 2024-09-18 | End: 2024-09-18 | Stop reason: HOSPADM

## 2024-09-18 RX ORDER — MOXIFLOXACIN 5 MG/ML
SOLUTION/ DROPS OPHTHALMIC
Status: DISCONTINUED | OUTPATIENT
Start: 2024-09-18 | End: 2024-09-18 | Stop reason: HOSPADM

## 2024-09-18 RX ORDER — ACETAMINOPHEN 325 MG/1
650 TABLET ORAL EVERY 4 HOURS PRN
Status: DISCONTINUED | OUTPATIENT
Start: 2024-09-18 | End: 2024-09-18 | Stop reason: HOSPADM

## 2024-09-18 RX ORDER — PHENYLEPHRINE HYDROCHLORIDE 100 MG/ML
1 SOLUTION/ DROPS OPHTHALMIC
Status: DISCONTINUED | OUTPATIENT
Start: 2024-09-18 | End: 2024-09-18 | Stop reason: HOSPADM

## 2024-09-18 RX ORDER — FENTANYL CITRATE 50 UG/ML
25 INJECTION, SOLUTION INTRAMUSCULAR; INTRAVENOUS EVERY 5 MIN PRN
Status: DISCONTINUED | OUTPATIENT
Start: 2024-09-18 | End: 2024-09-18 | Stop reason: HOSPADM

## 2024-09-18 RX ORDER — PHENYLEPHRINE HYDROCHLORIDE 25 MG/ML
1 SOLUTION/ DROPS OPHTHALMIC
Status: DISCONTINUED | OUTPATIENT
Start: 2024-09-18 | End: 2024-09-18

## 2024-09-18 RX ORDER — TETRACAINE HYDROCHLORIDE 5 MG/ML
1 SOLUTION OPHTHALMIC EVERY 5 MIN PRN
Status: DISCONTINUED | OUTPATIENT
Start: 2024-09-18 | End: 2024-09-18

## 2024-09-18 RX ORDER — PROPARACAINE HYDROCHLORIDE 5 MG/ML
SOLUTION/ DROPS OPHTHALMIC
Status: DISCONTINUED | OUTPATIENT
Start: 2024-09-18 | End: 2024-09-18 | Stop reason: HOSPADM

## 2024-09-18 RX ADMIN — MIDAZOLAM HYDROCHLORIDE 1 MG: 1 INJECTION, SOLUTION INTRAMUSCULAR; INTRAVENOUS at 10:09

## 2024-09-18 RX ADMIN — MOXIFLOXACIN 1 DROP: 5 SOLUTION/ DROPS OPHTHALMIC at 10:09

## 2024-09-18 RX ADMIN — MOXIFLOXACIN OPHTHALMIC 1 DROP: 5 SOLUTION/ DROPS OPHTHALMIC at 09:09

## 2024-09-18 RX ADMIN — MOXIFLOXACIN OPHTHALMIC 1 DROP: 5 SOLUTION/ DROPS OPHTHALMIC at 10:09

## 2024-09-18 RX ADMIN — Medication 1 DROP: at 10:09

## 2024-09-18 RX ADMIN — Medication 1 DROP: at 09:09

## 2024-09-18 RX ADMIN — MIDAZOLAM HYDROCHLORIDE 2 MG: 1 INJECTION, SOLUTION INTRAMUSCULAR; INTRAVENOUS at 10:09

## 2024-09-18 NOTE — DISCHARGE SUMMARY
[facility]  Discharge Note  Short Stay    Procedure(s) (LRB):  EXTRACTION, CATARACT, WITH IOL INSERTION (Left)      BRIEF DISCHARGE NOTE:    Date of discharge: 09/18/2024    Reason for hospitalization -  Cataract surgery     Final Diagnosis - Visually significant Cataract    Procedures and treatment provided - Status post phacoemulsification with placement of intraocular lens     Diet - Advance to regular as tolerated    Activity - as tolerated    Disposition at the end of the case - Good.    Discharge: to home    The patient tolerated the procedure well and knows to follow up with me tomorrow in the eye clinic, sooner if needed.    Patient and family instructions (as appropriate) - Given to patient on discharge    Oanh Miguel MD

## 2024-09-18 NOTE — PLAN OF CARE
Discharge instructions provided to the patient. Patient verbalized understanding of the instructions. IV removed, cath tip intact, VS stable, no concerns voiced. Escorted patient via wheelchair to family member awaiting in private vehicle.

## 2024-09-18 NOTE — OP NOTE
DATE OF PROCEDURE: 09/18/2024    SURGEON: NICOLE TRIANA MD    PREOPERATIVE DIAGNOSIS:  Senile nuclear sclerotic cataract left eye.     POSTOPERATIVE DIAGNOSIS: Senile nuclear sclerotic cataract left eye.     PROCEDURE PERFORMED:  Phacoemulsification with placement of intraocular lens, left eye.    IMPLANT:  DIB00 23.5    Anesthesia: Moderate sedation with topical Lidocaine. Patient ID confirmed and re-evaluated the patient and anesthesia plan confirming it is suitable for the patient's condition and procedure.     COMPLICATIONS: none    ESTIMATED BLOOD LOSS: <1cc    SPECIMENS: none    INDICATIONS FOR PROCEDURE:   The patient has a history of painless progressive vision loss.  The patient has described difficulties with activities of daily living, which specifically include driving, which is secondary to cataract formation and progression. After we had a thorough discussion about risks, benefits, and alternatives to cataract surgery, the patient agreed to proceed with phacoemulsification and implantation of a lens in the left eye.  These risks include, but are not limited to, hemorrhage, pain, infection, need for additional surgery, need for glasses or contacts, loss of vision, or even loss of the eye.    PROCEDURE IN DETAIL:  The patient was met in the preop holding area.  Consent was confirmed to be signed.  The operative site was marked.  The patient was brought into the operating room by the anesthesia team and placed under monitored anesthesia care.  The left eye was prepped and draped in a sterile ophthalmic fashion.  A Eloy speculum was placed into the left eye.   A paracentesis site was made and 1% preservative-free lidocaine was injected into the anterior chamber.  Viscoelastic  material was injected into the anterior chamber.  A keratome blade was used to make a clear corneal incision.  A cystotome was used to initiate the continuous curvilinear capsulorrhexis which was completed with Utrata forceps.   BSS on a schwarz cannula was used to perform hydrodissection.  The phacoemulsification tip was introduced into the eye and the nucleus was removed in a standard divide-and-conquer fashion.  Remaining cortical material was removed from the eye using irrigation-aspiration.  The capsular bag was filled with viscoelastic material and the intraocular lens was injected and positioned into place. Remaining viscoelastic material was removed from the eye using irrigation and aspiration.  The corneal wounds were hydrated.  The eye was filled to physiologic pressure. The wounds were found to be watertight. Drops of Vigamox and prednisilone were placed into the eye.  The eye was washed, dried, and shielded.  The patient tolerated the procedure well and knows to follow up with me tomorrow morning, sooner if needed.      Under my direct supervision, intravenous moderate sedation was administered during the course of this procedure, with continuous monitoring of hemodynamic parameters.  Monitoring of the patient's vital signs and respiratory status was provided by trained nursing staff during the entire course of the procedure and under my supervision and recorded in the patient's medical record.  The total time for sedation was 13 minutes.     Clindamycin Pregnancy And Lactation Text: This medication can be used in pregnancy if certain situations. Clindamycin is also present in breast milk.

## 2024-09-18 NOTE — DISCHARGE INSTRUCTIONS
Dr. Miguel     Cataract Post-Operative Instructions       Day of surgery:     -Resume drops THREE times daily into the operative eye.     -Do not rub your eye     -Wear protective sunglasses during the day.     -Resume moderate activity.     -Bathe/shower/wash face normally     -Do not apply makeup around the operative eye for 1 week.     -You should expect:     Blurry vision and halos for 24-48 hours     Dilated pupil for 24-48 hours     Scratchy feeling in the eye for 1-2 days     Curved shadow in your peripheral vision for 2-3 weeks     Occasional flickering of lights for up to 1 week     -If you experience severe pain or nausea, call Dr. Miguel or the on-call doctor at [phone].       Plan to see Dr. Miguel at:     [facility] [address]    [address] [place]  [address]    **Most patients can drive the next morning.  If you do not feel comfortable driving, please arrange for transportation. **

## 2024-09-18 NOTE — PLAN OF CARE
Eyes pre-procedure, checklist and tasks completed, VSS, pt has no c/o voiced, no needs requested, time allotted for questions/ concerns, bed low and locked, siderails up, call light given to pt

## 2024-09-19 ENCOUNTER — OFFICE VISIT (OUTPATIENT)
Dept: OPHTHALMOLOGY | Facility: CLINIC | Age: 54
End: 2024-09-19
Payer: COMMERCIAL

## 2024-09-19 DIAGNOSIS — Z98.42 STATUS POST CATARACT EXTRACTION AND INSERTION OF INTRAOCULAR LENS, LEFT: Primary | ICD-10-CM

## 2024-09-19 DIAGNOSIS — Z96.1 STATUS POST CATARACT EXTRACTION AND INSERTION OF INTRAOCULAR LENS, LEFT: Primary | ICD-10-CM

## 2024-09-19 PROCEDURE — 1159F MED LIST DOCD IN RCRD: CPT | Mod: CPTII,S$GLB,, | Performed by: OPHTHALMOLOGY

## 2024-09-19 PROCEDURE — 99024 POSTOP FOLLOW-UP VISIT: CPT | Mod: S$GLB,,, | Performed by: OPHTHALMOLOGY

## 2024-09-19 PROCEDURE — 99999 PR PBB SHADOW E&M-EST. PATIENT-LVL IV: CPT | Mod: PBBFAC,,, | Performed by: OPHTHALMOLOGY

## 2024-09-19 NOTE — PROGRESS NOTES
HPI    Dr. Hall/Dr. Miguel    S/p Phaco w/IOL OS: 09/18/2024 (monovision near)  S/p phaco/IOL OD 12/22/22 (monovision distance)  MS   H/o Optic Neuritis   OS near/ using monoVA     PMB TID OS    Patient here for 1 day PCIOL OS (monovision near) post-op visit.  Pt states that VA is good but believed that OS was supposed to be for   distance (instead of near monovision).  Pt denies any eye pain.   Last edited by Mally Tolentino on 9/19/2024  4:08 PM.            Assessment /Plan     For exam results, see Encounter Report.    Status post cataract extraction and insertion of intraocular lens, left                       Slit Lamp Exam  L/L - normal  C/s - quiet  Cornea - clear  A/C - 1+ cell  Lens - PCIOL    POD #1 s/p phaco/IOL  - doing well  - continue the following drops:    vigamox or ocuflox TID x 1 wk then stop  Pred forte TID x  4 wks  Ketorolac TID until runs out    Versus:    Combination drop - 1 drop TID x total of 1 month    Appropriate precautions and post op medications reviewed.  Patient instructed to call or come in if symptoms of redness, decreased vision, or pain are experienced.    -f/up 1-2 wks, sooner PRN. Or 4 wks with optom for mrx if needed.    S/p Phaco w/IOL OS: 09/18/2024 (monovision near)  S/p phaco/IOL OD 12/22/22 (monovision distance)

## 2024-10-02 ENCOUNTER — LAB VISIT (OUTPATIENT)
Dept: LAB | Facility: HOSPITAL | Age: 54
End: 2024-10-02
Payer: COMMERCIAL

## 2024-10-02 ENCOUNTER — OFFICE VISIT (OUTPATIENT)
Dept: NEUROLOGY | Facility: CLINIC | Age: 54
End: 2024-10-02
Payer: COMMERCIAL

## 2024-10-02 VITALS
HEART RATE: 67 BPM | BODY MASS INDEX: 25.4 KG/M2 | DIASTOLIC BLOOD PRESSURE: 86 MMHG | SYSTOLIC BLOOD PRESSURE: 130 MMHG | WEIGHT: 138.88 LBS

## 2024-10-02 DIAGNOSIS — G35 MS (MULTIPLE SCLEROSIS): ICD-10-CM

## 2024-10-02 DIAGNOSIS — G35 RELAPSING-REMITTING MULTIPLE SCLEROSIS: ICD-10-CM

## 2024-10-02 DIAGNOSIS — R41.3 MEMORY IMPAIRMENT: ICD-10-CM

## 2024-10-02 DIAGNOSIS — G35 RELAPSING-REMITTING MULTIPLE SCLEROSIS: Primary | ICD-10-CM

## 2024-10-02 LAB
ALBUMIN SERPL BCP-MCNC: 4.6 G/DL (ref 3.5–5.2)
ALP SERPL-CCNC: 93 U/L (ref 55–135)
ALT SERPL W/O P-5'-P-CCNC: 46 U/L (ref 10–44)
ANION GAP SERPL CALC-SCNC: 8 MMOL/L (ref 8–16)
AST SERPL-CCNC: 29 U/L (ref 10–40)
BASOPHILS # BLD AUTO: 0.05 K/UL (ref 0–0.2)
BASOPHILS NFR BLD: 0.8 % (ref 0–1.9)
BILIRUB SERPL-MCNC: 0.5 MG/DL (ref 0.1–1)
BUN SERPL-MCNC: 12 MG/DL (ref 6–20)
CALCIUM SERPL-MCNC: 10.3 MG/DL (ref 8.7–10.5)
CHLORIDE SERPL-SCNC: 104 MMOL/L (ref 95–110)
CO2 SERPL-SCNC: 32 MMOL/L (ref 23–29)
CREAT SERPL-MCNC: 0.7 MG/DL (ref 0.5–1.4)
DIFFERENTIAL METHOD BLD: ABNORMAL
EOSINOPHIL # BLD AUTO: 0.1 K/UL (ref 0–0.5)
EOSINOPHIL NFR BLD: 1.7 % (ref 0–8)
ERYTHROCYTE [DISTWIDTH] IN BLOOD BY AUTOMATED COUNT: 12 % (ref 11.5–14.5)
EST. GFR  (NO RACE VARIABLE): >60 ML/MIN/1.73 M^2
FOLATE SERPL-MCNC: 16.4 NG/ML (ref 4–24)
GLUCOSE SERPL-MCNC: 81 MG/DL (ref 70–110)
HBV CORE AB SERPL QL IA: NORMAL
HBV SURFACE AG SERPL QL IA: NORMAL
HCT VFR BLD AUTO: 42.3 % (ref 37–48.5)
HCYS SERPL-SCNC: 4.9 UMOL/L (ref 4–15.5)
HGB BLD-MCNC: 13.8 G/DL (ref 12–16)
IGA SERPL-MCNC: 105 MG/DL (ref 40–350)
IGG SERPL-MCNC: 676 MG/DL (ref 650–1600)
IGM SERPL-MCNC: 61 MG/DL (ref 50–300)
IMM GRANULOCYTES # BLD AUTO: 0.02 K/UL (ref 0–0.04)
IMM GRANULOCYTES NFR BLD AUTO: 0.3 % (ref 0–0.5)
LYMPHOCYTES # BLD AUTO: 2.8 K/UL (ref 1–4.8)
LYMPHOCYTES NFR BLD: 42.7 % (ref 18–48)
MCH RBC QN AUTO: 32.9 PG (ref 27–31)
MCHC RBC AUTO-ENTMCNC: 32.6 G/DL (ref 32–36)
MCV RBC AUTO: 101 FL (ref 82–98)
MONOCYTES # BLD AUTO: 0.5 K/UL (ref 0.3–1)
MONOCYTES NFR BLD: 7.6 % (ref 4–15)
NEUTROPHILS # BLD AUTO: 3.1 K/UL (ref 1.8–7.7)
NEUTROPHILS NFR BLD: 46.9 % (ref 38–73)
NRBC BLD-RTO: 0 /100 WBC
PLATELET # BLD AUTO: 269 K/UL (ref 150–450)
PMV BLD AUTO: 10.8 FL (ref 9.2–12.9)
POTASSIUM SERPL-SCNC: 4.1 MMOL/L (ref 3.5–5.1)
PROT SERPL-MCNC: 7.4 G/DL (ref 6–8.4)
RBC # BLD AUTO: 4.19 M/UL (ref 4–5.4)
SODIUM SERPL-SCNC: 144 MMOL/L (ref 136–145)
TREPONEMA PALLIDUM IGG+IGM AB [PRESENCE] IN SERUM OR PLASMA BY IMMUNOASSAY: NONREACTIVE
VIT B12 SERPL-MCNC: >2000 PG/ML (ref 210–950)
WBC # BLD AUTO: 6.56 K/UL (ref 3.9–12.7)

## 2024-10-02 PROCEDURE — 87340 HEPATITIS B SURFACE AG IA: CPT

## 2024-10-02 PROCEDURE — 80053 COMPREHEN METABOLIC PANEL: CPT

## 2024-10-02 PROCEDURE — 86704 HEP B CORE ANTIBODY TOTAL: CPT

## 2024-10-02 PROCEDURE — 83090 ASSAY OF HOMOCYSTEINE: CPT

## 2024-10-02 PROCEDURE — 99999 PR PBB SHADOW E&M-EST. PATIENT-LVL V: CPT | Mod: PBBFAC,,, | Performed by: STUDENT IN AN ORGANIZED HEALTH CARE EDUCATION/TRAINING PROGRAM

## 2024-10-02 PROCEDURE — 83921 ORGANIC ACID SINGLE QUANT: CPT

## 2024-10-02 PROCEDURE — 82784 ASSAY IGA/IGD/IGG/IGM EACH: CPT | Mod: 59

## 2024-10-02 PROCEDURE — 82746 ASSAY OF FOLIC ACID SERUM: CPT

## 2024-10-02 PROCEDURE — 0361U NEURFLMNT LT CHN DIG IA QUAN: CPT

## 2024-10-02 PROCEDURE — 84425 ASSAY OF VITAMIN B-1: CPT

## 2024-10-02 PROCEDURE — 82607 VITAMIN B-12: CPT

## 2024-10-02 PROCEDURE — 85025 COMPLETE CBC W/AUTO DIFF WBC: CPT

## 2024-10-02 PROCEDURE — 86711 JOHN CUNNINGHAM ANTIBODY: CPT

## 2024-10-02 PROCEDURE — 86593 SYPHILIS TEST NON-TREP QUANT: CPT

## 2024-10-02 NOTE — PROGRESS NOTES
"[facility]  New Patient Visit      Disease Summary     Principle neurological diagnosis: relapsing-remitting multiple sclerosis    Date of symptom onset: 2007  Date of diagnosis: 2007  Disease type at diagnosis: relapsing-remitting  Disease type currently: relapsing remitting  Previous therapy: see below  -Copaxone: 2007, discontinued due to injection site reaction  -Avonex: 5642-6657, discontinued due to worsening of depressed mood, anxiety  -Aubagio: 9258-7581, discontinued due to development of peripheral neuropathy  -Tecfidera: 2018  -> transitioned to Ocrevus in 2019, had been on since  -> has experienced adverse effects from steroid pre-treatment during last several treaments: anxiety/irritability, worsening of hidradenitis suppurativa  Current therapy: Ocrevus  Last MRI Brain: 7/31/24 (periventricular and juxtacortical lesions c/w MS)  Last MRI C-spine: 5/12/19 (unremarkable)  Last MRI T-spine: 3/21/18 (unremarkable)  CSF: not on file, but reportedly c/w MS  Other relevant labs and tests:  JCV: No results found for: "JCVINDEX", "JCVANTIBODY"  Vit D: not on file  Lab Results   Component Value Date    [id number] 85 08/01/2023    [id number] 64 06/27/2016    [id number] 67 11/30/2015       History:     Previous records (physician notes, laboratory reports, and radiology reports) and imaging studies were reviewed and summarized. My recommendations will be communicated back to the patient's physician(s) by mail.     Ms. Osullivan is a 53 year-old female with a complex past medical history: hyperlipidemia, hypothyroidism, hidradenitis suppurativa (predominant groin involvement, s/p surgical intervention per patient), depression, generalized anxiety disorder and panic disorder (prescription benzodiazepine use), bilateral cataract surgery (right eye three weeks ago, left several years ago). Multiple sclerosis in addition to the above.    MS was diagnosed in 2007 following an episode of " monocular right vision loss, during which patient was treated with steroids and states that she regained full function afterwards. Followed with Dr. Marquez in [place] initially, then with Dr. Vázquez at [facility] in [place]. Was started on Copaxone for maintenance therapy but tolerated poorly due to injection site reaction, was severe. Switched to Avonex, but tolerated poorly over the long-term due to psychiatric comorbidities. Has also been treated w/Aubagio, Tecfidera. Was transitioned to Ocrevus in 2019: prior to this, patient states that she would experience flares occasionally, described as generalized weakness, though w/insidious-onset LLE weakness as residual deficit (requiring use of a cane). She would sometimes receive IV steroids for flares, sometimes not. For ~5 years--since around the time of starting Ocrevus--has not experienced any flares. She does note new-onset urinary urge incontinence w/in the last year, and intermittent BLUE weakness (L progressing to R, but has been ongoing ~1 month). Last Ocrevus infusion was ~3 weeks ago: for the last several treatments, she notes exacerbation of psychiatric symptoms (virgilio. anxiety and hidradenitis suppurativa). Also notes insidious onset memory impairment (short-term memory), myalgias/arthralgias (girdle) over the last year or so. Lives with her , denies alcohol/tobacco/drug use.    Prior JCV titer reportedly elevated, but result not on file in the EMR. Prior brain MRI w/periventricular (Polanco's fingers) and juxtacortical lesions. Prior cervical/thoracic MRIs have been unremarkable. Selections from images from 2018 are shown below.          Patient brings a disc with new MRI (7/2024) with her today for us to review. There is one new juxtacortical lesion in the left hemisphere.    ROS:     A complete 9 system ROS was reviewed by me and otherwise negative .     Past Medical History:   Diagnosis Date    Cataract     Endometriosis, site unspecified      H/O total hysterectomy     Hidradenitis     History of laparoscopy     History of psychiatric care     Multiple sclerosis     Panic anxiety syndrome     Therapy        Past Surgical History:   Procedure Laterality Date    APPENDECTOMY      breast reduction      CATARACT EXTRACTION W/  INTRAOCULAR LENS IMPLANT Right 12/22/2022    Procedure: EXTRACTION, CATARACT, WITH IOL INSERTION;  Surgeon: Oanh Miguel MD;  Location: [facility] OR;  Service: Ophthalmology;  Laterality: Right;    CATARACT EXTRACTION W/  INTRAOCULAR LENS IMPLANT Left 9/18/2024    Procedure: EXTRACTION, CATARACT, WITH IOL INSERTION;  Surgeon: Oanh Miguel MD;  Location: [facility] OR;  Service: Ophthalmology;  Laterality: Left;    HYSTERECTOMY      UT EXPLORATORY OF ABDOMEN      2002    sweat gland removal  10/2013    rt groin       Family History   Problem Relation Name Age of Onset    Anxiety disorder Mother      Cancer Mother          cervix    Cataracts Mother      Heart disease Father      Diabetes Paternal Grandfather      Breast cancer Neg Hx      Colon cancer Neg Hx      Ovarian cancer Neg Hx      Amblyopia Neg Hx      Blindness Neg Hx      Glaucoma Neg Hx      Macular degeneration Neg Hx      Retinal detachment Neg Hx      Strabismus Neg Hx         Social History     Socioeconomic History    Marital status:    Tobacco Use    Smoking status: Never    Smokeless tobacco: Never   Substance and Sexual Activity    Alcohol use: No     Alcohol/week: 0.0 standard drinks of alcohol    Drug use: No    Sexual activity: Not Currently     Birth control/protection: Abstinence     Social Drivers of Health     Financial Resource Strain: Medium Risk (6/27/2024)    Overall Financial Resource Strain (CARDIA)     Difficulty of Paying Living Expenses: Somewhat hard   Food Insecurity: Food Insecurity Present (6/27/2024)    Hunger Vital Sign     Worried About Running Out of Food in the Last Year: Patient declined     Ran Out of Food in the Last Year:  Sometimes true   Physical Activity: Unknown (6/27/2024)    Exercise Vital Sign     Days of Exercise per Week: Patient declined   Stress: Stress Concern Present (6/27/2024)    [employer] - Occupational Stress Questionnaire     Feeling of Stress : Very much   Housing Stability: Unknown (6/27/2024)    Housing Stability Vital Sign     Unable to Pay for Housing in the Last Year: No       Review of patient's allergies indicates:   Allergen Reactions    Glatiramer (copolymer 1) Dermatitis    Vicodin [hydrocodone-acetaminophen] Rash    Dextromethorphan Other (See Comments)     Anxiety, jittery, agitation    Gabapentin Other (See Comments)     Feet/ankle swelling, joint pain    Lorazepam Other (See Comments)     Increased anxiety, agitation    Aspirin      Other reaction(s): Rash    Aspirin     Codeine Other (See Comments)    Dimethyl fumarate Other (See Comments)    Glatiramer Hives    Penicillins      Other reaction(s): Rash  Other reaction(s): Rash    Ramelteon     Sulfa (sulfonamide antibiotics)     Sulfamethoxazole-trimethoprim      Other reaction(s): Rash    Teriflunomide     Trimethoprim Other (See Comments)    Vancomycin Nausea And Vomiting    Oxycodone-acetaminophen Rash       Living arrangements - the patient lives with their spouse.    Patient Employment       Status   Not Employed    Employer   UNEMPLOYED (OTHER)    Address   ,                Exam:     Vitals:    10/02/24 1011   BP: 130/86   Patient Position: Sitting   Pulse: 67   Weight: 63 kg (138 lb 14.2 oz)          In general, the patient is well nourished and appears to be in no acute distress.    Optic nerve pallor on the right. Mild red desaturation on the right. Difficulties w/several Ishihara plates w.the right eye.    MENTAL STATUS: language is fluent, normal verbal comprehension, motor impersistence w/EOM, motor/cerebellar testing; patient is alert and oriented x 3, fund of knowlege is appropriate by vocabulary. Behavior  and judgment appropriate with full medical decision making capacity.     CRANIAL NERVE EXAM:  There is no intrernuclear ophthalmoplegia. Extraocular muscles are intact. Pupils are equal, round, and reactive to light (no RAPD). VFs intact. No facial asymmetry. Facial sensation is intact bilaterally. There is no dysarthria. Uvula is midline, and palate moves symmetrically. Shoulder shrug intact bilaterlly. Tongue protrusion is midline. Hearing is intact to finger rub bilaterally. Neck is supple with full ROM    No Lhermitte's    MOTOR EXAM: Normal bulk and tone throughout UE and LE bilaterally.      Strength:  R deltoid 4+/5, L deltoid 4+/5  R biceps 4+/5, L biceps 4+/5  R triceps 4+/5, L triceps 4+/5  R finger flexors 4+/5, L finger flexors 4+/5  R finger extensors 4+/5, L finger extensors 4+/5  R finger abductors 4+/5, L finger abductors 4+/5  R  hip flexors 4+/5, L hip flexors 4/5  R  hip extensors 4+/5, L hip extensors 4+/5  R knee extensors 4+/5, L knee extensors 4+/5  R knee flexors 4+/5, L knee flexors 4+/5  R ankle dorsiflexors 4/5, L ankle dorsiflexors 4/5  R ankle plantarflexors 4+/5, L ankle plantarflexors 4+/5    Inconsistent exam. Give-way weakness.    REFLEXES: 2+ and symmetric throughout in all four extremities, aside from 1+ patellars bilaterally; toes are down bilaterally; negative Heard's, no cross-adductors    SENSORY EXAM: Patient reports mildly reduced sensation to light touch, vibration, pinprick and proprioception over the left arm/leg    COORDINATION: FNF--inconsistent, at times distal RUE tremor most evident at terminus of motion, other times while in transit (kinetic); sometimes present w/posture, sometimes not (though no re-emergence), somewhat distractible, perhaps some entrainment; not cerebellar. No rigidity/paratonia.    GAIT: Genu varus, antalgic. Turns well.        12/28/2016     9:31 AM   Timed 25 Foot Walk:   Did patient wear an AFO? No   Was assistive device used? No   Time for 25  "Foot Walk (seconds) 4.8            No data to display                  Imaging (personally reviewed):     No results found for this or any previous visit.    No results found for this or any previous visit.    No results found for this or any previous visit.    No results found for this or any previous visit.    No results found for this or any previous visit.    No results found for this or any previous visit.      Labs:     Lab Results   Component Value Date    [id number] 85 08/01/2023    [id number] 64 06/27/2016    [id number] 67 11/30/2015     No results found for: "JCVINDEX", "JCVANTIBODY"  No results found for: "WU5GEVCE", "ABSOLUTECD3", "GQ5UHPHN", "ABSOLUTECD8", "PR1CEGDE", "ABSOLUTECD4", "LABCD48"  Lab Results   Component Value Date    WBC 6.41 08/01/2023    RBC 4.22 08/01/2023    HGB 13.5 08/01/2023    HCT 41.3 08/01/2023    MCV 98 08/01/2023    MCH 32.0 (H) 08/01/2023    MCHC 32.7 08/01/2023    RDW 11.9 08/01/2023     08/01/2023    MPV 10.6 08/01/2023    GRAN 2.7 08/01/2023    GRAN 42.8 08/01/2023    LYMPH 3.0 08/01/2023    LYMPH 47.3 08/01/2023    MONO 0.4 08/01/2023    MONO 6.9 08/01/2023    EOS 0.1 08/01/2023    BASO 0.05 08/01/2023    EOSINOPHIL 2.2 08/01/2023    BASOPHIL 0.8 08/01/2023     Sodium   Date Value Ref Range Status   08/01/2023 142 136 - 145 mmol/L Final     Potassium   Date Value Ref Range Status   08/01/2023 4.1 3.5 - 5.1 mmol/L Final     Comment:     Anion Gap reference range revised on 4/28/2023     Chloride   Date Value Ref Range Status   08/01/2023 104 95 - 110 mmol/L Final     CO2   Date Value Ref Range Status   08/01/2023 30 22 - 31 mmol/L Final     Glucose   Date Value Ref Range Status   08/01/2023 89 70 - 110 mg/dL Final     Comment:     The ADA recommends the following guidelines for fasting glucose:    Normal:       less than 100 mg/dL    Prediabetes:  100 mg/dL to 125 mg/dL    Diabetes:     126 mg/dL or higher       BUN   Date Value Ref Range Status   08/01/2023 13 " 7 - 18 mg/dL Final     Creatinine   Date Value Ref Range Status   08/01/2023 0.60 0.50 - 1.40 mg/dL Final     Calcium   Date Value Ref Range Status   08/01/2023 9.7 8.4 - 10.2 mg/dL Final     Total Protein   Date Value Ref Range Status   08/01/2023 7.3 6.0 - 8.4 g/dL Final     Albumin   Date Value Ref Range Status   08/01/2023 4.9 3.5 - 5.2 g/dL Final     Total Bilirubin   Date Value Ref Range Status   08/01/2023 0.8 0.2 - 1.3 mg/dL Final     Alkaline Phosphatase   Date Value Ref Range Status   08/01/2023 88 38 - 145 U/L Final     AST   Date Value Ref Range Status   08/01/2023 56 (H) 14 - 36 U/L Final     ALT   Date Value Ref Range Status   08/01/2023 103 (H) 0 - 35 U/L Final     Anion Gap   Date Value Ref Range Status   08/01/2023 8 5 - 12 mmol/L Final     Comment:     Anion Gap reference range revised on 4/28/2023     eGFR if    Date Value Ref Range Status   01/12/2018 >60.0 >60 mL/min/1.73 m^2 Final     eGFR if non    Date Value Ref Range Status   01/12/2018 >60.0 >60 mL/min/1.73 m^2 Final     Comment:     Calculation used to obtain the estimated glomerular filtration  rate (eGFR) is the CKD-EPI equation.                   Diagnosis/Assessment/Plan:   Clinical picture is consistent with relapsing-remitting multiple sclerosis  -multiple relapses/flares: dissemination in time  -neuroimaging shows periventricular and juxtacortical lesions: dissemination in space    MRI brain from July does show new juxtacortical lesion in the left hemisphere, however it is unclear whether this occurred while on Ocrevus or not.    With new urinary urge incontinence and prior unrevealing C/T-spine MRI, merits repeat cord imaging: C/T-spine demyelinating w/wo contrast. Discussed with patient: she cannot tolerate due to severe claustrophobia, so will need to be done w/anesthesia. Will obtain Ocrevus safety labs, neurofilament light chain, repeat JCV titer and Vit. D in the interim. Given poor toleration  of pre-medication regimen (steroids) w/recent Ocrevus, Kesimpta is an option going forward.    Add on laboratories for reversible causes of memory impairment (B1, B9/HC, B12/MMA, RPR) as well as myalgias/arthralgias (ELIZABETH w/reflex).    Strength/movement examination display some characteristics of functional overlay. Continue to monitor for now.    Should continue to follow w/PT.       Multiple Sclerosis  -Assessment: relapsing remitting multiple sclerosis  -Imaging: MRI cervical and thoracic spine w/wo contrast  -Disease Modifying Therapies: on Ocrevus, may transition to Kesimpta going forward  Symptom management   -Discussed brain health measures  Plan discussed and questions were answered to satisfaction.  Return to clinic following above MRIs.         Total time spent with the patient: 80 minutes, including face to face consultation, chart review and coordination of care, on the day of the visit. This includes face to face time and non-face to face time preparing to see the patient (eg, review of tests), obtaining and/or reviewing separately obtained history, documenting clinical information in the electronic or other health record, independently interpreting resultsand communicating results to the patient/family/caregiver, or care coordination.   I performed a neurobehavioral status examination that included a clinical assessment of thinking, reasoning, and judgment. Please see above HPI and ROS for full details.     Chandana Ardon,   Neurology PGY-3    Rhina Pandya MD, MSc  Attending neurologist

## 2024-10-04 ENCOUNTER — PATIENT MESSAGE (OUTPATIENT)
Dept: OPHTHALMOLOGY | Facility: CLINIC | Age: 54
End: 2024-10-04
Payer: COMMERCIAL

## 2024-10-04 ENCOUNTER — TELEPHONE (OUTPATIENT)
Dept: NEUROLOGY | Facility: CLINIC | Age: 54
End: 2024-10-04
Payer: COMMERCIAL

## 2024-10-04 LAB — NEUROFILAMENT LIGHT CHAIN, PLASMA: 9.1 PG/ML

## 2024-10-04 NOTE — TELEPHONE ENCOUNTER
Place case request for MRIs under anesthesia. Teams msg sent to Ronnie asking for available dates

## 2024-10-07 ENCOUNTER — PATIENT MESSAGE (OUTPATIENT)
Dept: NEUROLOGY | Facility: CLINIC | Age: 54
End: 2024-10-07
Payer: COMMERCIAL

## 2024-10-07 LAB
JCPYV AB SERPL QL IA: ABNORMAL
JCPYV AB SERPL QL IA: ABNORMAL
JCV INDEX: 0.37
METHYLMALONATE SERPL-SCNC: 0.12 UMOL/L

## 2024-10-08 LAB — VIT B1 BLD-MCNC: 69 UG/L (ref 38–122)

## 2024-10-09 NOTE — TELEPHONE ENCOUNTER
Pt stated she has to have the MRIs in October due to her  starting a new job with new insurance in November. This Rn made pt aware I will ask the anesthesia team if there is a waiting list in case someone cancels in October. Also, made pt aware of financial assistance offered here. Pt was not interested in financial assistance. She stated she is going to hold off for now unless there is a cancellation and she can have them in October. Advised pt to reach out if something changes. Sent teams msg to Caro Stokes ( anesthesia) asking about the wait list

## 2024-10-09 NOTE — TELEPHONE ENCOUNTER
Caro ( anesthesia) suggested 2nd adult to be scanned , MRI only allows us to book anesthesia for 1 adult a day. We have 1 adult everyday until the november dates I gave you. The MRI dept has to approve a 2nd adult to be scanned. Caro stated she has 10/23 and 10/31 for 11am if able to get approved by MRI dept. Contacted Leah with MRI dept she stated she is putting 10/31 for 11 on hold for pt and will get [employer] to approve it. Notified pt and pt agreed to date and time. Made pt aware I will confirm with her tomorrow.

## 2024-10-10 NOTE — TELEPHONE ENCOUNTER
Leah stated [employer] did not approve 2nd adult. Leah offered for pt to have MRIs done tomorrow d/t a cancellation. Leah agreed to call pt to coordinate once confirmed that appt is available.

## 2024-10-22 ENCOUNTER — OFFICE VISIT (OUTPATIENT)
Dept: OPTOMETRY | Facility: CLINIC | Age: 54
End: 2024-10-22
Payer: COMMERCIAL

## 2024-10-22 DIAGNOSIS — Z98.42 STATUS POST CATARACT EXTRACTION AND INSERTION OF INTRAOCULAR LENS OF LEFT EYE: Primary | ICD-10-CM

## 2024-10-22 DIAGNOSIS — Z96.1 STATUS POST CATARACT EXTRACTION AND INSERTION OF INTRAOCULAR LENS OF LEFT EYE: Primary | ICD-10-CM

## 2024-10-22 PROCEDURE — 99999 PR PBB SHADOW E&M-EST. PATIENT-LVL III: CPT | Mod: PBBFAC,,, | Performed by: OPTOMETRIST

## 2024-10-22 PROCEDURE — 1159F MED LIST DOCD IN RCRD: CPT | Mod: CPTII,S$GLB,, | Performed by: OPTOMETRIST

## 2024-10-22 PROCEDURE — 99024 POSTOP FOLLOW-UP VISIT: CPT | Mod: S$GLB,,, | Performed by: OPTOMETRIST

## 2024-10-22 NOTE — PROGRESS NOTES
HPI    Pt is here today for 1 month po. Denies pain/discomfort.  DLS: 9/19/2024 Dr. Miguel  (-)Flashes   (-)Floaters   (-)Diplopia   (-)Headaches   (+)Itching   (-)Tearing  (-)Burning  (+)Dryness   (-)Photophobia  (-)Glare   (-)Blurred VA  Past Eye Sx: Cataract with IOL OS (OD previously done)  Eye Meds: Refresh PF PRN OU   Last edited by Katherine Hall, OD on 10/22/2024  2:45 PM.            Assessment /Plan     For exam results, see Encounter Report.    Status post cataract extraction and insertion of intraocular lens of left eye      Educated on today's WNL findings OU. Eye well healed from cataract surgery OS. Pt OK to discontinue post operative medications.     RTC in 1 year for annual eye exam unless changes noted sooner.

## 2024-10-23 ENCOUNTER — ANESTHESIA EVENT (OUTPATIENT)
Dept: ENDOSCOPY | Facility: HOSPITAL | Age: 54
End: 2024-10-23
Payer: COMMERCIAL

## 2024-10-23 NOTE — PRE-PROCEDURE INSTRUCTIONS
PreOp Instructions given:   - Verbal medication information (what to hold and what to take)   - NPO guidelines CLEARS ONLY - [id number]  - Arrival place directions given; time to be given the day before procedure by the   [facility] - [address] [facility] [place]  - Bathing with antibacterial soap   - Don't wear any jewelry or bring any valuables AM of surgery   - No makeup or moisturizer to face   - No perfume/cologne, powder, lotions or aftershave   Pt. verbalized understanding.   Pt denies any h/o Anesthesia/Sedation complications or side effects.  MS

## 2024-10-24 ENCOUNTER — HOSPITAL ENCOUNTER (OUTPATIENT)
Dept: RADIOLOGY | Facility: HOSPITAL | Age: 54
Discharge: HOME | End: 2024-10-24
Payer: COMMERCIAL

## 2024-10-24 ENCOUNTER — ANESTHESIA (OUTPATIENT)
Dept: ENDOSCOPY | Facility: HOSPITAL | Age: 54
End: 2024-10-24
Payer: COMMERCIAL

## 2024-10-24 ENCOUNTER — HOSPITAL ENCOUNTER (OUTPATIENT)
Facility: HOSPITAL | Age: 54
Discharge: HOME | End: 2024-10-24
Attending: STUDENT IN AN ORGANIZED HEALTH CARE EDUCATION/TRAINING PROGRAM
Payer: COMMERCIAL

## 2024-10-24 VITALS
SYSTOLIC BLOOD PRESSURE: 125 MMHG | DIASTOLIC BLOOD PRESSURE: 78 MMHG | TEMPERATURE: 98 F | WEIGHT: 134.63 LBS | HEIGHT: 62 IN | RESPIRATION RATE: 17 BRPM | OXYGEN SATURATION: 99 % | BODY MASS INDEX: 24.78 KG/M2 | HEART RATE: 70 BPM

## 2024-10-24 DIAGNOSIS — G35 RELAPSING-REMITTING MULTIPLE SCLEROSIS: ICD-10-CM

## 2024-10-24 DIAGNOSIS — G35 MULTIPLE SCLEROSIS: ICD-10-CM

## 2024-10-24 PROCEDURE — 25500020 PHARM REV CODE 255

## 2024-10-24 PROCEDURE — 72157 MRI CHEST SPINE W/O & W/DYE: CPT | Mod: TC

## 2024-10-24 PROCEDURE — 63600175 PHARM REV CODE 636 W HCPCS: Performed by: NURSE ANESTHETIST, CERTIFIED REGISTERED

## 2024-10-24 PROCEDURE — A9585 GADOBUTROL INJECTION: HCPCS

## 2024-10-24 PROCEDURE — 37000009 HC ANESTHESIA EA ADD 15 MINS

## 2024-10-24 PROCEDURE — D9220A PRA ANESTHESIA: Mod: CRNA,,, | Performed by: NURSE ANESTHETIST, CERTIFIED REGISTERED

## 2024-10-24 PROCEDURE — 37000008 HC ANESTHESIA 1ST 15 MINUTES

## 2024-10-24 PROCEDURE — D9220A PRA ANESTHESIA: Mod: ANES,,, | Performed by: ANESTHESIOLOGY

## 2024-10-24 PROCEDURE — 72156 MRI NECK SPINE W/O & W/DYE: CPT | Mod: TC

## 2024-10-24 PROCEDURE — 71000044 HC DOSC ROUTINE RECOVERY FIRST HOUR

## 2024-10-24 PROCEDURE — 72156 MRI NECK SPINE W/O & W/DYE: CPT | Mod: 26,,, | Performed by: RADIOLOGY

## 2024-10-24 PROCEDURE — 72157 MRI CHEST SPINE W/O & W/DYE: CPT | Mod: 26,,, | Performed by: RADIOLOGY

## 2024-10-24 RX ORDER — GLUCAGON 1 MG
1 KIT INJECTION
Status: DISCONTINUED | OUTPATIENT
Start: 2024-10-24 | End: 2024-10-24 | Stop reason: HOSPADM

## 2024-10-24 RX ORDER — LIDOCAINE HYDROCHLORIDE 20 MG/ML
INJECTION INTRAVENOUS
Status: DISCONTINUED | OUTPATIENT
Start: 2024-10-24 | End: 2024-10-24

## 2024-10-24 RX ORDER — SODIUM CHLORIDE 0.9 % (FLUSH) 0.9 %
10 SYRINGE (ML) INJECTION
Status: DISCONTINUED | OUTPATIENT
Start: 2024-10-24 | End: 2024-10-24 | Stop reason: HOSPADM

## 2024-10-24 RX ORDER — GADOBUTROL 604.72 MG/ML
7 INJECTION INTRAVENOUS
Status: COMPLETED | OUTPATIENT
Start: 2024-10-24 | End: 2024-10-24

## 2024-10-24 RX ORDER — PROPOFOL 10 MG/ML
VIAL (ML) INTRAVENOUS
Status: DISCONTINUED | OUTPATIENT
Start: 2024-10-24 | End: 2024-10-24

## 2024-10-24 RX ORDER — MIDAZOLAM HYDROCHLORIDE 1 MG/ML
INJECTION, SOLUTION INTRAMUSCULAR; INTRAVENOUS
Status: DISCONTINUED | OUTPATIENT
Start: 2024-10-24 | End: 2024-10-24

## 2024-10-24 RX ADMIN — PROPOFOL 150 MCG/KG/MIN: 10 INJECTION, EMULSION INTRAVENOUS at 02:10

## 2024-10-24 RX ADMIN — MIDAZOLAM HYDROCHLORIDE 4 MG: 2 INJECTION, SOLUTION INTRAMUSCULAR; INTRAVENOUS at 02:10

## 2024-10-24 RX ADMIN — GADOBUTROL 7 ML: 604.72 INJECTION INTRAVENOUS at 03:10

## 2024-10-24 RX ADMIN — LIDOCAINE HYDROCHLORIDE 60 MG: 20 INJECTION INTRAVENOUS at 02:10

## 2024-10-24 RX ADMIN — PROPOFOL 50 MG: 10 INJECTION, EMULSION INTRAVENOUS at 02:10

## 2024-10-24 NOTE — PROGRESS NOTES
Pt discharged per orders. AAOx'a 3. VSS. No s/s of acute distress. Resp even and unlabored.No complaints of pain verbalized. IV removed prior to discharge. Medication delivered at bedside.Reviewed discharge instructions, follow up care/appointments with parents and patient; verbalized understanding of discharge and follow up care/appointments. Discharged with all personal belongings.Escorted out with staff in wheelchair.Pt transported home via personal transportation.

## 2024-10-24 NOTE — ANESTHESIA PREPROCEDURE EVALUATION
10/24/2024  Jillian Osullivan is a 53 y.o., female.relapsing-remitting multiple sclerosis       Pre-op Assessment    I have reviewed the Patient Summary Reports.     I have reviewed the Nursing Notes. I have reviewed the NPO Status.   I have reviewed the Medications.     Review of Systems  Anesthesia Hx:  No problems with previous Anesthesia             Denies Family Hx of Anesthesia complications.    Denies Personal Hx of Anesthesia complications.                    Social:  Non-Smoker       Cardiovascular:       Denies Valvular problems/Murmurs.        Denies Orthopnea.     Denies WOODARD.                              Pulmonary:     Denies Asthma.    Sleep Apnea                Neurological:    Denies CVA. Neuromuscular Disease,   Denies Headaches. Denies Seizures.                                Endocrine:   Hypothyroidism          Psych:  Psychiatric History                  Physical Exam  General: Well nourished    Airway:  Mallampati: II / II  Mouth Opening: Normal  TM Distance: Normal  Tongue: Normal  Neck ROM: Normal ROM        Anesthesia Plan  Type of Anesthesia, risks & benefits discussed:    Anesthesia Type: Gen Supraglottic Airway, Gen Natural Airway  Intra-op Monitoring Plan: Standard ASA Monitors  Post Op Pain Control Plan: multimodal analgesia and IV/PO Opioids PRN  Induction:  IV  Airway Plan: , Post-Induction  Informed Consent: Informed consent signed with the Patient and all parties understand the risks and agree with anesthesia plan.  All questions answered.   ASA Score: 3  Day of Surgery Review of History & Physical: H&P completed by Anesthesiologist.    Ready For Surgery From Anesthesia Perspective.     .

## 2024-10-24 NOTE — TRANSFER OF CARE
"Anesthesia Transfer of Care Note    Patient: Jillian Osullivan    Procedure(s) Performed: Procedure(s) (LRB):  MRI under anesthesia Thoracic Spine W/WO contrast 76775 MRI under anesthesia Cervical Spine W/WO contrast 80361 (N/A)    Patient location: PACU    Anesthesia Type: general    Transport from OR: Transported from OR on 2-3 L/min O2 by NC with adequate spontaneous ventilation    Post pain: adequate analgesia    Post assessment: no apparent anesthetic complications    Post vital signs: stable    Level of consciousness: awake and alert    Nausea/Vomiting: no nausea/vomiting    Complications: none    Transfer of care protocol was followed      Last vitals: Visit Vitals  BP (!) 102/58 (BP Location: Right arm, Patient Position: Lying)   Pulse 83   Temp 36.7 °C (98 °F) (Temporal)   Resp 18   Ht 5' 2" (1.575 m)   Wt 61.1 kg (134 lb 9.6 oz)   LMP 10/01/2014 (Approximate)   SpO2 97%   Breastfeeding No   BMI 24.62 kg/m²     "

## 2024-10-25 DIAGNOSIS — F41.1 GENERALIZED ANXIETY DISORDER: ICD-10-CM

## 2024-10-28 RX ORDER — CLONAZEPAM 1 MG/1
TABLET ORAL
Qty: 90 TABLET | Refills: 2 | Status: SHIPPED | OUTPATIENT
Start: 2024-10-28

## 2024-11-04 NOTE — ANESTHESIA POSTPROCEDURE EVALUATION
Anesthesia Post Evaluation    Patient: Jillian Osullivan    Procedure(s) Performed: Procedure(s) (LRB):  MRI under anesthesia Thoracic Spine W/WO contrast 63980 MRI under anesthesia Cervical Spine W/WO contrast 29534 (N/A)    Final Anesthesia Type: general      Patient location during evaluation: PACU  Patient participation: Yes- Able to Participate  Level of consciousness: awake and alert  Post-procedure vital signs: reviewed and stable  Pain management: adequate  Airway patency: patent    PONV status at discharge: No PONV  Anesthetic complications: no      Cardiovascular status: hemodynamically stable  Respiratory status: spontaneous ventilation  Follow-up not needed.              Vitals Value Taken Time   /78 10/24/24 1630   Temp 36.7 °C (98 °F) 10/24/24 1630   Pulse 70 10/24/24 1630   Resp 17 10/24/24 1630   SpO2 99 % 10/24/24 1630         No case tracking events are documented in the log.      Pain/Bubba Score: No data recorded

## 2024-11-28 ENCOUNTER — PATIENT MESSAGE (OUTPATIENT)
Dept: NEUROLOGY | Facility: CLINIC | Age: 54
End: 2024-11-28
Payer: COMMERCIAL

## 2024-11-28 ENCOUNTER — PATIENT MESSAGE (OUTPATIENT)
Dept: OPHTHALMOLOGY | Facility: CLINIC | Age: 54
End: 2024-11-28
Payer: COMMERCIAL

## 2024-12-05 ENCOUNTER — TELEPHONE (OUTPATIENT)
Dept: NEUROLOGY | Facility: CLINIC | Age: 54
End: 2024-12-05
Payer: COMMERCIAL

## 2024-12-11 ENCOUNTER — TELEPHONE (OUTPATIENT)
Dept: NEUROLOGY | Facility: CLINIC | Age: 54
End: 2024-12-11
Payer: COMMERCIAL

## 2024-12-11 ENCOUNTER — PATIENT MESSAGE (OUTPATIENT)
Dept: NEUROLOGY | Facility: CLINIC | Age: 54
End: 2024-12-11
Payer: COMMERCIAL

## 2024-12-11 NOTE — TELEPHONE ENCOUNTER
Spoke with pt to schedule follow up appt. Pt is scheduled on 1/2. Offered today and 12/19 but pt declined. Pt agreed and was okay with scheduling with [place].

## 2024-12-16 ENCOUNTER — OFFICE VISIT (OUTPATIENT)
Dept: OPHTHALMOLOGY | Facility: CLINIC | Age: 54
End: 2024-12-16
Payer: COMMERCIAL

## 2024-12-16 ENCOUNTER — PATIENT MESSAGE (OUTPATIENT)
Dept: PSYCHIATRY | Facility: CLINIC | Age: 54
End: 2024-12-16
Payer: COMMERCIAL

## 2024-12-16 DIAGNOSIS — H47.291 PARTIAL OPTIC ATROPHY OF RIGHT EYE: ICD-10-CM

## 2024-12-16 DIAGNOSIS — H26.492 POSTERIOR CAPSULAR OPACIFICATION VISUALLY SIGNIFICANT, LEFT EYE: Primary | ICD-10-CM

## 2024-12-16 PROCEDURE — 99213 OFFICE O/P EST LOW 20 MIN: CPT | Mod: 24,S$GLB,, | Performed by: OPHTHALMOLOGY

## 2024-12-16 PROCEDURE — G2211 COMPLEX E/M VISIT ADD ON: HCPCS | Mod: S$GLB,,, | Performed by: OPHTHALMOLOGY

## 2024-12-16 PROCEDURE — 99999 PR PBB SHADOW E&M-EST. PATIENT-LVL III: CPT | Mod: PBBFAC,,, | Performed by: OPHTHALMOLOGY

## 2024-12-16 PROCEDURE — 1159F MED LIST DOCD IN RCRD: CPT | Mod: CPTII,S$GLB,, | Performed by: OPHTHALMOLOGY

## 2024-12-16 NOTE — PROGRESS NOTES
HPI    Dr. Hall/Dr. Miguel    S/p Phaco w/IOL OS: 09/18/2024 (monovision near)  S/p phaco/IOL OD 12/22/22 (monovision distance)  MS   H/o Optic Neuritis   OS near/ using monoVA     Gtt's:  At's BID OU    Patient is here for PCO check of left eye.  Pt. States vision fluctuates from blurry to clear and feel as there is a   jelly film over her eye.  Pt gets sudden pain and it fee as there is something in the eye.  Pt. Denies pain today.    Last edited by Stephanie Dhaliwal on 12/16/2024 11:17 AM.            Assessment /Plan     For exam results, see Encounter Report.    Posterior capsular opacification visually significant, left eye  -     Cancel: Yag Capsulotomy - OS - Left Eye    Partial optic atrophy of right eye          Pt says near VA is fluctuating starting in the past 2 wks.    Unclear etiology -- pt using ATs, says she has been tolerating monoVA and does not think this is a factor.    Eye exam normal today, VA measuring excellent sc.    Pt will try to check OD and then OS alone when next blurry vision event occurs and let me know outcome.    Today's visit is associated with current and anticipated ongoing medical care related to this patient's single serious/complex condition (MS h/o optic neuritis). Follow up is to be continued indefinitely to monitor the condition.

## 2024-12-18 ENCOUNTER — PATIENT MESSAGE (OUTPATIENT)
Dept: OPHTHALMOLOGY | Facility: CLINIC | Age: 54
End: 2024-12-18
Payer: COMMERCIAL

## 2025-01-02 ENCOUNTER — OFFICE VISIT (OUTPATIENT)
Dept: NEUROLOGY | Facility: CLINIC | Age: 55
End: 2025-01-02
Payer: COMMERCIAL

## 2025-01-02 VITALS
SYSTOLIC BLOOD PRESSURE: 145 MMHG | DIASTOLIC BLOOD PRESSURE: 86 MMHG | BODY MASS INDEX: 24.14 KG/M2 | HEIGHT: 62 IN | WEIGHT: 131.19 LBS | HEART RATE: 60 BPM

## 2025-01-02 DIAGNOSIS — G35 MS (MULTIPLE SCLEROSIS): Primary | ICD-10-CM

## 2025-01-02 DIAGNOSIS — E55.9 VITAMIN D DEFICIENCY: ICD-10-CM

## 2025-01-02 PROCEDURE — 1160F RVW MEDS BY RX/DR IN RCRD: CPT | Mod: CPTII,S$GLB,,

## 2025-01-02 PROCEDURE — 3077F SYST BP >= 140 MM HG: CPT | Mod: CPTII,S$GLB,,

## 2025-01-02 PROCEDURE — G2211 COMPLEX E/M VISIT ADD ON: HCPCS | Mod: S$GLB,,,

## 2025-01-02 PROCEDURE — 99999 PR PBB SHADOW E&M-EST. PATIENT-LVL V: CPT | Mod: PBBFAC,,,

## 2025-01-02 PROCEDURE — 99215 OFFICE O/P EST HI 40 MIN: CPT | Mod: S$GLB,,,

## 2025-01-02 PROCEDURE — 99417 PROLNG OP E/M EACH 15 MIN: CPT | Mod: S$GLB,,,

## 2025-01-02 PROCEDURE — 3079F DIAST BP 80-89 MM HG: CPT | Mod: CPTII,S$GLB,,

## 2025-01-02 PROCEDURE — 3008F BODY MASS INDEX DOCD: CPT | Mod: CPTII,S$GLB,,

## 2025-01-02 PROCEDURE — 1159F MED LIST DOCD IN RCRD: CPT | Mod: CPTII,S$GLB,,

## 2025-01-02 NOTE — Clinical Note
She is a newer patient for us and this will be the first time we will be ordering Ocrevus. Last infusion was in September (I believe 9/15). She will be getting labs in February.

## 2025-01-02 NOTE — Clinical Note
I would also like to discontinue pre-med steroids.  She does not tolerate them at all.  She is an anxious person as it is and it just makes it worse. There was talk about getting started on Kesimpta at previous visit with LENARD, but I am not sure if she would be able to give herself the injection, but also did not talk to her about that, just talked about taking out steroids.

## 2025-01-02 NOTE — PROGRESS NOTES
Patient ID: Jillian Osullivan is a 54 y.o. female who presents today for a routine clinic visit for MS.  She was last seen by Dr. Pandya on 10/2/2024.  The history was provided by the patient.     Principle neurological diagnosis: MS  Date of symptom onset: 2007  Date of diagnosis: 2007  Disease type at diagnosis: RR  Disease type currently: RR  Previous therapy: see below  -Copaxone: 2007, discontinued due to injection site reaction  -Avonex: 5585-5960, discontinued due to worsening of depressed mood, anxiety  -Aubagio: 1285-8519, discontinued due to development of peripheral neuropathy  -Tecfidera: 2018  -> transitioned to Ocrevus in 2019, had been on since  -> has experienced adverse effects from steroid pre-treatment during last several treaments: anxiety/irritability, worsening of hidradenitis suppurativa  Current therapy: Ocrevus  Last MRI Brain: 7/31/2024 - new lesions  Last MRI C-spine: 10/24/2024 - no lesions   Last MRI T-spine: 10/24/2024 - no lesions   CSF: not on file, but reportedly c/w MS  JCV: NA  Other relevant labs and tests: 8/1/2023: vitamin D - 85    Subjective:     She does not tolerate steroids well with the infusion - tolerating Ocrevus well without any side effects.     She is feeling somewhat stable with her MS symptoms     ROS:      1/2/2025     7:13 AM   REVIEW OF SYMPTOMS   Do you feel abnormally tired on most days? No    Do you feel you generally sleep well? No    Do you have difficulty controlling your bladder?  Yes  - hold on oxybutynin until after eye doctor appt     Do you have difficulty controlling your bowels?  No    Do you have frequent muscle cramps, tightness or spasms in your limbs?  Yes    Do you have new visual symptoms?  Yes    Do you have worsening difficulty with your memory or thinking? No    Do you have worsening symptoms of anxiety or depression?  Yes    For patients who walk, Do you have more difficulty walking?  No    Have you fallen since your last visit?  No     For patients who use wheelchairs: Do you have any skin wounds or breakdown? Not Applicable    Do you have difficulty using your hands?  No    Do you have shooting or burning pain? No    Do you have difficulty with sexual function?  Yes    If you are sexually active, are you using birth control? Y/N  N/A Not Applicable    Do you often choke when swallowing liquids or solid food?  No    Do you experience worsening symptoms when overheated? Yes  And in the cold    Do you need any new equipment such as a wheelchair, walker or shower chair? No    Do you receive co-pay financial assistance for your principal MS medicine? Yes    Would you be interested in participating in an MS research trial in the future? No    For patients on Gilenya, Tecfidera, Aubagio, Rituxan, Ocrevus, Tysabri, Lemtrada or Methotrexate, are you aware that you should NOT receive live virus vaccines?  Discussed with patient     Do you feel you have adequate family/friend support?  Yes    Do you have health insurance?   Yes    Are you currently employed? No    Do you receive SSDI/SSI?  Yes    Do you use marijuana or cannabis products? No    Have you been diagnosed with a urinary tract infection since your last visit here? No    Have you been diagnosed with a respiratory tract infection since your last visit here? No    Have you been to the emergency room since your last visit here? No    Have you been hospitalized since your last visit here?  No        Patient-reported            1/2/2025     7:18 AM   FSS SCORE & INTERPRETATION   FSS SCORE  57    FSS SCORE INTERPRETATION May be suffering from fatigue        Patient-reported         1/2/2025     7:16 AM   MS AMAURY-D SCORE & INTERPRETATION   AMAURY-D SCORE  37    AMAURY-D INTERPRETATION  Possibility of major Depression        Patient-reported         1/2/2025     7:14 AM   MS MIRANDA-7 SCORE & INTERPRETATION   MIRANDA-7 SCORE  17    MIRANDA-7 SCORE INTERPRETATION Severe Anxiety        Patient-reported         1/2/2025      7:18 AM   PEQ MS MOS PAIN EFFECTS SCORE & INTERPRETATION   PES SCORE 24    PES SCORE INTERPRETATION Scores can range from 6-30.  Items are scaled so that higher scores indicate a greater impact of pain on a patients mood and behavior.        Patient-reported          No data to display                  1/2/2025     7:20 AM   MS BLADDER CONTROL SCORE & INTERPRETATION   BLCS SCORE 5    BLCS SCORE INTERPRETATION  Scores can range from 0-22, with higher scores indicating greater bladder control problems.        Patient-reported         1/2/2025     7:24 AM   MS BOWEL CONTROL SCORE & INTERPRETATION   BWCS SCORE 3    BWCS SCORE INTERPRETATION Scores can range from 0-26, with higher scores indicating greater bowel control problems.        Patient-reported         1/2/2025     7:20 AM   PEQ MS IMPACT OF VISUAL IMPAIRMENT SCORE & INTERPRETATION   EMMETT SCALE SCORE  8    EMMETT SCORE INTERPRETATION Scores can range from 0-15, with higher scores indicating greater impact of visual problems on daily activites.        Patient-reported         1/2/2025     7:23 AM   MS PDQ SCORE & INTERPRETATION   PDQ RETROSPECTIVE MEMORY SUBSCALE 11    PDQ ATTENTION/CONCENTRATION SUBSCALE 12    PDQ PROSPECTIVE MEMORY SUBSCALE 10    PDQ PLANNING/ORGANIZATION SUBSCALE 14    PDQ TOTAL SCORE 47    PDQ SCORE INTERPRETATION Scores can range from 0-80, with higher scores indicating greater perceived cognitive impairment.        Patient-reported         1/2/2025     7:26 AM   MSSS SCORE & INTERPRETATION   MSSS TANGIBLE SUPPORT SUBSCALE 37.5    MSSS EMOTIONAL/INFORMATIONAL SUPPORT SUBSCALE 31.25    MSSS AFFECTIONATE SUPPORT SUBSCALE 33.33    MSSS POSITIVE SOCIAL INTERACTION SUBSCALE 33.33    MSSS TOTAL SCORE 33.85    MSSS SCORE INTERPRETATION Scores can range from 0-100, with higher scores indicating greater perceived support.        Patient-reported        SOCIAL HISTORY  Living arrangements - the patient lives with their family.  Social History      Socioeconomic History    Marital status:    Tobacco Use    Smoking status: Never    Smokeless tobacco: Never   Substance and Sexual Activity    Alcohol use: No     Alcohol/week: 0.0 standard drinks of alcohol    Drug use: No    Sexual activity: Not Currently     Birth control/protection: Abstinence     Social Drivers of Health     Financial Resource Strain: Medium Risk (6/27/2024)    Overall Financial Resource Strain (CARDIA)     Difficulty of Paying Living Expenses: Somewhat hard   Food Insecurity: Food Insecurity Present (6/27/2024)    Hunger Vital Sign     Worried About Running Out of Food in the Last Year: Patient declined     Ran Out of Food in the Last Year: Sometimes true   Physical Activity: Unknown (6/27/2024)    Exercise Vital Sign     Days of Exercise per Week: Patient declined   Stress: Stress Concern Present (6/27/2024)    [place] [employer] - Occupational Stress Questionnaire     Feeling of Stress : Very much   Housing Stability: Unknown (6/27/2024)    Housing Stability Vital Sign     Unable to Pay for Housing in the Last Year: No       Current Outpatient Medications on File Prior to Visit   Medication Sig Dispense Refill    azelaic acid (AZELEX) 15 % gel Apply topically every morning.      CALCIUM-MAGNESIUM-ZINC ORAL Take by mouth once daily. Calcium-1,000 mg  Magnesium-500 mg  Zinc-25mg      cholecalciferol, vitamin D3, 5,000 unit capsule Take 5,000 Units by mouth once daily.       clindamycin (CLEOCIN T) 1 % lotion MARLENA EXT AA BID      clindamycin phosphate 1% (CLINDAGEL) 1 % gel MARLENA TO THE AFFECTED AREA ON AREAS OF BODY BID  1    coenzyme Q10 300 mg Cap Take 1 capsule by mouth every evening. 400mg      doxycycline 50 MG capsule Take 50 mg by mouth.      levothyroxine (SYNTHROID) 75 MCG tablet Take 1 tablet (75 mcg total) by mouth before breakfast. 90 tablet 1    magnesium oxide (MAG-OX) 400 mg (241.3 mg magnesium) tablet Take 300 mg by mouth once daily.       ocrelizumab 30 mg/mL Soln Inject 600 mg into the vein every 6 (six) months.      omega 3-dha-epa-fish oil 1,000 (120-180) mg Cap Take 1 capsule by mouth once daily.      psyllium (METAMUCIL) powder Take 1 packet by mouth once daily.      SOOLANTRA 1 % Crea       turmeric root extract 500 mg Cap Take by mouth Daily.      vitamins  A,C,E-zinc-copper 14,320-226-200 unit-mg-unit Cap Take by mouth.      whey protein isolate 21 gram-100 kcal/27 gram Powd by NOT APPLICABLE route.      fluocinonide (LIDEX) 0.05 % external solution Apply 0.05 % topically once daily. (Patient not taking: Reported on 1/2/2025)      prednisoLONE-moxiflox-bromfen 1-0.5-0.075 % DrpS Apply 1 drop to eye 3 (three) times daily. 5 mL 3    UNABLE TO FIND Take by mouth 2 (two) times daily. Multigenics without Iron (Patient not taking: Reported on 1/2/2025)      UNABLE TO FIND Take 100 g by mouth once daily. medication name: D-Ribose (Patient not taking: Reported on 1/2/2025)       Current Facility-Administered Medications on File Prior to Visit   Medication Dose Route Frequency Provider Last Rate Last Admin    balanced salt irrigation intra-ocular solution 1 drop  1 drop Right Eye On Call Procedure Oanh Miguel MD        phenylephrine HCL 10% ophthalmic solution 1 drop  1 drop Right Eye PRN Oanh Miguel MD        sodium chloride 0.9% flush 2 mL  2 mL Intravenous PRN Oanh Miguel MD           Objective:     1. 25 foot timed walk:      12/28/2016     8:45 AM   Timed 25 Foot Walk:   Did patient wear an AFO? No   Was assistive device used? No   Time for 25 Foot Walk (seconds) 4.8           NEURO EXAM    In general, the patient is well nourished and appears to be in no acute distress.    MENTAL STATUS: language is fluent, normal verbal comprehension, short-term and remote memory is intact, attention is normal, patient is alert and oriented x 3, fund of knowlege is appropriate by vocabulary.       Imaging:     Personally reviewed.  "    Results for orders placed during the hospital encounter of 10/24/24    MRI Cervical Spine Demyelinating W W/O Contrast    Impression  No evidence of signal abnormality or abnormal enhancement within the cervical cord.  No advanced stenosis.      Electronically signed by: Reinaldo Cochran  Date:    10/24/2024  Time:    15:43    Results for orders placed during the hospital encounter of 10/24/24    MRI Thoracic Spine Demyelinating W W/O Contrast    Impression  No cord signal abnormality or abnormal enhancement in the thoracic spine.    No advanced degenerative change.      Electronically signed by: Reinaldo Cochran  Date:    10/24/2024  Time:    15:47        Labs:     Lab Results   Component Value Date    UWPNPRGO14AH 85 08/01/2023    KVVJCATT04LN 64 06/27/2016    OPBFDQKQ06RH 67 11/30/2015     Lab Results   Component Value Date    JCVINDEX 0.37 (H) 10/02/2024    JCVANTIBODY INDETERMINATE (A) 10/02/2024     No results found for: "RI2RFMUH", "ABSOLUTECD3", "QL9GFDAL", "ABSOLUTECD8", "QS6GIHCP", "ABSOLUTECD4", "LABCD48"  Lab Results   Component Value Date    WBC 6.56 10/02/2024    RBC 4.19 10/02/2024    HGB 13.8 10/02/2024    HCT 42.3 10/02/2024     (H) 10/02/2024    MCH 32.9 (H) 10/02/2024    MCHC 32.6 10/02/2024    RDW 12.0 10/02/2024     10/02/2024    MPV 10.8 10/02/2024    GRAN 3.1 10/02/2024    GRAN 46.9 10/02/2024    LYMPH 2.8 10/02/2024    LYMPH 42.7 10/02/2024    MONO 0.5 10/02/2024    MONO 7.6 10/02/2024    EOS 0.1 10/02/2024    BASO 0.05 10/02/2024    EOSINOPHIL 1.7 10/02/2024    BASOPHIL 0.8 10/02/2024     Sodium   Date Value Ref Range Status   10/02/2024 144 136 - 145 mmol/L Final     Potassium   Date Value Ref Range Status   10/02/2024 4.1 3.5 - 5.1 mmol/L Final     Chloride   Date Value Ref Range Status   10/02/2024 104 95 - 110 mmol/L Final     CO2   Date Value Ref Range Status   10/02/2024 32 (H) 23 - 29 mmol/L Final     Glucose   Date Value Ref Range Status   10/02/2024 81 70 - 110 mg/dL " Final     BUN   Date Value Ref Range Status   10/02/2024 12 6 - 20 mg/dL Final     Creatinine   Date Value Ref Range Status   10/02/2024 0.7 0.5 - 1.4 mg/dL Final     Calcium   Date Value Ref Range Status   10/02/2024 10.3 8.7 - 10.5 mg/dL Final     Total Protein   Date Value Ref Range Status   10/02/2024 7.4 6.0 - 8.4 g/dL Final     Albumin   Date Value Ref Range Status   10/02/2024 4.6 3.5 - 5.2 g/dL Final     Total Bilirubin   Date Value Ref Range Status   10/02/2024 0.5 0.1 - 1.0 mg/dL Final     Comment:     For infants and newborns, interpretation of results should be based  on gestational age, weight and in agreement with clinical  observations.    Premature Infant recommended reference ranges:  Up to 24 hours.............<8.0 mg/dL  Up to 48 hours............<12.0 mg/dL  3-5 days..................<15.0 mg/dL  6-29 days.................<15.0 mg/dL       Alkaline Phosphatase   Date Value Ref Range Status   10/02/2024 93 55 - 135 U/L Final     AST   Date Value Ref Range Status   10/02/2024 29 10 - 40 U/L Final     ALT   Date Value Ref Range Status   10/02/2024 46 (H) 10 - 44 U/L Final     Anion Gap   Date Value Ref Range Status   10/02/2024 8 8 - 16 mmol/L Final     eGFR if    Date Value Ref Range Status   01/12/2018 >60.0 >60 mL/min/1.73 m^2 Final     eGFR if non    Date Value Ref Range Status   01/12/2018 >60.0 >60 mL/min/1.73 m^2 Final     Comment:     Calculation used to obtain the estimated glomerular filtration  rate (eGFR) is the CKD-EPI equation.        Lab Results   Component Value Date    HEPBSAG Non-reactive 10/02/2024    HEPBCAB Non-reactive 10/02/2024           MS Impression and Plan:     NEURO MULTIPLE SCLEROSIS IMPRESSION:   Number of relapses in the past year?:  0  Clinical Progression:  Clinically Stable  MS Classification:  Relapsing-Remitting MS  Current DMT: ocrelizumab  Current DMT comment:   Will continue without pre-med steroids due to negative side effects  it causes the patient   DMT:  No change in management  DMT comment:  She is aware of the risks associated with immunosuppressant therapy, including increased risk of infection.   Symptom Management:  No change in symptom management  Symptom Management comment:   Will consider starting oxybutynin for urinary urgency and leakage, but patient would like to see the eye doctor first due to the potential of causing dry eyes. Also discussed pelvic floor therapy, which the patient does not feel comfortable with doing.   Additional Impressions:   Labs in February   MRIs - preferably repeat brain soon, but patient does need to be put under anesthesia, may consider yearly  Follow up in 3 months  Plan discussed and questions were answered to satisfaction.     Problem List Items Addressed This Visit       MS (multiple sclerosis) - Primary    Relevant Orders    CBC Auto Differential    Comprehensive Metabolic Panel    Hepatitis B Core Antibody, Total    Hepatitis B Surface Antigen    Immunoglobulins (IgG, IgA, IgM) Quantitative    Misc Sendout Test, Blood Vitamin D Level     Other Visit Diagnoses       Vitamin D deficiency        Relevant Orders    Misc Sendout Test, Blood Vitamin D Level            I spent a total of 70 minutes on the day of the visit.This includes face to face time and non-face to face time preparing to see the patient (eg, review of tests), obtaining and/or reviewing separately obtained history, documenting clinical information in the electronic or other health record, independently interpreting results and communicating results to the patient/family/caregiver, or care coordinator.       KAT Helms

## 2025-01-07 ENCOUNTER — TELEPHONE (OUTPATIENT)
Dept: NEUROLOGY | Facility: CLINIC | Age: 55
End: 2025-01-07
Payer: COMMERCIAL

## 2025-01-07 NOTE — TELEPHONE ENCOUNTER
----- Message from [place] Zuniga NP sent at 1/2/2025  3:05 PM CST -----  She is a newer patient for us and this will be the first time we will be ordering Ocrevus. Last infusion was in September (I believe 9/15). She will be getting labs in February.

## 2025-01-07 NOTE — TELEPHONE ENCOUNTER
Last Ocrevus on 9/13/24 at [facility]. She is Scheduled on 3/12/25 at [facility] - prefers to stay there. Labs scheduled on 2/3/25. Advised I will f/u with her after her labs

## 2025-01-09 ENCOUNTER — OFFICE VISIT (OUTPATIENT)
Dept: PSYCHIATRY | Facility: CLINIC | Age: 55
End: 2025-01-09
Payer: COMMERCIAL

## 2025-01-09 DIAGNOSIS — F51.04 CHRONIC INSOMNIA: ICD-10-CM

## 2025-01-09 DIAGNOSIS — F40.01 PANIC DISORDER WITH AGORAPHOBIA: ICD-10-CM

## 2025-01-09 DIAGNOSIS — F32.1 MDD (MAJOR DEPRESSIVE DISORDER), SINGLE EPISODE, MODERATE: Primary | ICD-10-CM

## 2025-01-09 DIAGNOSIS — G89.4 CHRONIC PAIN SYNDROME: ICD-10-CM

## 2025-01-09 DIAGNOSIS — F41.1 GAD (GENERALIZED ANXIETY DISORDER): ICD-10-CM

## 2025-01-09 DIAGNOSIS — F41.1 GENERALIZED ANXIETY DISORDER: ICD-10-CM

## 2025-01-09 RX ORDER — CLONAZEPAM 1 MG/1
TABLET ORAL
Qty: 90 TABLET | Refills: 5 | Status: SHIPPED | OUTPATIENT
Start: 2025-01-09

## 2025-01-09 RX ORDER — ZALEPLON 10 MG/1
10 CAPSULE ORAL NIGHTLY PRN
Qty: 30 CAPSULE | Refills: 2 | Status: SHIPPED | OUTPATIENT
Start: 2025-01-09

## 2025-01-09 RX ORDER — DULOXETIN HYDROCHLORIDE 60 MG/1
120 CAPSULE, DELAYED RELEASE ORAL DAILY
Qty: 60 CAPSULE | Refills: 6 | Status: SHIPPED | OUTPATIENT
Start: 2025-01-09

## 2025-01-09 RX ORDER — BUSPIRONE HYDROCHLORIDE 30 MG/1
30 TABLET ORAL 2 TIMES DAILY
Qty: 180 TABLET | Refills: 3 | Status: SHIPPED | OUTPATIENT
Start: 2025-01-09
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
## 2025-01-14 ENCOUNTER — CLINICAL SUPPORT (OUTPATIENT)
Dept: OPHTHALMOLOGY | Facility: CLINIC | Age: 55
End: 2025-01-14
Payer: COMMERCIAL

## 2025-01-14 ENCOUNTER — OFFICE VISIT (OUTPATIENT)
Dept: OPHTHALMOLOGY | Facility: CLINIC | Age: 55
End: 2025-01-14
Payer: COMMERCIAL

## 2025-01-14 DIAGNOSIS — G35 MS (MULTIPLE SCLEROSIS): ICD-10-CM

## 2025-01-14 DIAGNOSIS — H47.291 PARTIAL OPTIC ATROPHY OF RIGHT EYE: ICD-10-CM

## 2025-01-14 DIAGNOSIS — Z96.1 PSEUDOPHAKIA OF BOTH EYES: ICD-10-CM

## 2025-01-14 DIAGNOSIS — H43.822 VITREOMACULAR ADHESION, LEFT: ICD-10-CM

## 2025-01-14 DIAGNOSIS — Z86.69 H/O OPTIC NEURITIS: ICD-10-CM

## 2025-01-14 DIAGNOSIS — H43.811 POSTERIOR VITREOUS DETACHMENT, RIGHT: Primary | ICD-10-CM

## 2025-01-14 PROCEDURE — 1159F MED LIST DOCD IN RCRD: CPT | Mod: CPTII,S$GLB,, | Performed by: OPHTHALMOLOGY

## 2025-01-14 PROCEDURE — 92134 CPTRZ OPH DX IMG PST SGM RTA: CPT | Mod: S$GLB,,, | Performed by: OPHTHALMOLOGY

## 2025-01-14 PROCEDURE — 99214 OFFICE O/P EST MOD 30 MIN: CPT | Mod: S$GLB,,, | Performed by: OPHTHALMOLOGY

## 2025-01-14 PROCEDURE — 92201 OPSCPY EXTND RTA DRAW UNI/BI: CPT | Mod: S$GLB,,, | Performed by: OPHTHALMOLOGY

## 2025-01-14 PROCEDURE — 1160F RVW MEDS BY RX/DR IN RCRD: CPT | Mod: CPTII,S$GLB,, | Performed by: OPHTHALMOLOGY

## 2025-01-14 PROCEDURE — 99999 PR PBB SHADOW E&M-EST. PATIENT-LVL II: CPT | Mod: PBBFAC,,, | Performed by: OPHTHALMOLOGY

## 2025-01-14 NOTE — PROGRESS NOTES
HPI    Patient is being referred by Dr. Miguel today for floaters and shadow   over vision OS  She mentions being more sensitive to light   Also some flashes of light in OS more recently     S/p Phaco w/IOL OS: 09/18/2024 (monovision near)   S/p phaco/IOL OD 12/22/22 (monovision distance)   MS   H/o Optic Neuritis   OS near/ using monoVA     Last edited by Pema Alicea on 1/14/2025  8:20 AM.         A/P    ICD-10-CM ICD-9-CM   1. Posterior vitreous detachment, right  H43.811 379.21   2. Vitreomacular adhesion, left  H43.822 379.27   3. Pseudophakia of both eyes  Z96.1 V43.1   4. MS (multiple sclerosis)  G35 340   5. Partial optic atrophy of right eye  H47.291 377.15   6. H/O optic neuritis  Z86.69 V12.49       1. Posterior vitreous detachment, right  2. Vitreomacular adhesion, left  Here for floater/flash eval  Prev f/b Dr Miguel - Recent notes reviewed    Exam notable for PVD OD, VMA OS, large central floaters OS  Pt has noted incr symptoms since Nov and had flashes OS yday  No RT/RD with   Plan: Observation for now, counseled on RT/RD risk, monitor, recheck 1 mo  Pathology of PVD, Retinal Tear, Retinal Detachment reviewed in great detail  RD precautions discussed in detail, patient expressed understanding  RTC immediately PRN (especially ANY change flashes, floaters, vision, visual field)     3. Pseudophakia of both eyes  Good lens position OU  Plan: Observation, update Mrx prn     4. MS (multiple sclerosis)  5. Partial optic atrophy of right eye  6. H/O optic neuritis  Hx of optic neuritis OD, none OS  Stable per pt  Plan: Continue Present Management     RTC 1 mo DFE/OCTm OU         I saw and examined the patient and reviewed in detail the findings documented. The final examination findings, image interpretations which have been independently interpreted, and plan as documented in the record represent my personal judgment and conclusions.    Ramon Mcelroy MD  Vitreoretinal Surgery   [facility]

## 2025-01-15 ENCOUNTER — TELEPHONE (OUTPATIENT)
Dept: NEUROLOGY | Facility: CLINIC | Age: 55
End: 2025-01-15
Payer: COMMERCIAL

## 2025-01-15 NOTE — TELEPHONE ENCOUNTER
----- Message from [place] Zuniga NP sent at 1/15/2025 10:23 AM CST -----  I would also like to discontinue pre-med steroids.  She does not tolerate them at all.  She is an anxious person as it is and it just makes it worse. There was talk about getting started on Kesimpta at previous visit with LENARD, but I am not sure if she would be able to give herself the injection, but also did not talk to her about that, just talked about taking out steroids.

## 2025-02-03 ENCOUNTER — LAB VISIT (OUTPATIENT)
Dept: LAB | Facility: HOSPITAL | Age: 55
End: 2025-02-03
Payer: COMMERCIAL

## 2025-02-03 DIAGNOSIS — G35 MS (MULTIPLE SCLEROSIS): ICD-10-CM

## 2025-02-03 DIAGNOSIS — E55.9 VITAMIN D DEFICIENCY: ICD-10-CM

## 2025-02-03 LAB
ALBUMIN SERPL BCP-MCNC: 4.3 G/DL (ref 3.5–5.2)
ALP SERPL-CCNC: 80 U/L (ref 40–150)
ALT SERPL W/O P-5'-P-CCNC: 43 U/L (ref 10–44)
ANION GAP SERPL CALC-SCNC: 8 MMOL/L (ref 8–16)
AST SERPL-CCNC: 31 U/L (ref 10–40)
BASOPHILS # BLD AUTO: 0.06 K/UL (ref 0–0.2)
BASOPHILS NFR BLD: 0.7 % (ref 0–1.9)
BILIRUB SERPL-MCNC: 0.6 MG/DL (ref 0.1–1)
BUN SERPL-MCNC: 13 MG/DL (ref 6–20)
CALCIUM SERPL-MCNC: 9.9 MG/DL (ref 8.7–10.5)
CHLORIDE SERPL-SCNC: 106 MMOL/L (ref 95–110)
CO2 SERPL-SCNC: 26 MMOL/L (ref 23–29)
CREAT SERPL-MCNC: 0.7 MG/DL (ref 0.5–1.4)
DIFFERENTIAL METHOD BLD: ABNORMAL
EOSINOPHIL # BLD AUTO: 0.1 K/UL (ref 0–0.5)
EOSINOPHIL NFR BLD: 0.9 % (ref 0–8)
ERYTHROCYTE [DISTWIDTH] IN BLOOD BY AUTOMATED COUNT: 11.8 % (ref 11.5–14.5)
EST. GFR  (NO RACE VARIABLE): >60 ML/MIN/1.73 M^2
GLUCOSE SERPL-MCNC: 85 MG/DL (ref 70–110)
HBV CORE AB SERPL QL IA: NORMAL
HBV SURFACE AG SERPL QL IA: NORMAL
HCT VFR BLD AUTO: 40.8 % (ref 37–48.5)
HGB BLD-MCNC: 12.6 G/DL (ref 12–16)
IGA SERPL-MCNC: 100 MG/DL (ref 40–350)
IGG SERPL-MCNC: 601 MG/DL (ref 650–1600)
IGM SERPL-MCNC: 53 MG/DL (ref 50–300)
IMM GRANULOCYTES # BLD AUTO: 0.02 K/UL (ref 0–0.04)
IMM GRANULOCYTES NFR BLD AUTO: 0.2 % (ref 0–0.5)
LYMPHOCYTES # BLD AUTO: 3.2 K/UL (ref 1–4.8)
LYMPHOCYTES NFR BLD: 35.8 % (ref 18–48)
MCH RBC QN AUTO: 31.7 PG (ref 27–31)
MCHC RBC AUTO-ENTMCNC: 30.9 G/DL (ref 32–36)
MCV RBC AUTO: 103 FL (ref 82–98)
MONOCYTES # BLD AUTO: 0.6 K/UL (ref 0.3–1)
MONOCYTES NFR BLD: 6.6 % (ref 4–15)
NEUTROPHILS # BLD AUTO: 4.9 K/UL (ref 1.8–7.7)
NEUTROPHILS NFR BLD: 55.8 % (ref 38–73)
NRBC BLD-RTO: 0 /100 WBC
PLATELET # BLD AUTO: 293 K/UL (ref 150–450)
PMV BLD AUTO: 11 FL (ref 9.2–12.9)
POTASSIUM SERPL-SCNC: 3.9 MMOL/L (ref 3.5–5.1)
PROT SERPL-MCNC: 7.1 G/DL (ref 6–8.4)
RBC # BLD AUTO: 3.98 M/UL (ref 4–5.4)
SODIUM SERPL-SCNC: 140 MMOL/L (ref 136–145)
WBC # BLD AUTO: 8.85 K/UL (ref 3.9–12.7)

## 2025-02-03 PROCEDURE — 85025 COMPLETE CBC W/AUTO DIFF WBC: CPT

## 2025-02-03 PROCEDURE — 82784 ASSAY IGA/IGD/IGG/IGM EACH: CPT | Mod: 59

## 2025-02-03 PROCEDURE — 82306 VITAMIN D 25 HYDROXY: CPT

## 2025-02-03 PROCEDURE — 87340 HEPATITIS B SURFACE AG IA: CPT

## 2025-02-03 PROCEDURE — 80053 COMPREHEN METABOLIC PANEL: CPT

## 2025-02-03 PROCEDURE — 86704 HEP B CORE ANTIBODY TOTAL: CPT

## 2025-02-04 LAB — 25(OH)D3+25(OH)D2 SERPL-MCNC: 100 NG/ML (ref 30–96)

## 2025-02-05 ENCOUNTER — PATIENT MESSAGE (OUTPATIENT)
Dept: OPHTHALMOLOGY | Facility: CLINIC | Age: 55
End: 2025-02-05
Payer: COMMERCIAL

## 2025-02-07 ENCOUNTER — PATIENT MESSAGE (OUTPATIENT)
Dept: NEUROLOGY | Facility: CLINIC | Age: 55
End: 2025-02-07
Payer: COMMERCIAL

## 2025-02-07 ENCOUNTER — TELEPHONE (OUTPATIENT)
Dept: NEUROLOGY | Facility: CLINIC | Age: 55
End: 2025-02-07
Payer: COMMERCIAL

## 2025-02-07 NOTE — TELEPHONE ENCOUNTER
Labs   2/3/25  WBC 8.85  ALC 3168  AST 31, ALT 43  IgG 601 IgM 53  IgA 100  HBsAg - anti-Hbc - , no current or past infection    Scheduled 3/12/25 at [facility]. Orders faxed on 2/7/25

## 2025-02-07 NOTE — TELEPHONE ENCOUNTER
----- Message from [place] Zuniga NP sent at 2/6/2025  1:59 PM CST -----  Labs are stable, ok to schedule infusion - she is one that we are DC'ing steroids.

## 2025-02-14 ENCOUNTER — OFFICE VISIT (OUTPATIENT)
Dept: OPHTHALMOLOGY | Facility: CLINIC | Age: 55
End: 2025-02-14
Payer: COMMERCIAL

## 2025-02-14 ENCOUNTER — CLINICAL SUPPORT (OUTPATIENT)
Dept: OPHTHALMOLOGY | Facility: CLINIC | Age: 55
End: 2025-02-14
Payer: COMMERCIAL

## 2025-02-14 DIAGNOSIS — H43.822 VITREOMACULAR ADHESION, LEFT: ICD-10-CM

## 2025-02-14 DIAGNOSIS — Z96.1 PSEUDOPHAKIA OF BOTH EYES: ICD-10-CM

## 2025-02-14 DIAGNOSIS — H43.811 POSTERIOR VITREOUS DETACHMENT, RIGHT: Primary | ICD-10-CM

## 2025-02-14 DIAGNOSIS — G35 MS (MULTIPLE SCLEROSIS): ICD-10-CM

## 2025-02-14 DIAGNOSIS — H47.291 PARTIAL OPTIC ATROPHY OF RIGHT EYE: ICD-10-CM

## 2025-02-14 PROCEDURE — 99999 PR PBB SHADOW E&M-EST. PATIENT-LVL IV: CPT | Mod: PBBFAC,,, | Performed by: OPHTHALMOLOGY

## 2025-02-14 PROCEDURE — 99999 PR PBB SHADOW E&M-EST. PATIENT-LVL I: CPT | Mod: PBBFAC,,,

## 2025-02-14 NOTE — PROGRESS NOTES
HPI    1mo DFE OCT    Pt sts VA still blurry and sees bubble in OS     S/p Phaco w/IOL OS: 09/18/2024 (monovision near)   S/p phaco/IOL OD 12/ 22/22 (monovision distance)   MS   H/o Optic Neuritis   OS near/ using monoVA     Last edited by Pema Alicea on 2/14/2025  9:18 AM.         A/P    ICD-10-CM ICD-9-CM   1. Posterior vitreous detachment, right  H43.811 379.21   2. Vitreomacular adhesion, left  H43.822 379.27   3. Pseudophakia of both eyes  Z96.1 V43.1   4. MS (multiple sclerosis)  G35 340   5. Partial optic atrophy of right eye  H47.291 377.15       1. Posterior vitreous detachment, right  2. Vitreomacular adhesion, left  Here for floater/flash f/u    Exam notable for PVD OD, VMA OS, stable large central floaters OS    Plan: discussed nature of vitreous attachment at length with pt, pt is quite bothered by large central floaters OS. Given large central floaters impacting pt's quality of life and vision, discussed option of vitrectomy. Pt wishes to think about it and contact us when she would like to proceed. Consent obtained today for sx under mac block    Risks, benefits and alternatives to surgery and anesthesia including no treatment were discussed with the patient including: death, coma, blindness, bleeding, retinal detachment, cataract, need for more surgeries and glaucoma. The patient understands and accepts risks All questions were answered and the patient wishes to proceed with surgery    Recommend [facility] [place] Vitrectomy Left Eye   R/B/A to surgery and anesthesia discussed with patient including: death, coma, blindness, bleeding, retinal detachment, cataract, and glaucoma Patient understands and accepts risks All questions answered Patient wishes to proceed with surgery      3. Pseudophakia of both eyes  Good lens position OU  Plan: Observation, update Mrx prn     4. MS (multiple sclerosis)  5. Partial optic atrophy of right eye  6. H/O optic neuritis  Hx of optic neuritis OD, none OS  Stable per  pt  Plan: Continue Present Management     RTC post-op pending surgery planning        I saw and examined the patient and reviewed in detail the findings documented. The final examination findings, image interpretations which have been independently interpreted, and plan as documented in the record represent my personal judgment and conclusions.    Ramon Mcelroy MD  Vitreoretinal Surgery   [facility]

## 2025-02-19 ENCOUNTER — TELEPHONE (OUTPATIENT)
Dept: OPHTHALMOLOGY | Facility: CLINIC | Age: 55
End: 2025-02-19
Payer: COMMERCIAL

## 2025-02-19 DIAGNOSIS — H43.822 VITREOMACULAR ADHESION, LEFT: Primary | ICD-10-CM

## 2025-02-28 PROBLEM — J01.00 ACUTE NON-RECURRENT MAXILLARY SINUSITIS: Status: ACTIVE | Noted: 2025-02-28

## 2025-02-28 PROBLEM — J06.9 UPPER RESPIRATORY TRACT INFECTION: Status: ACTIVE | Noted: 2025-02-28

## 2025-03-07 ENCOUNTER — PATIENT MESSAGE (OUTPATIENT)
Dept: PSYCHIATRY | Facility: CLINIC | Age: 55
End: 2025-03-07
Payer: COMMERCIAL

## 2025-04-08 ENCOUNTER — PATIENT MESSAGE (OUTPATIENT)
Dept: PSYCHIATRY | Facility: CLINIC | Age: 55
End: 2025-04-08
Payer: COMMERCIAL

## 2025-04-08 ENCOUNTER — PATIENT MESSAGE (OUTPATIENT)
Dept: OPHTHALMOLOGY | Facility: CLINIC | Age: 55
End: 2025-04-08
Payer: COMMERCIAL

## 2025-05-05 ENCOUNTER — PATIENT MESSAGE (OUTPATIENT)
Dept: PSYCHIATRY | Facility: CLINIC | Age: 55
End: 2025-05-05
Payer: COMMERCIAL

## 2025-05-06 ENCOUNTER — OFFICE VISIT (OUTPATIENT)
Dept: NEUROLOGY | Facility: CLINIC | Age: 55
End: 2025-05-06
Payer: COMMERCIAL

## 2025-05-06 VITALS — BODY MASS INDEX: 24.26 KG/M2 | WEIGHT: 131.81 LBS | HEIGHT: 62 IN

## 2025-05-06 DIAGNOSIS — R32 URINARY INCONTINENCE, UNSPECIFIED TYPE: ICD-10-CM

## 2025-05-06 DIAGNOSIS — G35 MS (MULTIPLE SCLEROSIS): Primary | ICD-10-CM

## 2025-05-06 DIAGNOSIS — E55.9 VITAMIN D DEFICIENCY: ICD-10-CM

## 2025-05-06 PROCEDURE — 3008F BODY MASS INDEX DOCD: CPT | Mod: CPTII,S$GLB,,

## 2025-05-06 PROCEDURE — 1159F MED LIST DOCD IN RCRD: CPT | Mod: CPTII,S$GLB,,

## 2025-05-06 PROCEDURE — 99215 OFFICE O/P EST HI 40 MIN: CPT | Mod: S$GLB,,,

## 2025-05-06 PROCEDURE — G2211 COMPLEX E/M VISIT ADD ON: HCPCS | Mod: S$GLB,,,

## 2025-05-06 PROCEDURE — 99999 PR PBB SHADOW E&M-EST. PATIENT-LVL V: CPT | Mod: PBBFAC,,,

## 2025-05-06 PROCEDURE — 99417 PROLNG OP E/M EACH 15 MIN: CPT | Mod: S$GLB,,,

## 2025-05-06 PROCEDURE — 1160F RVW MEDS BY RX/DR IN RCRD: CPT | Mod: CPTII,S$GLB,,

## 2025-05-06 RX ORDER — OXYBUTYNIN CHLORIDE 5 MG/1
5 TABLET ORAL 2 TIMES DAILY
Qty: 180 TABLET | Refills: 1 | Status: SHIPPED | OUTPATIENT
Start: 2025-05-06

## 2025-05-06 NOTE — Clinical Note
Hi Dr. Montiel (Staff),   I just saw Ms. Osullivan for MS and she was expressing to me that her anxiety has been significantly increased lately due to a number of family life stressors. She was wondering if there was any way she could be seen soon(ayaka) to discuss.  I did suggest a referral to psychology and let her know about [facility], but she seemed pretty reluctant to those options. I let her know that I would reach out, but did make sure to let her know that I was now sure if anything sooner would be available. If someone is able to reach out to her, it would be greatly appreciated.  Thank you!

## 2025-05-06 NOTE — Clinical Note
She is going to need to get MRIs under sedation, please. She did not want to be under sedation so long, so that is why I only ordered brain.  She also doesn't need them until October.  Thanks!

## 2025-05-06 NOTE — Clinical Note
She says that she still received steroids at the last visit.  I cannot see what was given since she does not get her infusion here to verify if she did or not.  Did we maybe decided to 1/2 it instead? She says that she tolerated them a little better, so I do wonder if it was actually taken out and she was just mistaken with what she was give.  Can we make sure the steroids are taking our to pre-meds going forward? Thanks!

## 2025-05-06 NOTE — PROGRESS NOTES
Patient ID: Jillian Osullivan is a 54 y.o. female who presents today for a routine clinic visit for MS.  She was last seen on 1/2/2025.  The history was provided by the patient.     NEURO MULTIPLE SCLEROISIS SUMMARY:   Principle neurological diagnosis:  MS  Date of Symptom Onset:  2007  Date of Diagnosis:  2007  Disease type at diagnosis:  Relapsing-Remitting MS  Disease type currently:  Relapsing-Remitting MS  Previous Therapy:  First DMT:  Glatiramer acetate - 2007 (ISR)  Second DMT:  Interferon beta-1a IM - 3966-7357 (worsening depression)  Third DMT:  Terifluomide - 4985-7944 (peripheral neuropathy)  Fourth DMT:  Dimethyl fumarate - 2018  Current Therapy:  Ocrelizumab - 2019 - present, she does have adverse effects from steroids.   Last MRI Brain:  7/31/2024 - new lesions, but compared to 2018  Last MRI C-Spine:  10/24/2024 - no lesions  Last MRI T-Spine:  10/24/2024 - no lesions  Date JCV Completed:  10/2/2024  JCV Index:  0.37  JCV Antibody:  Positive  Lab Notes:  CSF not on file, but reported c/w MS  Other relevant labs and studies:    10/2/2024: sNfL - 9.1  2/3/2025: vitamin D - 100      Subjective:     She states that she is going through a great deal of family issues right now and it is weighing on her.  She states that the earliest she is able to see her psychiatrist Dr. Montiel is in August.     She says that she thinks she did still get steroids with her last infusion, but she states that she feels that she tolerated it better this time.     She did try ALA, but she states that she did not tolerate it.     She did increase CoQ10 to 400mg, but she is still having fatigue, but she is also not sleeping well due to anxiety.     She does continue to have urinary symptoms with leakage.  She would like to try oxybutynin now and still defers pelvic floor therapy.     ROS:      1/2/2025     7:13 AM   REVIEW OF SYMPTOMS   Do you feel abnormally tired on most days? No   Do you feel you generally sleep well? No    Do you have difficulty controlling your bladder?  Yes   Do you have difficulty controlling your bowels?  No   Do you have frequent muscle cramps, tightness or spasms in your limbs?  Yes   Do you have new visual symptoms?  Yes   Do you have worsening difficulty with your memory or thinking? No   Do you have worsening symptoms of anxiety or depression?  Yes   For patients who walk, Do you have more difficulty walking?  Not Applicable   Have you fallen since your last visit?  No   For patients who use wheelchairs: Do you have any skin wounds or breakdown? Not Applicable   Do you have difficulty using your hands?  No   Do you have shooting or burning pain? No   Do you have difficulty with sexual function?  Yes   If you are sexually active, are you using birth control? Y/N  N/A Not Applicable   Do you often choke when swallowing liquids or solid food?  No   Do you experience worsening symptoms when overheated? Yes   Do you need any new equipment such as a wheelchair, walker or shower chair? No   Do you receive co-pay financial assistance for your principal MS medicine? Yes   Would you be interested in participating in an MS research trial in the future? No   For patients on Gilenya, Tecfidera, Aubagio, Rituxan, Ocrevus, Tysabri, Lemtrada or Methotrexate, are you aware that you should NOT receive live virus vaccines?  No   Do you feel you have adequate family/friend support?  Yes   Do you have health insurance?   Yes   Are you currently employed? No   Do you receive SSDI/SSI?  Yes   Do you use marijuana or cannabis products? No   Have you been diagnosed with a urinary tract infection since your last visit here? No   Have you been diagnosed with a respiratory tract infection since your last visit here? No   Have you been to the emergency room since your last visit here? No   Have you been hospitalized since your last visit here?  No            1/2/2025     7:18 AM   FSS SCORE & INTERPRETATION   FSS SCORE  57    FSS SCORE  INTERPRETATION May be suffering from fatigue        Patient-reported         1/2/2025     7:16 AM   MS AMAURY-D SCORE & INTERPRETATION   AMAURY-D SCORE  37    AMAURY-D INTERPRETATION  Possibility of major Depression        Patient-reported         1/2/2025     7:14 AM   MS MIRANDA-7 SCORE & INTERPRETATION   MIRANDA-7 SCORE  17    MIRANDA-7 SCORE INTERPRETATION Severe Anxiety        Patient-reported         1/2/2025     7:18 AM   PEQ MS MOS PAIN EFFECTS SCORE & INTERPRETATION   PES SCORE 24    PES SCORE INTERPRETATION Scores can range from 6-30.  Items are scaled so that higher scores indicate a greater impact of pain on a patients mood and behavior.        Patient-reported          No data to display                  1/2/2025     7:20 AM   MS BLADDER CONTROL SCORE & INTERPRETATION   BLCS SCORE 5    BLCS SCORE INTERPRETATION  Scores can range from 0-22, with higher scores indicating greater bladder control problems.        Patient-reported         1/2/2025     7:24 AM   MS BOWEL CONTROL SCORE & INTERPRETATION   BWCS SCORE 3    BWCS SCORE INTERPRETATION Scores can range from 0-26, with higher scores indicating greater bowel control problems.        Patient-reported         1/2/2025     7:20 AM   PEQ MS IMPACT OF VISUAL IMPAIRMENT SCORE & INTERPRETATION   EMMETT SCALE SCORE  8    EMMETT SCORE INTERPRETATION Scores can range from 0-15, with higher scores indicating greater impact of visual problems on daily activites.        Patient-reported         1/2/2025     7:23 AM   MS PDQ SCORE & INTERPRETATION   PDQ RETROSPECTIVE MEMORY SUBSCALE 11    PDQ ATTENTION/CONCENTRATION SUBSCALE 12    PDQ PROSPECTIVE MEMORY SUBSCALE 10    PDQ PLANNING/ORGANIZATION SUBSCALE 14    PDQ TOTAL SCORE 47    PDQ SCORE INTERPRETATION Scores can range from 0-80, with higher scores indicating greater perceived cognitive impairment.        Patient-reported         1/2/2025     7:26 AM   MSSS SCORE & INTERPRETATION   MSSS TANGIBLE SUPPORT SUBSCALE 37.5    MSSS  EMOTIONAL/INFORMATIONAL SUPPORT SUBSCALE 31.25    MSSS AFFECTIONATE SUPPORT SUBSCALE 33.33    MSSS POSITIVE SOCIAL INTERACTION SUBSCALE 33.33    MSSS TOTAL SCORE 33.85    MSSS SCORE INTERPRETATION Scores can range from 0-100, with higher scores indicating greater perceived support.        Patient-reported        SOCIAL HISTORY  Living arrangements - the patient lives with their family.  Social History     Socioeconomic History    Marital status:    Tobacco Use    Smoking status: Never    Smokeless tobacco: Never   Substance and Sexual Activity    Alcohol use: No     Alcohol/week: 0.0 standard drinks of alcohol    Drug use: No    Sexual activity: Not Currently     Birth control/protection: Abstinence     Social Drivers of Health     Financial Resource Strain: Patient Declined (2/13/2025)    Overall Financial Resource Strain (CARDIA)     Difficulty of Paying Living Expenses: Patient declined   Food Insecurity: Patient Declined (2/13/2025)    Hunger Vital Sign     Worried About Running Out of Food in the Last Year: Patient declined     Ran Out of Food in the Last Year: Patient declined   Transportation Needs: Patient Declined (2/13/2025)    PRAPARE - Transportation     Lack of Transportation (Medical): Patient declined     Lack of Transportation (Non-Medical): Patient declined   Physical Activity: Patient Declined (2/13/2025)    Exercise Vital Sign     Days of Exercise per Week: Patient declined     Minutes of Exercise per Session: Patient declined   Stress: Stress Concern Present (2/13/2025)    [employer] - Occupational Stress Questionnaire     Feeling of Stress : Rather much   Housing Stability: Patient Declined (2/13/2025)    Housing Stability Vital Sign     Unable to Pay for Housing in the Last Year: Patient declined     Homeless in the Last Year: Patient declined       Medications Ordered Prior to Encounter[1]    Objective:     1. 25 foot timed walk:      12/28/2016     8:45 AM  5/6/2025    10:30 AM   Timed 25 Foot Walk:   Did patient wear an AFO? No No   Was assistive device used? No Yes   Assistive device used (ej one):  Unilateral Assistance   Time for 25 Foot Walk (seconds) 4.8 6.85   Time for 25 Foot Walk (seconds)  7.51       2. SDMT       No data to display                       NEURO EXAM    In general, the patient is well nourished and appears to be in no acute distress.    MENTAL STATUS: language is fluent, normal verbal comprehension, short-term and remote memory is intact, attention is normal, patient is alert and oriented x 3, fund of knowlege is appropriate by vocabulary.     CRANIAL NERVE EXAM:  There is no internuclear ophthalmoplegia.  Extraocular muscles are intact. No facial asymmetry. Facial sensation is intact bilaterally. There is no dysarthria. Uvula is midline, and palate moves symmetrically. Tongue protrusion is midline. Hearing is slightly decreased to finger rub on left. Neck is supple with full ROM    MOTOR EXAM: Normal bulk and tone throughout UE and LE bilaterally. Rapid sequential movements are slowed; Strength: JEANNINE BUE deltoids due to pain     Strength:  R biceps 5/5, L biceps 5/5  R triceps 5/5, L triceps 5/5  R wrist flexors 5/5, L wrist flexors 5/5  R wrist extensors 5/5, L wrist extensors 5/5  R finger abductors 3/5, L finger abductors 3/5  R hip flexors 4+/5, L hip flexors 4+/5  R knee extensors 5/5, L knee extensors 5/5  R knee flexors 5/5, L knee flexors 5/5  R ankle dorsiflexors 4+/5, L ankle dorsiflexors 5/5  R ankle plantarflexors 5/5, L ankle plantarflexors 5/5    COORDINATION: Normal finger-to-nose exam.    GAIT: wide based and stable, and cane dependent     Imaging: personally reviewed     Results for orders placed during the hospital encounter of 10/24/24    MRI Cervical Spine Demyelinating W W/O Contrast    Impression  No evidence of signal abnormality or abnormal enhancement within the cervical cord.  No advanced  "stenosis.      Electronically signed by: Reinaldo Cochran  Date:    10/24/2024  Time:    15:43    Results for orders placed during the hospital encounter of 10/24/24    MRI Thoracic Spine Demyelinating W W/O Contrast    Impression  No cord signal abnormality or abnormal enhancement in the thoracic spine.    No advanced degenerative change.      Electronically signed by: Reinaldo Cochran  Date:    10/24/2024  Time:    15:47        Labs: personally reviewed      Lab Results   Component Value Date    AHNCVBYU18LK 100 (H) 02/03/2025    KTWWNTPC68GF 85 08/01/2023    GRBWQATI71RU 64 06/27/2016     Lab Results   Component Value Date    JCVINDEX 0.37 (H) 10/02/2024    JCVANTIBODY INDETERMINATE (A) 10/02/2024     No results found for: "NN1LQPKX", "ABSOLUTECD3", "NI9MEPRE", "ABSOLUTECD8", "LK2ZUQZO", "ABSOLUTECD4", "LABCD48"  Lab Results   Component Value Date    WBC 8.85 02/03/2025    RBC 3.98 (L) 02/03/2025    HGB 12.6 02/03/2025    HCT 40.8 02/03/2025     (H) 02/03/2025    MCH 31.7 (H) 02/03/2025    MCHC 30.9 (L) 02/03/2025    RDW 11.8 02/03/2025     02/03/2025    MPV 11.0 02/03/2025    GRAN 4.9 02/03/2025    GRAN 55.8 02/03/2025    LYMPH 3.2 02/03/2025    LYMPH 35.8 02/03/2025    MONO 0.6 02/03/2025    MONO 6.6 02/03/2025    EOS 0.1 02/03/2025    BASO 0.06 02/03/2025    EOSINOPHIL 0.9 02/03/2025    BASOPHIL 0.7 02/03/2025     Sodium   Date Value Ref Range Status   02/03/2025 140 136 - 145 mmol/L Final     Potassium   Date Value Ref Range Status   02/03/2025 3.9 3.5 - 5.1 mmol/L Final     Chloride   Date Value Ref Range Status   02/03/2025 106 95 - 110 mmol/L Final     CO2   Date Value Ref Range Status   02/03/2025 26 23 - 29 mmol/L Final     Glucose   Date Value Ref Range Status   02/03/2025 85 70 - 110 mg/dL Final     BUN   Date Value Ref Range Status   02/03/2025 13 6 - 20 mg/dL Final     Creatinine   Date Value Ref Range Status   02/03/2025 0.7 0.5 - 1.4 mg/dL Final     Calcium   Date Value Ref Range Status "   02/03/2025 9.9 8.7 - 10.5 mg/dL Final     Total Protein   Date Value Ref Range Status   02/03/2025 7.1 6.0 - 8.4 g/dL Final     Albumin   Date Value Ref Range Status   02/03/2025 4.3 3.5 - 5.2 g/dL Final     Total Bilirubin   Date Value Ref Range Status   02/03/2025 0.6 0.1 - 1.0 mg/dL Final     Comment:     For infants and newborns, interpretation of results should be based  on gestational age, weight and in agreement with clinical  observations.    Premature Infant recommended reference ranges:  Up to 24 hours.............<8.0 mg/dL  Up to 48 hours............<12.0 mg/dL  3-5 days..................<15.0 mg/dL  6-29 days.................<15.0 mg/dL       Alkaline Phosphatase   Date Value Ref Range Status   02/03/2025 80 40 - 150 U/L Final     AST   Date Value Ref Range Status   02/03/2025 31 10 - 40 U/L Final     ALT   Date Value Ref Range Status   02/03/2025 43 10 - 44 U/L Final     Anion Gap   Date Value Ref Range Status   02/03/2025 8 8 - 16 mmol/L Final     eGFR if    Date Value Ref Range Status   01/12/2018 >60.0 >60 mL/min/1.73 m^2 Final     eGFR if non    Date Value Ref Range Status   01/12/2018 >60.0 >60 mL/min/1.73 m^2 Final     Comment:     Calculation used to obtain the estimated glomerular filtration  rate (eGFR) is the CKD-EPI equation.        Lab Results   Component Value Date    HEPBSAG Non-reactive 02/03/2025    HEPBCAB Non-reactive 02/03/2025           MS Impression and Plan:     NEURO MULTIPLE SCLEROSIS IMPRESSION:   Number of relapses in the past year?:  0  Clinical Progression:  Clinically Stable  MRI Progression:  Stable  MS Classification:  Relapsing-Remitting MS  Current DMT: ocrelizumab  DMT:  No change in management  DMT comment:  She is aware of the risks associated with immunosuppressant therapy, including increased risk of infection.   Symptom Management:  Implement change in symptom management  Implement Change in Symptom Management:  Mood and  Bladder  Implement Change in Symptom Management comment:   MOOD: discussed [employer] to help find a psychiatrist, if she is wanting to change or see someone sooner. I will also reach out to Dr. Montiel for patient to make him aware of the situation, if he is able to see her sooner.   BLADDER: will trial oxybutynin.   Additional Impressions:   Patient is remaining stable on Ocrevus - will make sure to discontinue pre-med steroids going forward.   Discussed how depression and sleep are most likely strongly impacted due to increased depression and anxiety recommended following up with psychiatry soon.   Safety labs in August for September infusion  Brain MRI w w/o under sedation in October.   Follow up with Dr. Pandya in 6 months   Plan discussed and questions were answered to satisfaction.     Problem List Items Addressed This Visit       MS (multiple sclerosis) - Primary    Relevant Medications    oxybutynin (DITROPAN) 5 MG Tab    Other Relevant Orders    MRI Brain Demyelinating W W/O Contrast    CBC Auto Differential    Comprehensive Metabolic Panel    Hepatitis B Core Antibody, Total    Hepatitis B Surface Antigen    Immunoglobulins (IgG, IgA, IgM) Quantitative    Vitamin D     Other Visit Diagnoses         Vitamin D deficiency        Relevant Orders    Vitamin D      Urinary incontinence, unspecified type        Relevant Medications    oxybutynin (DITROPAN) 5 MG Tab    Other Relevant Orders    MRI Brain Demyelinating W W/O Contrast    CBC Auto Differential    Comprehensive Metabolic Panel    Hepatitis B Core Antibody, Total    Hepatitis B Surface Antigen    Immunoglobulins (IgG, IgA, IgM) Quantitative    Vitamin D               I spent a total of 90 minutes on the day of the visit.This includes face to face time and non-face to face time preparing to see the patient (eg, review of tests), obtaining and/or reviewing separately obtained history, documenting clinical information in the electronic or other  health record, independently interpreting results and communicating results to the patient/family/caregiver, or care coordinator.       KAT Helms         [1]   Current Outpatient Medications on File Prior to Visit   Medication Sig Dispense Refill    azelaic acid (AZELEX) 15 % gel Apply topically every morning.      busPIRone (BUSPAR) 30 MG Tab Take 1 tablet (30 mg total) by mouth 2 (two) times daily. 180 tablet 3    CALCIUM-MAGNESIUM-ZINC ORAL Take by mouth once daily. Calcium-1,000 mg  Magnesium-500 mg  Zinc-25mg      cholecalciferol, vitamin D3, 5,000 unit capsule Take 5,000 Units by mouth once daily.       clindamycin (CLEOCIN T) 1 % lotion MARLENA EXT AA BID      clindamycin phosphate 1% (CLINDAGEL) 1 % gel MARLENA TO THE AFFECTED AREA ON AREAS OF BODY BID  1    clonazePAM (KLONOPIN) 1 MG tablet Take oral 1/2 - 1 tablet q 6 hours prn anxiety or insomnia 90 tablet 5    coenzyme Q10 300 mg Cap Take 1 capsule by mouth every evening. 400mg      doxycycline 50 MG capsule Take 50 mg by mouth.      DULoxetine (CYMBALTA) 60 MG capsule Take 2 capsules (120 mg total) by mouth once daily. 60 capsule 6    levothyroxine (SYNTHROID) 75 MCG tablet Take 1 tablet (75 mcg total) by mouth before breakfast. 90 tablet 1    magnesium oxide (MAG-OX) 400 mg (241.3 mg magnesium) tablet Take 300 mg by mouth once daily.      ocrelizumab 30 mg/mL Soln Inject 600 mg into the vein every 6 (six) months.      omega 3-dha-epa-fish oil 1,000 (120-180) mg Cap Take 1 capsule by mouth once daily.      psyllium (METAMUCIL) powder Take 1 packet by mouth once daily.      SOOLANTRA 1 % Crea       turmeric root extract 500 mg Cap Take by mouth Daily.      vitamins  A,C,E-zinc-copper 14,320-226-200 unit-mg-unit Cap Take by mouth.      whey protein isolate 21 gram-100 kcal/27 gram Powd by NOT APPLICABLE route.      zaleplon (SONATA) 10 MG capsule Take 1 capsule (10 mg total) by mouth nightly as needed (insomnia). 30 capsule 2    fluocinonide (LIDEX)  0.05 % external solution Apply 0.05 % topically once daily. (Patient not taking: Reported on 5/6/2025)      UNABLE TO FIND Take by mouth 2 (two) times daily. Multigenics without Iron       Current Facility-Administered Medications on File Prior to Visit   Medication Dose Route Frequency Provider Last Rate Last Admin    balanced salt irrigation intra-ocular solution 1 drop  1 drop Right Eye On Call Procedure Oanh Miguel MD        phenylephrine HCL 10% ophthalmic solution 1 drop  1 drop Right Eye PRN Oanh Miguel MD        sodium chloride 0.9% flush 2 mL  2 mL Intravenous PRN Oanh Miguel MD

## 2025-05-13 ENCOUNTER — PATIENT MESSAGE (OUTPATIENT)
Dept: PSYCHIATRY | Facility: CLINIC | Age: 55
End: 2025-05-13
Payer: COMMERCIAL

## 2025-05-15 ENCOUNTER — TELEPHONE (OUTPATIENT)
Dept: PSYCHIATRY | Facility: CLINIC | Age: 55
End: 2025-05-15
Payer: COMMERCIAL

## 2025-05-27 ENCOUNTER — TELEPHONE (OUTPATIENT)
Dept: NEUROLOGY | Facility: CLINIC | Age: 55
End: 2025-05-27
Payer: COMMERCIAL

## 2025-05-27 NOTE — TELEPHONE ENCOUNTER
Spoke to Valentina at [facility] and advised patient should NOT be receiving steroids with Ocrevus. She states order from Dr. Prince came in after ours and took over our orders. Those orders had steroids on it so that is why patient received steroids. Advised Dr. Pandya / [place] have taken over the patient care. Next appt on 9/10/25

## 2025-05-27 NOTE — TELEPHONE ENCOUNTER
----- Message from [place] Zuniga NP sent at 5/24/2025  1:53 PM CDT -----  She says that she still received steroids at the last visit.  I cannot see what was given since she does not get her infusion here to verify if she did or not.  Did we maybe decided to 1/2 it instead? She says that she tolerated them a little better, so I do wonder if it was actually taken out and she was just mistaken with what she was give.  Can we make sure the steroids are taking our to pre-meds going forward? Thanks!

## (undated) DEVICE — SOL PVP-I SCRUB 7.5% 4OZ

## (undated) DEVICE — DRAPE OPHTHALMIC 48X62 FEN

## (undated) DEVICE — SOL BETADINE 5%

## (undated) DEVICE — Device

## (undated) DEVICE — DUOVISC

## (undated) DEVICE — SYR LUER LOCK 1CC

## (undated) DEVICE — DRESSING TRANS 2X2 TEGADERM

## (undated) DEVICE — SYR SLIP TIP 1CC

## (undated) DEVICE — GLOVE BIOGEL ECLIPSE SZ 6.5

## (undated) DEVICE — GLASSES EYE PROTECTIVE